# Patient Record
Sex: FEMALE | Race: BLACK OR AFRICAN AMERICAN | NOT HISPANIC OR LATINO | Employment: UNEMPLOYED | URBAN - METROPOLITAN AREA
[De-identification: names, ages, dates, MRNs, and addresses within clinical notes are randomized per-mention and may not be internally consistent; named-entity substitution may affect disease eponyms.]

---

## 2017-01-27 ENCOUNTER — HISTORICAL (OUTPATIENT)
Dept: ADMINISTRATIVE | Facility: HOSPITAL | Age: 57
End: 2017-01-27

## 2017-05-15 ENCOUNTER — OFFICE VISIT (OUTPATIENT)
Dept: ORTHOPEDICS | Facility: CLINIC | Age: 57
End: 2017-05-15
Payer: MEDICAID

## 2017-05-15 VITALS
BODY MASS INDEX: 37.39 KG/M2 | DIASTOLIC BLOOD PRESSURE: 85 MMHG | WEIGHT: 219 LBS | SYSTOLIC BLOOD PRESSURE: 130 MMHG | HEIGHT: 64 IN | HEART RATE: 84 BPM

## 2017-05-15 DIAGNOSIS — M25.561 CHRONIC PAIN OF BOTH KNEES: ICD-10-CM

## 2017-05-15 DIAGNOSIS — M79.641 PAIN IN BOTH HANDS: Primary | ICD-10-CM

## 2017-05-15 DIAGNOSIS — G89.29 CHRONIC PAIN OF BOTH KNEES: ICD-10-CM

## 2017-05-15 DIAGNOSIS — M25.562 CHRONIC PAIN OF BOTH KNEES: ICD-10-CM

## 2017-05-15 DIAGNOSIS — M79.642 PAIN IN BOTH HANDS: Primary | ICD-10-CM

## 2017-05-15 PROCEDURE — 99203 OFFICE O/P NEW LOW 30 MIN: CPT | Mod: ,,, | Performed by: ORTHOPAEDIC SURGERY

## 2017-05-15 PROCEDURE — 73560 X-RAY EXAM OF KNEE 1 OR 2: CPT | Mod: 50,,, | Performed by: ORTHOPAEDIC SURGERY

## 2017-05-15 PROCEDURE — 73120 X-RAY EXAM OF HAND: CPT | Mod: 50,,, | Performed by: ORTHOPAEDIC SURGERY

## 2017-05-15 RX ORDER — AMLODIPINE BESYLATE 5 MG/1
5 TABLET ORAL DAILY
COMMUNITY
End: 2017-07-17

## 2017-05-15 RX ORDER — HYDROCHLOROTHIAZIDE 25 MG/1
25 TABLET ORAL DAILY
COMMUNITY
End: 2018-12-03 | Stop reason: SDUPTHER

## 2017-05-15 RX ORDER — CARVEDILOL 6.25 MG/1
6.25 TABLET ORAL 2 TIMES DAILY WITH MEALS
COMMUNITY
End: 2018-12-03 | Stop reason: SDUPTHER

## 2017-05-15 NOTE — PROGRESS NOTES
Subjective:     Chief Complaint  Chief Complaint   Patient presents with    Left Hand - Pain     Left hand is worse than her right hand and has been occurring for about a month no known injury.    Right Hand - Pain       HPI  Chelsea Grossman is a 57 y.o.  female who complains of numbness in both hands. On her physical examination she has numbness in the median nerve distribution of both hands.examination of her knees she has full range of motion there is no effusion and no crepitance on range of motion x-ray examination of the left and right hand show some mild degenerative changes. X-ray examination of the left and right knee are negative      Review of Systems     Constitutional: Negative    HENT: Negative  Eyes: Negative  Respiratory: Negative  Cardiovascular: Negative  Musculoskeletal: HPI  Skin: Negative  Neurological: Negative  Hematological: Negative  Endocrine: Negative      Physical Exam    Vitals:    05/15/17 1438   BP: 130/85   Pulse: 84     Physical Examination:     General appearance -  well appearing, and in no distress  Mental status - awake  Neck - supple  Chest -  symmetric air entry  Heart - normal rate   Abdomen - soft    Past Medical History  Past Medical History:   Diagnosis Date    Hypertension        Past Surgical History  Past Surgical History:   Procedure Laterality Date    HYSTEROTOMY      kidney stones      Left ear surgery      Right pinky surgery         Medications  Current Outpatient Prescriptions   Medication Sig    amlodipine (NORVASC) 5 MG tablet Take 5 mg by mouth once daily.    carvedilol (COREG) 6.25 MG tablet Take 6.25 mg by mouth 2 (two) times daily with meals.    hydrochlorothiazide (HYDRODIURIL) 25 MG tablet Take 25 mg by mouth once daily.    loratadine (CLARITIN) 5 mg chewable tablet Take 5 mg by mouth once daily.     No current facility-administered medications for this visit.        Allergies  Review of patient's allergies indicates:  No Known  Allergies        Assessment/Plan   Pain in both hands  -     X-Ray Hand 3 View Bilateral  -     Nerve conduction test; Future    Chronic pain of both knees  -     X-Ray Knee 1 or 2 View Left  -     X-Ray Knee 1 or 2 View Right

## 2017-05-15 NOTE — MR AVS SNAPSHOT
[facility]  [address]  [address]  [place] [address]  Phone: [phone]  Fax: [phone]                  Chelsea Grossman   5/15/2017 2:20 PM   Office Visit    Description:  Female : 1960   Provider:  Harpreet Rothman MD   Department:  [facility]           Reason for Visit     Left Hand - Pain     Right Hand - Pain           Diagnoses this Visit        Comments    Pain in both hands    -  Primary     Chronic pain of both knees                To Do List           Future Appointments        Provider Department Dept Phone    2017 8:40 AM Harpreet Rothman MD [facility] [phone]      Goals (5 Years of Data)     None      Follow-Up and Disposition     Return in about 1 week (around 2017) for after nerve conduction test.    Follow-up and Disposition History           Medications           Message regarding Medications     Verify the changes and/or additions to your medication regime listed below are the same as discussed with your clinician today.  If any of these changes or additions are incorrect, please notify your healthcare provider.             Verify that the below list of medications is an accurate representation of the medications you are currently taking.  If none reported, the list may be blank. If incorrect, please contact your healthcare provider. Carry this list with you in case of emergency.           Current Medications     amlodipine (NORVASC) 5 MG tablet Take 5 mg by mouth once daily.    carvedilol (COREG) 6.25 MG tablet Take 6.25 mg by mouth 2 (two) times daily with meals.    hydrochlorothiazide (HYDRODIURIL) 25 MG tablet Take 25 mg by mouth once daily.    loratadine (CLARITIN) 5 mg chewable tablet Take 5 mg by mouth once daily.           Clinical Reference Information           Your Vitals Were     BP Pulse Height Weight BMI    130/85 (BP Location: Left arm, Patient Position:  "Sitting) 84 5' 4" (1.626 m) 99.3 kg (219 lb) 37.59 kg/m2      Blood Pressure          Most Recent Value    BP  130/85      Allergies as of 5/15/2017     No Known Allergies      Immunizations Administered on Date of Encounter - 5/15/2017     None      Orders Placed During Today's Visit      Normal Orders This Visit    X-Ray Hand 3 View Bilateral     X-Ray Knee 1 or 2 View Left     X-Ray Knee 1 or 2 View Right     Future Labs/Procedures Expected by Expires    Nerve conduction test  As directed 5/15/2018      gripNote Sign UP     Activating your gripNote account is as easy as 1-2-3!     1) Visit [email or web address], select Sign Up Now, enter this activation code and your date of birth, then select Next.  [email or web address]  Expires: 6/29/2017  3:15 PM      2) Create a username and password to use when you visit [employer]Note in the future and select a security question in case you lose your password and select Next.    3) Enter your e-mail address and click Sign Up!    Additional Information  If you have questions, please  call [phone] to talk to our gripNote staff. Remember, gripNote is NOT to be used for urgent needs. For medical emergencies, dial 911.         "

## 2017-05-22 ENCOUNTER — OFFICE VISIT (OUTPATIENT)
Dept: ORTHOPEDICS | Facility: CLINIC | Age: 57
End: 2017-05-22
Payer: MEDICAID

## 2017-05-22 VITALS
HEIGHT: 64 IN | HEART RATE: 84 BPM | WEIGHT: 213 LBS | DIASTOLIC BLOOD PRESSURE: 83 MMHG | SYSTOLIC BLOOD PRESSURE: 126 MMHG | BODY MASS INDEX: 36.37 KG/M2

## 2017-05-22 DIAGNOSIS — G56.03 CARPAL TUNNEL SYNDROME, BILATERAL: Primary | ICD-10-CM

## 2017-05-22 PROCEDURE — 99213 OFFICE O/P EST LOW 20 MIN: CPT | Mod: ,,, | Performed by: ORTHOPAEDIC SURGERY

## 2017-05-22 NOTE — PROGRESS NOTES
Has complaints of numbness in both hands for several months she denies any traumaSubjective:     Chief Complaint  Chief Complaint   Patient presents with    Right Knee - Pain     Patient is here today for both knees and both hands.  The problem has been occurring for about a month.  She has X-rays on her left hand.    Left Knee - Pain    Left Hand - Pain    Right Hand - Pain       HPI  Chelsea Grossman is a 57 y.o.  female who numbness in both hands for several months denies any trauma patient has also been seen in the past for some bilateral knee pain her x-rays essentially negative we will schedule a nerve conduction tests of both upper extremities rule out carpal tunnel syndrome      Review of Systems     Constitutional: Negative    HENT: Negative  Eyes: Negative  Respiratory: Negative  Cardiovascular: Negative  Musculoskeletal: HPI  Skin: Negative  Neurological: Negative  Hematological: Negative  Endocrine: Negative      Physical Exam    Vitals:    05/22/17 0853   BP: 126/83   Pulse: 84     Physical Examination:     General appearance -  well appearing, and in no distress  Mental status - awake  Neck - supple  Chest -  symmetric air entry  Heart - normal rate   Abdomen - soft    Past Medical History  Past Medical History:   Diagnosis Date    Hypertension        Past Surgical History  Past Surgical History:   Procedure Laterality Date    HYSTEROTOMY      kidney stones      Left ear surgery      Right pinky surgery         Medications  Current Outpatient Prescriptions   Medication Sig    amlodipine (NORVASC) 5 MG tablet Take 5 mg by mouth once daily.    carvedilol (COREG) 6.25 MG tablet Take 6.25 mg by mouth 2 (two) times daily with meals.    hydrochlorothiazide (HYDRODIURIL) 25 MG tablet Take 25 mg by mouth once daily.    loratadine (CLARITIN) 5 mg chewable tablet Take 5 mg by mouth once daily.     No current facility-administered medications for this visit.        Allergies  Review of patient's  allergies indicates:   Allergen Reactions    Bactrim [sulfamethoxazole-trimethoprim] Itching    Sulfa (sulfonamide antibiotics) Itching           Assessment/Plan   Carpal tunnel syndrome, bilateral  -     Nerve conduction test; Future

## 2017-05-30 ENCOUNTER — DOCUMENTATION ONLY (OUTPATIENT)
Dept: ORTHOPEDICS | Facility: CLINIC | Age: 57
End: 2017-05-30

## 2017-06-19 ENCOUNTER — OFFICE VISIT (OUTPATIENT)
Dept: ORTHOPEDICS | Facility: CLINIC | Age: 57
End: 2017-06-19
Payer: MEDICAID

## 2017-06-19 VITALS
HEIGHT: 64 IN | DIASTOLIC BLOOD PRESSURE: 90 MMHG | SYSTOLIC BLOOD PRESSURE: 140 MMHG | HEART RATE: 93 BPM | BODY MASS INDEX: 36.37 KG/M2 | WEIGHT: 213 LBS

## 2017-06-19 DIAGNOSIS — G56.03 CARPAL TUNNEL SYNDROME, BILATERAL: Primary | ICD-10-CM

## 2017-06-19 DIAGNOSIS — M65.352 TRIGGER FINGER, LEFT LITTLE FINGER: ICD-10-CM

## 2017-06-19 PROCEDURE — 99213 OFFICE O/P EST LOW 20 MIN: CPT | Mod: 57,,, | Performed by: ORTHOPAEDIC SURGERY

## 2017-06-19 RX ORDER — TIZANIDINE 4 MG/1
1 TABLET ORAL DAILY
COMMUNITY
Start: 2017-06-05 | End: 2017-08-21 | Stop reason: ALTCHOICE

## 2017-06-19 RX ORDER — TRAMADOL HYDROCHLORIDE 50 MG/1
1 TABLET ORAL DAILY
COMMUNITY
Start: 2017-05-27 | End: 2017-08-21 | Stop reason: ALTCHOICE

## 2017-06-19 RX ORDER — HYDROCODONE BITARTRATE AND ACETAMINOPHEN 10; 325 MG/1; MG/1
1 TABLET ORAL DAILY
COMMUNITY
Start: 2017-05-27 | End: 2017-08-21 | Stop reason: ALTCHOICE

## 2017-06-19 RX ORDER — GABAPENTIN 300 MG/1
1 CAPSULE ORAL 3 TIMES DAILY
COMMUNITY
Start: 2017-06-05 | End: 2018-12-03

## 2017-06-19 NOTE — PROGRESS NOTES
Past Medical History:   Diagnosis Date    Hypertension        Past Surgical History:   Procedure Laterality Date    HYSTEROTOMY      kidney stones      Left ear surgery      Right pinky surgery         Current Outpatient Prescriptions   Medication Sig    amlodipine (NORVASC) 5 MG tablet Take 5 mg by mouth once daily.    carvedilol (COREG) 6.25 MG tablet Take 6.25 mg by mouth 2 (two) times daily with meals.    gabapentin (NEURONTIN) 300 MG capsule Take 1 capsule by mouth Daily.    hydrochlorothiazide (HYDRODIURIL) 25 MG tablet Take 25 mg by mouth once daily.    hydrocodone-acetaminophen 10-325mg (NORCO)  mg Tab Take 1 tablet by mouth Daily.    loratadine (CLARITIN) 5 mg chewable tablet Take 5 mg by mouth once daily.    tizanidine (ZANAFLEX) 4 MG tablet Take 1 tablet by mouth Daily.    tramadol (ULTRAM) 50 mg tablet Take 1 tablet by mouth Daily.     No current facility-administered medications for this visit.        Review of patient's allergies indicates:   Allergen Reactions    Bactrim [sulfamethoxazole-trimethoprim] Itching    Sulfa (sulfonamide antibiotics) Itching       History reviewed. No pertinent family history.    Social History     Social History    Marital status: Single     Spouse name: N/A    Number of children: N/A    Years of education: N/A     Occupational History    Not on file.     Social History Main Topics    Smoking status: Current Some Day Smoker     Types: Cigarettes    Smokeless tobacco: Not on file    Alcohol use Yes    Drug use: No    Sexual activity: Not on file     Other Topics Concern    Not on file     Social History Narrative    No narrative on file       Chief Complaint:   Chief Complaint   Patient presents with    Left Wrist - Follow-up, Pain, Numbness     Here for review of NCS. Performed on 06/08/2017    Right Wrist - Follow-up, Pain, Numbness     Here for review of NCS. Performed on 06/08/2017       Consulting Physician: No ref. provider  "found    History of Present Illness:    This is a 57 y.o. year old female who complains of Patient's nerve conduction test are positive for bilateral carpal tunnel syndrome and she is also having chronic left finger trigger finger of the left little finger      ROS    Examination:    Vital Signs:    Vitals:    06/19/17 1510   BP: (!) 140/90   Pulse: 93   Weight: 96.6 kg (213 lb)   Height: 5' 4" (1.626 m)   PainSc:   7   PainLoc: Wrist       This a well-developed, well nourished patient in no acute distress.    Alert and oriented and cooperative to examination.       Physical Exam: Left Hand Exam    Skin  Scars:   None  Rash:   None    Inspection  Erythema:  None  Bruising:  None  Swelling:  None  Masses:  None  Lymphadenopathy: None    Coordination:  Normal  Instability:  None    Range of Motion  Finger ROM:  Full    Strength:  Normal   Tenderness:  None    Pulse:   2+ radial  Capillary Refill: Normal    Sensation:  Decreased sensation of the left hand median distribution  Patient also has triggering of the left fifth finger        Imaging: X-rays ordered and reviewed today no x-ray done today     Assessment: bilateral carpal tunnel syndrome and left little finger trigger finger    Plan:  patient wishes to have a left carpal tunnel release and release of the left fifth finger trigger finger      DISCLAIMER: This note may have been dictated using voice recognition software and may contain grammatical errors.     NOTE: Consult report sent to referring provider via EPIC EMR.  "

## 2017-07-07 ENCOUNTER — OFFICE VISIT (OUTPATIENT)
Dept: ORTHOPEDICS | Facility: CLINIC | Age: 57
End: 2017-07-07
Payer: MEDICAID

## 2017-07-07 LAB
ALBUMIN SERPL-MCNC: 3.7 G/DL (ref 3.1–4.7)
ALP SERPL-CCNC: 140 IU/L (ref 40–104)
ALT (SGPT): 96 IU/L (ref 3–33)
AST SERPL-CCNC: 81 IU/L (ref 10–40)
BASOPHILS NFR BLD: 0 K/UL (ref 0–0.2)
BASOPHILS NFR BLD: 0.6 %
BILIRUB SERPL-MCNC: 0.8 MG/DL (ref 0.3–1)
BUN SERPL-MCNC: 14 MG/DL (ref 8–20)
CALCIUM SERPL-MCNC: 9.8 MG/DL (ref 7.7–10.4)
CHLORIDE: 107 MMOL/L (ref 98–110)
CO2 SERPL-SCNC: 26.8 MMOL/L (ref 22.8–31.6)
CREATININE: 0.97 MG/DL (ref 0.6–1.4)
EOSINOPHIL NFR BLD: 0.1 K/UL (ref 0–0.7)
EOSINOPHIL NFR BLD: 1.6 %
ERYTHROCYTE [DISTWIDTH] IN BLOOD BY AUTOMATED COUNT: 12.3 % (ref 11.7–14.9)
GLUCOSE: 100 MG/DL (ref 70–99)
GRAN #: 2.5 K/UL (ref 1.4–6.5)
GRAN%: 34.7 %
HCT VFR BLD AUTO: 40 % (ref 36–48)
HGB BLD-MCNC: 14 G/DL (ref 12–15)
IMMATURE GRANS (ABS): 0 K/UL (ref 0–1)
IMMATURE GRANULOCYTES: 0.3 %
LYMPH #: 3.9 K/UL (ref 1.2–3.4)
LYMPH%: 56 %
MCH RBC QN AUTO: 33.2 PG (ref 25–35)
MCHC RBC AUTO-ENTMCNC: 35 G/DL (ref 31–36)
MCV RBC AUTO: 94.8 FL (ref 79–98)
MONO #: 0.5 K/UL (ref 0.1–0.6)
MONO%: 6.8 %
NUCLEATED RBCS: 0 %
PLATELET # BLD AUTO: 214 K/UL (ref 140–440)
PMV BLD AUTO: 10.3 FL (ref 8.8–12.7)
POTASSIUM SERPL-SCNC: 3.3 MMOL/L (ref 3.5–5)
PROT SERPL-MCNC: 7.5 G/DL (ref 6–8.2)
RBC # BLD AUTO: 4.22 M/UL (ref 3.5–5.5)
SODIUM: 141 MMOL/L (ref 134–144)
WBC # BLD AUTO: 7 K/UL (ref 5–10)

## 2017-07-11 LAB — POTASSIUM SERPL-SCNC: 3.4 MMOL/L (ref 3.5–5)

## 2017-07-14 DIAGNOSIS — L29.8 ITCHING DUE TO DRUG: Primary | ICD-10-CM

## 2017-07-14 DIAGNOSIS — T50.905A ITCHING DUE TO DRUG: Primary | ICD-10-CM

## 2017-07-14 RX ORDER — DIPHENHYDRAMINE HCL 50 MG
50 CAPSULE ORAL EVERY 6 HOURS PRN
Refills: 0 | COMMUNITY
Start: 2017-07-14 | End: 2018-11-16

## 2017-07-17 ENCOUNTER — OFFICE VISIT (OUTPATIENT)
Dept: ORTHOPEDICS | Facility: CLINIC | Age: 57
End: 2017-07-17
Payer: MEDICAID

## 2017-07-17 VITALS
DIASTOLIC BLOOD PRESSURE: 78 MMHG | WEIGHT: 213 LBS | HEART RATE: 82 BPM | BODY MASS INDEX: 36.37 KG/M2 | SYSTOLIC BLOOD PRESSURE: 133 MMHG | HEIGHT: 64 IN

## 2017-07-17 DIAGNOSIS — G56.03 CARPAL TUNNEL SYNDROME, BILATERAL: Primary | ICD-10-CM

## 2017-07-17 DIAGNOSIS — M65.352 TRIGGER FINGER, LEFT LITTLE FINGER: ICD-10-CM

## 2017-07-17 PROCEDURE — 99024 POSTOP FOLLOW-UP VISIT: CPT | Mod: ,,, | Performed by: ORTHOPAEDIC SURGERY

## 2017-07-17 RX ORDER — OXYCODONE HYDROCHLORIDE 10 MG/1
TABLET ORAL
Refills: 0 | COMMUNITY
Start: 2017-07-11 | End: 2017-08-21 | Stop reason: ALTCHOICE

## 2017-07-17 RX ORDER — OXYCODONE AND ACETAMINOPHEN 5; 325 MG/1; MG/1
1 TABLET ORAL EVERY 6 HOURS PRN
Qty: 30 TABLET | Refills: 0 | Status: SHIPPED | OUTPATIENT
Start: 2017-07-17 | End: 2017-08-21 | Stop reason: ALTCHOICE

## 2017-07-17 RX ORDER — AMLODIPINE BESYLATE 10 MG/1
10 TABLET ORAL DAILY
COMMUNITY
Start: 2017-06-22 | End: 2018-12-03 | Stop reason: SDUPTHER

## 2017-07-17 NOTE — PROGRESS NOTES
Past Medical History:   Diagnosis Date    Hypertension        Past Surgical History:   Procedure Laterality Date    HYSTEROTOMY      kidney stones      Left ear surgery      Right pinky surgery         Current Outpatient Prescriptions   Medication Sig    amlodipine (NORVASC) 10 MG tablet     carvedilol (COREG) 6.25 MG tablet Take 6.25 mg by mouth 2 (two) times daily with meals.    diphenhydrAMINE (BENADRYL) 50 MG capsule Take 1 capsule (50 mg total) by mouth every 6 (six) hours as needed for Itching.    gabapentin (NEURONTIN) 300 MG capsule Take 1 capsule by mouth Daily.    hydrochlorothiazide (HYDRODIURIL) 25 MG tablet Take 25 mg by mouth once daily.    hydrocodone-acetaminophen 10-325mg (NORCO)  mg Tab Take 1 tablet by mouth Daily.    loratadine (CLARITIN) 5 mg chewable tablet Take 5 mg by mouth once daily.    oxycodone (ROXICODONE) 10 mg Tab immediate release tablet TK 1 T PO Q 4 TO 6 H PRN    tizanidine (ZANAFLEX) 4 MG tablet Take 1 tablet by mouth Daily.    tramadol (ULTRAM) 50 mg tablet Take 1 tablet by mouth Daily.     No current facility-administered medications for this visit.        Review of patient's allergies indicates:   Allergen Reactions    Bactrim [sulfamethoxazole-trimethoprim] Itching    Sulfa (sulfonamide antibiotics) Itching       History reviewed. No pertinent family history.    Social History     Social History    Marital status: Single     Spouse name: N/A    Number of children: N/A    Years of education: N/A     Occupational History    Not on file.     Social History Main Topics    Smoking status: Current Some Day Smoker     Types: Cigarettes    Smokeless tobacco: Never Used    Alcohol use Yes    Drug use: No    Sexual activity: Not on file     Other Topics Concern    Not on file     Social History Narrative    No narrative on file       Chief Complaint:   Chief Complaint   Patient presents with    Left Wrist - Pain, Post-op Evaluation     DOS: 07/11/2017.  "6 days post op Left wrist CTR and trigger finger release of 5th digit Left hand. Patient states oxycodone is making her itch. She hasn't went to the pharmacy to  benadryl that was approved on 07/14/2017.        Consulting Physician: No ref. provider found    History of Present Illness:    This is a 57 y.o. year old female who complains of patient returns today 6 days post left carpal tunnel release and fifth finger trigger finger release      ROS    Examination:    Vital Signs:    Vitals:    07/17/17 1043   BP: 133/78   Pulse: 82   Weight: 96.6 kg (213 lb)   Height: 5' 4" (1.626 m)   PainSc:   6   PainLoc: Wrist       This a well-developed, well nourished patient in no acute distress.    Alert and oriented and cooperative to examination.       Physical Exam: on physical examination she has increased sensation to left hand her incisions are healing well with no drainage    Imaging: X-rays ordered and reviewed today no x-rays done     Assessment: status post left arpal tunnel release and left fifth finger trigger finger release    Plan:  Patient instructed to start range of motion of the finger and wrist and return in 9 days for suture removal      DISCLAIMER: This note may have been dictated using voice recognition software and may contain grammatical errors.     NOTE: Consult report sent to referring provider via EPIC EMR.  "

## 2017-07-26 ENCOUNTER — OFFICE VISIT (OUTPATIENT)
Dept: ORTHOPEDICS | Facility: CLINIC | Age: 57
End: 2017-07-26
Payer: MEDICAID

## 2017-07-26 VITALS
DIASTOLIC BLOOD PRESSURE: 80 MMHG | HEIGHT: 64 IN | SYSTOLIC BLOOD PRESSURE: 134 MMHG | HEART RATE: 78 BPM | RESPIRATION RATE: 98 BRPM | BODY MASS INDEX: 36.37 KG/M2 | WEIGHT: 213 LBS

## 2017-07-26 DIAGNOSIS — G56.03 CARPAL TUNNEL SYNDROME, BILATERAL: ICD-10-CM

## 2017-07-26 DIAGNOSIS — M65.352 TRIGGER FINGER, LEFT LITTLE FINGER: Primary | ICD-10-CM

## 2017-07-26 PROCEDURE — 99024 POSTOP FOLLOW-UP VISIT: CPT | Mod: ,,, | Performed by: ORTHOPAEDIC SURGERY

## 2017-07-26 NOTE — PROGRESS NOTES
Past Medical History:   Diagnosis Date    Hypertension        Past Surgical History:   Procedure Laterality Date    HYSTEROTOMY      kidney stones      Left ear surgery      Right pinky surgery         Current Outpatient Prescriptions   Medication Sig    amlodipine (NORVASC) 10 MG tablet     carvedilol (COREG) 6.25 MG tablet Take 6.25 mg by mouth 2 (two) times daily with meals.    diphenhydrAMINE (BENADRYL) 50 MG capsule Take 1 capsule (50 mg total) by mouth every 6 (six) hours as needed for Itching.    gabapentin (NEURONTIN) 300 MG capsule Take 1 capsule by mouth Daily.    hydrochlorothiazide (HYDRODIURIL) 25 MG tablet Take 25 mg by mouth once daily.    hydrocodone-acetaminophen 10-325mg (NORCO)  mg Tab Take 1 tablet by mouth Daily.    loratadine (CLARITIN) 5 mg chewable tablet Take 5 mg by mouth once daily.    oxycodone (ROXICODONE) 10 mg Tab immediate release tablet TK 1 T PO Q 4 TO 6 H PRN    oxycodone-acetaminophen (PERCOCET) 5-325 mg per tablet Take 1 tablet by mouth every 6 (six) hours as needed for Pain.    tizanidine (ZANAFLEX) 4 MG tablet Take 1 tablet by mouth Daily.    tramadol (ULTRAM) 50 mg tablet Take 1 tablet by mouth Daily.     No current facility-administered medications for this visit.        Review of patient's allergies indicates:   Allergen Reactions    Bactrim [sulfamethoxazole-trimethoprim] Itching    Sulfa (sulfonamide antibiotics) Itching       History reviewed. No pertinent family history.    Social History     Social History    Marital status: Single     Spouse name: N/A    Number of children: N/A    Years of education: N/A     Occupational History    Not on file.     Social History Main Topics    Smoking status: Current Some Day Smoker     Types: Cigarettes    Smokeless tobacco: Never Used    Alcohol use Yes    Drug use: No    Sexual activity: Not on file     Other Topics Concern    Not on file     Social History Narrative    No narrative on file  "      Chief Complaint:   Chief Complaint   Patient presents with    Left Wrist - Post-op Evaluation     DOS: 07/11/2017. 15 days post op Left wrist CTR and trigger finger release of 5th digit Left hand. Sutures Removed Today. Patient had pains in her wrist by incision site right before going to sleep last night.       Consulting Physician: No ref. provider found    History of Present Illness:    This is a 57 y.o. year old female who complains of Patient is 2 weeks status postleft carpal tunnel release and fifth finger trigger finger release she has no complaints smileexcept for some mild discomfort around the incision she has good sensation to the fingers      ROS    Examination:    Vital Signs:    Vitals:    07/26/17 0853   BP: 134/80   Pulse: 78   Resp: (!) 98   Weight: 96.6 kg (213 lb)   Height: 5' 4" (1.626 m)   PainSc:   4   PainLoc: Wrist       This a well-developed, well nourished patient in no acute distress.    Alert and oriented and cooperative to examination.       Physical Exam: on her physical examination her incision is healed she has good sensation to the fingers and has no triggering of the fifth finger    Imaging: X-rays ordered and reviewed today no x-rays done today     Assessment: tatus post left carpal tunnel release and left fifth finger trigger finger releasedoing well    Plan:  Patient instructed to continue range of motion exercises to her wrist and finger and to protect the incision she is to return as needed      DISCLAIMER: This note may have been dictated using voice recognition software and may contain grammatical errors.     NOTE: Consult report sent to referring provider via EPIC EMR.  "

## 2017-08-21 ENCOUNTER — OFFICE VISIT (OUTPATIENT)
Dept: ORTHOPEDICS | Facility: CLINIC | Age: 57
End: 2017-08-21
Payer: MEDICAID

## 2017-08-21 VITALS
WEIGHT: 213 LBS | HEIGHT: 64 IN | DIASTOLIC BLOOD PRESSURE: 88 MMHG | SYSTOLIC BLOOD PRESSURE: 138 MMHG | HEART RATE: 83 BPM | BODY MASS INDEX: 36.37 KG/M2

## 2017-08-21 DIAGNOSIS — G56.03 CARPAL TUNNEL SYNDROME, BILATERAL: Primary | ICD-10-CM

## 2017-08-21 PROCEDURE — 3008F BODY MASS INDEX DOCD: CPT | Mod: ,,, | Performed by: ORTHOPAEDIC SURGERY

## 2017-08-21 PROCEDURE — 99024 POSTOP FOLLOW-UP VISIT: CPT | Mod: ,,, | Performed by: ORTHOPAEDIC SURGERY

## 2017-08-21 RX ORDER — OXYCODONE AND ACETAMINOPHEN 7.5; 325 MG/1; MG/1
1 TABLET ORAL EVERY 6 HOURS PRN
Qty: 30 TABLET | Refills: 0 | Status: SHIPPED | OUTPATIENT
Start: 2017-08-21 | End: 2017-09-06 | Stop reason: SDUPTHER

## 2017-08-21 NOTE — PROGRESS NOTES
Past Medical History:   Diagnosis Date    Hypertension        Past Surgical History:   Procedure Laterality Date    HYSTEROTOMY      kidney stones      Left ear surgery      Right pinky surgery         Current Outpatient Prescriptions   Medication Sig    amlodipine (NORVASC) 10 MG tablet     carvedilol (COREG) 6.25 MG tablet Take 6.25 mg by mouth 2 (two) times daily with meals.    diphenhydrAMINE (BENADRYL) 50 MG capsule Take 1 capsule (50 mg total) by mouth every 6 (six) hours as needed for Itching.    gabapentin (NEURONTIN) 300 MG capsule Take 1 capsule by mouth Daily.    hydrochlorothiazide (HYDRODIURIL) 25 MG tablet Take 25 mg by mouth once daily.    loratadine (CLARITIN) 5 mg chewable tablet Take 5 mg by mouth once daily.     No current facility-administered medications for this visit.        Review of patient's allergies indicates:   Allergen Reactions    Bactrim [sulfamethoxazole-trimethoprim] Itching    Sulfa (sulfonamide antibiotics) Itching       History reviewed. No pertinent family history.    Social History     Social History    Marital status: Single     Spouse name: N/A    Number of children: N/A    Years of education: N/A     Occupational History    Not on file.     Social History Main Topics    Smoking status: Current Some Day Smoker     Types: Cigarettes    Smokeless tobacco: Never Used    Alcohol use Yes    Drug use: No    Sexual activity: Not on file     Other Topics Concern    Not on file     Social History Narrative    No narrative on file       Chief Complaint:   Chief Complaint   Patient presents with    Right Hand - Pain, Numbness, Tingling     Patient having Numbness, tingling, and pain for about 2 weeks. She states it is getting worse not better. Affects the first 2 fingers worse and going into the 3rd finger now.       Consulting Physician: No ref. provider found    History of Present Illness:    This is a 57 y.o. year old female who complains of patient returns  "today with numbness in the right hand she has a positive history of bilateral carpal tunnel syndrome a positive nerve conduction test      ROS    Examination:    Vital Signs:    Vitals:    08/21/17 0839   BP: 138/88   Pulse: 83   Weight: 96.6 kg (213 lb)   Height: 5' 4" (1.626 m)   PainSc:   7   PainLoc: Hand       This a well-developed, well nourished patient in no acute distress.    Alert and oriented and cooperative to examination.       Physical Exam: rightHand Exam    Skin  Scars:   None  Rash:   None    Inspection  Erythema:  None  Bruising:  None  Swelling:  None  Masses:  None  Lymphadenopathy: None    Coordination:  Normal  Instability:  None    Range of Motion  Finger ROM:  Full    Strength:  Normal   Tenderness:  None    Pulse:   2+ radial  Capillary Refill: Normal    Sensation:  Decreased sensation in the thumb index and ring finger and long finger          Imaging: X-rays ordered and reviewed today no x-rays done today patient has a previous nerve conduction test which is positive for right carpal tunnel syndrome     Assessment: right carpal tunnel syndrome    Plan:  Patient wishes to have right carpal tunnel release and we will schedule      DISCLAIMER: This note may have been dictated using voice recognition software and may contain grammatical errors.     NOTE: Consult report sent to referring provider via EPIC EMR.  "

## 2017-08-25 ENCOUNTER — OFFICE VISIT (OUTPATIENT)
Dept: ORTHOPEDICS | Facility: CLINIC | Age: 57
End: 2017-08-25
Payer: MEDICAID

## 2017-08-25 LAB
ALBUMIN SERPL-MCNC: 4 G/DL (ref 3.1–4.7)
ALP SERPL-CCNC: 125 IU/L (ref 40–104)
ALT (SGPT): 90 IU/L (ref 3–33)
AST SERPL-CCNC: 76 IU/L (ref 10–40)
BASOPHILS NFR BLD: 0 K/UL (ref 0–0.2)
BASOPHILS NFR BLD: 0.5 %
BILIRUB SERPL-MCNC: 1 MG/DL (ref 0.3–1)
BUN SERPL-MCNC: 11 MG/DL (ref 8–20)
CALCIUM SERPL-MCNC: 9.4 MG/DL (ref 7.7–10.4)
CHLORIDE: 105 MMOL/L (ref 98–110)
CO2 SERPL-SCNC: 25.2 MMOL/L (ref 22.8–31.6)
CREATININE: 0.85 MG/DL (ref 0.6–1.4)
EOSINOPHIL NFR BLD: 0.1 K/UL (ref 0–0.7)
EOSINOPHIL NFR BLD: 0.9 %
ERYTHROCYTE [DISTWIDTH] IN BLOOD BY AUTOMATED COUNT: 12.4 % (ref 11.7–14.9)
GLUCOSE: 124 MG/DL (ref 70–99)
GRAN #: 2.3 K/UL (ref 1.4–6.5)
GRAN%: 35.1 %
HCT VFR BLD AUTO: 41.9 % (ref 36–48)
HGB BLD-MCNC: 14.2 G/DL (ref 12–15)
IMMATURE GRANS (ABS): 0 K/UL (ref 0–1)
IMMATURE GRANULOCYTES: 0 %
LYMPH #: 3.7 K/UL (ref 1.2–3.4)
LYMPH%: 57.5 %
MCH RBC QN AUTO: 32.5 PG (ref 25–35)
MCHC RBC AUTO-ENTMCNC: 33.9 G/DL (ref 31–36)
MCV RBC AUTO: 95.9 FL (ref 79–98)
MONO #: 0.4 K/UL (ref 0.1–0.6)
MONO%: 6 %
NUCLEATED RBCS: 0 %
PLATELET # BLD AUTO: 200 K/UL (ref 140–440)
PMV BLD AUTO: 10.2 FL (ref 8.8–12.7)
POTASSIUM SERPL-SCNC: 3.8 MMOL/L (ref 3.5–5)
PROT SERPL-MCNC: 8 G/DL (ref 6–8.2)
RBC # BLD AUTO: 4.37 M/UL (ref 3.5–5.5)
SODIUM: 139 MMOL/L (ref 134–144)
WBC # BLD AUTO: 6.5 K/UL (ref 5–10)

## 2017-09-06 ENCOUNTER — OFFICE VISIT (OUTPATIENT)
Dept: ORTHOPEDICS | Facility: CLINIC | Age: 57
End: 2017-09-06
Payer: MEDICAID

## 2017-09-06 VITALS
DIASTOLIC BLOOD PRESSURE: 107 MMHG | HEART RATE: 86 BPM | HEIGHT: 64 IN | BODY MASS INDEX: 36.37 KG/M2 | WEIGHT: 213 LBS | SYSTOLIC BLOOD PRESSURE: 162 MMHG

## 2017-09-06 DIAGNOSIS — G56.03 CARPAL TUNNEL SYNDROME, BILATERAL: Primary | ICD-10-CM

## 2017-09-06 PROCEDURE — 99024 POSTOP FOLLOW-UP VISIT: CPT | Mod: ,,, | Performed by: ORTHOPAEDIC SURGERY

## 2017-09-06 RX ORDER — OXYCODONE AND ACETAMINOPHEN 7.5; 325 MG/1; MG/1
1 TABLET ORAL EVERY 6 HOURS PRN
Qty: 30 TABLET | Refills: 0 | Status: SHIPPED | OUTPATIENT
Start: 2017-09-06 | End: 2018-11-16

## 2017-09-06 NOTE — PROGRESS NOTES
Past Medical History:   Diagnosis Date    Hypertension        Past Surgical History:   Procedure Laterality Date    HYSTEROTOMY      kidney stones      Left ear surgery      Right pinky surgery         Current Outpatient Prescriptions   Medication Sig    amlodipine (NORVASC) 10 MG tablet     carvedilol (COREG) 6.25 MG tablet Take 6.25 mg by mouth 2 (two) times daily with meals.    diphenhydrAMINE (BENADRYL) 50 MG capsule Take 1 capsule (50 mg total) by mouth every 6 (six) hours as needed for Itching.    gabapentin (NEURONTIN) 300 MG capsule Take 1 capsule by mouth Daily.    hydrochlorothiazide (HYDRODIURIL) 25 MG tablet Take 25 mg by mouth once daily.    loratadine (CLARITIN) 5 mg chewable tablet Take 5 mg by mouth once daily.    oxycodone-acetaminophen (PERCOCET) 7.5-325 mg per tablet Take 1 tablet by mouth every 6 (six) hours as needed for Pain.     No current facility-administered medications for this visit.        Review of patient's allergies indicates:   Allergen Reactions    Bactrim [sulfamethoxazole-trimethoprim] Itching    Sulfa (sulfonamide antibiotics) Itching       History reviewed. No pertinent family history.    Social History     Social History    Marital status: Single     Spouse name: N/A    Number of children: N/A    Years of education: N/A     Occupational History    Not on file.     Social History Main Topics    Smoking status: Current Some Day Smoker     Types: Cigarettes    Smokeless tobacco: Never Used    Alcohol use Yes    Drug use: No    Sexual activity: Not on file     Other Topics Concern    Not on file     Social History Narrative    No narrative on file       Chief Complaint:   Chief Complaint   Patient presents with    Right Wrist - Post-op Evaluation, Pain     DOS: 08/29/2017, Right CTR 8 days post op       Consulting Physician: No ref. provider found    History of Present Illness:    This is a 57 y.o. year old female who complains of patient is one-week  status post right carpal tunnel release she has no complaints      ROS    Examination:    Vital Signs:  There were no vitals filed for this visit.    This a well-developed, well nourished patient in no acute distress.    Alert and oriented and cooperative to examination.       Physical Exam: Right Wrist Exam    Skin  Scars:   Healing well  Rash:   None    Inspection  Erythema:  None  Bruising:  None  Swelling:  None  Masses  None  Lymphadenopathy: None    Coordination:  Normal  Instability:  None    Range of Motion  Volar Flexion:  Normal  Dorsal Extension: Normal  Radial Deviation: Normal  Ulnar Deviation: Normal  Finger ROM:  Full    Strength:  Normal     Tenderness  Radial:   None  Ulnar:   None  Snuffbox:  None    Pulse:   2+ radial  Sensation:  Intact    Tinel's:   Negative  Finkelstein's:  Negative          Imaging: X-rays ordered and reviewed today no x-rays done today     Assessment: 1 week status post right carpal tunnel release doing well    Plan:  Patient's been instructed on range of motion exercises she is to keep the wound dry and return in 1 week for suture removal      DISCLAIMER: This note may have been dictated using voice recognition software and may contain grammatical errors.     NOTE: Consult report sent to referring provider via [employer].

## 2017-09-13 ENCOUNTER — OFFICE VISIT (OUTPATIENT)
Dept: ORTHOPEDICS | Facility: CLINIC | Age: 57
End: 2017-09-13
Payer: MEDICAID

## 2017-09-13 VITALS
HEART RATE: 83 BPM | DIASTOLIC BLOOD PRESSURE: 85 MMHG | WEIGHT: 213 LBS | BODY MASS INDEX: 36.37 KG/M2 | HEIGHT: 64 IN | SYSTOLIC BLOOD PRESSURE: 139 MMHG

## 2017-09-13 DIAGNOSIS — G56.03 CARPAL TUNNEL SYNDROME, BILATERAL: Primary | ICD-10-CM

## 2017-09-13 PROCEDURE — 99024 POSTOP FOLLOW-UP VISIT: CPT | Mod: ,,, | Performed by: ORTHOPAEDIC SURGERY

## 2017-09-13 RX ORDER — TIZANIDINE 4 MG/1
TABLET ORAL
COMMUNITY
Start: 2017-08-28 | End: 2018-11-16

## 2017-09-13 NOTE — PROGRESS NOTES
Past Medical History:   Diagnosis Date    Hypertension        Past Surgical History:   Procedure Laterality Date    HYSTEROTOMY      kidney stones      Left ear surgery      Right pinky surgery         Current Outpatient Prescriptions   Medication Sig    amlodipine (NORVASC) 10 MG tablet     carvedilol (COREG) 6.25 MG tablet Take 6.25 mg by mouth 2 (two) times daily with meals.    diphenhydrAMINE (BENADRYL) 50 MG capsule Take 1 capsule (50 mg total) by mouth every 6 (six) hours as needed for Itching.    gabapentin (NEURONTIN) 300 MG capsule Take 1 capsule by mouth Daily.    hydrochlorothiazide (HYDRODIURIL) 25 MG tablet Take 25 mg by mouth once daily.    loratadine (CLARITIN) 5 mg chewable tablet Take 5 mg by mouth once daily.    oxycodone-acetaminophen (PERCOCET) 7.5-325 mg per tablet Take 1 tablet by mouth every 6 (six) hours as needed for Pain.    tizanidine (ZANAFLEX) 4 MG tablet      No current facility-administered medications for this visit.        Review of patient's allergies indicates:   Allergen Reactions    Bactrim [sulfamethoxazole-trimethoprim] Itching    Sulfa (sulfonamide antibiotics) Itching       History reviewed. No pertinent family history.    Social History     Social History    Marital status: Single     Spouse name: N/A    Number of children: N/A    Years of education: N/A     Occupational History    Not on file.     Social History Main Topics    Smoking status: Current Some Day Smoker     Types: Cigarettes    Smokeless tobacco: Never Used    Alcohol use Yes    Drug use: No    Sexual activity: Not on file     Other Topics Concern    Not on file     Social History Narrative    No narrative on file       Chief Complaint:   Chief Complaint   Patient presents with    Right Wrist - Post-op Evaluation     Post-op 2 weeks and 1 day.  DOS: 08/29/2017, Right CTR.  She states her wrist is doing okay.       Consulting Physician: No ref. provider found    History of Present  "Illness:    This is a 57 y.o. year old female who complains of patient is two-week status post      ROS    Examination:    Vital Signs:    Vitals:    09/13/17 0930   BP: 139/85   Pulse: 83   Weight: 96.6 kg (213 lb)   Height: 5' 4" (1.626 m)   PainSc:   7       This a well-developed, well nourished patient in no acute distress.    Alert and oriented and cooperative to examination.       Physical Exam: right hand-he incision on her right hand is healing well she has no drainage she has good sensation of the fingers    Imaging: X-rays ordered and reviewed today no x-rays done today     Assessment:  Weeks status post right carpal tunnel release doing well    Plan:  Patient isinstructed to do no heavy lifting and protect the incision      DISCLAIMER: This note may have been dictated using voice recognition software and may contain grammatical errors.     NOTE: Consult report sent to referring provider via [employer] EMR.  "

## 2018-07-26 ENCOUNTER — OFFICE VISIT (OUTPATIENT)
Dept: ORTHOPEDICS | Facility: CLINIC | Age: 58
End: 2018-07-26
Payer: MEDICAID

## 2018-07-26 VITALS
HEART RATE: 64 BPM | DIASTOLIC BLOOD PRESSURE: 74 MMHG | SYSTOLIC BLOOD PRESSURE: 118 MMHG | WEIGHT: 213 LBS | HEIGHT: 64 IN | BODY MASS INDEX: 36.37 KG/M2

## 2018-07-26 DIAGNOSIS — S93.492A SPRAIN OF ANTERIOR TALOFIBULAR LIGAMENT OF LEFT ANKLE, INITIAL ENCOUNTER: Primary | ICD-10-CM

## 2018-07-26 PROCEDURE — 99213 OFFICE O/P EST LOW 20 MIN: CPT | Mod: ,,, | Performed by: ORTHOPAEDIC SURGERY

## 2018-07-26 RX ORDER — MELOXICAM 7.5 MG/1
7.5 TABLET ORAL DAILY
Qty: 30 TABLET | Refills: 2 | Status: SHIPPED | OUTPATIENT
Start: 2018-07-26 | End: 2018-08-25

## 2018-07-26 RX ORDER — TRAMADOL HYDROCHLORIDE 50 MG/1
TABLET ORAL
COMMUNITY
Start: 2018-07-12 | End: 2018-11-16

## 2018-07-26 NOTE — PROGRESS NOTES
[facility]    Subjective:     Chief Complaint:   Chief Complaint   Patient presents with    Left Ankle - Pain     Left ankle/foot pain for about a week. States she fell stepping off a curb and twisted foot. States she does not feel stable. Went to [facility] and got xrays.        Past Medical History:   Diagnosis Date    Hypertension        Past Surgical History:   Procedure Laterality Date    CARPAL TUNNEL RELEASE      HYSTEROTOMY      kidney stones      Left ear surgery      Right pinky surgery         Current Outpatient Prescriptions   Medication Sig    amlodipine (NORVASC) 10 MG tablet     carvedilol (COREG) 6.25 MG tablet Take 6.25 mg by mouth 2 (two) times daily with meals.    diphenhydrAMINE (BENADRYL) 50 MG capsule Take 1 capsule (50 mg total) by mouth every 6 (six) hours as needed for Itching.    hydrochlorothiazide (HYDRODIURIL) 25 MG tablet Take 25 mg by mouth once daily.    loratadine (CLARITIN) 5 mg chewable tablet Take 5 mg by mouth once daily.    tizanidine (ZANAFLEX) 4 MG tablet     traMADol (ULTRAM) 50 mg tablet     gabapentin (NEURONTIN) 300 MG capsule Take 1 capsule by mouth Daily.    oxycodone-acetaminophen (PERCOCET) 7.5-325 mg per tablet Take 1 tablet by mouth every 6 (six) hours as needed for Pain.     No current facility-administered medications for this visit.        Review of patient's allergies indicates:   Allergen Reactions    Bactrim [sulfamethoxazole-trimethoprim] Itching    Sulfa (sulfonamide antibiotics) Itching       History reviewed. No pertinent family history.    Social History     Social History    Marital status: Single     Spouse name: N/A    Number of children: N/A    Years of education: N/A     Occupational History    Not on file.     Social History Main Topics    Smoking status: Current Some Day Smoker     Types: Cigarettes    Smokeless tobacco: Never Used    Alcohol use Yes    Drug use: No    Sexual activity: Not on file     Other Topics  Concern    Not on file     Social History Narrative    No narrative on file       History of present illness: Patient comes in today for her left ankle. She fell. She tripped. She twisted her left ankle. She was seen in the emergency room and placed into a splint. She was referred on for further care.      Review of Systems:    Constitution: Negative for chills, fever, and sweats.  Negative for unexplained weight loss.    HENT:  Negative for headaches and blurry vision.    Cardiovascular:Negative for chest pain or irregular heart beat. Negative for hypertension.    Respiratory:  Negative for cough and shortness of breath.    Gastrointestinal: Negative for abdominal pain, heartburn, melena, nausea, and vomitting.    Genitourinary:  Negative bladder incontinence and dysuria.    Musculoskeletal:  See HPI for details.     Neurological: Negative for numbness.    Psychiatric/Behavioral: Negative for depression.  The patient is not nervous/anxious.      Endocrine: Negative for polyuria    Hematologic/Lymphatic: Negative for bleeding problem.  Does not bruise/bleed easily.    Skin: Negative for poor would healing and rash    Objective:      Physical Examination:    Vital Signs:    Vitals:    07/26/18 1448   BP: 118/74   Pulse: 64       Body mass index is 36.56 kg/m².    This a well-developed, well nourished patient in no acute distress.  They are alert and oriented and cooperative to examination.        Patient is very tender of the lateral ligament complex. She has a negative anterior drawer. She has no medial sided tenderness.  Pertinent New Results:  X-rays done in the emergency room are reviewed. They demonstrate no fractures or subluxations. The x-rays are of the left ankle  XRAY Report / Interpretation:   No new XRAYS Today.    Assessment/Plan:      Ankle sprain. Injury to the anterior talofibular ligament. Placed her into an ankle corset. Follow-up in 6 weeks. I did start her on physical therapy      This note was  created using Dragon voice recognition software that occasionally misinterpreted phrases or words.

## 2018-10-01 ENCOUNTER — OFFICE VISIT (OUTPATIENT)
Dept: ORTHOPEDICS | Facility: CLINIC | Age: 58
End: 2018-10-01
Payer: MEDICAID

## 2018-10-01 VITALS — HEIGHT: 64 IN | BODY MASS INDEX: 36.37 KG/M2 | WEIGHT: 213 LBS

## 2018-10-01 DIAGNOSIS — M79.672 CHRONIC PAIN IN LEFT FOOT: ICD-10-CM

## 2018-10-01 DIAGNOSIS — G89.29 CHRONIC PAIN IN LEFT FOOT: ICD-10-CM

## 2018-10-01 DIAGNOSIS — S93.492A SPRAIN OF ANTERIOR TALOFIBULAR LIGAMENT OF LEFT ANKLE, INITIAL ENCOUNTER: Primary | ICD-10-CM

## 2018-10-01 PROCEDURE — 73620 X-RAY EXAM OF FOOT: CPT | Mod: FY,LT,, | Performed by: ORTHOPAEDIC SURGERY

## 2018-10-01 PROCEDURE — 99213 OFFICE O/P EST LOW 20 MIN: CPT | Mod: ,,, | Performed by: ORTHOPAEDIC SURGERY

## 2018-10-01 RX ORDER — IBUPROFEN 600 MG/1
600 TABLET ORAL 3 TIMES DAILY
Qty: 60 TABLET | Refills: 1 | Status: SHIPPED | OUTPATIENT
Start: 2018-10-01 | End: 2019-03-18

## 2018-10-01 NOTE — PROGRESS NOTES
Past Medical History:   Diagnosis Date    Hypertension        Past Surgical History:   Procedure Laterality Date    CARPAL TUNNEL RELEASE      HYSTEROTOMY      kidney stones      Left ear surgery      Right pinky surgery         Current Outpatient Medications   Medication Sig    amlodipine (NORVASC) 10 MG tablet     carvedilol (COREG) 6.25 MG tablet Take 6.25 mg by mouth 2 (two) times daily with meals.    diphenhydrAMINE (BENADRYL) 50 MG capsule Take 1 capsule (50 mg total) by mouth every 6 (six) hours as needed for Itching.    gabapentin (NEURONTIN) 300 MG capsule Take 1 capsule by mouth Daily.    hydrochlorothiazide (HYDRODIURIL) 25 MG tablet Take 25 mg by mouth once daily.    loratadine (CLARITIN) 5 mg chewable tablet Take 5 mg by mouth once daily.    tizanidine (ZANAFLEX) 4 MG tablet     traMADol (ULTRAM) 50 mg tablet     oxycodone-acetaminophen (PERCOCET) 7.5-325 mg per tablet Take 1 tablet by mouth every 6 (six) hours as needed for Pain.     No current facility-administered medications for this visit.        Review of patient's allergies indicates:   Allergen Reactions    Bactrim [sulfamethoxazole-trimethoprim] Itching    Sulfa (sulfonamide antibiotics) Itching       History reviewed. No pertinent family history.    Social History     Socioeconomic History    Marital status: Single     Spouse name: Not on file    Number of children: Not on file    Years of education: Not on file    Highest education level: Not on file   Social Needs    Financial resource strain: Not on file    Food insecurity - worry: Not on file    Food insecurity - inability: Not on file    Transportation needs - medical: Not on file    Transportation needs - non-medical: Not on file   Occupational History    Not on file   Tobacco Use    Smoking status: Current Some Day Smoker     Types: Cigarettes    Smokeless tobacco: Never Used   Substance and Sexual Activity    Alcohol use: Yes    Drug use: No    Sexual  "activity: Not on file   Other Topics Concern    Not on file   Social History Narrative    Not on file       Chief Complaint:   Chief Complaint   Patient presents with    Left Ankle - Pain     DOI: May 2018, twisted ankle in late July when she stepped off of a curb and twisted her Left ankle. Pt states that her fall affected her left side. She has pain on her left side. She was seen at [facility]. She takes Tylenol 3 and it helps with her pain.       Consulting Physician: No ref. provider found    History of Present Illness:    This is a 58 y.o. year old female who complains of patient is seen today with a body full month history of a leftankle sprain she twisted her ankle when she stepped off a curb patient continues to complain of some intermittent left ankle pain      ROS    Examination:    Vital Signs:    Vitals:    10/01/18 1254   Weight: 96.6 kg (213 lb)   Height: 5' 4" (1.626 m)   PainSc:   9   PainLoc: Ankle       This a well-developed, well nourished patient in no acute distress.    Alert and oriented and cooperative to examination.       Physical Exam: left ankle-patient has good range of motion of the ankle most of her pain appears to be over the medial aspect of the ankle over the medial malleolus and she also has some pain into the first metatarsal area and to the great toe there is some diffuse swelling present in the ankle and the left foot compared to the right    Imaging:  AP and lateral x-ray of the left foot was negative for any fractures or dislocations the most recent x-ray of her left ankle shows mortise to be well centered is no evidence of any fractures or dislocations     Assessment: chronic left foot pain no improvement after about 4-5 months of conservative therapy    Plan:  We'll get an MRI of the left foot      DISCLAIMER: This note may have been dictated using voice recognition software and may contain grammatical errors.     NOTE: Consult report sent to referring provider via [employer] " EMR.

## 2018-10-08 DIAGNOSIS — S92.302G: Primary | ICD-10-CM

## 2018-10-24 ENCOUNTER — OFFICE VISIT (OUTPATIENT)
Dept: ORTHOPEDICS | Facility: CLINIC | Age: 58
End: 2018-10-24
Payer: MEDICAID

## 2018-10-24 VITALS — BODY MASS INDEX: 36.37 KG/M2 | WEIGHT: 213 LBS | HEIGHT: 64 IN

## 2018-10-24 DIAGNOSIS — M84.375G STRESS FRACTURE OF METATARSAL BONE OF LEFT FOOT WITH DELAYED HEALING, SUBSEQUENT ENCOUNTER: Primary | ICD-10-CM

## 2018-10-24 DIAGNOSIS — S92.302G: ICD-10-CM

## 2018-10-24 PROCEDURE — 99213 OFFICE O/P EST LOW 20 MIN: CPT | Mod: ,,, | Performed by: ORTHOPAEDIC SURGERY

## 2018-10-24 RX ORDER — ESTRADIOL 0.1 MG/G
CREAM VAGINAL DAILY
COMMUNITY
Start: 2018-08-06 | End: 2018-12-03

## 2018-10-24 NOTE — PROGRESS NOTES
Past Medical History:   Diagnosis Date    Hypertension        Past Surgical History:   Procedure Laterality Date    CARPAL TUNNEL RELEASE      HYSTEROTOMY      kidney stones      Left ear surgery      Right pinky surgery         Current Outpatient Medications   Medication Sig    amlodipine (NORVASC) 10 MG tablet     carvedilol (COREG) 6.25 MG tablet Take 6.25 mg by mouth 2 (two) times daily with meals.    diphenhydrAMINE (BENADRYL) 50 MG capsule Take 1 capsule (50 mg total) by mouth every 6 (six) hours as needed for Itching.    estradiol (ESTRACE) 0.01 % (0.1 mg/gram) vaginal cream once daily.    gabapentin (NEURONTIN) 300 MG capsule Take 1 capsule by mouth Daily.    hydrochlorothiazide (HYDRODIURIL) 25 MG tablet Take 25 mg by mouth once daily.    ibuprofen (ADVIL,MOTRIN) 600 MG tablet Take 1 tablet (600 mg total) by mouth 3 (three) times daily.    loratadine (CLARITIN) 5 mg chewable tablet Take 5 mg by mouth once daily.    oxycodone-acetaminophen (PERCOCET) 7.5-325 mg per tablet Take 1 tablet by mouth every 6 (six) hours as needed for Pain.    tizanidine (ZANAFLEX) 4 MG tablet     traMADol (ULTRAM) 50 mg tablet      No current facility-administered medications for this visit.        Review of patient's allergies indicates:   Allergen Reactions    Bactrim [sulfamethoxazole-trimethoprim] Itching    Sulfa (sulfonamide antibiotics) Itching       History reviewed. No pertinent family history.    Social History     Socioeconomic History    Marital status: Single     Spouse name: Not on file    Number of children: Not on file    Years of education: Not on file    Highest education level: Not on file   Social Needs    Financial resource strain: Not on file    Food insecurity - worry: Not on file    Food insecurity - inability: Not on file    Transportation needs - medical: Not on file    Transportation needs - non-medical: Not on file   Occupational History    Not on file   Tobacco Use     "Smoking status: Current Some Day Smoker     Types: Cigarettes    Smokeless tobacco: Never Used   Substance and Sexual Activity    Alcohol use: Yes    Drug use: No    Sexual activity: Not on file   Other Topics Concern    Not on file   Social History Narrative    Not on file       Chief Complaint:   Chief Complaint   Patient presents with    Results     Patient is here to review her MRI of the left foot performed on 10/8/18 @ [facility].        Consulting Physician: No ref. provider found    History of Present Illness:    This is a 58 y.o. year old female who complains of this patient returns today for follow-up of her left foot she  Complains of significant pain in the left foot her initial injury occurred patient states she injured her foot in May 2018 and then had a reinjury of her foot in July 2018 patient was seen in July with Dr. Gonzales who placed her in a walking boot for about 6 weeks      ROS    Examination:    Vital Signs:    Vitals:    10/24/18 1523   Weight: 96.6 kg (213 lb)   Height: 5' 4" (1.626 m)       This a well-developed, well nourished patient in no acute distress.    Alert and oriented and cooperative to examination.       Physical Exam: left foot- patient has swelling and pain across the midfoot area    Imaging:  Patient is a MRI which was done on October in 2018 which showed a ununited fractures of the second third and fourth metatarsals with complete rupture of the Lisfranc ligament and lateral subluxation compatible with a Lisfranc fracture     Assessment: Lisfranc fracture of the left foot    Plan:  I'm getting ahead and get an CT scan of the left foot      DISCLAIMER: This note may have been dictated using voice recognition software and may contain grammatical errors.     NOTE: Consult report sent to referring provider via [employer] EMR.  "

## 2018-11-16 ENCOUNTER — OFFICE VISIT (OUTPATIENT)
Dept: ORTHOPEDICS | Facility: CLINIC | Age: 58
End: 2018-11-16
Payer: MEDICAID

## 2018-11-16 ENCOUNTER — TELEPHONE (OUTPATIENT)
Dept: ORTHOPEDICS | Facility: CLINIC | Age: 58
End: 2018-11-16

## 2018-11-16 VITALS — WEIGHT: 213 LBS | HEIGHT: 64 IN | BODY MASS INDEX: 36.37 KG/M2

## 2018-11-16 DIAGNOSIS — S93.325D LISFRANC DISLOCATION, LEFT, SUBSEQUENT ENCOUNTER: Primary | ICD-10-CM

## 2018-11-16 DIAGNOSIS — S92.322P: ICD-10-CM

## 2018-11-16 PROCEDURE — 99213 OFFICE O/P EST LOW 20 MIN: CPT | Mod: ,,, | Performed by: ORTHOPAEDIC SURGERY

## 2018-11-16 RX ORDER — OMEPRAZOLE 20 MG/1
CAPSULE, DELAYED RELEASE ORAL
COMMUNITY
Start: 2018-11-01 | End: 2019-06-10 | Stop reason: SDUPTHER

## 2018-11-16 NOTE — PROGRESS NOTES
Past Medical History:   Diagnosis Date    Hypertension        Past Surgical History:   Procedure Laterality Date    CARPAL TUNNEL RELEASE      HYSTEROTOMY      kidney stones      Left ear surgery      Right pinky surgery         Current Outpatient Medications   Medication Sig    amlodipine (NORVASC) 10 MG tablet     carvedilol (COREG) 6.25 MG tablet Take 6.25 mg by mouth 2 (two) times daily with meals.    estradiol (ESTRACE) 0.01 % (0.1 mg/gram) vaginal cream once daily.    gabapentin (NEURONTIN) 300 MG capsule Take 1 capsule by mouth Daily.    hydrochlorothiazide (HYDRODIURIL) 25 MG tablet Take 25 mg by mouth once daily.    ibuprofen (ADVIL,MOTRIN) 600 MG tablet Take 1 tablet (600 mg total) by mouth 3 (three) times daily.    omeprazole (PRILOSEC) 20 MG capsule      No current facility-administered medications for this visit.        Review of patient's allergies indicates:   Allergen Reactions    Bactrim [sulfamethoxazole-trimethoprim] Itching    Sulfa (sulfonamide antibiotics) Itching       History reviewed. No pertinent family history.    Social History     Socioeconomic History    Marital status: Single     Spouse name: Not on file    Number of children: Not on file    Years of education: Not on file    Highest education level: Not on file   Social Needs    Financial resource strain: Not on file    Food insecurity - worry: Not on file    Food insecurity - inability: Not on file    Transportation needs - medical: Not on file    Transportation needs - non-medical: Not on file   Occupational History    Not on file   Tobacco Use    Smoking status: Current Some Day Smoker     Types: Cigarettes    Smokeless tobacco: Never Used   Substance and Sexual Activity    Alcohol use: Yes    Drug use: No    Sexual activity: Not on file   Other Topics Concern    Not on file   Social History Narrative    Not on file       Chief Complaint:   Chief Complaint   Patient presents with    Left Foot -  "Pain, Follow-up     Here for Review of  CT Left Foot       Consulting Physician: No ref. provider found    History of Present Illness:    This is a 58 y.o. year old female who complains of patient returns today for follow-up of her left foot she had a recent CT scan patient continues to complain of severe pain left foot and inability to bear weight      ROS    Examination:    Vital Signs:    Vitals:    11/16/18 1037   Weight: 96.6 kg (213 lb)   Height: 5' 4" (1.626 m)   PainSc:   8   PainLoc: Foot       This a well-developed, well nourished patient in no acute distress.    Alert and oriented and cooperative to examination.       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     NOTE: Consult report sent to referring provider via [employer] EMR.  "

## 2018-12-03 ENCOUNTER — OFFICE VISIT (OUTPATIENT)
Dept: FAMILY MEDICINE | Facility: CLINIC | Age: 58
End: 2018-12-03
Payer: MEDICAID

## 2018-12-03 VITALS
SYSTOLIC BLOOD PRESSURE: 160 MMHG | WEIGHT: 230 LBS | OXYGEN SATURATION: 97 % | HEIGHT: 64 IN | HEART RATE: 97 BPM | BODY MASS INDEX: 39.27 KG/M2 | DIASTOLIC BLOOD PRESSURE: 92 MMHG | TEMPERATURE: 98 F

## 2018-12-03 DIAGNOSIS — R30.0 DYSURIA: ICD-10-CM

## 2018-12-03 DIAGNOSIS — M15.9 OSTEOARTHRITIS OF MULTIPLE JOINTS, UNSPECIFIED OSTEOARTHRITIS TYPE: ICD-10-CM

## 2018-12-03 DIAGNOSIS — I10 ESSENTIAL HYPERTENSION: Primary | ICD-10-CM

## 2018-12-03 PROBLEM — M85.80 OSTEOPENIA: Status: ACTIVE | Noted: 2018-04-20

## 2018-12-03 LAB
BILIRUB SERPL-MCNC: NORMAL MG/DL
BLOOD URINE, POC: NORMAL
COLOR, POC UA: YELLOW
GLUCOSE UR QL STRIP: NORMAL
KETONES UR QL STRIP: NORMAL
LEUKOCYTE ESTERASE URINE, POC: NORMAL
NITRITE, POC UA: NORMAL
PH, POC UA: 5.5
PROTEIN, POC: NORMAL
SPECIFIC GRAVITY, POC UA: 1.02
UROBILINOGEN, POC UA: 1

## 2018-12-03 PROCEDURE — 99214 OFFICE O/P EST MOD 30 MIN: CPT | Mod: 25,,, | Performed by: INTERNAL MEDICINE

## 2018-12-03 PROCEDURE — 81002 URINALYSIS NONAUTO W/O SCOPE: CPT | Mod: ,,, | Performed by: INTERNAL MEDICINE

## 2018-12-03 RX ORDER — MELOXICAM 15 MG/1
15 TABLET ORAL DAILY
COMMUNITY
End: 2018-12-03

## 2018-12-03 RX ORDER — TRAMADOL HYDROCHLORIDE 50 MG/1
50 TABLET ORAL EVERY 12 HOURS PRN
COMMUNITY
End: 2018-12-03 | Stop reason: SDUPTHER

## 2018-12-03 RX ORDER — HYDROCODONE BITARTRATE AND ACETAMINOPHEN 10; 325 MG/1; MG/1
1 TABLET ORAL EVERY 12 HOURS PRN
COMMUNITY
End: 2018-12-03

## 2018-12-03 RX ORDER — CARVEDILOL 6.25 MG/1
6.25 TABLET ORAL 2 TIMES DAILY WITH MEALS
Qty: 180 TABLET | Refills: 1 | Status: SHIPPED | OUTPATIENT
Start: 2018-12-03 | End: 2019-06-10 | Stop reason: SDUPTHER

## 2018-12-03 RX ORDER — TRAMADOL HYDROCHLORIDE 50 MG/1
50 TABLET ORAL EVERY 12 HOURS PRN
Qty: 60 TABLET | Refills: 1 | Status: SHIPPED | OUTPATIENT
Start: 2018-12-03 | End: 2019-03-18 | Stop reason: SDUPTHER

## 2018-12-03 RX ORDER — LORATADINE 10 MG/1
10 TABLET ORAL DAILY
COMMUNITY
End: 2019-06-10 | Stop reason: SDUPTHER

## 2018-12-03 RX ORDER — TIZANIDINE 4 MG/1
4 TABLET ORAL 2 TIMES DAILY PRN
COMMUNITY
End: 2018-12-03

## 2018-12-03 RX ORDER — HYDROCHLOROTHIAZIDE 25 MG/1
25 TABLET ORAL DAILY
Qty: 90 TABLET | Refills: 1 | Status: SHIPPED | OUTPATIENT
Start: 2018-12-03 | End: 2019-06-10 | Stop reason: SDUPTHER

## 2018-12-03 RX ORDER — AMLODIPINE BESYLATE 10 MG/1
10 TABLET ORAL DAILY
Qty: 90 TABLET | Refills: 1 | Status: SHIPPED | OUTPATIENT
Start: 2018-12-03 | End: 2019-06-10 | Stop reason: SDUPTHER

## 2018-12-03 NOTE — PROGRESS NOTES
Subjective:       Patient ID: Chelsea Grossman is a 58 y.o. female.    Chief Complaint: No chief complaint on file.    Here to establish care with me; previously seeing Dr. Adams who has left the area.   She is scheduled for surgery on her foot with Dr. Rothman later this month; going for pre op labs on 12/13.  She has been out of her meds.  Taking ibuprofen for pain but it is upsetting her stomach.  Previously on meloxicam which also upset her stomach.  Seeing GI for Hep C; sounds like she is waiting on insurance approval for treatment.        Hypertension   This is a chronic problem. The problem is uncontrolled. Associated symptoms include peripheral edema. Pertinent negatives include no chest pain, headaches, neck pain, palpitations or shortness of breath. Past treatments include calcium channel blockers, beta blockers and diuretics. Compliance problems: has been out of meds about a week.    Urinary Tract Infection    The current episode started more than 1 month ago. The problem occurs every urination. The quality of the pain is described as burning. Associated symptoms include flank pain, frequency and urgency. Pertinent negatives include no chills, discharge, hematuria, nausea, vomiting, constipation or rash. Her past medical history is significant for hypertension.     Review of Systems   Constitutional: Positive for unexpected weight change (weight gain). Negative for chills, fatigue and fever.   HENT: Positive for congestion and sinus pressure. Negative for hearing loss, postnasal drip, rhinorrhea, trouble swallowing and voice change.    Eyes: Negative for photophobia and visual disturbance.   Respiratory: Negative for apnea, cough, choking, chest tightness, shortness of breath and wheezing.    Cardiovascular: Negative for chest pain, palpitations and leg swelling.   Gastrointestinal: Negative for abdominal pain, blood in stool, constipation, diarrhea, nausea, rectal pain and vomiting.   Endocrine:  Negative for cold intolerance, heat intolerance, polydipsia and polyuria.   Genitourinary: Positive for flank pain, frequency and urgency. Negative for decreased urine volume, difficulty urinating, dysuria, genital sores, hematuria, menstrual problem, pelvic pain, vaginal bleeding and vaginal discharge.   Musculoskeletal: Positive for arthralgias (left foot), back pain and neck stiffness. Negative for gait problem, joint swelling, myalgias and neck pain.   Skin: Negative for color change, rash and wound.   Allergic/Immunologic: Negative for environmental allergies and food allergies.   Neurological: Negative for dizziness, tremors, seizures, syncope, facial asymmetry, speech difficulty, weakness, light-headedness, numbness and headaches.   Hematological: Negative for adenopathy. Does not bruise/bleed easily.   Psychiatric/Behavioral: Negative for confusion, hallucinations, sleep disturbance and suicidal ideas. The patient is not nervous/anxious.        Past Medical History:   Diagnosis Date    Hepatitis C     Hypertension     Osteoarthritis     Osteopenia       Past Surgical History:   Procedure Laterality Date    CARPAL TUNNEL RELEASE      HYSTEROTOMY      KIDNEY STONE SURGERY      Left ear surgery      TOTAL ABDOMINAL HYSTERECTOMY W/ BILATERAL SALPINGOOPHORECTOMY      TRIGGER FINGER RELEASE Left     2nd and 3rd fingers       Family History   Problem Relation Age of Onset    Heart disease Mother     Cancer Father         stomach    Breast cancer Sister     Stomach cancer Brother        Social History     Socioeconomic History    Marital status: Single     Spouse name: None    Number of children: None    Years of education: None    Highest education level: None   Social Needs    Financial resource strain: None    Food insecurity - worry: None    Food insecurity - inability: None    Transportation needs - medical: None    Transportation needs - non-medical: None   Occupational History     "Occupation: disabled   Tobacco Use    Smoking status: Former Smoker     Types: Cigarettes     Last attempt to quit: 10/2018     Years since quittin.1    Smokeless tobacco: Never Used   Substance and Sexual Activity    Alcohol use: Yes     Frequency: 2-4 times a month     Drinks per session: 1 or 2    Drug use: No    Sexual activity: No     Partners: Female   Other Topics Concern    None   Social History Narrative    Live with sister       Current Outpatient Medications   Medication Sig Dispense Refill    amLODIPine (NORVASC) 10 MG tablet Take 1 tablet (10 mg total) by mouth once daily. 90 tablet 1    carvedilol (COREG) 6.25 MG tablet Take 1 tablet (6.25 mg total) by mouth 2 (two) times daily with meals. 180 tablet 1    hydroCHLOROthiazide (HYDRODIURIL) 25 MG tablet Take 1 tablet (25 mg total) by mouth once daily. 90 tablet 1    ibuprofen (ADVIL,MOTRIN) 600 MG tablet Take 1 tablet (600 mg total) by mouth 3 (three) times daily. 60 tablet 1    loratadine (CLARITIN) 10 mg tablet Take 10 mg by mouth once daily.      omeprazole (PRILOSEC) 20 MG capsule       traMADol (ULTRAM) 50 mg tablet Take 1 tablet (50 mg total) by mouth every 12 (twelve) hours as needed for Pain. 60 tablet 1     No current facility-administered medications for this visit.        Review of patient's allergies indicates:   Allergen Reactions    Bactrim [sulfamethoxazole-trimethoprim] Itching    Sulfa (sulfonamide antibiotics) Itching     Objective:      Blood pressure (!) 160/92, pulse 97, temperature 97.6 °F (36.4 °C), temperature source Temporal, height 5' 4" (1.626 m), weight 104.3 kg (230 lb), SpO2 97 %. Body mass index is 39.48 kg/m².   Physical Exam   Constitutional: She appears well-developed. No distress.   Obese     HENT:   Nose: Nose normal.   Mouth/Throat: Oropharynx is clear and moist.   Eyes: Conjunctivae are normal. Right eye exhibits no discharge. Left eye exhibits no discharge. No scleral icterus.   Neck: Carotid " bruit is not present.   Cardiovascular: Normal rate, regular rhythm and normal heart sounds.   No murmur heard.  Pulmonary/Chest: Effort normal and breath sounds normal. No respiratory distress. She has no decreased breath sounds. She has no wheezes. She has no rhonchi. She has no rales.   Abdominal: Soft. She exhibits no distension. There is no tenderness. There is no rebound and no guarding.   Musculoskeletal: She exhibits no edema.   Neurological: She is alert. She displays no tremor.   Skin: Skin is warm and dry.   Psychiatric: She has a normal mood and affect. Her speech is normal.   Nursing note and vitals reviewed.          Assessment:       1. Essential hypertension    2. Osteoarthritis of multiple joints, unspecified osteoarthritis type    3. Dysuria        Plan:       Diagnoses and all orders for this visit:    Essential hypertension  Comments:  Need to get her back on her meds and recheck BP before surgery.  Will see her after her labs so I can review them also.   Orders:  -     amLODIPine (NORVASC) 10 MG tablet; Take 1 tablet (10 mg total) by mouth once daily.  -     hydroCHLOROthiazide (HYDRODIURIL) 25 MG tablet; Take 1 tablet (25 mg total) by mouth once daily.    Osteoarthritis of multiple joints, unspecified osteoarthritis type  -     traMADol (ULTRAM) 50 mg tablet; Take 1 tablet (50 mg total) by mouth every 12 (twelve) hours as needed for Pain.    Dysuria  Comments:  Looks like she saw GYN for similar back in April.  If culture negative, should f/u with her.  Orders:  -     POCT urine dipstick without microscope  -     Urine Culture, Routine    Other orders  -     carvedilol (COREG) 6.25 MG tablet; Take 1 tablet (6.25 mg total) by mouth 2 (two) times daily with meals.

## 2018-12-06 ENCOUNTER — TELEPHONE (OUTPATIENT)
Dept: FAMILY MEDICINE | Facility: CLINIC | Age: 58
End: 2018-12-06

## 2018-12-06 LAB
BACTERIA UR CULT: NO GROWTH
BACTERIA UR CULT: NORMAL

## 2018-12-06 NOTE — TELEPHONE ENCOUNTER
----- Message from Spenser Curry Jr., MD sent at 12/6/2018 12:52 PM CST -----  I have reviewed your results.  They demonstrate no abnormal findings.  If you have any additional concerns regarding these tests, please contact me at your convenience.    Culture was negative.  She should f/u with GYN to see if symptoms are r/t changes d/t menopause

## 2018-12-13 LAB
ALBUMIN SERPL-MCNC: 3.8 G/DL (ref 3.1–4.7)
ALP SERPL-CCNC: 141 IU/L (ref 40–104)
ALT (SGPT): 102 IU/L (ref 3–33)
AST SERPL-CCNC: 100 IU/L (ref 10–40)
BILIRUB SERPL-MCNC: 1.2 MG/DL (ref 0.3–1)
BUN SERPL-MCNC: 13 MG/DL (ref 8–20)
CALCIUM SERPL-MCNC: 10 MG/DL (ref 7.7–10.4)
CHLORIDE: 101 MMOL/L (ref 98–110)
CO2 SERPL-SCNC: 26.2 MMOL/L (ref 22.8–31.6)
CREATININE: 0.83 MG/DL (ref 0.6–1.4)
GLUCOSE: 118 MG/DL (ref 70–99)
HCT VFR BLD AUTO: 39.6 % (ref 36–48)
HGB BLD-MCNC: 13.4 G/DL (ref 12–15)
MCH RBC QN AUTO: 32.4 PG (ref 25–35)
MCHC RBC AUTO-ENTMCNC: 33.8 G/DL (ref 31–36)
MCV RBC AUTO: 95.9 FL (ref 79–98)
NUCLEATED RBCS: 0 %
PLATELET # BLD AUTO: 213 K/UL (ref 140–440)
POTASSIUM SERPL-SCNC: 3.6 MMOL/L (ref 3.5–5)
PROT SERPL-MCNC: 8 G/DL (ref 6–8.2)
RBC # BLD AUTO: 4.13 M/UL (ref 3.5–5.5)
SODIUM: 138 MMOL/L (ref 134–144)
WBC # BLD AUTO: 6.4 K/UL (ref 5–10)

## 2018-12-14 ENCOUNTER — OFFICE VISIT (OUTPATIENT)
Dept: FAMILY MEDICINE | Facility: CLINIC | Age: 58
End: 2018-12-14
Payer: MEDICAID

## 2018-12-14 VITALS
SYSTOLIC BLOOD PRESSURE: 126 MMHG | HEART RATE: 84 BPM | HEIGHT: 64 IN | OXYGEN SATURATION: 96 % | BODY MASS INDEX: 40.12 KG/M2 | DIASTOLIC BLOOD PRESSURE: 78 MMHG | TEMPERATURE: 98 F | WEIGHT: 235 LBS

## 2018-12-14 DIAGNOSIS — Z01.818 PRE-OPERATIVE EXAMINATION: ICD-10-CM

## 2018-12-14 DIAGNOSIS — I10 ESSENTIAL HYPERTENSION: Primary | ICD-10-CM

## 2018-12-14 PROCEDURE — 99213 OFFICE O/P EST LOW 20 MIN: CPT | Mod: ,,, | Performed by: INTERNAL MEDICINE

## 2018-12-14 NOTE — PROGRESS NOTES
Subjective:       Patient ID: Chelsea Grossman is a 58 y.o. female.    Chief Complaint: Hypertension (continuity of care)    Here for follow up on blood pressure and preoperative evaluation.  She hadn't been on her meds last visit and her blood pressure was not well controlled.  Back on meds now and BP has been better;  120s/70s on home monitoring.  No chest pain or SOB.  Prior to hurting her foot in May, she had  > 4 METS activity without symptoms.  She also reports that she has had a treadmill stress test within the last 2 years which was fine.        Review of Systems   Constitutional: Negative for chills, fatigue, fever and unexpected weight change.   HENT: Negative for congestion, hearing loss, postnasal drip, rhinorrhea, trouble swallowing and voice change.    Eyes: Negative for photophobia and visual disturbance.   Respiratory: Negative for apnea, cough, choking, chest tightness, shortness of breath and wheezing.    Cardiovascular: Negative for chest pain, palpitations and leg swelling.   Gastrointestinal: Negative for abdominal pain, blood in stool, constipation, diarrhea, nausea, rectal pain and vomiting.   Endocrine: Negative for cold intolerance, heat intolerance, polydipsia and polyuria.   Genitourinary: Negative for decreased urine volume, difficulty urinating, dysuria, frequency, genital sores, hematuria, menstrual problem, pelvic pain, urgency, vaginal bleeding and vaginal discharge.   Musculoskeletal: Positive for arthralgias (foot pain). Negative for back pain, gait problem, joint swelling, myalgias, neck pain and neck stiffness.   Skin: Negative for color change, rash and wound.   Allergic/Immunologic: Negative for environmental allergies and food allergies.   Neurological: Negative for dizziness, tremors, seizures, syncope, facial asymmetry, speech difficulty, weakness, light-headedness, numbness and headaches.   Hematological: Negative for adenopathy. Does not bruise/bleed easily.    Psychiatric/Behavioral: Negative for confusion, hallucinations, sleep disturbance and suicidal ideas. The patient is not nervous/anxious.        Past Medical History:   Diagnosis Date    Hepatitis C     Hypertension     Osteoarthritis     Osteopenia       Past Surgical History:   Procedure Laterality Date    CARPAL TUNNEL RELEASE      HYSTEROTOMY      KIDNEY STONE SURGERY      Left ear surgery      TOTAL ABDOMINAL HYSTERECTOMY W/ BILATERAL SALPINGOOPHORECTOMY      TRIGGER FINGER RELEASE Left     2nd and 3rd fingers       Family History   Problem Relation Age of Onset    Heart disease Mother     Cancer Father         stomach    Breast cancer Sister     Stomach cancer Brother        Social History     Socioeconomic History    Marital status: Single     Spouse name: None    Number of children: None    Years of education: None    Highest education level: None   Social Needs    Financial resource strain: None    Food insecurity - worry: None    Food insecurity - inability: None    Transportation needs - medical: None    Transportation needs - non-medical: None   Occupational History    Occupation: disabled   Tobacco Use    Smoking status: Former Smoker     Types: Cigarettes     Last attempt to quit: 10/2018     Years since quittin.2    Smokeless tobacco: Never Used   Substance and Sexual Activity    Alcohol use: Yes     Frequency: 2-4 times a month     Drinks per session: 1 or 2    Drug use: No    Sexual activity: No     Partners: Female   Other Topics Concern    None   Social History Narrative    Live with sister       Current Outpatient Medications   Medication Sig Dispense Refill    amLODIPine (NORVASC) 10 MG tablet Take 1 tablet (10 mg total) by mouth once daily. 90 tablet 1    carvedilol (COREG) 6.25 MG tablet Take 1 tablet (6.25 mg total) by mouth 2 (two) times daily with meals. 180 tablet 1    hydroCHLOROthiazide (HYDRODIURIL) 25 MG tablet Take 1 tablet (25 mg total) by mouth  "once daily. 90 tablet 1    ibuprofen (ADVIL,MOTRIN) 600 MG tablet Take 1 tablet (600 mg total) by mouth 3 (three) times daily. 60 tablet 1    loratadine (CLARITIN) 10 mg tablet Take 10 mg by mouth once daily.      omeprazole (PRILOSEC) 20 MG capsule       traMADol (ULTRAM) 50 mg tablet Take 1 tablet (50 mg total) by mouth every 12 (twelve) hours as needed for Pain. 60 tablet 1     No current facility-administered medications for this visit.        Review of patient's allergies indicates:   Allergen Reactions    Bactrim [sulfamethoxazole-trimethoprim] Itching    Sulfa (sulfonamide antibiotics) Itching     Objective:    HPI     Hypertension      Additional comments: continuity of care          Last edited by Hugh Rausch MA on 12/14/2018 10:44 AM. (History)      Blood pressure 126/78, pulse 84, temperature 97.6 °F (36.4 °C), temperature source Temporal, height 5' 4" (1.626 m), weight 106.6 kg (235 lb), SpO2 96 %. Body mass index is 40.34 kg/m².   Physical Exam   Constitutional: She appears well-developed. No distress.   Obese     HENT:   Nose: Nose normal.   Mouth/Throat: Oropharynx is clear and moist.   Eyes: Conjunctivae are normal. Right eye exhibits no discharge. Left eye exhibits no discharge. No scleral icterus.   Neck: Carotid bruit is not present.   Cardiovascular: Normal rate, regular rhythm and normal heart sounds.   No murmur heard.  Pulmonary/Chest: Effort normal and breath sounds normal. No respiratory distress. She has no decreased breath sounds. She has no wheezes. She has no rhonchi. She has no rales.   Abdominal: Soft. She exhibits no distension. There is no tenderness. There is no rebound and no guarding.   Musculoskeletal: She exhibits no edema.   Neurological: She is alert. She displays no tremor.   Skin: Skin is warm and dry.   Psychiatric: She has a normal mood and affect. Her speech is normal.   Nursing note and vitals reviewed.      CMP normal other than LFTs  Assessment:       1. " Essential hypertension    2. Pre-operative examination        Plan:       Chelsea was seen today for hypertension.    Diagnoses and all orders for this visit:    Essential hypertension  Comments:  Controlled back on meds;  continue current meds.     Pre-operative examination  Comments:  No further workup required.

## 2019-01-04 ENCOUNTER — OFFICE VISIT (OUTPATIENT)
Dept: ORTHOPEDICS | Facility: CLINIC | Age: 59
End: 2019-01-04
Payer: MEDICAID

## 2019-01-04 VITALS — BODY MASS INDEX: 40.19 KG/M2 | HEIGHT: 64 IN | WEIGHT: 235.38 LBS

## 2019-01-04 DIAGNOSIS — Z98.890 POST-OPERATIVE STATE: ICD-10-CM

## 2019-01-04 DIAGNOSIS — S93.325D LISFRANC DISLOCATION, LEFT, SUBSEQUENT ENCOUNTER: Primary | ICD-10-CM

## 2019-01-04 PROCEDURE — 99024 SUTURE REMOVAL: ICD-10-PCS | Mod: ,,, | Performed by: ORTHOPAEDIC SURGERY

## 2019-01-04 PROCEDURE — 73630 X-RAY EXAM OF FOOT: CPT | Mod: FY,LT,, | Performed by: ORTHOPAEDIC SURGERY

## 2019-01-04 PROCEDURE — 29405 PR APPLY SHORT LEG CAST: ICD-10-PCS | Mod: 58,LT,, | Performed by: ORTHOPAEDIC SURGERY

## 2019-01-04 PROCEDURE — 99024 POSTOP FOLLOW-UP VISIT: CPT | Mod: ,,, | Performed by: ORTHOPAEDIC SURGERY

## 2019-01-04 PROCEDURE — 29405 APPL SHORT LEG CAST: CPT | Mod: 58,LT,, | Performed by: ORTHOPAEDIC SURGERY

## 2019-01-04 PROCEDURE — 73630 PR  X-RAY FOOT 3+ VW: ICD-10-PCS | Mod: FY,LT,, | Performed by: ORTHOPAEDIC SURGERY

## 2019-01-04 RX ORDER — OXYCODONE AND ACETAMINOPHEN 5; 325 MG/1; MG/1
1 TABLET ORAL EVERY 6 HOURS PRN
Qty: 30 TABLET | Refills: 0 | Status: SHIPPED | OUTPATIENT
Start: 2019-01-04 | End: 2019-03-18

## 2019-01-04 RX ORDER — OXYCODONE AND ACETAMINOPHEN 10; 325 MG/1; MG/1
TABLET ORAL
Refills: 0 | COMMUNITY
Start: 2018-12-20 | End: 2019-03-18

## 2019-01-04 NOTE — PROGRESS NOTES
Past Medical History:   Diagnosis Date    Hepatitis C     Hypertension     Osteoarthritis     Osteopenia        Past Surgical History:   Procedure Laterality Date    CARPAL TUNNEL RELEASE      HYSTEROTOMY      KIDNEY STONE SURGERY      Left ear surgery      TOTAL ABDOMINAL HYSTERECTOMY W/ BILATERAL SALPINGOOPHORECTOMY      TRIGGER FINGER RELEASE Left     2nd and 3rd fingers       Current Outpatient Medications   Medication Sig    amLODIPine (NORVASC) 10 MG tablet Take 1 tablet (10 mg total) by mouth once daily.    carvedilol (COREG) 6.25 MG tablet Take 1 tablet (6.25 mg total) by mouth 2 (two) times daily with meals.    hydroCHLOROthiazide (HYDRODIURIL) 25 MG tablet Take 1 tablet (25 mg total) by mouth once daily.    ibuprofen (ADVIL,MOTRIN) 600 MG tablet Take 1 tablet (600 mg total) by mouth 3 (three) times daily.    loratadine (CLARITIN) 10 mg tablet Take 10 mg by mouth once daily.    omeprazole (PRILOSEC) 20 MG capsule     oxyCODONE-acetaminophen (PERCOCET)  mg per tablet TK ONE T PO Q 4 TO 6 H PRN P    traMADol (ULTRAM) 50 mg tablet Take 1 tablet (50 mg total) by mouth every 12 (twelve) hours as needed for Pain.     No current facility-administered medications for this visit.        Review of patient's allergies indicates:   Allergen Reactions    Bactrim [sulfamethoxazole-trimethoprim] Itching    Sulfa (sulfonamide antibiotics) Itching       Family History   Problem Relation Age of Onset    Heart disease Mother     Cancer Father         stomach    Breast cancer Sister     Stomach cancer Brother        Social History     Socioeconomic History    Marital status: Single     Spouse name: Not on file    Number of children: Not on file    Years of education: Not on file    Highest education level: Not on file   Social Needs    Financial resource strain: Not on file    Food insecurity - worry: Not on file    Food insecurity - inability: Not on file    Transportation needs -  "medical: Not on file    Transportation needs - non-medical: Not on file   Occupational History    Occupation: disabled   Tobacco Use    Smoking status: Former Smoker     Types: Cigarettes     Last attempt to quit: 10/2018     Years since quittin.2    Smokeless tobacco: Never Used   Substance and Sexual Activity    Alcohol use: Yes     Frequency: 2-4 times a month     Drinks per session: 1 or 2    Drug use: No    Sexual activity: No     Partners: Female   Other Topics Concern    Not on file   Social History Narrative    Live with sister       Chief Complaint:   Chief Complaint   Patient presents with    Left Foot - Pain, Post-op Evaluation     DOS: 2018, 17 days S/P Fusion of the left first metatarsal medial cuneiform fusion of second metatarsal and intermediate cuneiform infusion of third metatarsal lateral cuneiform bone left foot       Consulting Physician: No ref. provider found    History of Present Illness:    This is a 58 y.o. 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    Alert and oriented and cooperative to examination.       Physical Exam: left foot-the incisions are healing well there is some swelling there is no drainage    Imaging:  AP lateral oblique x-ray of the left foot shows the fusions to bein good position with staples and hardware in good position     Assessment: status post fusions of the second third and fourth metatarsal cuneiformjoints of the left foot    Plan:  We'll place her in a nonweightbearing short leg cast      DISCLAIMER: This note may have been dictated using voice recognition " software and may contain grammatical errors.     NOTE: Consult report sent to referring provider via [employer] EMR.

## 2019-02-06 ENCOUNTER — OFFICE VISIT (OUTPATIENT)
Dept: ORTHOPEDICS | Facility: CLINIC | Age: 59
End: 2019-02-06
Payer: MEDICAID

## 2019-02-06 VITALS — WEIGHT: 235 LBS | BODY MASS INDEX: 40.12 KG/M2 | HEIGHT: 64 IN

## 2019-02-06 DIAGNOSIS — Z98.890 POST-OPERATIVE STATE: ICD-10-CM

## 2019-02-06 DIAGNOSIS — S93.325D LISFRANC DISLOCATION, LEFT, SUBSEQUENT ENCOUNTER: Primary | ICD-10-CM

## 2019-02-06 PROCEDURE — 29405 APPL SHORT LEG CAST: CPT | Mod: 58,LT,, | Performed by: ORTHOPAEDIC SURGERY

## 2019-02-06 PROCEDURE — 29405 PR APPLY SHORT LEG CAST: ICD-10-PCS | Mod: 58,LT,, | Performed by: ORTHOPAEDIC SURGERY

## 2019-02-06 PROCEDURE — 99024 POSTOP FOLLOW-UP VISIT: CPT | Mod: ,,, | Performed by: ORTHOPAEDIC SURGERY

## 2019-02-06 PROCEDURE — 99024 PR POST-OP FOLLOW-UP VISIT: ICD-10-PCS | Mod: ,,, | Performed by: ORTHOPAEDIC SURGERY

## 2019-02-06 RX ORDER — TRAMADOL HYDROCHLORIDE 50 MG/1
50 TABLET ORAL EVERY 6 HOURS PRN
Qty: 30 TABLET | Refills: 0 | Status: SHIPPED | OUTPATIENT
Start: 2019-02-06 | End: 2019-02-16

## 2019-02-06 NOTE — PROGRESS NOTES
Past Medical History:   Diagnosis Date    Hepatitis C     Hypertension     Osteoarthritis     Osteopenia        Past Surgical History:   Procedure Laterality Date    CARPAL TUNNEL RELEASE      HYSTEROTOMY      KIDNEY STONE SURGERY      Left ear surgery      TOTAL ABDOMINAL HYSTERECTOMY W/ BILATERAL SALPINGOOPHORECTOMY      TRIGGER FINGER RELEASE Left     2nd and 3rd fingers       Current Outpatient Medications   Medication Sig    amLODIPine (NORVASC) 10 MG tablet Take 1 tablet (10 mg total) by mouth once daily.    carvedilol (COREG) 6.25 MG tablet Take 1 tablet (6.25 mg total) by mouth 2 (two) times daily with meals.    hydroCHLOROthiazide (HYDRODIURIL) 25 MG tablet Take 1 tablet (25 mg total) by mouth once daily.    ibuprofen (ADVIL,MOTRIN) 600 MG tablet Take 1 tablet (600 mg total) by mouth 3 (three) times daily.    loratadine (CLARITIN) 10 mg tablet Take 10 mg by mouth once daily.    omeprazole (PRILOSEC) 20 MG capsule     oxyCODONE-acetaminophen (PERCOCET)  mg per tablet TK ONE T PO Q 4 TO 6 H PRN P    oxyCODONE-acetaminophen (PERCOCET) 5-325 mg per tablet Take 1 tablet by mouth every 6 (six) hours as needed for Pain.    traMADol (ULTRAM) 50 mg tablet Take 1 tablet (50 mg total) by mouth every 12 (twelve) hours as needed for Pain.     No current facility-administered medications for this visit.        Review of patient's allergies indicates:   Allergen Reactions    Bactrim [sulfamethoxazole-trimethoprim] Itching    Sulfa (sulfonamide antibiotics) Itching       Family History   Problem Relation Age of Onset    Heart disease Mother     Cancer Father         stomach    Breast cancer Sister     Stomach cancer Brother        Social History     Socioeconomic History    Marital status: Single     Spouse name: Not on file    Number of children: Not on file    Years of education: Not on file    Highest education level: Not on file   Social Needs    Financial resource strain: Not on  "file    Food insecurity - worry: Not on file    Food insecurity - inability: Not on file    Transportation needs - medical: Not on file    Transportation needs - non-medical: Not on file   Occupational History    Occupation: disabled   Tobacco Use    Smoking status: Former Smoker     Types: Cigarettes     Last attempt to quit: 10/2018     Years since quittin.3    Smokeless tobacco: Never Used   Substance and Sexual Activity    Alcohol use: Yes     Frequency: 2-4 times a month     Drinks per session: 1 or 2    Drug use: No    Sexual activity: No     Partners: Female   Other Topics Concern    Not on file   Social History Narrative    Live with sister       Chief Complaint:   Chief Complaint   Patient presents with    Left Foot - Post-op Evaluation     DOS: 2018, 7 weeks and 1 day S/P Fusion of the left first metatarsal medial cuneiform fusion of second metatarsal and intermediate cuneiform infusion of third metatarsal lateral cuneiform bone left foot. Patient NWB and doing well. PL is 6/10       Consulting Physician: No ref. provider found    History of Present Illness:    This is a 58 y.o. year old female who complains of patient is now about 7 weeks status post fusion of her first metatarsal second and third metatarsalbones for a chronic Lisfranc fracture patient continues to have some pain she is presently nonweightbearingf the left foot      ROS    Examination:    Vital Signs:    Vitals:    19 1333   Weight: 106.6 kg (235 lb)   Height: 5' 4" (1.626 m)   PainSc:   6   PainLoc: Foot       This a well-developed, well nourished patient in no acute distress.    Alert and oriented and cooperative to examination.       Physical Exam: left foot-patient's incisions are healing well she has no drainage    Imaging:  No x-rays done today     Assessment: status post first second and third metatarsal base effusions the left foot for Lisfranc fracture    Plan:  We'll continue her nonweightbearing for " the next 4 weeks placed her in a short leg cast she can start weightbearing partiallyfull at 5 weeks from      DISCLAIMER: This note may have been dictated using voice recognition software and may contain grammatical errors.     NOTE: Consult report sent to referring provider via [employer] EMR.

## 2019-03-13 ENCOUNTER — OFFICE VISIT (OUTPATIENT)
Dept: ORTHOPEDICS | Facility: CLINIC | Age: 59
End: 2019-03-13
Payer: MEDICAID

## 2019-03-13 VITALS
HEART RATE: 80 BPM | DIASTOLIC BLOOD PRESSURE: 79 MMHG | SYSTOLIC BLOOD PRESSURE: 127 MMHG | HEIGHT: 64 IN | BODY MASS INDEX: 40.12 KG/M2 | WEIGHT: 235 LBS

## 2019-03-13 DIAGNOSIS — Z98.890 POST-OPERATIVE STATE: ICD-10-CM

## 2019-03-13 DIAGNOSIS — S93.325D LISFRANC DISLOCATION, LEFT, SUBSEQUENT ENCOUNTER: Primary | ICD-10-CM

## 2019-03-13 PROCEDURE — 73630 X-RAY EXAM OF FOOT: CPT | Mod: FY,LT,, | Performed by: ORTHOPAEDIC SURGERY

## 2019-03-13 PROCEDURE — 73630 PR  X-RAY FOOT 3+ VW: ICD-10-PCS | Mod: FY,LT,, | Performed by: ORTHOPAEDIC SURGERY

## 2019-03-13 PROCEDURE — 99024 POSTOP FOLLOW-UP VISIT: CPT | Mod: ,,, | Performed by: ORTHOPAEDIC SURGERY

## 2019-03-13 PROCEDURE — 99024 PR POST-OP FOLLOW-UP VISIT: ICD-10-PCS | Mod: ,,, | Performed by: ORTHOPAEDIC SURGERY

## 2019-03-13 NOTE — PROGRESS NOTES
Past Medical History:   Diagnosis Date    Hepatitis C     Hypertension     Osteoarthritis     Osteopenia        Past Surgical History:   Procedure Laterality Date    CARPAL TUNNEL RELEASE      HYSTEROTOMY      KIDNEY STONE SURGERY      Left ear surgery      TOTAL ABDOMINAL HYSTERECTOMY W/ BILATERAL SALPINGOOPHORECTOMY      TRIGGER FINGER RELEASE Left     2nd and 3rd fingers       Current Outpatient Medications   Medication Sig    amLODIPine (NORVASC) 10 MG tablet Take 1 tablet (10 mg total) by mouth once daily.    carvedilol (COREG) 6.25 MG tablet Take 1 tablet (6.25 mg total) by mouth 2 (two) times daily with meals.    hydroCHLOROthiazide (HYDRODIURIL) 25 MG tablet Take 1 tablet (25 mg total) by mouth once daily.    ibuprofen (ADVIL,MOTRIN) 600 MG tablet Take 1 tablet (600 mg total) by mouth 3 (three) times daily.    loratadine (CLARITIN) 10 mg tablet Take 10 mg by mouth once daily.    omeprazole (PRILOSEC) 20 MG capsule     oxyCODONE-acetaminophen (PERCOCET)  mg per tablet TK ONE T PO Q 4 TO 6 H PRN P    oxyCODONE-acetaminophen (PERCOCET) 5-325 mg per tablet Take 1 tablet by mouth every 6 (six) hours as needed for Pain.    traMADol (ULTRAM) 50 mg tablet Take 1 tablet (50 mg total) by mouth every 12 (twelve) hours as needed for Pain.     No current facility-administered medications for this visit.        Review of patient's allergies indicates:   Allergen Reactions    Bactrim [sulfamethoxazole-trimethoprim] Itching    Sulfa (sulfonamide antibiotics) Itching       Family History   Problem Relation Age of Onset    Heart disease Mother     Cancer Father         stomach    Breast cancer Sister     Stomach cancer Brother        Social History     Socioeconomic History    Marital status: Single     Spouse name: Not on file    Number of children: Not on file    Years of education: Not on file    Highest education level: Not on file   Social Needs    Financial resource strain: Not on  "file    Food insecurity - worry: Not on file    Food insecurity - inability: Not on file    Transportation needs - medical: Not on file    Transportation needs - non-medical: Not on file   Occupational History    Occupation: disabled   Tobacco Use    Smoking status: Former Smoker     Types: Cigarettes     Last attempt to quit: 10/2018     Years since quittin.4    Smokeless tobacco: Never Used   Substance and Sexual Activity    Alcohol use: Yes     Frequency: 2-4 times a month     Drinks per session: 1 or 2    Drug use: No    Sexual activity: No     Partners: Female   Other Topics Concern    Not on file   Social History Narrative    Live with sister       Chief Complaint:   Chief Complaint   Patient presents with    Left Foot - Post-op Evaluation     DOS: 2018,  12 weeks and 1 day S/P Fusion of the left first metatarsal medial cuneiform fusion of second metatarsal and intermediate cuneiform infusion of third metatarsal lateral cuneiform bone left foot. Patient started walking on it 1 week ago       Consulting Physician: No ref. provider found    History of Present Illness:    This is a 58 y.o. year old female who complains of the patient is now about 3 months status post first second third metatarsal fusions for a is [place] fracture dislocationof the  left footthe patient has been casted and she is only partial weightbearing at this time her cast was removed      ROS    Examination:    Vital Signs:    Vitals:    19 1406   BP: 127/79   Pulse: 80   Weight: 106.6 kg (235 lb)   Height: 5' 4" (1.626 m)   PainSc:   4       This a well-developed, well nourished patient in no acute distress.    Alert and oriented and cooperative to examination.       Physical Exam: left foot-her incisions are healed is no drainage or any swelling    Imaging:  AP lateral oblique x-ray of the left foot shows hardware to be in good position the the fusions appear to be taking there is no lytic lines between the " fusionfused bones     Assessment: status post first second third metatarsal base fusions of the left foot    Plan:  Patient is a start partial weightbearing with 50 pounds for the next 2 weeks and increase to full weightbearing as tolerated we'll see her back in 4 weeks      DISCLAIMER: This note may have been dictated using voice recognition software and may contain grammatical errors.     NOTE: Consult report sent to referring provider via [employer] EMR.

## 2019-03-18 ENCOUNTER — OFFICE VISIT (OUTPATIENT)
Dept: FAMILY MEDICINE | Facility: CLINIC | Age: 59
End: 2019-03-18
Payer: MEDICAID

## 2019-03-18 VITALS
SYSTOLIC BLOOD PRESSURE: 134 MMHG | HEART RATE: 83 BPM | OXYGEN SATURATION: 98 % | TEMPERATURE: 98 F | WEIGHT: 210 LBS | BODY MASS INDEX: 35.85 KG/M2 | HEIGHT: 64 IN | DIASTOLIC BLOOD PRESSURE: 78 MMHG

## 2019-03-18 DIAGNOSIS — I10 BENIGN ESSENTIAL HYPERTENSION: Primary | ICD-10-CM

## 2019-03-18 DIAGNOSIS — M25.512 ACUTE PAIN OF LEFT SHOULDER: ICD-10-CM

## 2019-03-18 DIAGNOSIS — R30.0 DYSURIA: ICD-10-CM

## 2019-03-18 DIAGNOSIS — M15.9 OSTEOARTHRITIS OF MULTIPLE JOINTS, UNSPECIFIED OSTEOARTHRITIS TYPE: ICD-10-CM

## 2019-03-18 LAB
BILIRUB SERPL-MCNC: ABNORMAL MG/DL
BLOOD, POC UA: ABNORMAL
GLUCOSE UR QL STRIP: ABNORMAL
KETONES UR QL STRIP: ABNORMAL
LEUKOCYTE ESTERASE URINE, POC: ABNORMAL
NITRITE, POC UA: ABNORMAL
PH, POC UA: ABNORMAL
PROTEIN, POC: ABNORMAL
SPECIFIC GRAVITY, POC UA: 1.02
UROBILINOGEN, POC UA: 4

## 2019-03-18 PROCEDURE — 99214 PR OFFICE/OUTPT VISIT, EST, LEVL IV, 30-39 MIN: ICD-10-PCS | Mod: 25,,, | Performed by: INTERNAL MEDICINE

## 2019-03-18 PROCEDURE — 81003 POCT URINALYSIS: ICD-10-PCS | Mod: QW,,, | Performed by: INTERNAL MEDICINE

## 2019-03-18 PROCEDURE — 81003 URINALYSIS AUTO W/O SCOPE: CPT | Mod: QW,,, | Performed by: INTERNAL MEDICINE

## 2019-03-18 PROCEDURE — 99214 OFFICE O/P EST MOD 30 MIN: CPT | Mod: 25,,, | Performed by: INTERNAL MEDICINE

## 2019-03-18 RX ORDER — TRAMADOL HYDROCHLORIDE 50 MG/1
50 TABLET ORAL EVERY 6 HOURS PRN
Qty: 60 TABLET | Refills: 1 | Status: SHIPPED | OUTPATIENT
Start: 2019-03-18 | End: 2019-06-10 | Stop reason: SDUPTHER

## 2019-03-18 RX ORDER — CELECOXIB 200 MG/1
200 CAPSULE ORAL DAILY
Qty: 30 CAPSULE | Refills: 3 | Status: SHIPPED | OUTPATIENT
Start: 2019-03-18 | End: 2019-06-10 | Stop reason: SDUPTHER

## 2019-03-18 NOTE — PROGRESS NOTES
Subjective:       Patient ID: Chelsea Grossman is a 58 y.o. female.    Chief Complaint: Hypertension (continuity of care and med refills) and Neck Pain (neck pain and swellin in shoulders)    Here for routine follow up.  She had surgery on her foot in December and remained in a cast until last week.  She is currently limited weight bearing, progressing to full weight bearign as tolerated.  She has increased pain if she puts too much weight on it.  Left shoulder has been bothering her about a week.  No known trauma.  She has been sleeping on her left side a lot and also having to lean on the walker to ambulate.  Has had issues with NSAIDs, including meloxicam, bothering her stomach.        Hypertension   This is a chronic problem. Associated symptoms include neck pain and peripheral edema (left foot). Pertinent negatives include no chest pain, headaches, palpitations or shortness of breath.   Dysuria    This is a new problem. The current episode started in the past 7 days. The problem occurs every urination. The quality of the pain is described as burning. There has been no fever. Associated symptoms include frequency and urgency. Pertinent negatives include no chills, hematuria, nausea, vomiting, constipation or rash. Her past medical history is significant for hypertension.     Review of Systems   Constitutional: Negative for chills, fatigue, fever and unexpected weight change.   HENT: Negative for congestion, hearing loss, postnasal drip, rhinorrhea, trouble swallowing and voice change.    Eyes: Negative for photophobia and visual disturbance.   Respiratory: Negative for apnea, cough, choking, chest tightness, shortness of breath and wheezing.    Cardiovascular: Negative for chest pain, palpitations and leg swelling.   Gastrointestinal: Negative for abdominal pain, blood in stool, constipation, diarrhea, nausea, rectal pain and vomiting.   Endocrine: Negative for cold intolerance, heat intolerance, polydipsia  and polyuria.   Genitourinary: Positive for difficulty urinating, dysuria, frequency, pelvic pain (pressure in the last week) and urgency. Negative for decreased urine volume, genital sores, hematuria, menstrual problem and vaginal bleeding.   Musculoskeletal: Positive for arthralgias, back pain, gait problem, neck pain and neck stiffness. Negative for myalgias. Joint swelling: bilateral shoulder swelling.   Skin: Negative for color change, rash and wound.   Allergic/Immunologic: Negative for environmental allergies and food allergies.   Neurological: Positive for light-headedness. Negative for dizziness, tremors, seizures, syncope, facial asymmetry, speech difficulty, weakness, numbness and headaches.   Hematological: Negative for adenopathy. Does not bruise/bleed easily.   Psychiatric/Behavioral: Negative for confusion, hallucinations, sleep disturbance and suicidal ideas. The patient is not nervous/anxious.        Past Medical History:   Diagnosis Date    Hepatitis C     Hypertension     Osteoarthritis     Osteopenia       Past Surgical History:   Procedure Laterality Date    CARPAL TUNNEL RELEASE      FOOT SURGERY Left 12/18/2018    HYSTEROTOMY      KIDNEY STONE SURGERY      Left ear surgery      TOTAL ABDOMINAL HYSTERECTOMY W/ BILATERAL SALPINGOOPHORECTOMY      TRIGGER FINGER RELEASE Left     2nd and 3rd fingers       Family History   Problem Relation Age of Onset    Heart disease Mother     Cancer Father         stomach    Breast cancer Sister     Stomach cancer Brother        Social History     Socioeconomic History    Marital status: Single     Spouse name: None    Number of children: None    Years of education: None    Highest education level: None   Social Needs    Financial resource strain: None    Food insecurity - worry: None    Food insecurity - inability: None    Transportation needs - medical: None    Transportation needs - non-medical: None   Occupational History     "Occupation: disabled   Tobacco Use    Smoking status: Former Smoker     Types: Cigarettes     Last attempt to quit: 10/2018     Years since quittin.4    Smokeless tobacco: Never Used   Substance and Sexual Activity    Alcohol use: Yes     Frequency: 2-4 times a month     Drinks per session: 1 or 2    Drug use: No    Sexual activity: No     Partners: Female   Other Topics Concern    None   Social History Narrative    Live with sister       Current Outpatient Medications   Medication Sig Dispense Refill    amLODIPine (NORVASC) 10 MG tablet Take 1 tablet (10 mg total) by mouth once daily. 90 tablet 1    carvedilol (COREG) 6.25 MG tablet Take 1 tablet (6.25 mg total) by mouth 2 (two) times daily with meals. 180 tablet 1    hydroCHLOROthiazide (HYDRODIURIL) 25 MG tablet Take 1 tablet (25 mg total) by mouth once daily. 90 tablet 1    loratadine (CLARITIN) 10 mg tablet Take 10 mg by mouth once daily.      omeprazole (PRILOSEC) 20 MG capsule       traMADol (ULTRAM) 50 mg tablet Take 1 tablet (50 mg total) by mouth every 6 (six) hours as needed for Pain. 60 tablet 1    celecoxib (CELEBREX) 200 MG capsule Take 1 capsule (200 mg total) by mouth once daily. 30 capsule 3     No current facility-administered medications for this visit.        Review of patient's allergies indicates:   Allergen Reactions    Bactrim [sulfamethoxazole-trimethoprim] Itching    Sulfa (sulfonamide antibiotics) Itching     Objective:    HPI     Hypertension      Additional comments: continuity of care and med refills              Neck Pain      Additional comments: neck pain and swellin in shoulders          Last edited by Hugh Rausch MA on 3/18/2019  2:00 PM. (History)      Blood pressure 134/78, pulse 83, temperature 97.6 °F (36.4 °C), temperature source Temporal, height 5' 4" (1.626 m), weight 95.3 kg (210 lb), SpO2 98 %. Body mass index is 36.05 kg/m².   Physical Exam   Constitutional: She appears well-developed. No distress. "   Obese     HENT:   Nose: Nose normal.   Mouth/Throat: Oropharynx is clear and moist.   Eyes: Conjunctivae are normal. Right eye exhibits no discharge. Left eye exhibits no discharge. No scleral icterus.   Neck: Carotid bruit is not present.   Cardiovascular: Normal rate, regular rhythm and normal heart sounds.   No murmur heard.  Pulmonary/Chest: Effort normal and breath sounds normal. No respiratory distress. She has no decreased breath sounds. She has no wheezes. She has no rhonchi. She has no rales.   Abdominal: Soft. She exhibits no distension. There is tenderness in the suprapubic area. There is no rebound, no guarding and no CVA tenderness.   Musculoskeletal: She exhibits no edema.        Left shoulder: She exhibits decreased range of motion and tenderness.   Neurological: She is alert. She displays no tremor. Gait (using rolling walker) abnormal.   Skin: Skin is warm and dry.   Psychiatric: She has a normal mood and affect. Her speech is normal.   Nursing note and vitals reviewed.        Office Visit on 03/18/2019   Component Date Value Ref Range Status    WBC, UA 03/18/2019 -   Final    Nitrite, UA 03/18/2019 -   Final    Urobilinogen, UA 03/18/2019 4.0   Final    Protein 03/18/2019 -   Final    pH, UA 03/18/2019 -   Final    Blood, UA 03/18/2019 -   Final    Spec Grav UA 03/18/2019 1.020   Final    Ketones, UA 03/18/2019 -   Final    Bilirubin 03/18/2019 -   Final    Glucose, UA 03/18/2019 -   Final   ]  Assessment:       1. Benign essential hypertension    2. Dysuria    3. Acute pain of left shoulder    4. Osteoarthritis of multiple joints, unspecified osteoarthritis type        Plan:       Chelsea was seen today for hypertension and neck pain.    Diagnoses and all orders for this visit:    Benign essential hypertension  -     Comprehensive metabolic panel; Future  -     Lipid panel; Future  -     Comprehensive metabolic panel  -     Lipid panel    Dysuria  Comments:  Will check culture.  Make  sure to drink plenty ofl fluids as mild dehydration can cause some dysuria. AZO as needed    Orders:  -     POCT Urinalysis  -     Urine Culture, Routine    Acute pain of left shoulder  Comments:  Discuss in f/u with Ortho  Orders:  -     celecoxib (CELEBREX) 200 MG capsule; Take 1 capsule (200 mg total) by mouth once daily.    Osteoarthritis of multiple joints, unspecified osteoarthritis type  -     traMADol (ULTRAM) 50 mg tablet; Take 1 tablet (50 mg total) by mouth every 6 (six) hours as needed for Pain.  -     celecoxib (CELEBREX) 200 MG capsule; Take 1 capsule (200 mg total) by mouth once daily.

## 2019-03-20 ENCOUNTER — TELEPHONE (OUTPATIENT)
Dept: FAMILY MEDICINE | Facility: CLINIC | Age: 59
End: 2019-03-20

## 2019-03-20 DIAGNOSIS — B18.2 CHRONIC HEPATITIS C WITHOUT HEPATIC COMA: Primary | ICD-10-CM

## 2019-03-20 LAB
ALBUMIN SERPL-MCNC: 4.5 G/DL (ref 3.5–5.5)
ALBUMIN/GLOB SERPL: 1 {RATIO} (ref 1.2–2.2)
ALP SERPL-CCNC: 182 IU/L (ref 39–117)
ALT SERPL-CCNC: 80 IU/L (ref 0–32)
AST SERPL-CCNC: 81 IU/L (ref 0–40)
BILIRUB SERPL-MCNC: 1.1 MG/DL (ref 0–1.2)
BUN SERPL-MCNC: 11 MG/DL (ref 6–24)
BUN/CREAT SERPL: 11 (ref 9–23)
CALCIUM SERPL-MCNC: 10.3 MG/DL (ref 8.7–10.2)
CHLORIDE SERPL-SCNC: 99 MMOL/L (ref 96–106)
CHOLEST SERPL-MCNC: 139 MG/DL (ref 100–199)
CO2 SERPL-SCNC: 25 MMOL/L (ref 20–29)
CREAT SERPL-MCNC: 0.98 MG/DL (ref 0.57–1)
GLOBULIN SER CALC-MCNC: 4.3 G/DL (ref 1.5–4.5)
GLUCOSE SERPL-MCNC: 126 MG/DL (ref 65–99)
HDLC SERPL-MCNC: 42 MG/DL
LDLC SERPL CALC-MCNC: 78 MG/DL (ref 0–99)
POTASSIUM SERPL-SCNC: 3.7 MMOL/L (ref 3.5–5.2)
PROT SERPL-MCNC: 8.8 G/DL (ref 6–8.5)
SODIUM SERPL-SCNC: 143 MMOL/L (ref 134–144)
TRIGL SERPL-MCNC: 96 MG/DL (ref 0–149)
VLDLC SERPL CALC-MCNC: 19 MG/DL (ref 5–40)

## 2019-03-20 NOTE — TELEPHONE ENCOUNTER
----- Message from Spenser Curry Jr., MD sent at 3/20/2019 10:40 AM CDT -----  Let her know that I found out about a Hep C clinic at [facility] so I am going to refer her to them to see if they can get treatment approved.

## 2019-03-20 NOTE — TELEPHONE ENCOUNTER
Notified patient that Dr. Curry was sending a referral to clinic at [facility] to see if the can get treatment for hep c approved.

## 2019-03-20 NOTE — TELEPHONE ENCOUNTER
----- Message from Spenser Curry Jr., MD sent at 3/20/2019 10:40 AM CDT -----  Your recent labs revealed an elevation in your fasting blood sugar.  This indicates that you may be at risk for developing diabetes.  You can reduce this risk by reducing or eliminating sweets from your diet, using wheat bread/pasta, limiting potato intake, and walking at least 30 minutes per day.  We will do follow bloodwork at your next appointment.

## 2019-03-21 ENCOUNTER — DOCUMENTATION ONLY (OUTPATIENT)
Dept: TRANSPLANT | Facility: CLINIC | Age: 59
End: 2019-03-21

## 2019-03-21 NOTE — NURSING
Pt records reviewed.   Pt will be referred to Hepatology.    Initial referral received  from the [facility].   Referring Provider/diagnosis    ANEL ROMANO JR, Jr., MD   Chronic hepatitis C without hepatic coma      Referral letter sent to patient.

## 2019-03-21 NOTE — LETTER
March 21, 2019    Chelsea Grossman  [address] [address]      Dear Chelsea Grossman:    Your doctor has referred you to the [facility]. We are sending this letter to remind you to make an appointment with us to complete the referral process.     Please call us at [phone] to schedule an appointment. We look forward to seeing you soon.     If you received a call and have been scheduled, please disregard this letter.       Sincerely,        [facility]   [address] [place] [id number]  [phone]

## 2019-04-11 ENCOUNTER — OFFICE VISIT (OUTPATIENT)
Dept: ORTHOPEDICS | Facility: CLINIC | Age: 59
End: 2019-04-11
Payer: MEDICAID

## 2019-04-11 VITALS — WEIGHT: 210 LBS | HEART RATE: 76 BPM | BODY MASS INDEX: 35.85 KG/M2 | HEIGHT: 64 IN

## 2019-04-11 DIAGNOSIS — S93.325D LISFRANC DISLOCATION, LEFT, SUBSEQUENT ENCOUNTER: Primary | ICD-10-CM

## 2019-04-11 DIAGNOSIS — Z98.890 POST-OPERATIVE STATE: ICD-10-CM

## 2019-04-11 PROCEDURE — 99214 OFFICE O/P EST MOD 30 MIN: CPT | Mod: ,,, | Performed by: ORTHOPAEDIC SURGERY

## 2019-04-11 PROCEDURE — 73630 X-RAY EXAM OF FOOT: CPT | Mod: FY,LT,, | Performed by: ORTHOPAEDIC SURGERY

## 2019-04-11 PROCEDURE — 73630 PR  X-RAY FOOT 3+ VW: ICD-10-PCS | Mod: FY,LT,, | Performed by: ORTHOPAEDIC SURGERY

## 2019-04-11 PROCEDURE — 99214 PR OFFICE/OUTPT VISIT, EST, LEVL IV, 30-39 MIN: ICD-10-PCS | Mod: ,,, | Performed by: ORTHOPAEDIC SURGERY

## 2019-04-11 RX ORDER — TIZANIDINE 4 MG/1
4 TABLET ORAL
COMMUNITY
Start: 2016-09-08 | End: 2019-06-10 | Stop reason: SDUPTHER

## 2019-04-11 NOTE — PROGRESS NOTES
Past Medical History:   Diagnosis Date    Hepatitis C     Hypertension     Osteoarthritis     Osteopenia        Past Surgical History:   Procedure Laterality Date    CARPAL TUNNEL RELEASE      FOOT SURGERY Left 12/18/2018    HYSTEROTOMY      KIDNEY STONE SURGERY      Left ear surgery      TOTAL ABDOMINAL HYSTERECTOMY W/ BILATERAL SALPINGOOPHORECTOMY      TRIGGER FINGER RELEASE Left     2nd and 3rd fingers       Current Outpatient Medications   Medication Sig    amLODIPine (NORVASC) 10 MG tablet Take 1 tablet (10 mg total) by mouth once daily.    carvedilol (COREG) 6.25 MG tablet Take 1 tablet (6.25 mg total) by mouth 2 (two) times daily with meals.    celecoxib (CELEBREX) 200 MG capsule Take 1 capsule (200 mg total) by mouth once daily.    hydroCHLOROthiazide (HYDRODIURIL) 25 MG tablet Take 1 tablet (25 mg total) by mouth once daily.    loratadine (CLARITIN) 10 mg tablet Take 10 mg by mouth once daily.    traMADol (ULTRAM) 50 mg tablet Take 1 tablet (50 mg total) by mouth every 6 (six) hours as needed for Pain.    omeprazole (PRILOSEC) 20 MG capsule     tiZANidine (ZANAFLEX) 4 MG tablet Take 4 mg by mouth.     No current facility-administered medications for this visit.        Review of patient's allergies indicates:   Allergen Reactions    Sulfa (sulfonamide antibiotics) Itching    Sulfamethoxazole-trimethoprim Itching and Rash     Itchy Rash       Family History   Problem Relation Age of Onset    Heart disease Mother     Cancer Father         stomach    Breast cancer Sister     Stomach cancer Brother        Social History     Socioeconomic History    Marital status: Single     Spouse name: Not on file    Number of children: Not on file    Years of education: Not on file    Highest education level: Not on file   Occupational History    Occupation: disabled   Social Needs    Financial resource strain: Not on file    Food insecurity:     Worry: Not on file     Inability: Not on file  "   Transportation needs:     Medical: Not on file     Non-medical: Not on file   Tobacco Use    Smoking status: Former Smoker     Types: Cigarettes     Last attempt to quit: 10/2018     Years since quittin.5    Smokeless tobacco: Never Used   Substance and Sexual Activity    Alcohol use: Yes     Frequency: 2-4 times a month     Drinks per session: 1 or 2    Drug use: No    Sexual activity: Never     Partners: Female   Lifestyle    Physical activity:     Days per week: Not on file     Minutes per session: Not on file    Stress: Not on file   Relationships    Social connections:     Talks on phone: Not on file     Gets together: Not on file     Attends Congregational service: Not on file     Active member of club or organization: Not on file     Attends meetings of clubs or organizations: Not on file     Relationship status: Not on file   Other Topics Concern    Not on file   Social History Narrative    Live with sister       Chief Complaint:   Chief Complaint   Patient presents with    Left Foot - Follow-up     DOS: 2018,  4 months S/P Fusion of the left first metatarsal medial cuneiform fusion of second metatarsal and intermediate cuneiform infusion of third metatarsal lateral cuneiform bone left foot.  Patient is walking with cane.        Consulting Physician: No ref. provider found    History of Present Illness:    This is a 58 y.o. year old female who complains of atient is now 4 months status post fusion of the metatarsal second third and fourth metatarsals for a Lisfranc fracturepatient is a malleolus K      ROS    Examination:    Vital Signs:    Vitals:    19 1306   Pulse: 76   Weight: 95.3 kg (210 lb)   Height: 5' 4" (1.626 m)   PainSc:   4   PainLoc: Foot       This a well-developed, well nourished patient in no acute distress.    Alert and oriented and cooperative to examination.       Physical Exam: left foot-refers not on her incisions are healing wellshe has some discomfort over the " anterior part of her metatarsal base area she alsoedially and laterally in the foot    Imaging:  -ray examination of the left foot shows definite fractures between thefirst metatarsal second and third metatarsal basesbut the hardware in good position     Assessment: tatus post multiple fusions of the first second third metatarsal bases Be healing well    Plan:  atient continue full weightbearing as tolerated      DISCLAIMER: This note may have been dictated using voice recognition software and may contain grammatical errors.     NOTE: Consult report sent to referring provider via [employer] EMR.

## 2019-05-07 ENCOUNTER — TELEPHONE (OUTPATIENT)
Dept: FAMILY MEDICINE | Facility: CLINIC | Age: 59
End: 2019-05-07

## 2019-05-07 NOTE — TELEPHONE ENCOUNTER
----- Message from Spenser Curry Jr., MD sent at 5/6/2019  3:49 PM CDT -----  I have reviewed your results.  They demonstrate no abnormal findings.  If you have any additional concerns regarding these tests, please contact me at your convenience.

## 2019-06-10 ENCOUNTER — OFFICE VISIT (OUTPATIENT)
Dept: FAMILY MEDICINE | Facility: CLINIC | Age: 59
End: 2019-06-10
Payer: MEDICAID

## 2019-06-10 VITALS
TEMPERATURE: 97 F | SYSTOLIC BLOOD PRESSURE: 120 MMHG | HEART RATE: 81 BPM | DIASTOLIC BLOOD PRESSURE: 70 MMHG | HEIGHT: 64 IN | BODY MASS INDEX: 35.68 KG/M2 | WEIGHT: 209 LBS | OXYGEN SATURATION: 98 %

## 2019-06-10 DIAGNOSIS — M15.9 OSTEOARTHRITIS OF MULTIPLE JOINTS, UNSPECIFIED OSTEOARTHRITIS TYPE: ICD-10-CM

## 2019-06-10 DIAGNOSIS — I10 ESSENTIAL HYPERTENSION: Primary | ICD-10-CM

## 2019-06-10 DIAGNOSIS — B18.2 CHRONIC HEPATITIS C WITHOUT HEPATIC COMA: ICD-10-CM

## 2019-06-10 DIAGNOSIS — R73.01 IMPAIRED FASTING GLUCOSE: ICD-10-CM

## 2019-06-10 DIAGNOSIS — R77.9 ELEVATED SERUM PROTEIN LEVEL: ICD-10-CM

## 2019-06-10 DIAGNOSIS — H93.12 TINNITUS OF LEFT EAR: ICD-10-CM

## 2019-06-10 PROBLEM — E55.9 VITAMIN D DEFICIENCY: Status: ACTIVE | Noted: 2017-10-31

## 2019-06-10 PROBLEM — M54.12 CERVICAL RADICULAR PAIN: Status: ACTIVE | Noted: 2017-02-03

## 2019-06-10 PROBLEM — M15.0 PRIMARY OSTEOARTHRITIS INVOLVING MULTIPLE JOINTS: Status: ACTIVE | Noted: 2018-04-24

## 2019-06-10 PROBLEM — G56.03 CARPAL TUNNEL SYNDROME ON BOTH SIDES: Status: ACTIVE | Noted: 2017-02-03

## 2019-06-10 PROCEDURE — 99214 OFFICE O/P EST MOD 30 MIN: CPT | Mod: ,,, | Performed by: INTERNAL MEDICINE

## 2019-06-10 PROCEDURE — 99214 PR OFFICE/OUTPT VISIT, EST, LEVL IV, 30-39 MIN: ICD-10-PCS | Mod: ,,, | Performed by: INTERNAL MEDICINE

## 2019-06-10 RX ORDER — HYDROCHLOROTHIAZIDE 25 MG/1
25 TABLET ORAL DAILY
Qty: 90 TABLET | Refills: 1 | Status: SHIPPED | OUTPATIENT
Start: 2019-06-10 | End: 2019-12-10 | Stop reason: SDUPTHER

## 2019-06-10 RX ORDER — LORATADINE 10 MG/1
10 TABLET ORAL DAILY
Qty: 90 TABLET | Refills: 1 | Status: SHIPPED | OUTPATIENT
Start: 2019-06-10 | End: 2019-11-13

## 2019-06-10 RX ORDER — CELECOXIB 200 MG/1
200 CAPSULE ORAL DAILY
Qty: 90 CAPSULE | Refills: 1 | Status: SHIPPED | OUTPATIENT
Start: 2019-06-10 | End: 2019-12-10 | Stop reason: SDUPTHER

## 2019-06-10 RX ORDER — TIZANIDINE 4 MG/1
4 TABLET ORAL 3 TIMES DAILY PRN
Qty: 60 TABLET | Refills: 3 | Status: SHIPPED | OUTPATIENT
Start: 2019-06-10 | End: 2019-06-11

## 2019-06-10 RX ORDER — CARVEDILOL 6.25 MG/1
6.25 TABLET ORAL 2 TIMES DAILY WITH MEALS
Qty: 180 TABLET | Refills: 1 | Status: SHIPPED | OUTPATIENT
Start: 2019-06-10 | End: 2019-12-10 | Stop reason: SDUPTHER

## 2019-06-10 RX ORDER — FLUTICASONE PROPIONATE 50 MCG
1 SPRAY, SUSPENSION (ML) NASAL DAILY
Qty: 3 BOTTLE | Refills: 1 | Status: SHIPPED | OUTPATIENT
Start: 2019-06-10 | End: 2020-05-18 | Stop reason: SDUPTHER

## 2019-06-10 RX ORDER — OMEPRAZOLE 20 MG/1
20 CAPSULE, DELAYED RELEASE ORAL DAILY
Qty: 90 CAPSULE | Refills: 1 | Status: SHIPPED | OUTPATIENT
Start: 2019-06-10 | End: 2019-12-10 | Stop reason: SDUPTHER

## 2019-06-10 RX ORDER — AMLODIPINE BESYLATE 10 MG/1
10 TABLET ORAL DAILY
Qty: 90 TABLET | Refills: 1 | Status: SHIPPED | OUTPATIENT
Start: 2019-06-10 | End: 2019-12-10 | Stop reason: SDUPTHER

## 2019-06-10 RX ORDER — TRAMADOL HYDROCHLORIDE 50 MG/1
50 TABLET ORAL EVERY 6 HOURS PRN
Qty: 60 TABLET | Refills: 1 | Status: SHIPPED | OUTPATIENT
Start: 2019-06-10 | End: 2019-09-09 | Stop reason: SDUPTHER

## 2019-06-10 NOTE — PROGRESS NOTES
Subjective:       Patient ID: Chelsea Grossman is a 59 y.o. female.    Chief Complaint: Hypertension (continuity of care); Foot Pain (left foot pain); and Tinnitus (left ear noise)    Here for routine follow up.  She has [facility] to try and arrange treatment for her Hep C.      Hypertension   Associated symptoms include neck pain. Pertinent negatives include no chest pain, headaches, palpitations or shortness of breath.   Ringing in Ears:   Chronicity:  Chronic  Onset:  More than 1 year ago  Progression since onset:  Gradually worsening  Frequency:  Constantly  Hearing loss characteristics: crickets.   Associated symptoms: ear pain (left ear) and tinnitus (left ear).  No dizziness, no fever, no headaches and no rhinorrhea.   Left side of nose always feels congested  Exacerbated by: Worse if gets water in her ear during shower.  Risk factors for lung disease:  Ear surgeryNo dizziness and no environmental allergies.    Review of Systems   Constitutional: Negative for chills, fatigue, fever and unexpected weight change.   HENT: Positive for ear pain (left ear) and tinnitus (left ear). Negative for congestion, hearing loss, postnasal drip, rhinorrhea, trouble swallowing and voice change.    Eyes: Negative for photophobia and visual disturbance.   Respiratory: Negative for apnea, cough, choking, chest tightness, shortness of breath and wheezing.    Cardiovascular: Negative for chest pain, palpitations and leg swelling.   Gastrointestinal: Negative for abdominal pain, blood in stool, constipation, diarrhea, nausea, rectal pain and vomiting.   Endocrine: Negative for cold intolerance, heat intolerance, polydipsia and polyuria.   Genitourinary: Negative for decreased urine volume, difficulty urinating, dysuria, frequency, genital sores, hematuria, menstrual problem, pelvic pain, urgency, vaginal bleeding and vaginal discharge.   Musculoskeletal: Positive for arthralgias, back pain,  neck pain and neck stiffness. Negative for gait problem, joint swelling and myalgias.   Skin: Negative for color change, rash and wound.   Allergic/Immunologic: Negative for environmental allergies and food allergies.   Neurological: Positive for numbness (left hand ). Negative for dizziness, tremors, seizures, syncope, facial asymmetry, speech difficulty, weakness, light-headedness and headaches.   Hematological: Negative for adenopathy. Does not bruise/bleed easily.   Psychiatric/Behavioral: Negative for confusion, hallucinations, sleep disturbance and suicidal ideas. The patient is not nervous/anxious.        Past Medical History:   Diagnosis Date    Hepatitis C     Hypertension     Osteoarthritis     Osteopenia       Past Surgical History:   Procedure Laterality Date    CARPAL TUNNEL RELEASE      FOOT SURGERY Left 2018    HYSTEROTOMY      KIDNEY STONE SURGERY      Left ear surgery      TOTAL ABDOMINAL HYSTERECTOMY W/ BILATERAL SALPINGOOPHORECTOMY      TRIGGER FINGER RELEASE Left     2nd and 3rd fingers       Family History   Problem Relation Age of Onset    Heart disease Mother     Cancer Father         stomach    Breast cancer Sister     Stomach cancer Brother        Social History     Socioeconomic History    Marital status: Single     Spouse name: Not on file    Number of children: Not on file    Years of education: Not on file    Highest education level: Not on file   Occupational History    Occupation: disabled   Social Needs    Financial resource strain: Not on file    Food insecurity:     Worry: Not on file     Inability: Not on file    Transportation needs:     Medical: Not on file     Non-medical: Not on file   Tobacco Use    Smoking status: Former Smoker     Types: Cigarettes     Last attempt to quit: 10/2018     Years since quittin.6    Smokeless tobacco: Never Used   Substance and Sexual Activity    Alcohol use: Yes     Frequency: 2-4 times a month     Drinks per  session: 1 or 2    Drug use: No    Sexual activity: Never     Partners: Female   Lifestyle    Physical activity:     Days per week: Not on file     Minutes per session: Not on file    Stress: Rather much   Relationships    Social connections:     Talks on phone: Not on file     Gets together: Not on file     Attends Jew service: Not on file     Active member of club or organization: Not on file     Attends meetings of clubs or organizations: Not on file     Relationship status: Not on file   Other Topics Concern    Not on file   Social History Narrative    Live with sister       Current Outpatient Medications   Medication Sig Dispense Refill    amLODIPine (NORVASC) 10 MG tablet Take 1 tablet (10 mg total) by mouth once daily. 90 tablet 1    carvedilol (COREG) 6.25 MG tablet Take 1 tablet (6.25 mg total) by mouth 2 (two) times daily with meals. 180 tablet 1    celecoxib (CELEBREX) 200 MG capsule Take 1 capsule (200 mg total) by mouth once daily. 90 capsule 1    hydroCHLOROthiazide (HYDRODIURIL) 25 MG tablet Take 1 tablet (25 mg total) by mouth once daily. 90 tablet 1    loratadine (CLARITIN) 10 mg tablet Take 1 tablet (10 mg total) by mouth once daily. 90 tablet 1    omeprazole (PRILOSEC) 20 MG capsule Take 1 capsule (20 mg total) by mouth once daily. 90 capsule 1    tiZANidine (ZANAFLEX) 4 MG tablet Take 1 tablet (4 mg total) by mouth 3 (three) times daily as needed. 60 tablet 3    traMADol (ULTRAM) 50 mg tablet Take 1 tablet (50 mg total) by mouth every 6 (six) hours as needed for Pain. 60 tablet 1    fluticasone propionate (FLONASE) 50 mcg/actuation nasal spray 1 spray (50 mcg total) by Each Nare route once daily. 3 Bottle 1     No current facility-administered medications for this visit.        Review of patient's allergies indicates:   Allergen Reactions    Sulfa (sulfonamide antibiotics) Itching    Sulfamethoxazole-trimethoprim Itching and Rash     Itchy Rash     Objective:    HPI      "Hypertension      Additional comments: continuity of care              Foot Pain      Additional comments: left foot pain              Tinnitus      Additional comments: left ear noise          Last edited by Hugh Rausch MA on 6/10/2019  2:07 PM. (History)      Blood pressure 120/70, pulse 81, temperature 97.2 °F (36.2 °C), temperature source Temporal, height 5' 4" (1.626 m), weight 94.8 kg (209 lb), SpO2 98 %. Body mass index is 35.87 kg/m².   Physical Exam   Constitutional: She appears well-developed. No distress.   Obese     HENT:   Right Ear: Tympanic membrane, external ear and ear canal normal.   Left Ear: External ear and ear canal normal. Tympanic membrane is scarred and retracted.   Nose: Nose normal.   Mouth/Throat: Oropharynx is clear and moist.   Eyes: Conjunctivae are normal. Right eye exhibits no discharge. Left eye exhibits no discharge. No scleral icterus.   Arcus senilus     Neck: Carotid bruit is not present.   Cardiovascular: Normal rate, regular rhythm and normal heart sounds.   No murmur heard.  Pulmonary/Chest: Effort normal and breath sounds normal. No respiratory distress. She has no decreased breath sounds. She has no wheezes. She has no rhonchi. She has no rales.   Abdominal: Soft. She exhibits no distension. There is no tenderness. There is no rebound and no guarding.   Musculoskeletal: She exhibits no edema.   Neurological: She is alert. She displays no tremor.   Skin: Skin is warm and dry.   Psychiatric: She has a normal mood and affect. Her speech is normal.   Nursing note and vitals reviewed.        Orders Only on 03/19/2019   Component Date Value Ref Range Status    Glucose 03/19/2019 126* 65 - 99 mg/dL Final    BUN, Bld 03/19/2019 11  6 - 24 mg/dL Final    Creatinine 03/19/2019 0.98  0.57 - 1.00 mg/dL Final    eGFR if non African American 03/19/2019 63  >59 mL/min/1.73 Final    eGFR if African American 03/19/2019 73  >59 mL/min/1.73 Final    BUN/Creatinine Ratio 03/19/2019 " 11  9 - 23 Final    Sodium 03/19/2019 143  134 - 144 mmol/L Final    Potassium 03/19/2019 3.7  3.5 - 5.2 mmol/L Final    Chloride 03/19/2019 99  96 - 106 mmol/L Final    CO2 03/19/2019 25  20 - 29 mmol/L Final    Calcium 03/19/2019 10.3* 8.7 - 10.2 mg/dL Final    Total Protein 03/19/2019 8.8* 6.0 - 8.5 g/dL Final    Albumin 03/19/2019 4.5  3.5 - 5.5 g/dL Final    Globulin, Total 03/19/2019 4.3  1.5 - 4.5 g/dL Final    Albumin/Globulin Ratio 03/19/2019 1.0* 1.2 - 2.2 Final    Total Bilirubin 03/19/2019 1.1  0.0 - 1.2 mg/dL Final    Alkaline Phosphatase 03/19/2019 182* 39 - 117 IU/L Final    AST 03/19/2019 81* 0 - 40 IU/L Final    ALT 03/19/2019 80* 0 - 32 IU/L Final    Cholesterol 03/19/2019 139  100 - 199 mg/dL Final    Triglycerides 03/19/2019 96  0 - 149 mg/dL Final    HDL 03/19/2019 42  >39 mg/dL Final    VLDL Cholesterol Nicolas 03/19/2019 19  5 - 40 mg/dL Final    LDL Calculated 03/19/2019 78  0 - 99 mg/dL Final   Office Visit on 03/18/2019   Component Date Value Ref Range Status    WBC, UA 03/18/2019 -   Final    Nitrite, UA 03/18/2019 -   Final    Urobilinogen, UA 03/18/2019 4.0   Final    Protein 03/18/2019 -   Final    pH, UA 03/18/2019 -   Final    Blood, UA 03/18/2019 -   Final    Spec Grav UA 03/18/2019 1.020   Final    Ketones, UA 03/18/2019 -   Final    Bilirubin 03/18/2019 -   Final    Glucose, UA 03/18/2019 -   Final   ]  Assessment:       1. Essential hypertension    2. Osteoarthritis of multiple joints, unspecified osteoarthritis type    3. Chronic hepatitis C without hepatic coma    4. Tinnitus of left ear    5. Elevated serum protein level    6. Impaired fasting glucose        Plan:       Chelsea was seen today for hypertension, foot pain and tinnitus.    Diagnoses and all orders for this visit:    Essential hypertension  Comments:  Well controlled  Orders:  -     amLODIPine (NORVASC) 10 MG tablet; Take 1 tablet (10 mg total) by mouth once daily.  -     carvedilol (COREG)  6.25 MG tablet; Take 1 tablet (6.25 mg total) by mouth 2 (two) times daily with meals.  -     hydroCHLOROthiazide (HYDRODIURIL) 25 MG tablet; Take 1 tablet (25 mg total) by mouth once daily.  -     Comprehensive metabolic panel; Future    Osteoarthritis of multiple joints, unspecified osteoarthritis type  -     celecoxib (CELEBREX) 200 MG capsule; Take 1 capsule (200 mg total) by mouth once daily.  -     traMADol (ULTRAM) 50 mg tablet; Take 1 tablet (50 mg total) by mouth every 6 (six) hours as needed for Pain.    Chronic hepatitis C without hepatic coma  Comments:  Keep hepatology appointment      Tinnitus of left ear  Comments:  Likely r/t prior surgery.  May want to use ear plugs in the shower.  Orders:  -     loratadine (CLARITIN) 10 mg tablet; Take 1 tablet (10 mg total) by mouth once daily.  -     fluticasone propionate (FLONASE) 50 mcg/actuation nasal spray; 1 spray (50 mcg total) by Each Nare route once daily.    Elevated serum protein level  Comments:  Elevated on last labs; repeat labs.   Orders:  -     Comprehensive metabolic panel; Future    Impaired fasting glucose  -     Comprehensive metabolic panel; Future  -     Hemoglobin A1c; Future    Other orders  -     omeprazole (PRILOSEC) 20 MG capsule; Take 1 capsule (20 mg total) by mouth once daily.  -     tiZANidine (ZANAFLEX) 4 MG tablet; Take 1 tablet (4 mg total) by mouth 3 (three) times daily as needed.

## 2019-06-11 ENCOUNTER — LAB VISIT (OUTPATIENT)
Dept: LAB | Facility: HOSPITAL | Age: 59
End: 2019-06-11
Attending: PHYSICIAN ASSISTANT
Payer: MEDICAID

## 2019-06-11 ENCOUNTER — INITIAL CONSULT (OUTPATIENT)
Dept: HEPATOLOGY | Facility: CLINIC | Age: 59
End: 2019-06-11
Payer: MEDICAID

## 2019-06-11 VITALS
SYSTOLIC BLOOD PRESSURE: 144 MMHG | WEIGHT: 209.69 LBS | HEART RATE: 71 BPM | DIASTOLIC BLOOD PRESSURE: 96 MMHG | BODY MASS INDEX: 35.8 KG/M2 | HEIGHT: 64 IN

## 2019-06-11 DIAGNOSIS — R74.8 ABNORMAL TRANSAMINASES: ICD-10-CM

## 2019-06-11 DIAGNOSIS — R73.01 IMPAIRED FASTING GLUCOSE: ICD-10-CM

## 2019-06-11 DIAGNOSIS — R76.8 HEPATITIS C ANTIBODY TEST POSITIVE: ICD-10-CM

## 2019-06-11 DIAGNOSIS — R74.8 ABNORMAL TRANSAMINASES: Primary | ICD-10-CM

## 2019-06-11 LAB
ALBUMIN SERPL BCP-MCNC: 3.7 G/DL (ref 3.5–5.2)
ALP SERPL-CCNC: 215 U/L (ref 55–135)
ALT SERPL W/O P-5'-P-CCNC: 88 U/L (ref 10–44)
ANION GAP SERPL CALC-SCNC: 11 MMOL/L (ref 8–16)
AST SERPL-CCNC: 68 U/L (ref 10–40)
BASOPHILS # BLD AUTO: 0.05 K/UL (ref 0–0.2)
BASOPHILS NFR BLD: 0.6 % (ref 0–1.9)
BILIRUB SERPL-MCNC: 0.6 MG/DL (ref 0.1–1)
BUN SERPL-MCNC: 16 MG/DL (ref 6–20)
CALCIUM SERPL-MCNC: 10.4 MG/DL (ref 8.7–10.5)
CHLORIDE SERPL-SCNC: 104 MMOL/L (ref 95–110)
CO2 SERPL-SCNC: 25 MMOL/L (ref 23–29)
CREAT SERPL-MCNC: 0.8 MG/DL (ref 0.5–1.4)
DIFFERENTIAL METHOD: ABNORMAL
EOSINOPHIL # BLD AUTO: 0.1 K/UL (ref 0–0.5)
EOSINOPHIL NFR BLD: 1.2 % (ref 0–8)
ERYTHROCYTE [DISTWIDTH] IN BLOOD BY AUTOMATED COUNT: 13.4 % (ref 11.5–14.5)
EST. GFR  (AFRICAN AMERICAN): >60 ML/MIN/1.73 M^2
EST. GFR  (NON AFRICAN AMERICAN): >60 ML/MIN/1.73 M^2
GLUCOSE SERPL-MCNC: 112 MG/DL (ref 70–110)
HCT VFR BLD AUTO: 40.5 % (ref 37–48.5)
HGB BLD-MCNC: 13.5 G/DL (ref 12–16)
IMM GRANULOCYTES # BLD AUTO: 0.03 K/UL (ref 0–0.04)
IMM GRANULOCYTES NFR BLD AUTO: 0.4 % (ref 0–0.5)
INR PPP: 1.1 (ref 0.8–1.2)
LYMPHOCYTES # BLD AUTO: 3.9 K/UL (ref 1–4.8)
LYMPHOCYTES NFR BLD: 47.4 % (ref 18–48)
MCH RBC QN AUTO: 32.5 PG (ref 27–31)
MCHC RBC AUTO-ENTMCNC: 33.3 G/DL (ref 32–36)
MCV RBC AUTO: 98 FL (ref 82–98)
MONOCYTES # BLD AUTO: 0.6 K/UL (ref 0.3–1)
MONOCYTES NFR BLD: 6.8 % (ref 4–15)
NEUTROPHILS # BLD AUTO: 3.6 K/UL (ref 1.8–7.7)
NEUTROPHILS NFR BLD: 43.6 % (ref 38–73)
NRBC BLD-RTO: 0 /100 WBC
PLATELET # BLD AUTO: 242 K/UL (ref 150–350)
PMV BLD AUTO: 11 FL (ref 9.2–12.9)
POTASSIUM SERPL-SCNC: 3.9 MMOL/L (ref 3.5–5.1)
PROT SERPL-MCNC: 8.2 G/DL (ref 6–8.4)
PROTHROMBIN TIME: 11.1 SEC (ref 9–12.5)
RBC # BLD AUTO: 4.15 M/UL (ref 4–5.4)
SODIUM SERPL-SCNC: 140 MMOL/L (ref 136–145)
WBC # BLD AUTO: 8.23 K/UL (ref 3.9–12.7)

## 2019-06-11 PROCEDURE — 99213 OFFICE O/P EST LOW 20 MIN: CPT | Mod: PBBFAC,PO | Performed by: PHYSICIAN ASSISTANT

## 2019-06-11 PROCEDURE — 86706 HEP B SURFACE ANTIBODY: CPT

## 2019-06-11 PROCEDURE — 87340 HEPATITIS B SURFACE AG IA: CPT

## 2019-06-11 PROCEDURE — 87902 NFCT AGT GNTYP ALYS HEP C: CPT

## 2019-06-11 PROCEDURE — 99203 PR OFFICE/OUTPT VISIT, NEW, LEVL III, 30-44 MIN: ICD-10-PCS | Mod: S$PBB,,, | Performed by: PHYSICIAN ASSISTANT

## 2019-06-11 PROCEDURE — 80053 COMPREHEN METABOLIC PANEL: CPT

## 2019-06-11 PROCEDURE — 87522 HEPATITIS C REVRS TRNSCRPJ: CPT

## 2019-06-11 PROCEDURE — 85610 PROTHROMBIN TIME: CPT | Mod: PO

## 2019-06-11 PROCEDURE — 86704 HEP B CORE ANTIBODY TOTAL: CPT

## 2019-06-11 PROCEDURE — 99999 PR PBB SHADOW E&M-EST. PATIENT-LVL III: CPT | Mod: PBBFAC,,, | Performed by: PHYSICIAN ASSISTANT

## 2019-06-11 PROCEDURE — 36415 COLL VENOUS BLD VENIPUNCTURE: CPT | Mod: PO

## 2019-06-11 PROCEDURE — 85025 COMPLETE CBC W/AUTO DIFF WBC: CPT

## 2019-06-11 PROCEDURE — 83036 HEMOGLOBIN GLYCOSYLATED A1C: CPT

## 2019-06-11 PROCEDURE — 99203 OFFICE O/P NEW LOW 30 MIN: CPT | Mod: S$PBB,,, | Performed by: PHYSICIAN ASSISTANT

## 2019-06-11 PROCEDURE — 99999 PR PBB SHADOW E&M-EST. PATIENT-LVL III: ICD-10-PCS | Mod: PBBFAC,,, | Performed by: PHYSICIAN ASSISTANT

## 2019-06-11 PROCEDURE — 86790 VIRUS ANTIBODY NOS: CPT

## 2019-06-11 RX ORDER — MULTIVITAMIN
1 TABLET ORAL DAILY
COMMUNITY

## 2019-06-11 RX ORDER — AMOXICILLIN 500 MG
1 CAPSULE ORAL DAILY
COMMUNITY
End: 2020-02-10

## 2019-06-11 NOTE — LETTER
June 11, 2019      Spenser Curry Jr., MD  [address]  [facility] [address]           [facility]  [address] [facility] [address] [place] [address]  Phone: [phone]          Patient: Chelsea Grossman   MR Number: [id number]   YOB: 1960   Date of Visit: 6/11/2019       Dear Dr. Spenser Curry Jr.:    Thank you for referring Chelsea Grossman to me for evaluation. Attached you will find relevant portions of my assessment and plan of care.    If you have questions, please do not hesitate to call me. I look forward to following Chelsea Grossman along with you.    Sincerely,    Jennifer B. Scheuermann, PA    Enclosure  CC:  No Recipients    If you would like to receive this communication electronically, please contact [email or web address] or [phone] to request more information on [facility] access.    For providers and/or their staff who would like to refer a patient to [facility], please contact us through our one-stop-shop provider referral line, [facility], at [phone].    If you feel you have received this communication in error or would no longer like to receive these types of communications, please e-mail [email or web address]

## 2019-06-11 NOTE — PROGRESS NOTES
HEPATOLOGY CLINIC VISIT NOTE - HCV clinic    REFERRING PROVIDER: Spenser Curry MD    CHIEF COMPLAINT: Hepatitis C     HISTORY: This is a 59 y.o. Black or  female here for HCV    HCV history:  · Originally told of HCV in 2018; was referred to Dr De's office but appt never scheduled.  · 14 HCVAB positive (Care Everywhere). No records of HCV RNA.    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   No abnormal skin rashes.                     Past Medical History:   Diagnosis Date    Hepatitis C     History of kidney stones     Hypertension     Osteoarthritis     Osteopenia        Past Surgical History:   Procedure Laterality Date    CARPAL TUNNEL RELEASE      FOOT SURGERY Left 2018    HYSTEROTOMY      KIDNEY STONE SURGERY      Left ear surgery      TOTAL ABDOMINAL HYSTERECTOMY W/ BILATERAL SALPINGOOPHORECTOMY      TRIGGER FINGER RELEASE Left     2nd and 3rd fingers       FAMILY HISTORY: Negative for liver disease    SOCIAL HISTORY:   Social History     Tobacco Use   Smoking Status Former Smoker    Types: Cigarettes    Last attempt to quit: 10/2018    Years since quittin.6   Smokeless Tobacco Never Used     Alcohol - (+) alcohol use in past, none regular now.  Drugs - remote history       ROS:   No fever, chills, weight loss, fatigue  No chest pain, palpitations, dyspnea, cough  No abdominal pain, change in bowel pattern, nausea, vomiting, GERD  No dysuria, hematuria   No skin rashes   No headaches  (+) Back pain, joint pains  No lower extremity edema  No depression or anxiety      PHYSICAL EXAM:  Friendly Black or   American female, in no acute distress; alert and oriented to person, place and time  VITALS: reviewed  HEENT: Sclerae anicteric.   NECK: Supple  CVS: Regular rate and rhythm. No murmurs  LUNGS: Normal respiratory effort. Clear bilaterally  ABDOMEN: Flat, soft, nontender. No organomegaly or masses. No ascites or hernias. Good bowel sounds.    SKIN: Warm and dry. No jaundice, No obvious rashes.   EXTREMITIES: No lower extremity edema  NEURO/PSYCH: Normal gate. Memory intact. Thought and speech pattern appropriate. Behavior normal. No depression or anxiety noted.    RECENT LABS:  Lab Results   Component Value Date    WBC 6.4 12/13/2018    HGB 13.4 12/13/2018     12/13/2018     No results found for: INR  Lab Results   Component Value Date    AST 81 (H) 03/19/2019    ALT 80 (H) 03/19/2019    BILITOT 1.1 03/19/2019    ALBUMIN 4.5 03/19/2019    ALKPHOS 182 (H) 03/19/2019    CREATININE 0.98 03/19/2019    BUN 11 03/19/2019     03/19/2019    K 3.7 03/19/2019         RECENT IMAGING:  none    ASSESSMENT  59 y.o. Black or  female with:  1. POSITIVE HEPATITIS C ANTIBODY  2. ABNORMAL LIVER PANEL      EDUCATION:  Discussed significance of HCV antibody, indicating prior exposure to HCV. Discussed need for additional labs to see if pt has cleared virus on own or if still viremic, in which case further evaluation and antiviral therapy would be needed.    Transmission of Hepatitis C was reviewed, including possible sexual transmission. Sexual contacts should be screened. Risk of vertical transmission of Hepatitis C from mother to baby was reviewed. Children should be screened. Patient should avoid sharing personal products such as razors, toothbrushes, etc.         PLAN:  1. Labs   Orders Placed This Encounter   Procedures    Hepatitis C genotype    CBC auto differential    Comprehensive metabolic panel    Protime-INR    Hepatitis B surface antigen    Hepatitis B surface antibody    Hepatitis B core  antibody, total    Hepatitis A antibody, IgG     Further recs to follow based on results.  If HCV RNA pos, will need f/u in HCV clinic  If HCV RNA neg but LFT still abnormal, will need general hepatology f/u for abnormal liver panel

## 2019-06-12 LAB
ESTIMATED AVG GLUCOSE: 108 MG/DL (ref 68–131)
HBA1C MFR BLD HPLC: 5.4 % (ref 4–5.6)
HBV CORE AB SERPL QL IA: NEGATIVE
HBV SURFACE AB SER-ACNC: NEGATIVE M[IU]/ML
HBV SURFACE AG SERPL QL IA: NEGATIVE
HEPATITIS A ANTIBODY, IGG: POSITIVE

## 2019-06-14 ENCOUNTER — TELEPHONE (OUTPATIENT)
Dept: HEPATOLOGY | Facility: CLINIC | Age: 59
End: 2019-06-14

## 2019-06-14 DIAGNOSIS — B18.2 CHRONIC HEPATITIS C WITHOUT HEPATIC COMA: Primary | ICD-10-CM

## 2019-06-14 DIAGNOSIS — R74.8 ABNORMAL TRANSAMINASES: ICD-10-CM

## 2019-06-14 LAB
HCV GENTYP SERPL NAA+PROBE: ABNORMAL
HCV RNA SERPL NAA+PROBE-LOG IU: 6.92 LOG (10) IU/ML
HCV RNA SERPL QL NAA+PROBE: DETECTED
HCV RNA SPEC NAA+PROBE-ACNC: ABNORMAL IU/ML

## 2019-06-14 NOTE — TELEPHONE ENCOUNTER
----- Message from Toyin Fuentes sent at 6/14/2019  2:48 PM CDT -----  Contact: PT  Needs Advice    Reason for call: TO DISCUSS LAB RESULTS         Communication Preference: [phone]    Additional Information:

## 2019-06-14 NOTE — TELEPHONE ENCOUNTER
Attempt made to reach patient.  I spoke with family member briefly.  Msg from PA Scheuermann mailed to her.  I stressed that she contact us for scheduling.

## 2019-06-14 NOTE — TELEPHONE ENCOUNTER
6/11/19 labs reviewed:    pls tell pt labs confirm she does have Hep C infection    pls schedule:  · (If she is able to get to main campus)  FibroScan  U/S abdomen  Visit  (all on same day)    · (If unable to get to main campus)  HCV FibroSURE, HIV - to be done ~ 1 week before visit  U/S abdomen  Visit    **Pt says we can call sister's number if her number is not working, but do NOT give medical info to her sister

## 2019-06-17 ENCOUNTER — OFFICE VISIT (OUTPATIENT)
Dept: ORTHOPEDICS | Facility: CLINIC | Age: 59
End: 2019-06-17
Payer: MEDICAID

## 2019-06-17 VITALS — HEIGHT: 64 IN | HEART RATE: 92 BPM | WEIGHT: 209 LBS | BODY MASS INDEX: 35.68 KG/M2

## 2019-06-17 DIAGNOSIS — S93.325D LISFRANC DISLOCATION, LEFT, SUBSEQUENT ENCOUNTER: Primary | ICD-10-CM

## 2019-06-17 PROCEDURE — 99214 PR OFFICE/OUTPT VISIT, EST, LEVL IV, 30-39 MIN: ICD-10-PCS | Mod: 25,,, | Performed by: ORTHOPAEDIC SURGERY

## 2019-06-17 PROCEDURE — 99214 OFFICE O/P EST MOD 30 MIN: CPT | Mod: 25,,, | Performed by: ORTHOPAEDIC SURGERY

## 2019-06-17 PROCEDURE — 73630 PR  X-RAY FOOT 3+ VW: ICD-10-PCS | Mod: FY,LT,, | Performed by: ORTHOPAEDIC SURGERY

## 2019-06-17 PROCEDURE — 73630 X-RAY EXAM OF FOOT: CPT | Mod: FY,LT,, | Performed by: ORTHOPAEDIC SURGERY

## 2019-06-17 NOTE — PROGRESS NOTES
Past Medical History:   Diagnosis Date    Hepatitis C     History of kidney stones     Hypertension     Osteoarthritis     Osteopenia        Past Surgical History:   Procedure Laterality Date    CARPAL TUNNEL RELEASE      FOOT SURGERY Left 12/18/2018    HYSTEROTOMY      KIDNEY STONE SURGERY      Left ear surgery      TOTAL ABDOMINAL HYSTERECTOMY W/ BILATERAL SALPINGOOPHORECTOMY      TRIGGER FINGER RELEASE Left     2nd and 3rd fingers       Current Outpatient Medications   Medication Sig    amLODIPine (NORVASC) 10 MG tablet Take 1 tablet (10 mg total) by mouth once daily.    carvedilol (COREG) 6.25 MG tablet Take 1 tablet (6.25 mg total) by mouth 2 (two) times daily with meals.    celecoxib (CELEBREX) 200 MG capsule Take 1 capsule (200 mg total) by mouth once daily.    fish oil-omega-3 fatty acids 300-1,000 mg capsule Take 1 capsule by mouth once daily.    fluticasone propionate (FLONASE) 50 mcg/actuation nasal spray 1 spray (50 mcg total) by Each Nare route once daily.    hydroCHLOROthiazide (HYDRODIURIL) 25 MG tablet Take 1 tablet (25 mg total) by mouth once daily.    loratadine (CLARITIN) 10 mg tablet Take 1 tablet (10 mg total) by mouth once daily.    multivitamin (MULTIPLE VITAMINS) per tablet Take 1 tablet by mouth once daily.    omeprazole (PRILOSEC) 20 MG capsule Take 1 capsule (20 mg total) by mouth once daily.    traMADol (ULTRAM) 50 mg tablet Take 1 tablet (50 mg total) by mouth every 6 (six) hours as needed for Pain.     No current facility-administered medications for this visit.        Review of patient's allergies indicates:   Allergen Reactions    Sulfa (sulfonamide antibiotics) Itching    Sulfamethoxazole-trimethoprim Itching and Rash     Itchy Rash       Family History   Problem Relation Age of Onset    Heart disease Mother     Cancer Father         stomach    Breast cancer Sister     Stomach cancer Brother        Social History     Socioeconomic History    Marital  status: Single     Spouse name: Not on file    Number of children: Not on file    Years of education: Not on file    Highest education level: Not on file   Occupational History    Occupation: disabled   Social Needs    Financial resource strain: Not on file    Food insecurity:     Worry: Not on file     Inability: Not on file    Transportation needs:     Medical: Not on file     Non-medical: Not on file   Tobacco Use    Smoking status: Former Smoker     Types: Cigarettes     Last attempt to quit: 10/2018     Years since quittin.7    Smokeless tobacco: Never Used   Substance and Sexual Activity    Alcohol use: Yes     Frequency: 2-4 times a month     Drinks per session: 1 or 2    Drug use: No    Sexual activity: Never     Partners: Female   Lifestyle    Physical activity:     Days per week: Not on file     Minutes per session: Not on file    Stress: Rather much   Relationships    Social connections:     Talks on phone: Not on file     Gets together: Not on file     Attends Sikh service: Not on file     Active member of club or organization: Not on file     Attends meetings of clubs or organizations: Not on file     Relationship status: Not on file   Other Topics Concern    Not on file   Social History Narrative    Live with sister       Chief Complaint:   Chief Complaint   Patient presents with    Left Foot - Pain, Follow-up     DOS: 2018,  6 months S/P Fusion of the left first metatarsal medial cuneiform fusion of second metatarsal and intermediate cuneiform infusion of third metatarsal lateral cuneiform bone left foot. She still having pain in the foot when she walks       Consulting Physician: No ref. provider found    History of Present Illness:    This is a 59 y.o. year old female who complains of patient is now 6 months status post fusion of the left first metatarsal second metatarsal and third metatarsal she had a Lisfranc fracture of the left foot she continues complain of  "pain      ROS    Examination:    Vital Signs:    Vitals:    06/17/19 1349   Pulse: 92   Weight: 94.8 kg (209 lb)   Height: 5' 4" (1.626 m)   PainSc:   6   PainLoc: Foot       This a well-developed, well nourished patient in no acute distress.    Alert and oriented and cooperative to examination.       Physical Exam: left foot-patient incision is healed well she does not have any prominent hardware her pain appears be over the first metatarsal shaft area and along the lateralhindfoot area she has good ankle motion and subtalar joint motion    Imaging:  AP lateral oblique x-ray of the left foot shows fusion of the first second third metatarsals with hardware in place diffusions appear to be intact     Assessment: status post left first metatarsal second metatarsal and third metatarsal base effusions    Plan:  We'll get her a Rx support molded to her left shoe      DISCLAIMER: This note may have been dictated using voice recognition software and may contain grammatical errors.     NOTE: Consult report sent to referring provider via EP[employer] EMR.  "

## 2019-07-26 RX ORDER — TRAMADOL HYDROCHLORIDE 50 MG/1
50 TABLET ORAL
COMMUNITY
Start: 2017-04-28 | End: 2019-09-09

## 2019-07-26 RX ORDER — TIZANIDINE 4 MG/1
4 TABLET ORAL 3 TIMES DAILY PRN
Refills: 3 | COMMUNITY
Start: 2019-07-10 | End: 2020-05-18

## 2019-07-26 RX ORDER — ESTRADIOL 0.1 MG/G
CREAM VAGINAL
Refills: 6 | COMMUNITY
Start: 2019-06-24 | End: 2019-12-10 | Stop reason: SDUPTHER

## 2019-08-23 ENCOUNTER — HOSPITAL ENCOUNTER (OUTPATIENT)
Dept: RADIOLOGY | Facility: HOSPITAL | Age: 59
Discharge: HOME | End: 2019-08-23
Attending: PHYSICIAN ASSISTANT
Payer: MEDICAID

## 2019-08-23 ENCOUNTER — PROCEDURE VISIT (OUTPATIENT)
Dept: HEPATOLOGY | Facility: CLINIC | Age: 59
End: 2019-08-23
Attending: PHYSICIAN ASSISTANT
Payer: MEDICAID

## 2019-08-23 DIAGNOSIS — B18.2 CHRONIC HEPATITIS C WITHOUT HEPATIC COMA: ICD-10-CM

## 2019-08-23 DIAGNOSIS — R74.8 ABNORMAL TRANSAMINASES: ICD-10-CM

## 2019-08-23 PROCEDURE — 76700 US EXAM ABDOM COMPLETE: CPT | Mod: 26,,, | Performed by: RADIOLOGY

## 2019-08-23 PROCEDURE — 76700 US ABDOMEN COMPLETE: ICD-10-PCS | Mod: 26,,, | Performed by: RADIOLOGY

## 2019-08-23 PROCEDURE — 91200 PR LIVER ELASTOGRAPHY W/OUT IMAG W/INTERP & REPORT: ICD-10-PCS | Mod: 26,S$PBB,, | Performed by: PHYSICIAN ASSISTANT

## 2019-08-23 PROCEDURE — 91200 LIVER ELASTOGRAPHY: CPT | Mod: 26,S$PBB,, | Performed by: PHYSICIAN ASSISTANT

## 2019-08-23 PROCEDURE — 76700 US EXAM ABDOM COMPLETE: CPT | Mod: TC

## 2019-08-23 PROCEDURE — 91200 LIVER ELASTOGRAPHY: CPT | Mod: PBBFAC | Performed by: PHYSICIAN ASSISTANT

## 2019-08-27 NOTE — PROCEDURES
Procedures   Fibroscan Procedure     Name: Chelsea Grossman  Date of Procedure : 2019   :: Jennifer B Scheuermann, PA  Diagnosis: HCV    Probe: XL    Fibroscan readin.4 KPa    Fibrosis:F4     CAP readin dB/m    Steatosis: :<S1

## 2019-09-09 ENCOUNTER — OFFICE VISIT (OUTPATIENT)
Dept: FAMILY MEDICINE | Facility: CLINIC | Age: 59
End: 2019-09-09
Payer: MEDICAID

## 2019-09-09 ENCOUNTER — TELEPHONE (OUTPATIENT)
Dept: HEPATOLOGY | Facility: CLINIC | Age: 59
End: 2019-09-09

## 2019-09-09 VITALS
TEMPERATURE: 98 F | HEART RATE: 81 BPM | SYSTOLIC BLOOD PRESSURE: 120 MMHG | HEIGHT: 64 IN | OXYGEN SATURATION: 98 % | BODY MASS INDEX: 36.19 KG/M2 | WEIGHT: 212 LBS | DIASTOLIC BLOOD PRESSURE: 60 MMHG

## 2019-09-09 DIAGNOSIS — M15.9 OSTEOARTHRITIS OF MULTIPLE JOINTS, UNSPECIFIED OSTEOARTHRITIS TYPE: ICD-10-CM

## 2019-09-09 DIAGNOSIS — I10 BENIGN ESSENTIAL HYPERTENSION: Primary | ICD-10-CM

## 2019-09-09 PROBLEM — K21.9 GASTROESOPHAGEAL REFLUX DISEASE: Status: ACTIVE | Noted: 2019-06-24

## 2019-09-09 PROCEDURE — 99213 OFFICE O/P EST LOW 20 MIN: CPT | Mod: S$GLB,,, | Performed by: INTERNAL MEDICINE

## 2019-09-09 PROCEDURE — 99213 PR OFFICE/OUTPT VISIT, EST, LEVL III, 20-29 MIN: ICD-10-PCS | Mod: S$GLB,,, | Performed by: INTERNAL MEDICINE

## 2019-09-09 RX ORDER — TRAMADOL HYDROCHLORIDE 50 MG/1
50 TABLET ORAL EVERY 6 HOURS PRN
Qty: 60 TABLET | Refills: 1 | Status: SHIPPED | OUTPATIENT
Start: 2019-09-09 | End: 2019-11-13 | Stop reason: SDUPTHER

## 2019-09-09 NOTE — PROGRESS NOTES
Subjective:       Patient ID: Chelsea Grossman is a 59 y.o. female.    Chief Complaint: Hypertension (continuity of care)    Here for routine follow up.  She has been to Hepatology and in process of arranging treatment for Hep C.    Hypertension   This is a chronic problem. The problem is controlled. Pertinent negatives include no chest pain, headaches, neck pain, palpitations, peripheral edema or shortness of breath. Past treatments include beta blockers, calcium channel blockers and diuretics. There are no compliance problems.      Review of Systems   Constitutional: Negative for chills, fatigue, fever and unexpected weight change.   HENT: Positive for ear pain (left ear) and tinnitus (left ear). Negative for congestion, hearing loss, postnasal drip, rhinorrhea, trouble swallowing and voice change.    Eyes: Negative for photophobia and visual disturbance.   Respiratory: Negative for apnea, cough, choking, chest tightness, shortness of breath and wheezing.    Cardiovascular: Negative for chest pain, palpitations and leg swelling.   Gastrointestinal: Negative for abdominal pain, blood in stool, constipation, diarrhea, nausea, rectal pain and vomiting.   Endocrine: Negative for cold intolerance, heat intolerance, polydipsia and polyuria.   Genitourinary: Negative for decreased urine volume, difficulty urinating, dysuria, frequency, genital sores, hematuria, menstrual problem, pelvic pain, urgency, vaginal bleeding and vaginal discharge.   Musculoskeletal: Positive for arthralgias (still has persistent pain r/t lisfranc fracture). Negative for back pain, gait problem, joint swelling, myalgias, neck pain and neck stiffness.   Skin: Negative for color change, rash and wound.   Allergic/Immunologic: Negative for environmental allergies and food allergies.   Neurological: Negative for dizziness, tremors, seizures, syncope, facial asymmetry, speech difficulty, weakness, light-headedness, numbness and headaches.    Hematological: Negative for adenopathy. Does not bruise/bleed easily.   Psychiatric/Behavioral: Negative for confusion, hallucinations, sleep disturbance and suicidal ideas. The patient is not nervous/anxious.        Past Medical History:   Diagnosis Date    Hepatitis C     History of kidney stones     Hypertension     Osteoarthritis     Osteopenia       Past Surgical History:   Procedure Laterality Date    CARPAL TUNNEL RELEASE      FOOT SURGERY Left 2018    HYSTEROTOMY      KIDNEY STONE SURGERY      Left ear surgery      TOTAL ABDOMINAL HYSTERECTOMY W/ BILATERAL SALPINGOOPHORECTOMY      TRIGGER FINGER RELEASE Left     2nd and 3rd fingers       Family History   Problem Relation Age of Onset    Heart disease Mother     Cancer Father         stomach    Breast cancer Sister     Stomach cancer Brother        Social History     Socioeconomic History    Marital status: Single     Spouse name: Not on file    Number of children: Not on file    Years of education: Not on file    Highest education level: Not on file   Occupational History    Occupation: disabled   Social Needs    Financial resource strain: Not on file    Food insecurity:     Worry: Not on file     Inability: Not on file    Transportation needs:     Medical: Not on file     Non-medical: Not on file   Tobacco Use    Smoking status: Former Smoker     Types: Cigarettes     Last attempt to quit: 10/2018     Years since quittin.9    Smokeless tobacco: Never Used   Substance and Sexual Activity    Alcohol use: Yes     Frequency: 2-4 times a month     Drinks per session: 1 or 2    Drug use: No    Sexual activity: Never     Partners: Female   Lifestyle    Physical activity:     Days per week: Not on file     Minutes per session: Not on file    Stress: Not at all   Relationships    Social connections:     Talks on phone: Not on file     Gets together: Not on file     Attends Mandaen service: Not on file     Active member  of club or organization: Not on file     Attends meetings of clubs or organizations: Not on file     Relationship status: Not on file   Other Topics Concern    Not on file   Social History Narrative    Live with sister       Current Outpatient Medications   Medication Sig Dispense Refill    amLODIPine (NORVASC) 10 MG tablet Take 1 tablet (10 mg total) by mouth once daily. 90 tablet 1    carvedilol (COREG) 6.25 MG tablet Take 1 tablet (6.25 mg total) by mouth 2 (two) times daily with meals. 180 tablet 1    celecoxib (CELEBREX) 200 MG capsule Take 1 capsule (200 mg total) by mouth once daily. 90 capsule 1    estradiol (ESTRACE) 0.01 % (0.1 mg/gram) vaginal cream INSERT 1 2 GRAM INTRAVAGINALLY EVERY NIGHT AT BEDTIME 2 TO 3 TIMES PER WEEK  6    fish oil-omega-3 fatty acids 300-1,000 mg capsule Take 1 capsule by mouth once daily.      fluticasone propionate (FLONASE) 50 mcg/actuation nasal spray 1 spray (50 mcg total) by Each Nare route once daily. 3 Bottle 1    hydroCHLOROthiazide (HYDRODIURIL) 25 MG tablet Take 1 tablet (25 mg total) by mouth once daily. 90 tablet 1    loratadine (CLARITIN) 10 mg tablet Take 1 tablet (10 mg total) by mouth once daily. 90 tablet 1    multivitamin (MULTIPLE VITAMINS) per tablet Take 1 tablet by mouth once daily.      omeprazole (PRILOSEC) 20 MG capsule Take 1 capsule (20 mg total) by mouth once daily. 90 capsule 1    tiZANidine (ZANAFLEX) 4 MG tablet Take 4 mg by mouth 3 (three) times daily as needed.  3    traMADol (ULTRAM) 50 mg tablet Take 1 tablet (50 mg total) by mouth every 6 (six) hours as needed for Pain. 60 tablet 1     No current facility-administered medications for this visit.        Review of patient's allergies indicates:   Allergen Reactions    Sulfa (sulfonamide antibiotics) Itching    Sulfamethoxazole-trimethoprim Itching and Rash     Itchy Rash     Objective:    HPI     Hypertension      Additional comments: continuity of care          Last edited by  "Hugh Oliverjose [email or web address] cox on 9/9/2019  8:58 AM. (History)      Blood pressure 120/60, pulse 81, temperature 97.5 °F (36.4 °C), temperature source Temporal, height 5' 4" (1.626 m), weight 96.2 kg (212 lb), SpO2 98 %. Body mass index is 36.39 kg/m².   Physical Exam   Constitutional: She appears well-developed. No distress.   Obese     HENT:   Nose: Nose normal.   Mouth/Throat: Oropharynx is clear and moist.   Eyes: Conjunctivae are normal. Right eye exhibits no discharge. Left eye exhibits no discharge. No scleral icterus.   Arcus senilus     Neck: Carotid bruit is not present.   Cardiovascular: Normal rate, regular rhythm and normal heart sounds.   No murmur heard.  Pulmonary/Chest: Effort normal and breath sounds normal. No respiratory distress. She has no decreased breath sounds. She has no wheezes. She has no rhonchi. She has no rales.   Abdominal: Soft. She exhibits no distension. There is no tenderness. There is no rebound and no guarding.   Musculoskeletal: She exhibits no edema.   Neurological: She is alert. She displays no tremor.   Skin: Skin is warm and dry.   Psychiatric: She has a normal mood and affect. Her speech is normal.   Nursing note and vitals reviewed.          Assessment:       1. Benign essential hypertension    2. Osteoarthritis of multiple joints, unspecified osteoarthritis type        Plan:       Chelsea was seen today for hypertension.    Diagnoses and all orders for this visit:    Benign essential hypertension  Comments:  BP well controlled    Osteoarthritis of multiple joints, unspecified osteoarthritis type  -     traMADol (ULTRAM) 50 mg tablet; Take 1 tablet (50 mg total) by mouth every 6 (six) hours as needed for Pain.         "

## 2019-09-10 NOTE — TELEPHONE ENCOUNTER
Attempt made to reach patient.  Unable to LVM.  Msg from provider mailed to her.  I stressed that she contact us for scheduling.

## 2019-09-10 NOTE — TELEPHONE ENCOUNTER
----- Message from Vaishnavi Greer MA sent at 9/9/2019  1:20 PM CDT -----  Contact: Pt   Pt inquiring about results from lab, u/s, and fibroscan completed on 8/22. Pt states she will not be able to come in for a physical visit due to having a death in her family.   ----- Message -----  From: Ginger Rutherford MA  Sent: 9/9/2019   8:30 AM  To: [place] Jennifer B Staff    Patient had an appointment with Gely last week, she missed due to a death in her family.Pt calling to get results.        Pt# [phone]

## 2019-09-10 NOTE — TELEPHONE ENCOUNTER
FibroScan - kPa 13.4  U/S - no liver lesions  HIV neg    pls call pt   - HIV test was negative  - FibroScan shows she has cirrhosis, which means there is scarring all throughout her liver from the virus  - ultrasound looked okay    It is VERY important she r/s the visit so we can review these results in more detail and discuss HCV treatment    Let me know if she doesn't r/s    thanks

## 2019-09-17 ENCOUNTER — TELEPHONE (OUTPATIENT)
Dept: HEPATOLOGY | Facility: CLINIC | Age: 59
End: 2019-09-17

## 2019-10-02 ENCOUNTER — TELEPHONE (OUTPATIENT)
Dept: HEPATOLOGY | Facility: CLINIC | Age: 59
End: 2019-10-02

## 2019-10-02 NOTE — TELEPHONE ENCOUNTER
----- Message from Cynthia Trotter RN sent at 10/2/2019  8:04 AM CDT -----  Patient phoned stating that transportation did not arrive.  She moved appt to 10/9.

## 2019-10-25 ENCOUNTER — TELEPHONE (OUTPATIENT)
Dept: PHARMACY | Facility: CLINIC | Age: 59
End: 2019-10-25

## 2019-10-25 ENCOUNTER — OFFICE VISIT (OUTPATIENT)
Dept: HEPATOLOGY | Facility: CLINIC | Age: 59
End: 2019-10-25
Payer: MEDICAID

## 2019-10-25 ENCOUNTER — CLINICAL SUPPORT (OUTPATIENT)
Dept: INFECTIOUS DISEASES | Facility: CLINIC | Age: 59
End: 2019-10-25
Payer: MEDICAID

## 2019-10-25 ENCOUNTER — LAB VISIT (OUTPATIENT)
Dept: LAB | Facility: HOSPITAL | Age: 59
End: 2019-10-25
Payer: MEDICAID

## 2019-10-25 VITALS
RESPIRATION RATE: 16 BRPM | WEIGHT: 217.13 LBS | HEART RATE: 80 BPM | TEMPERATURE: 99 F | HEIGHT: 64 IN | BODY MASS INDEX: 37.07 KG/M2 | DIASTOLIC BLOOD PRESSURE: 77 MMHG | SYSTOLIC BLOOD PRESSURE: 138 MMHG

## 2019-10-25 DIAGNOSIS — K74.60 HEPATIC CIRRHOSIS, UNSPECIFIED HEPATIC CIRRHOSIS TYPE, UNSPECIFIED WHETHER ASCITES PRESENT: ICD-10-CM

## 2019-10-25 DIAGNOSIS — B18.2 CHRONIC HEPATITIS C WITHOUT HEPATIC COMA: ICD-10-CM

## 2019-10-25 DIAGNOSIS — K74.60 HEPATIC CIRRHOSIS, UNSPECIFIED HEPATIC CIRRHOSIS TYPE, UNSPECIFIED WHETHER ASCITES PRESENT: Primary | ICD-10-CM

## 2019-10-25 LAB — AFP SERPL-MCNC: 19 NG/ML (ref 0–8.4)

## 2019-10-25 PROCEDURE — 99215 OFFICE O/P EST HI 40 MIN: CPT | Mod: PBBFAC,25 | Performed by: PHYSICIAN ASSISTANT

## 2019-10-25 PROCEDURE — 99999 PR PBB SHADOW E&M-EST. PATIENT-LVL V: ICD-10-PCS | Mod: PBBFAC,,, | Performed by: PHYSICIAN ASSISTANT

## 2019-10-25 PROCEDURE — 82105 ALPHA-FETOPROTEIN SERUM: CPT

## 2019-10-25 PROCEDURE — 99215 PR OFFICE/OUTPT VISIT, EST, LEVL V, 40-54 MIN: ICD-10-PCS | Mod: S$PBB,,, | Performed by: PHYSICIAN ASSISTANT

## 2019-10-25 PROCEDURE — 99999 PR PBB SHADOW E&M-EST. PATIENT-LVL V: CPT | Mod: PBBFAC,,, | Performed by: PHYSICIAN ASSISTANT

## 2019-10-25 PROCEDURE — 81596 NFCT DS CHRNC HCV 6 ASSAYS: CPT

## 2019-10-25 PROCEDURE — 99215 OFFICE O/P EST HI 40 MIN: CPT | Mod: S$PBB,,, | Performed by: PHYSICIAN ASSISTANT

## 2019-10-25 PROCEDURE — 90471 IMMUNIZATION ADMIN: CPT | Mod: PBBFAC

## 2019-10-25 RX ORDER — VELPATASVIR AND SOFOSBUVIR 100; 400 MG/1; MG/1
1 TABLET, FILM COATED ORAL DAILY
Qty: 28 TABLET | Refills: 2 | Status: SHIPPED | OUTPATIENT
Start: 2019-10-25 | End: 2020-02-10 | Stop reason: ALTCHOICE

## 2019-10-25 RX ORDER — CONJUGATED ESTROGENS 0.62 MG/G
CREAM VAGINAL
COMMUNITY
Start: 2019-10-11 | End: 2020-05-18

## 2019-10-25 NOTE — PROGRESS NOTES
HEPATOLOGY CLINIC VISIT NOTE - HCV clinic    CHIEF COMPLAINT: Hepatitis C     HISTORY: This is a 59 y.o. Black or  female here for f/u. Initially seen w/ (+) HCVAB, subsequent labs confirmed Chronic HCV.    Here for f/u w/ additional labs, imaging, liver staging.   (+) immunity to HAV    Lacking immunity to HBV   HIV screen neg   Evidence of cirrhosis, noted below.    Feels well  Motivated to have HCV treated  Denies jaundice, dark urine, hematemesis, melena, slowed mentation, abdominal distention.       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    No transfusions or tattoos  - Genotype 1a  - Treatment naive  - NS5A resistance unknown  - HCV RNA > 8 million     Liver staging:  FibroScan 8/23/19 - kPa 13.4, F4  Labs seem to support this w/ intermittent TBili elevation to 1.2 and isolated AST>ALT    Cirrhosis history:  Well compensated, no evidence of portal HTN  HE - none  Ascites - none  Jaundice / TBili elevation - intermittent TBili up to 1.2  Albumin - normal 3.7  Platelets - normal > 200    MELD-Na score: 7 at 6/11/2019 11:27 AM  MELD score: 7 at 6/11/2019 11:27 AM  Calculated from:  Serum Creatinine: 0.8 mg/dL (Rounded to 1 mg/dL) at 6/11/2019 11:27 AM  Serum Sodium: 140 mmol/L (Rounded to 137 mmol/L) at 6/11/2019 11:27 AM  Total Bilirubin: 0.6 mg/dL (Rounded to 1 mg/dL) at 6/11/2019 11:27 AM  INR(ratio): 1.1 at 6/11/2019 11:27 AM  Age: 59 years    Cirrhosis maintenance:  - HCC screening - U/S 8/2019 - no liver lesions, no AFP  - Varices screening - not indicated  - HAV immunity - (+) immunity documented 6/11/19  - HBV immunity - Lacking immunity                      Past Medical History:   Diagnosis Date    Hepatitis C     History of kidney stones     Hypertension     Osteoarthritis     Osteopenia        Past Surgical History:   Procedure Laterality Date    CARPAL  TUNNEL RELEASE      FOOT SURGERY Left 2018    HYSTEROTOMY      KIDNEY STONE SURGERY      Left ear surgery      TOTAL ABDOMINAL HYSTERECTOMY W/ BILATERAL SALPINGOOPHORECTOMY      TRIGGER FINGER RELEASE Left     2nd and 3rd fingers       FAMILY HISTORY: Negative for liver disease    SOCIAL HISTORY:   Social History     Tobacco Use   Smoking Status Former Smoker    Types: Cigarettes    Last attempt to quit: 10/2018    Years since quittin.0   Smokeless Tobacco Never Used     Alcohol - (+) alcohol use in past, none regular now.  Drugs - remote history       ROS:   No fever, chills, weight loss, fatigue  No chest pain, palpitations, dyspnea, cough  No abdominal pain, nausea, vomiting, (+) GERD  No skin rashes   No headaches  (+) Back pain, joint pains  No lower extremity edema  No depression or anxiety      PHYSICAL EXAM:  Friendly Black or  female, in no acute distress; alert and oriented to person, place and time  VITALS: reviewed  HEENT: Sclerae anicteric.   NECK: Supple  LUNGS: Normal respiratory effort.  ABDOMEN: Flat, soft, nontender.   SKIN: Warm and dry. No jaundice, No obvious rashes.   EXTREMITIES: No lower extremity edema  NEURO/PSYCH: Normal gate. Memory intact. Thought and speech pattern appropriate. Behavior normal. No depression or anxiety noted.    RECENT LABS:  Lab Results   Component Value Date    WBC 8.23 2019    HGB 13.5 2019     2019     Lab Results   Component Value Date    INR 1.1 2019     Lab Results   Component Value Date    AST 68 (H) 2019    ALT 88 (H) 2019    BILITOT 0.6 2019    ALBUMIN 3.7 2019    ALKPHOS 215 (H) 2019    CREATININE 0.8 2019    BUN 16 2019     2019    K 3.9 2019         RECENT IMAGING:  Results for orders placed during the hospital encounter of 19   US Abdomen Complete    Narrative EXAMINATION:  US ABDOMEN COMPLETE    CLINICAL HISTORY:  HCV,  eval for cirrhosis and portal HTN; Chronic viral hepatitis C    TECHNIQUE:  Complete abdominal ultrasound (including pancreas, liver, gallbladder, common bile duct, spleen, aorta, IVC, and kidneys) was performed.    COMPARISON:  No prior abdominal imaging available for comparison    FINDINGS:  Pancreas: The visualized portions of pancreas appear normal.    Aorta: No aneurysm.    Liver: 17.9 cm, enlarged in size homogeneous parenchymal echotexture. No focal lesions.    Gallbladder: 3-4 mobile gallstones largest measuring 0.3 cm, no wall thickening or pericholecystic fluid, no hyperemia.  Negative sonographic Black's sign.    Biliary system: 3 mm common bile duct.  No intrahepatic ductal dilatation.    Inferior vena cava: Normal in appearance.    Right kidney: 8.6 cm. No hydronephrosis.  Reduced in size, postsurgical changes evident, increased cortical echogenicity.    Left kidney: 11.8 cm. No hydronephrosis.    Spleen: 9.9 x 3.6 cm.  Normal in size with homogeneous echotexture.    Miscellaneous: No ascites.      Impression 1. Hepatomegaly  2. Cholelithiasis without evidence of acute cholecystitis.    Electronically signed by resident: Antoni Hills  Date:    08/23/2019  Time:    09:45    Electronically signed by: Matt Riley MD  Date:    08/23/2019  Time:    10:19       ASSESSMENT  59 y.o. Black or  female with:  1. CHRONIC HEPATITIS C, GENOTYPE 1A - treatment naive  -- elevated transaminases  -- (+) immunity to HAV  -- lacking immunity to HBV    2. NEWLY DIAGNOSED CIRRHOSIS   -- well preserved liver function  -- MELD 6/2019 - 7  -- HCC screening - u/s okay, needs AFP    3. GERD  -- well controlled w/ omeprazole      EDUCATION:  CIRRHOSIS  Discussed evidence that indicates that pt has cirrhosis.   -- Discussed the basics about liver fibrosis /scarring and how that relates to liver function. Reviewed the significance of the MELD scoring system as it relates to indication for transplant.  -- Signs and  symptoms of hepatic decompensation were reviewed, including jaundice, ascites, and slowed mentation due to hepatic encephalopathy. The patient should seek medical attention if any of these things occur.   --  We discussed the potential for bleeding from esophageal varices with symptoms of hematemesis and melena. The patient should report to the Emergency Department for these symptoms.   -- We discussed the increased risk of hepatocellular carcinoma due to cirrhosis. Lifelong screening every six months with ultrasound and AFP is recommended.   -- Discussed portal hypertension, including potential development of esophageal varices.    -- Tylenol (acetaminophen) is okay up to 2,000mg daily  -- Avoid NSAIDs     Dietary recommendations:  -- Avoid alcohol  -- Avoid raw seafood  -- Limit Na to 2,000mg   -- High protein diet    HCV  Discussed goal of HCV eradication to prevent progression of liver disease.  Discussed use of Epclusa daily x 12 weeks w/ potential side effects of fatigue and headache.     Reviewed limitations on acid suppressant medications due to DDI w/ Epclusa:  -- Antacids, H2 Receptor Antagonist - not taking  -- PPI - taking omeprazole 20mg. Will take 4 hours AFTER epclusa is dosed w/ food  Patient instructed to contact me if experiencing acid related symptoms so further recommendations can be made regarding acid suppression therapy.      Herbal / alternative therapies must be discontinued  Discussed importance of medication adherence and risk of treatment failure / viral resistance if not adherent. Pt has verbalized understanding.          PLAN:  1. Labs today: AFP, HCV FibroSURE  2. Obtain authorization to treat HCV with Epclusa x 12 weeks  -- Rx will be routed to [facility]  -- Patient will notify me of exact treatment start date so appropriate lab f/u can be scheduled.  3. HBV vaccine series - start today in [place]  4. CBC, CMP, INR, AFP, U/S - 2/2020    Duration of visit: 50 minutes  >50%  of visit spent counseling patient on HCV diagnosis and treatment, cirrhosis diagnosis and need for longterm monitoring

## 2019-10-25 NOTE — PATIENT INSTRUCTIONS
CIRRHOSIS COUNSELING   AVOID ALL alcohol (includes beer, wine and liquor)   AVOID non-steroidal anti-inflammatory drugs (NSAIDs) such as ibuprofen (Motrin, Advil), naproxen (Naprosyn, Aleve)    Tylenol/acetaminophen is OKAY for pain, no more than 2000 mg per day   NO RAW SEAFOOD due to the risk of infection   LOW SODIUM / SALT diet, less than 2000 mg per day (avoid canned foods, avoid adding salt to food, read food labels)   HIGH PROTEIN DIET to prevent muscle mass loss (meat, chicken, fish, eggs, dairy, whey protein powder, protein shakes)    Liver cancer screening (blood tests and ultrasound) is needed every 6 months forever. We will get tumor marker now. Assuming it is normal, this is due again in 2/2020.      Hepatitis B vaccine series - start today  Blood work today

## 2019-10-25 NOTE — TELEPHONE ENCOUNTER
No answer/VM to inform patient that [facility] received prescription for EPCLUSA and benefits investigation is required.  [facility] will be back in touch once insurance determination is received.

## 2019-10-28 ENCOUNTER — TELEPHONE (OUTPATIENT)
Dept: HEPATOLOGY | Facility: CLINIC | Age: 59
End: 2019-10-28

## 2019-10-28 LAB
A2 MACROGLOB SERPL-MCNC: 373 MG/DL (ref 100–280)
ALT SERPL W P-5'-P-CCNC: 72 U/L (ref 7–45)
APO A-I SERPL-MCNC: 170 MG/DL
BILIRUB SERPL-MCNC: 0.7 MG/DL
BIOPREDICTIVE SERIAL NUMBER: ABNORMAL
FIBROSIS STAGE SERPL QL: ABNORMAL
FIBROTEST INTERPRETATION: ABNORMAL
FIBROTEST-ACTITEST COMMENT: ABNORMAL
GGT SERPL-CCNC: 463 U/L (ref 5–36)
HAPTOGLOB SERPL-MCNC: 130 MG/DL (ref 30–200)
LIVER FIBR SCORE SERPL CALC.FIBROSURE: 0.76
NECROINFLAMMAT INTERP: ABNORMAL
NECROINFLAMMATORY ACT GRADE SERPL QL: ABNORMAL
NECROINFLAMMATORY ACT SCORE SERPL: 0.6

## 2019-10-28 NOTE — TELEPHONE ENCOUNTER
DOCUMENTATION ONLY:     Epclusa does not require prior authorization with the patients insurance company. Co-pay: $0 Patient Assistance IS NOT required. Forward to clinical pharmacist for consult & shipment.

## 2019-10-28 NOTE — TELEPHONE ENCOUNTER
Labs 10/25/19 reviewed  AFP 19  HCV FibroSURE - A2-3, F4    pls call pt:  1. Lab to check for liver scarring also indicates early cirrhosis present in liver. I do not think she needs any additional testing to determine the scarring level    2. Liver cancer tumor marker is slightly elevated. This is probably from the Hep C virus. We will monitor it while she is on treatment. As of now, next liver cancer screening will be due 2/2020.    3. We will proceed w/ HCV treatment w/ Epclusa as we discussed at her visit.    thanks

## 2019-11-04 NOTE — TELEPHONE ENCOUNTER
In the following note references to Epclusa indicate the authorized generic for Epclusa - aka AG Epclusa - sofosbuvir-velpatasvir 400-100mg tab.     Initial Epclusa consult completed on . Epclusa will be shipped on  to arrive at patient's home on  via [employer]. $0 copay. Patient will start Epclusa on . Address confirmed, CC on file. Confirmed 2 patient identifiers - name and . Therapy Appropriate.     Epclusa 400/100mg- Take one tablet by mouth daily x 12 weeks  Counseling was reviewed:   1. Patient MUST take Epclusa at the SAME time every day.   2. Patient MUST avoid acid reducers without consulting with myself or provider first. Antacids are to be spaced out at least 4 hours apart from Epclusa.  Omeprazole is to be taken 4 hours after Epclusa which is to be taken each morning with food.     3. Potential Side effects include: nausea, headaches, insomnia and fatigue.   Headache: Patient may treat with OTC remedies. If Tylenol is used, dose should not exceed 2000mg per day.    4. Medication list reviewed. No DDIs or allergies noted. Patient MUST contact myself or provider prior to starting any new OTC, herbal, or prescription drugs to avoid potential DDIs.    DDI: Medication list reviewed and potential DDIs addressed.    Discussed the importance of staying well hydrated while on therapy. Compliance stressed - patient to take missed doses as soon as remembered, but NOT to take 2 doses in one day. Patient will report questions or concerns to myself or practitioner. Patient verbalizes understanding. Patient plans to start Epclusa on .  Consultation included: indication; goals of treatment; administration; storage and handling; side effects; how to handle side effects; the importance of compliance; how to handle missed doses; the importance of laboratory monitoring; the importance of keeping all follow up appointments.  Patient understands to report any medication changes to [facility] and provider. All  questions answered and addressed to patients satisfaction. I will f/u with her in 1 week from start, [facility] to contact patient in 3 weeks for refills.     Hemal Avalos R.Ph., AAHIVP  Clinical Pharmacist, HIV/HCV  [facility]  Phone: [phone]

## 2019-11-06 ENCOUNTER — EPISODE CHANGES (OUTPATIENT)
Dept: HEPATOLOGY | Facility: CLINIC | Age: 59
End: 2019-11-06

## 2019-11-06 ENCOUNTER — TELEPHONE (OUTPATIENT)
Dept: HEPATOLOGY | Facility: CLINIC | Age: 59
End: 2019-11-06

## 2019-11-06 DIAGNOSIS — B18.2 CHRONIC HEPATITIS C WITHOUT HEPATIC COMA: Primary | ICD-10-CM

## 2019-11-06 NOTE — TELEPHONE ENCOUNTER
----- Message from Cynthia Trotter RN sent at 11/6/2019 11:36 AM CST -----  Patient phoned stating that she will start hep c tx on 11/7/19 and will have labs drawn at [facility].

## 2019-11-06 NOTE — TELEPHONE ENCOUNTER
Pt beginning 12 weeks of Epclusa on 11/7/19  Keerthi 1a, tx naive  F4    Pls schedule:  - CMP, AFP, HCV RNA at week 6 - 12/18/19  - CMP, HCV RNA - SVR12 - 4/22/20

## 2019-11-13 ENCOUNTER — TELEPHONE (OUTPATIENT)
Dept: FAMILY MEDICINE | Facility: CLINIC | Age: 59
End: 2019-11-13

## 2019-11-13 DIAGNOSIS — M15.9 OSTEOARTHRITIS OF MULTIPLE JOINTS, UNSPECIFIED OSTEOARTHRITIS TYPE: ICD-10-CM

## 2019-11-13 RX ORDER — TRAMADOL HYDROCHLORIDE 50 MG/1
50 TABLET ORAL EVERY 6 HOURS PRN
Qty: 60 TABLET | Refills: 1 | Status: SHIPPED | OUTPATIENT
Start: 2019-11-13 | End: 2019-12-10 | Stop reason: SDUPTHER

## 2019-11-14 ENCOUNTER — TELEPHONE (OUTPATIENT)
Dept: PHARMACY | Facility: CLINIC | Age: 59
End: 2019-11-14

## 2019-11-14 NOTE — TELEPHONE ENCOUNTER
Initial touch base conducted for Epclusa - Name/ confirmed.  Confirmed patient started medication as instructed on .  Patient confirms that they are taking Epclusa every day WITH FOOD. Patient confirmed she takes her Omeprazole 4 hours after the Epclusa.  Patient denies skipping or missing doses.  Patient reports experiencing no side effects since beginning therapy. Patient reports no new medications, otc remedies, or allergies. Patient reminded of lab appointments.  Patient counseled on importance of maintaining adherence and keeping lab appointments which were scheduled.  Advised to call [facility] and provider if any issues arise.  No questions or concerns. Aware [facility] will call for refills when patient has 7 days of medication on hand.

## 2019-11-16 ENCOUNTER — TELEPHONE (OUTPATIENT)
Dept: HEPATOLOGY | Facility: CLINIC | Age: 59
End: 2019-11-16

## 2019-11-27 ENCOUNTER — TELEPHONE (OUTPATIENT)
Dept: PHARMACY | Facility: CLINIC | Age: 59
End: 2019-11-27

## 2019-12-06 ENCOUNTER — EPISODE CHANGES (OUTPATIENT)
Dept: HEPATOLOGY | Facility: CLINIC | Age: 59
End: 2019-12-06

## 2019-12-09 ENCOUNTER — CLINICAL SUPPORT (OUTPATIENT)
Dept: INFECTIOUS DISEASES | Facility: CLINIC | Age: 59
End: 2019-12-09
Payer: MEDICAID

## 2019-12-09 PROCEDURE — 90471 IMMUNIZATION ADMIN: CPT | Mod: PBBFAC

## 2019-12-10 ENCOUNTER — OFFICE VISIT (OUTPATIENT)
Dept: FAMILY MEDICINE | Facility: CLINIC | Age: 59
End: 2019-12-10
Payer: MEDICAID

## 2019-12-10 VITALS
SYSTOLIC BLOOD PRESSURE: 150 MMHG | DIASTOLIC BLOOD PRESSURE: 90 MMHG | OXYGEN SATURATION: 98 % | HEIGHT: 64 IN | WEIGHT: 217 LBS | TEMPERATURE: 97 F | BODY MASS INDEX: 37.05 KG/M2 | HEART RATE: 73 BPM

## 2019-12-10 DIAGNOSIS — S93.325D LISFRANC DISLOCATION, LEFT, SUBSEQUENT ENCOUNTER: ICD-10-CM

## 2019-12-10 DIAGNOSIS — I10 ESSENTIAL HYPERTENSION: Primary | ICD-10-CM

## 2019-12-10 DIAGNOSIS — M15.9 OSTEOARTHRITIS OF MULTIPLE JOINTS, UNSPECIFIED OSTEOARTHRITIS TYPE: ICD-10-CM

## 2019-12-10 PROCEDURE — 99214 PR OFFICE/OUTPT VISIT, EST, LEVL IV, 30-39 MIN: ICD-10-PCS | Mod: S$GLB,,, | Performed by: INTERNAL MEDICINE

## 2019-12-10 PROCEDURE — 99214 OFFICE O/P EST MOD 30 MIN: CPT | Mod: S$GLB,,, | Performed by: INTERNAL MEDICINE

## 2019-12-10 RX ORDER — CARVEDILOL 6.25 MG/1
6.25 TABLET ORAL 2 TIMES DAILY WITH MEALS
Qty: 180 TABLET | Refills: 1 | Status: SHIPPED | OUTPATIENT
Start: 2019-12-10 | End: 2020-05-18 | Stop reason: SDUPTHER

## 2019-12-10 RX ORDER — DULOXETIN HYDROCHLORIDE 30 MG/1
30 CAPSULE, DELAYED RELEASE ORAL DAILY
Qty: 30 CAPSULE | Refills: 1 | Status: SHIPPED | OUTPATIENT
Start: 2019-12-10 | End: 2020-05-18 | Stop reason: SDUPTHER

## 2019-12-10 RX ORDER — TRAMADOL HYDROCHLORIDE 50 MG/1
50 TABLET ORAL EVERY 8 HOURS PRN
Qty: 60 TABLET | Refills: 1 | Status: SHIPPED | OUTPATIENT
Start: 2019-12-10 | End: 2020-05-18 | Stop reason: SDUPTHER

## 2019-12-10 RX ORDER — AMLODIPINE BESYLATE 10 MG/1
10 TABLET ORAL DAILY
Qty: 90 TABLET | Refills: 1 | Status: SHIPPED | OUTPATIENT
Start: 2019-12-10 | End: 2020-05-18 | Stop reason: SDUPTHER

## 2019-12-10 RX ORDER — LISINOPRIL 20 MG/1
20 TABLET ORAL DAILY
Qty: 30 TABLET | Refills: 1 | Status: SHIPPED | OUTPATIENT
Start: 2019-12-10 | End: 2020-02-13

## 2019-12-10 RX ORDER — CONJUGATED ESTROGENS 0.62 MG/G
CREAM VAGINAL
Status: CANCELLED | OUTPATIENT
Start: 2019-12-10

## 2019-12-10 RX ORDER — HYDROCHLOROTHIAZIDE 25 MG/1
25 TABLET ORAL DAILY
Qty: 90 TABLET | Refills: 1 | Status: SHIPPED | OUTPATIENT
Start: 2019-12-10 | End: 2020-05-18 | Stop reason: SDUPTHER

## 2019-12-10 RX ORDER — CELECOXIB 200 MG/1
200 CAPSULE ORAL DAILY
Qty: 90 CAPSULE | Refills: 1 | Status: SHIPPED | OUTPATIENT
Start: 2019-12-10 | End: 2020-05-18 | Stop reason: SDUPTHER

## 2019-12-10 RX ORDER — ESTRADIOL 0.1 MG/G
CREAM VAGINAL
Qty: 42.5 G | Refills: 3 | Status: SHIPPED | OUTPATIENT
Start: 2019-12-10 | End: 2020-05-18 | Stop reason: SDUPTHER

## 2019-12-10 RX ORDER — OMEPRAZOLE 20 MG/1
20 CAPSULE, DELAYED RELEASE ORAL DAILY
Qty: 90 CAPSULE | Refills: 1 | Status: SHIPPED | OUTPATIENT
Start: 2019-12-10 | End: 2020-02-10 | Stop reason: CLARIF

## 2019-12-10 NOTE — PROGRESS NOTES
Subjective:       Patient ID: Chelsea Grossman is a 59 y.o. female.    Chief Complaint: Hypertension (continuity of care) and sleep disturbance (difficulty sleeping)    Here for routine follow up.  She is being treated for her Hep C.  She complains today of worsening left foot pain.  She has a h/o Lis Franc injury requiring surgery about a year ago.  She has pain ever since but it is gradually worsening.  She describes the pain as a burning sensation that radiates up her leg.  Tramadol does help but she is hesitant to take it more than once per day.   Rates pain 8-9/10 intensity.    Hypertension   This is a chronic problem. Pertinent negatives include no chest pain, headaches, neck pain, palpitations, peripheral edema or shortness of breath. Past treatments include beta blockers, calcium channel blockers and diuretics. There are no compliance problems.      Review of Systems   Constitutional: Negative for chills, fatigue, fever and unexpected weight change.   HENT: Negative for congestion, hearing loss, postnasal drip, rhinorrhea, trouble swallowing and voice change.    Eyes: Negative for photophobia and visual disturbance.   Respiratory: Negative for apnea, cough, choking, chest tightness, shortness of breath and wheezing.    Cardiovascular: Negative for chest pain, palpitations and leg swelling.   Gastrointestinal: Negative for abdominal pain, blood in stool, constipation, diarrhea, nausea, rectal pain and vomiting.   Endocrine: Negative for cold intolerance, heat intolerance, polydipsia and polyuria.   Genitourinary: Negative for decreased urine volume, difficulty urinating, dysuria, frequency, genital sores, hematuria, menstrual problem, pelvic pain, urgency, vaginal bleeding and vaginal discharge.   Musculoskeletal: Positive for arthralgias (foot (left)). Negative for back pain, gait problem, joint swelling, myalgias, neck pain and neck stiffness.   Skin: Negative for color change, rash and wound.    Allergic/Immunologic: Negative for environmental allergies and food allergies.   Neurological: Negative for dizziness, tremors, seizures, syncope, facial asymmetry, speech difficulty, weakness, light-headedness, numbness and headaches.   Hematological: Negative for adenopathy. Does not bruise/bleed easily.   Psychiatric/Behavioral: Positive for sleep disturbance. Negative for confusion, hallucinations and suicidal ideas. The patient is not nervous/anxious.        Past Medical History:   Diagnosis Date    Cirrhosis     Hepatitis C     History of kidney stones     Hypertension     Osteoarthritis     Osteopenia       Past Surgical History:   Procedure Laterality Date    CARPAL TUNNEL RELEASE      FOOT SURGERY Left 2018    HYSTEROTOMY      KIDNEY STONE SURGERY      Left ear surgery      TOTAL ABDOMINAL HYSTERECTOMY W/ BILATERAL SALPINGOOPHORECTOMY      TRIGGER FINGER RELEASE Left     2nd and 3rd fingers       Family History   Problem Relation Age of Onset    Heart disease Mother     Cancer Father         stomach    Breast cancer Sister     Stomach cancer Brother        Social History     Socioeconomic History    Marital status: Single     Spouse name: Not on file    Number of children: Not on file    Years of education: Not on file    Highest education level: Not on file   Occupational History    Occupation: disabled   Social Needs    Financial resource strain: Not on file    Food insecurity:     Worry: Not on file     Inability: Not on file    Transportation needs:     Medical: Not on file     Non-medical: Not on file   Tobacco Use    Smoking status: Former Smoker     Types: Cigarettes     Last attempt to quit: 10/2018     Years since quittin.1    Smokeless tobacco: Never Used   Substance and Sexual Activity    Alcohol use: Yes     Frequency: 2-4 times a month     Drinks per session: 1 or 2    Drug use: No    Sexual activity: Never     Partners: Female   Lifestyle    Physical  activity:     Days per week: Not on file     Minutes per session: Not on file    Stress: To some extent   Relationships    Social connections:     Talks on phone: Not on file     Gets together: Not on file     Attends Caodaism service: Not on file     Active member of club or organization: Not on file     Attends meetings of clubs or organizations: Not on file     Relationship status: Not on file   Other Topics Concern    Not on file   Social History Narrative    Live with sister       Current Outpatient Medications   Medication Sig Dispense Refill    amLODIPine (NORVASC) 10 MG tablet Take 1 tablet (10 mg total) by mouth once daily. 90 tablet 1    carvedilol (COREG) 6.25 MG tablet Take 1 tablet (6.25 mg total) by mouth 2 (two) times daily with meals. 180 tablet 1    celecoxib (CELEBREX) 200 MG capsule Take 1 capsule (200 mg total) by mouth once daily. 90 capsule 1    estradiol (ESTRACE) 0.01 % (0.1 mg/gram) vaginal cream INSERT 1 2 GRAM INTRAVAGINALLY EVERY NIGHT AT BEDTIME 2 TO 3 TIMES PER WEEK 42.5 g 3    fish oil-omega-3 fatty acids 300-1,000 mg capsule Take 1 capsule by mouth once daily.      fluticasone propionate (FLONASE) 50 mcg/actuation nasal spray 1 spray (50 mcg total) by Each Nare route once daily. 3 Bottle 1    hydroCHLOROthiazide (HYDRODIURIL) 25 MG tablet Take 1 tablet (25 mg total) by mouth once daily. 90 tablet 1    multivitamin (MULTIPLE VITAMINS) per tablet Take 1 tablet by mouth once daily.      omeprazole (PRILOSEC) 20 MG capsule Take 1 capsule (20 mg total) by mouth once daily. 90 capsule 1    PREMARIN vaginal cream       sofosbuvir-velpatasvir 400-100 mg Tab Take 1 tablet by mouth once daily. 28 tablet 2    tiZANidine (ZANAFLEX) 4 MG tablet Take 4 mg by mouth 3 (three) times daily as needed.  3    traMADol (ULTRAM) 50 mg tablet Take 1 tablet (50 mg total) by mouth every 8 (eight) hours as needed for Pain. 60 tablet 1    DULoxetine (CYMBALTA) 30 MG capsule Take 1 capsule (30  "mg total) by mouth once daily. 30 capsule 1    lisinopril (PRINIVIL,ZESTRIL) 20 MG tablet Take 1 tablet (20 mg total) by mouth once daily. 30 tablet 1     No current facility-administered medications for this visit.        Review of patient's allergies indicates:   Allergen Reactions    Sulfa (sulfonamide antibiotics) Itching    Sulfamethoxazole-trimethoprim Itching and Rash     Itchy Rash     Objective:    HPI     Hypertension      Additional comments: continuity of care              sleep disturbance      Additional comments: difficulty sleeping          Last edited by Hugh Rausch MA on 12/10/2019  1:16 PM. (History)      Blood pressure (!) 150/90, pulse 73, temperature 97 °F (36.1 °C), temperature source Temporal, height 5' 4" (1.626 m), weight 98.4 kg (217 lb), SpO2 98 %. Body mass index is 37.25 kg/m².   Physical Exam   Constitutional: She appears well-developed. No distress.   Obese     HENT:   Nose: Nose normal.   Mouth/Throat: Oropharynx is clear and moist.   Eyes: Conjunctivae are normal. Right eye exhibits no discharge. Left eye exhibits no discharge. No scleral icterus.   Arcus senilus     Neck: Carotid bruit is not present.   Cardiovascular: Normal rate, regular rhythm and normal heart sounds.   No murmur heard.  Pulmonary/Chest: Effort normal and breath sounds normal. No respiratory distress. She has no decreased breath sounds. She has no wheezes. She has no rhonchi. She has no rales.   Abdominal: Soft. She exhibits no distension. There is no tenderness. There is no rebound and no guarding.   Musculoskeletal: She exhibits no edema.   Neurological: She is alert. She displays no tremor.   Skin: Skin is warm and dry.   Psychiatric: She has a normal mood and affect. Her speech is normal.   Nursing note and vitals reviewed.          Assessment:       1. Essential hypertension    2. Lisfranc dislocation, left, subsequent encounter    3. Osteoarthritis of multiple joints, unspecified osteoarthritis type "        Plan:       Chelsea was seen today for hypertension and sleep disturbance.    Diagnoses and all orders for this visit:    Essential hypertension  -     amLODIPine (NORVASC) 10 MG tablet; Take 1 tablet (10 mg total) by mouth once daily.  -     carvedilol (COREG) 6.25 MG tablet; Take 1 tablet (6.25 mg total) by mouth 2 (two) times daily with meals.  -     hydroCHLOROthiazide (HYDRODIURIL) 25 MG tablet; Take 1 tablet (25 mg total) by mouth once daily.    Lisfranc dislocation, left, subsequent encounter  Comments:  May have a neuropathic component given description of pain.  Gabapentin bothers stomach.  Make f/u with Ortho initially but may benefit from Podiatry eval  Orders:  -     celecoxib (CELEBREX) 200 MG capsule; Take 1 capsule (200 mg total) by mouth once daily.  -     traMADol (ULTRAM) 50 mg tablet; Take 1 tablet (50 mg total) by mouth every 8 (eight) hours as needed for Pain.  -     Ambulatory referral/consult to Orthopedics; Future  -     DULoxetine (CYMBALTA) 30 MG capsule; Take 1 capsule (30 mg total) by mouth once daily.    Osteoarthritis of multiple joints, unspecified osteoarthritis type  -     celecoxib (CELEBREX) 200 MG capsule; Take 1 capsule (200 mg total) by mouth once daily.  -     traMADol (ULTRAM) 50 mg tablet; Take 1 tablet (50 mg total) by mouth every 8 (eight) hours as needed for Pain.    Other orders  -     estradiol (ESTRACE) 0.01 % (0.1 mg/gram) vaginal cream; INSERT 1 2 GRAM INTRAVAGINALLY EVERY NIGHT AT BEDTIME 2 TO 3 TIMES PER WEEK  -     omeprazole (PRILOSEC) 20 MG capsule; Take 1 capsule (20 mg total) by mouth once daily.  -     Cancel: PREMARIN vaginal cream  -     lisinopril (PRINIVIL,ZESTRIL) 20 MG tablet; Take 1 tablet (20 mg total) by mouth once daily.

## 2019-12-18 ENCOUNTER — LAB VISIT (OUTPATIENT)
Dept: LAB | Facility: HOSPITAL | Age: 59
End: 2019-12-18
Attending: INTERNAL MEDICINE
Payer: MEDICAID

## 2019-12-18 DIAGNOSIS — R77.9 ELEVATED SERUM PROTEIN LEVEL: ICD-10-CM

## 2019-12-18 DIAGNOSIS — R73.01 IMPAIRED FASTING GLUCOSE: ICD-10-CM

## 2019-12-18 DIAGNOSIS — I10 ESSENTIAL HYPERTENSION: ICD-10-CM

## 2019-12-18 DIAGNOSIS — B18.2 CHRONIC HEPATITIS C WITHOUT HEPATIC COMA: ICD-10-CM

## 2019-12-18 LAB
AFP SERPL-MCNC: 10 NG/ML (ref 0–8.4)
ALBUMIN SERPL BCP-MCNC: 3.8 G/DL (ref 3.5–5.2)
ALP SERPL-CCNC: 115 U/L (ref 55–135)
ALT SERPL W/O P-5'-P-CCNC: 29 U/L (ref 10–44)
ANION GAP SERPL CALC-SCNC: 8 MMOL/L (ref 8–16)
AST SERPL-CCNC: 32 U/L (ref 10–40)
BILIRUB SERPL-MCNC: 0.5 MG/DL (ref 0.1–1)
BUN SERPL-MCNC: 16 MG/DL (ref 6–20)
CALCIUM SERPL-MCNC: 9.6 MG/DL (ref 8.7–10.5)
CHLORIDE SERPL-SCNC: 105 MMOL/L (ref 95–110)
CO2 SERPL-SCNC: 25 MMOL/L (ref 23–29)
CREAT SERPL-MCNC: 1.1 MG/DL (ref 0.5–1.4)
EST. GFR  (AFRICAN AMERICAN): >60 ML/MIN/1.73 M^2
EST. GFR  (NON AFRICAN AMERICAN): 54.7 ML/MIN/1.73 M^2
GLUCOSE SERPL-MCNC: 124 MG/DL (ref 70–110)
POTASSIUM SERPL-SCNC: 4 MMOL/L (ref 3.5–5.1)
PROT SERPL-MCNC: 8 G/DL (ref 6–8.4)
SODIUM SERPL-SCNC: 138 MMOL/L (ref 136–145)

## 2019-12-18 PROCEDURE — 80053 COMPREHEN METABOLIC PANEL: CPT

## 2019-12-18 PROCEDURE — 87522 HEPATITIS C REVRS TRNSCRPJ: CPT

## 2019-12-18 PROCEDURE — 82105 ALPHA-FETOPROTEIN SERUM: CPT

## 2019-12-20 LAB
HCV RNA SERPL NAA+PROBE-LOG IU: <1.08 LOG (10) IU/ML
HCV RNA SERPL QL NAA+PROBE: NOT DETECTED IU/ML
HCV RNA SPEC NAA+PROBE-ACNC: <12 IU/ML

## 2019-12-23 ENCOUNTER — TELEPHONE (OUTPATIENT)
Dept: HEPATOLOGY | Facility: CLINIC | Age: 59
End: 2019-12-23

## 2019-12-23 NOTE — TELEPHONE ENCOUNTER
HCV LAB REVIEW  Week 6 of Epclusa, planning on 12 weeks treatment  Keerthi 1a, tx naive  F4    Pertinent labs:  12/18/19  CMP stable  AFP 10 (down from 19)  HCV neg    pls call pt:  - Labs look good:  Liver enzymes now normal.   HCV is now negative in blood. Very important to continue treatment since it is likely still in the liver.  - Continue Epclusa - 1 pill daily - don't miss any doses.  - Should be half way through treatment.  - There is a small chance the virus can return during the 3 months after treatment ends. We will monitor blood for this.     Next labs to see if Hep C is cured are due:  - CMP, HCV RNA - SVR12 - 4/22/20  (keep appts for liver cancer screening in 2/2020)

## 2019-12-24 ENCOUNTER — TELEPHONE (OUTPATIENT)
Dept: PHARMACY | Facility: CLINIC | Age: 59
End: 2019-12-24

## 2019-12-24 NOTE — TELEPHONE ENCOUNTER
Epclusa (3 of 3) refill and reassessment attempted. (Attempt 1). N/A. Unable to leave VM for call back. Called both numbers on file. Will f/u with patient if no return call.

## 2019-12-26 NOTE — TELEPHONE ENCOUNTER
Epclusa (3 of 3) refill confirmed and reassessment complete. We will ship Epclusa refill on  via fedex to arrive on . $0.00 copay- 004. Confirmed 2 patient identifiers - name and . Therapy appropriate.     Patient has 6 doses of Epclusa remaining and takes it around 7:00 am daily.  Pt reports they are not having any side effects so far. No missed doses, no new medications, no new allergies or health conditions reported at this time. Allergies reviewed and medication reconciliation complete (reviewed and [facility] in [facility]).  Disease education reviewed (including transmission and prevention). Patient counseled on importance of maintaining adherence and keeping lab appointments which were scheduled. All questions answered and addressed to patients satisfaction. Advised to call [facility] and provider if any issues arise. Pt verbalized understanding.   - Patient continues to take Epclusa WITH food and Prilosec 4 hours later.

## 2019-12-30 ENCOUNTER — TELEPHONE (OUTPATIENT)
Dept: HEPATOLOGY | Facility: CLINIC | Age: 59
End: 2019-12-30

## 2019-12-30 NOTE — TELEPHONE ENCOUNTER
----- Message from Skylar Mcclendon sent at 12/30/2019  2:59 PM CST -----  Contact: pt  Patient calling to see when her next appt     Call Back :[phone]

## 2019-12-31 ENCOUNTER — TELEPHONE (OUTPATIENT)
Dept: FAMILY MEDICINE | Facility: CLINIC | Age: 59
End: 2019-12-31

## 2019-12-31 NOTE — TELEPHONE ENCOUNTER
The following medication needs a prior authorization:     Medication Name: omeprazole    Dosage: 20mg    Frequency: daily    Directions for use: take 1 capsule by mouth once daily.    Diagnosis: GERD K21.9    Is the request for a reauthorization? no    Is the patient currently stable on therapy? yes    Please list all therapeutic alternatives previously used with start/end dates and outcome:

## 2020-01-02 DIAGNOSIS — M84.375G STRESS FRACTURE OF METATARSAL BONE OF LEFT FOOT WITH DELAYED HEALING, SUBSEQUENT ENCOUNTER: Primary | ICD-10-CM

## 2020-01-15 ENCOUNTER — OFFICE VISIT (OUTPATIENT)
Dept: ORTHOPEDICS | Facility: CLINIC | Age: 60
End: 2020-01-15
Payer: MEDICAID

## 2020-01-15 ENCOUNTER — HOSPITAL ENCOUNTER (OUTPATIENT)
Dept: RADIOLOGY | Facility: HOSPITAL | Age: 60
Discharge: HOME | End: 2020-01-15
Attending: ORTHOPAEDIC SURGERY
Payer: MEDICAID

## 2020-01-15 VITALS — HEIGHT: 64 IN | WEIGHT: 217 LBS | BODY MASS INDEX: 37.05 KG/M2

## 2020-01-15 DIAGNOSIS — M84.375G STRESS FRACTURE OF METATARSAL BONE OF LEFT FOOT WITH DELAYED HEALING, SUBSEQUENT ENCOUNTER: ICD-10-CM

## 2020-01-15 DIAGNOSIS — S93.325D LISFRANC DISLOCATION, LEFT, SUBSEQUENT ENCOUNTER: ICD-10-CM

## 2020-01-15 PROCEDURE — 73630 X-RAY EXAM OF FOOT: CPT | Mod: TC,PN,LT

## 2020-01-15 PROCEDURE — 99213 PR OFFICE/OUTPT VISIT, EST, LEVL III, 20-29 MIN: ICD-10-PCS | Mod: S$PBB,,, | Performed by: ORTHOPAEDIC SURGERY

## 2020-01-15 PROCEDURE — 99999 PR PBB SHADOW E&M-EST. PATIENT-LVL III: CPT | Mod: PBBFAC,,, | Performed by: ORTHOPAEDIC SURGERY

## 2020-01-15 PROCEDURE — 99213 OFFICE O/P EST LOW 20 MIN: CPT | Mod: PBBFAC,25,PN | Performed by: ORTHOPAEDIC SURGERY

## 2020-01-15 PROCEDURE — 99213 OFFICE O/P EST LOW 20 MIN: CPT | Mod: S$PBB,,, | Performed by: ORTHOPAEDIC SURGERY

## 2020-01-15 PROCEDURE — 73630 XR FOOT COMPLETE 3 VIEW LEFT: ICD-10-PCS | Mod: 26,LT,, | Performed by: RADIOLOGY

## 2020-01-15 PROCEDURE — 73630 X-RAY EXAM OF FOOT: CPT | Mod: 26,LT,, | Performed by: RADIOLOGY

## 2020-01-15 PROCEDURE — 99999 PR PBB SHADOW E&M-EST. PATIENT-LVL III: ICD-10-PCS | Mod: PBBFAC,,, | Performed by: ORTHOPAEDIC SURGERY

## 2020-01-15 RX ORDER — TRAMADOL HYDROCHLORIDE 50 MG/1
50 TABLET ORAL EVERY 12 HOURS PRN
Qty: 30 TABLET | Refills: 0 | Status: SHIPPED | OUTPATIENT
Start: 2020-01-15 | End: 2020-01-25

## 2020-01-15 NOTE — LETTER
January 15, 2020      Spenser Curry Jr., MD  [address]  [place] [address]           [facility]  [place] [address]  Phone: [phone]          Patient: Chelsea Grossman   MR Number: [id number]   YOB: 1960   Date of Visit: 1/15/2020       Dear Dr. Spenser Curry Jr.:    Thank you for referring Chelsea Grossman to me for evaluation. Attached you will find relevant portions of my assessment and plan of care.    If you have questions, please do not hesitate to call me. I look forward to following Chelsea Grossman along with you.    Sincerely,    Harpreet Rothman MD    Enclosure  CC:  No Recipients    If you would like to receive this communication electronically, please contact [email or web address] or [phone] to request more information on [employer] access.    For providers and/or their staff who would like to refer a patient to [facility], please contact us through our one-stop-shop provider referral line, [facility], at [phone].    If you feel you have received this communication in error or would no longer like to receive these types of communications, please e-mail [email or web address]

## 2020-01-15 NOTE — PROGRESS NOTES
1/15/2020      Past Medical History:   Diagnosis Date    Cirrhosis     Hepatitis C     History of kidney stones     Hypertension     Osteoarthritis     Osteopenia        Past Surgical History:   Procedure Laterality Date    CARPAL TUNNEL RELEASE      FOOT SURGERY Left 12/18/2018    HYSTEROTOMY      KIDNEY STONE SURGERY      Left ear surgery      TOTAL ABDOMINAL HYSTERECTOMY W/ BILATERAL SALPINGOOPHORECTOMY      TRIGGER FINGER RELEASE Left     2nd and 3rd fingers         Current Outpatient Medications:     amLODIPine (NORVASC) 10 MG tablet, Take 1 tablet (10 mg total) by mouth once daily., Disp: 90 tablet, Rfl: 1    carvedilol (COREG) 6.25 MG tablet, Take 1 tablet (6.25 mg total) by mouth 2 (two) times daily with meals., Disp: 180 tablet, Rfl: 1    celecoxib (CELEBREX) 200 MG capsule, Take 1 capsule (200 mg total) by mouth once daily., Disp: 90 capsule, Rfl: 1    DULoxetine (CYMBALTA) 30 MG capsule, Take 1 capsule (30 mg total) by mouth once daily., Disp: 30 capsule, Rfl: 1    estradiol (ESTRACE) 0.01 % (0.1 mg/gram) vaginal cream, INSERT 1 2 GRAM INTRAVAGINALLY EVERY NIGHT AT BEDTIME 2 TO 3 TIMES PER WEEK, Disp: 42.5 g, Rfl: 3    fish oil-omega-3 fatty acids 300-1,000 mg capsule, Take 1 capsule by mouth once daily., Disp: , Rfl:     hydroCHLOROthiazide (HYDRODIURIL) 25 MG tablet, Take 1 tablet (25 mg total) by mouth once daily., Disp: 90 tablet, Rfl: 1    lisinopril (PRINIVIL,ZESTRIL) 20 MG tablet, Take 1 tablet (20 mg total) by mouth once daily., Disp: 30 tablet, Rfl: 1    omeprazole (PRILOSEC) 20 MG capsule, Take 1 capsule (20 mg total) by mouth once daily., Disp: 90 capsule, Rfl: 1    PREMARIN vaginal cream, , Disp: , Rfl:     sofosbuvir-velpatasvir 400-100 mg Tab, Take 1 tablet by mouth once daily., Disp: 28 tablet, Rfl: 2    traMADol (ULTRAM) 50 mg tablet, Take 1 tablet (50 mg total) by mouth every 8 (eight) hours as needed for Pain., Disp: 60 tablet, Rfl: 1    fluticasone propionate  (FLONASE) 50 mcg/actuation nasal spray, 1 spray (50 mcg total) by Each Nare route once daily. (Patient not taking: Reported on 1/15/2020), Disp: 3 Bottle, Rfl: 1    multivitamin (MULTIPLE VITAMINS) per tablet, Take 1 tablet by mouth once daily., Disp: , Rfl:     tiZANidine (ZANAFLEX) 4 MG tablet, Take 4 mg by mouth 3 (three) times daily as needed., Disp: , Rfl: 3    Allergies as of 01/15/2020 - Reviewed 01/15/2020   Allergen Reaction Noted    Sulfa (sulfonamide antibiotics) Itching 2017    Sulfamethoxazole-trimethoprim Itching and Rash 2017       Family History   Problem Relation Age of Onset    Heart disease Mother     Cancer Father         stomach    Breast cancer Sister     Stomach cancer Brother        Social History     Socioeconomic History    Marital status: Single     Spouse name: Not on file    Number of children: Not on file    Years of education: Not on file    Highest education level: Not on file   Occupational History    Occupation: disabled   Social Needs    Financial resource strain: Not on file    Food insecurity:     Worry: Not on file     Inability: Not on file    Transportation needs:     Medical: Not on file     Non-medical: Not on file   Tobacco Use    Smoking status: Former Smoker     Types: Cigarettes     Last attempt to quit: 10/2018     Years since quittin.2    Smokeless tobacco: Never Used   Substance and Sexual Activity    Alcohol use: Yes     Frequency: 2-4 times a month     Drinks per session: 1 or 2    Drug use: No    Sexual activity: Never     Partners: Female   Lifestyle    Physical activity:     Days per week: Not on file     Minutes per session: Not on file    Stress: To some extent   Relationships    Social connections:     Talks on phone: Not on file     Gets together: Not on file     Attends Hoahaoism service: Not on file     Active member of club or organization: Not on file     Attends meetings of clubs or organizations: Not on file      Relationship status: Not on file   Other Topics Concern    Not on file   Social History Narrative    Live with sister             HPI:  Patient is now about a year status post 1st 2nd and 3rd metatarsal base fusions for a Lisfranc fracture of the left foot she states about a week ago she started having increasing pain in the left foot  Patient states she has had about 5 episodes in the last week these episodes last for a few minutes mostly when she gets up    Review of Systems   Constitution: Negative for chills and fever.   HENT: Negative for headaches and blurry vision.   Cardiovascular: Negative for chest pain, irregular heartbeat, leg swelling and palpitations.   Respiratory: Negative for cough and shortness of breath.   Endocrine: Negative for polyuria.   Hematologic/Lymphatic: Negative for bleeding problem. Does not bruise/bleed easily.   Skin: Negative for poor wound healing and rash.   Musculoskeletal: Negative for joint pain. Negative for arthritis, joint swelling, muscle weakness and myalgias.   Gastrointestinal: Negative for abdominal pain, heartburn, melena, nausea and vomiting.   Genitourinary: Negative for bladder incontinence and dysuria.   Neurological: Negative for numbness.   Psychiatric/Behavioral: Negative for depression. The patient is not nervous/anxious.     PE:  [unfilled]    A&Ox3, NAD  Neck-supple without pain  RUE no swelling or edema  LUE no swelling or edema  Pelvis no hip or pelvic pain  Back no back pain  RLE no swelling or edema  LLE patient has no excessive swelling most her pain does not appear to be over the fusion sites appears to be over the medial arch and the medial metatarsal 1st metatarsal she ambulates well    Xrays:  X-rays of the left foot show the fusion of her 2nd and 3rd metatarsals the be intact and the hardware in good position no evidence of any other stress fractures        Labs:    No results found for this or any previous visit (from the past 24  hour(s)).    Assessment/Plan:   Status post 1st 2nd and 3rd metatarsal base excess fusions appear to be doing well will reorder her Ultram and watch this for a few weeks re-evaluate her in about 3 weeks she should avoid a lot of excessive walking at this time warm soaks to the foot

## 2020-01-27 ENCOUNTER — TELEPHONE (OUTPATIENT)
Dept: ORTHOPEDICS | Facility: CLINIC | Age: 60
End: 2020-01-27

## 2020-01-27 NOTE — TELEPHONE ENCOUNTER
----- Message from Hemal Donahue sent at 1/27/2020 11:22 AM CST -----  Contact: pt  Wants to zach 2/18, I can't, [phone]

## 2020-02-10 ENCOUNTER — OFFICE VISIT (OUTPATIENT)
Dept: FAMILY MEDICINE | Facility: CLINIC | Age: 60
End: 2020-02-10
Payer: MEDICAID

## 2020-02-10 VITALS
WEIGHT: 225 LBS | BODY MASS INDEX: 38.41 KG/M2 | HEIGHT: 64 IN | HEART RATE: 69 BPM | DIASTOLIC BLOOD PRESSURE: 82 MMHG | SYSTOLIC BLOOD PRESSURE: 138 MMHG | TEMPERATURE: 97 F | OXYGEN SATURATION: 98 %

## 2020-02-10 DIAGNOSIS — I10 BENIGN ESSENTIAL HYPERTENSION: Primary | ICD-10-CM

## 2020-02-10 DIAGNOSIS — K21.9 GASTROESOPHAGEAL REFLUX DISEASE, ESOPHAGITIS PRESENCE NOT SPECIFIED: ICD-10-CM

## 2020-02-10 DIAGNOSIS — E55.9 VITAMIN D DEFICIENCY: ICD-10-CM

## 2020-02-10 DIAGNOSIS — M15.9 PRIMARY OSTEOARTHRITIS INVOLVING MULTIPLE JOINTS: ICD-10-CM

## 2020-02-10 PROCEDURE — 99214 PR OFFICE/OUTPT VISIT, EST, LEVL IV, 30-39 MIN: ICD-10-PCS | Mod: S$GLB,,, | Performed by: INTERNAL MEDICINE

## 2020-02-10 PROCEDURE — 99214 OFFICE O/P EST MOD 30 MIN: CPT | Mod: S$GLB,,, | Performed by: INTERNAL MEDICINE

## 2020-02-10 RX ORDER — PANTOPRAZOLE SODIUM 40 MG/1
40 TABLET, DELAYED RELEASE ORAL DAILY
Qty: 90 TABLET | Refills: 1 | Status: SHIPPED | OUTPATIENT
Start: 2020-02-10 | End: 2020-05-18 | Stop reason: SDUPTHER

## 2020-02-10 NOTE — PROGRESS NOTES
Subjective:       Patient ID: Chelsea Grossman is a 59 y.o. female.    Chief Complaint: Leg Pain (left leg pain); Gastroesophageal Reflux; and Hypertension (continuity of care)    Here for routine follow up.  She is being treated for her Hep C.  Continues to have foot pain.  Tramadol does help and allows her to complete her ADLs.  Takes as it up to TID, sometimes only once or twice.  However, her insurance is not covering it.      Hypertension   This is a chronic problem. The problem is controlled. Associated symptoms include headaches (sinus pressure). Pertinent negatives include no chest pain, neck pain, palpitations, peripheral edema or shortness of breath. Past treatments include beta blockers, calcium channel blockers and diuretics. There are no compliance problems.    Gastroesophageal Reflux   She complains of heartburn. She reports no abdominal pain, no chest pain, no choking, no coughing, no nausea or no wheezing. This is a chronic problem. The heartburn is of moderate intensity. The heartburn wakes her from sleep. The symptoms are aggravated by certain foods and lying down (red sauce). Pertinent negatives include no fatigue. Risk factors include NSAIDs and obesity. She has tried a PPI for the symptoms. The treatment provided mild (has been out of meds because her insurance isn't covering them; incomplete relief when she was on them) relief.     Review of Systems   Constitutional: Positive for unexpected weight change (wt. gain). Negative for chills, fatigue and fever.   HENT: Positive for congestion, postnasal drip and sinus pain. Negative for hearing loss, rhinorrhea, trouble swallowing and voice change.    Eyes: Negative for photophobia and visual disturbance.   Respiratory: Negative for apnea, cough, choking, chest tightness, shortness of breath and wheezing.    Cardiovascular: Negative for chest pain, palpitations and leg swelling.   Gastrointestinal: Positive for heartburn. Negative for abdominal  pain, blood in stool, constipation, diarrhea, nausea, rectal pain and vomiting.        Reflux   Endocrine: Negative for cold intolerance, heat intolerance, polydipsia and polyuria.   Genitourinary: Negative for decreased urine volume, difficulty urinating, dysuria, frequency, genital sores, hematuria, menstrual problem, pelvic pain, urgency, vaginal bleeding and vaginal discharge.   Musculoskeletal: Positive for arthralgias, back pain and myalgias. Negative for gait problem, joint swelling, neck pain and neck stiffness.        Left leg pain   Skin: Negative for color change, rash and wound.   Allergic/Immunologic: Negative for environmental allergies and food allergies.   Neurological: Positive for headaches (sinus pressure). Negative for dizziness, tremors, seizures, syncope, facial asymmetry, speech difficulty, weakness, light-headedness and numbness.   Hematological: Negative for adenopathy. Does not bruise/bleed easily.   Psychiatric/Behavioral: Negative for confusion, hallucinations, sleep disturbance and suicidal ideas. The patient is not nervous/anxious.        Past Medical History:   Diagnosis Date    Cirrhosis     Hepatitis C     History of kidney stones     Hypertension     Osteoarthritis     Osteopenia       Past Surgical History:   Procedure Laterality Date    CARPAL TUNNEL RELEASE      FOOT SURGERY Left 12/18/2018    HYSTEROTOMY      KIDNEY STONE SURGERY      Left ear surgery      TOTAL ABDOMINAL HYSTERECTOMY W/ BILATERAL SALPINGOOPHORECTOMY      TRIGGER FINGER RELEASE Left     2nd and 3rd fingers       Family History   Problem Relation Age of Onset    Heart disease Mother     Cancer Father         stomach    Breast cancer Sister     Stomach cancer Brother        Social History     Socioeconomic History    Marital status: Single     Spouse name: Not on file    Number of children: Not on file    Years of education: Not on file    Highest education level: Not on file   Occupational  History    Occupation: disabled   Social Needs    Financial resource strain: Not on file    Food insecurity:     Worry: Not on file     Inability: Not on file    Transportation needs:     Medical: Not on file     Non-medical: Not on file   Tobacco Use    Smoking status: Former Smoker     Types: Cigarettes     Last attempt to quit: 10/2018     Years since quittin.3    Smokeless tobacco: Never Used   Substance and Sexual Activity    Alcohol use: Yes     Frequency: 2-4 times a month     Drinks per session: 1 or 2    Drug use: No    Sexual activity: Never     Partners: Female   Lifestyle    Physical activity:     Days per week: Not on file     Minutes per session: Not on file    Stress: To some extent   Relationships    Social connections:     Talks on phone: Not on file     Gets together: Not on file     Attends Hindu service: Not on file     Active member of club or organization: Not on file     Attends meetings of clubs or organizations: Not on file     Relationship status: Not on file   Other Topics Concern    Not on file   Social History Narrative    Live with sister       Current Outpatient Medications   Medication Sig Dispense Refill    amLODIPine (NORVASC) 10 MG tablet Take 1 tablet (10 mg total) by mouth once daily. 90 tablet 1    carvedilol (COREG) 6.25 MG tablet Take 1 tablet (6.25 mg total) by mouth 2 (two) times daily with meals. 180 tablet 1    celecoxib (CELEBREX) 200 MG capsule Take 1 capsule (200 mg total) by mouth once daily. 90 capsule 1    DULoxetine (CYMBALTA) 30 MG capsule Take 1 capsule (30 mg total) by mouth once daily. 30 capsule 1    estradiol (ESTRACE) 0.01 % (0.1 mg/gram) vaginal cream INSERT 1 2 GRAM INTRAVAGINALLY EVERY NIGHT AT BEDTIME 2 TO 3 TIMES PER WEEK 42.5 g 3    fluticasone propionate (FLONASE) 50 mcg/actuation nasal spray 1 spray (50 mcg total) by Each Nare route once daily. 3 Bottle 1    hydroCHLOROthiazide (HYDRODIURIL) 25 MG tablet Take 1 tablet (25  "mg total) by mouth once daily. 90 tablet 1    lisinopril (PRINIVIL,ZESTRIL) 20 MG tablet Take 1 tablet (20 mg total) by mouth once daily. 30 tablet 1    multivitamin (MULTIPLE VITAMINS) per tablet Take 1 tablet by mouth once daily.      PREMARIN vaginal cream       tiZANidine (ZANAFLEX) 4 MG tablet Take 4 mg by mouth 3 (three) times daily as needed.  3    traMADol (ULTRAM) 50 mg tablet Take 1 tablet (50 mg total) by mouth every 8 (eight) hours as needed for Pain. 60 tablet 1    pantoprazole (PROTONIX) 40 MG tablet Take 1 tablet (40 mg total) by mouth once daily. 90 tablet 1     No current facility-administered medications for this visit.        Review of patient's allergies indicates:   Allergen Reactions    Sulfa (sulfonamide antibiotics) Itching    Sulfamethoxazole-trimethoprim Itching and Rash     Itchy Rash     Objective:    HPI     Leg Pain      Additional comments: left leg pain              Hypertension      Additional comments: continuity of care          Last edited by Hugh Rausch MA on 2/10/2020  9:15 AM. (History)      Blood pressure 138/82, pulse 69, temperature 97.3 °F (36.3 °C), temperature source Temporal, height 5' 4" (1.626 m), weight 102.1 kg (225 lb), SpO2 98 %. Body mass index is 38.62 kg/m².   Physical Exam   Constitutional: She appears well-developed. No distress.   Obese     HENT:   Nose: Nose normal.   Mouth/Throat: Oropharynx is clear and moist.   Eyes: Conjunctivae are normal. Right eye exhibits no discharge. Left eye exhibits no discharge. No scleral icterus.   Arcus senilus     Neck: Carotid bruit is not present.   Cardiovascular: Normal rate, regular rhythm and normal heart sounds.   No murmur heard.  Pulmonary/Chest: Effort normal and breath sounds normal. No respiratory distress. She has no decreased breath sounds. She has no wheezes. She has no rhonchi. She has no rales.   Abdominal: Soft. She exhibits no distension. There is no tenderness. There is no rebound and no " guarding.   Musculoskeletal: She exhibits no edema.   Neurological: She is alert. She displays no tremor.   Skin: Skin is warm and dry.   Psychiatric: She has a normal mood and affect. Her speech is normal.   Nursing note and vitals reviewed.          Assessment:       1. Benign essential hypertension    2. Gastroesophageal reflux disease, esophagitis presence not specified    3. Vitamin D deficiency    4. Primary osteoarthritis involving multiple joints        Plan:       Chelsea was seen today for leg pain, gastroesophageal reflux and hypertension.    Diagnoses and all orders for this visit:    Benign essential hypertension  -     Lipid panel; Future  -     Urinalysis; Future    Gastroesophageal reflux disease, esophagitis presence not specified  -     pantoprazole (PROTONIX) 40 MG tablet; Take 1 tablet (40 mg total) by mouth once daily.    Vitamin D deficiency  -     Vitamin D; Future    Primary osteoarthritis involving multiple joints

## 2020-02-13 RX ORDER — LISINOPRIL 20 MG/1
TABLET ORAL
Qty: 90 TABLET | Refills: 1 | Status: SHIPPED | OUTPATIENT
Start: 2020-02-13 | End: 2020-05-18 | Stop reason: SDUPTHER

## 2020-02-19 ENCOUNTER — OFFICE VISIT (OUTPATIENT)
Dept: ORTHOPEDICS | Facility: CLINIC | Age: 60
End: 2020-02-19
Payer: MEDICAID

## 2020-02-19 ENCOUNTER — HOSPITAL ENCOUNTER (OUTPATIENT)
Dept: RADIOLOGY | Facility: HOSPITAL | Age: 60
Discharge: HOME | End: 2020-02-19
Attending: ORTHOPAEDIC SURGERY
Payer: MEDICAID

## 2020-02-19 VITALS — HEIGHT: 64 IN | BODY MASS INDEX: 38.41 KG/M2 | WEIGHT: 225 LBS

## 2020-02-19 DIAGNOSIS — S93.325D LISFRANC DISLOCATION, LEFT, SUBSEQUENT ENCOUNTER: Primary | ICD-10-CM

## 2020-02-19 DIAGNOSIS — M84.375G STRESS FRACTURE OF METATARSAL BONE OF LEFT FOOT WITH DELAYED HEALING, SUBSEQUENT ENCOUNTER: Primary | ICD-10-CM

## 2020-02-19 DIAGNOSIS — M84.375G STRESS FRACTURE OF METATARSAL BONE OF LEFT FOOT WITH DELAYED HEALING, SUBSEQUENT ENCOUNTER: ICD-10-CM

## 2020-02-19 PROCEDURE — 99213 OFFICE O/P EST LOW 20 MIN: CPT | Mod: S$PBB,,, | Performed by: ORTHOPAEDIC SURGERY

## 2020-02-19 PROCEDURE — 73630 X-RAY EXAM OF FOOT: CPT | Mod: TC,PN,LT

## 2020-02-19 PROCEDURE — 73630 XR FOOT COMPLETE 3 VIEW LEFT: ICD-10-PCS | Mod: 26,LT,, | Performed by: RADIOLOGY

## 2020-02-19 PROCEDURE — 99212 OFFICE O/P EST SF 10 MIN: CPT | Mod: PBBFAC,25,PN | Performed by: ORTHOPAEDIC SURGERY

## 2020-02-19 PROCEDURE — 99999 PR PBB SHADOW E&M-EST. PATIENT-LVL II: CPT | Mod: PBBFAC,,, | Performed by: ORTHOPAEDIC SURGERY

## 2020-02-19 PROCEDURE — 99999 PR PBB SHADOW E&M-EST. PATIENT-LVL II: ICD-10-PCS | Mod: PBBFAC,,, | Performed by: ORTHOPAEDIC SURGERY

## 2020-02-19 PROCEDURE — 99213 PR OFFICE/OUTPT VISIT, EST, LEVL III, 20-29 MIN: ICD-10-PCS | Mod: S$PBB,,, | Performed by: ORTHOPAEDIC SURGERY

## 2020-02-19 PROCEDURE — 73630 X-RAY EXAM OF FOOT: CPT | Mod: 26,LT,, | Performed by: RADIOLOGY

## 2020-02-19 RX ORDER — IBUPROFEN 600 MG/1
600 TABLET ORAL 3 TIMES DAILY
Qty: 60 TABLET | Refills: 1 | Status: SHIPPED | OUTPATIENT
Start: 2020-02-19 | End: 2020-05-18 | Stop reason: ALTCHOICE

## 2020-02-19 NOTE — LETTER
February 19, 2020      Spenser Curry Jr., MD  [address]  [place] [address]           [facility]  [place] [address]  Phone: [phone]          Patient: Chelsea Grossman   MR Number: [id number]   YOB: 1960   Date of Visit: 2/19/2020       Dear Dr. Spenser Curry Jr.:    Thank you for referring Chelsea Grossman to me for evaluation. Attached you will find relevant portions of my assessment and plan of care.    If you have questions, please do not hesitate to call me. I look forward to following Chelsea Grossman along with you.    Sincerely,    Harpreet Rothman MD    Enclosure  CC:  No Recipients    If you would like to receive this communication electronically, please contact [email or web address] or [phone] to request more information on [employer] access.    For providers and/or their staff who would like to refer a patient to [facility], please contact us through our one-stop-shop provider referral line, [facility], at [phone].    If you feel you have received this communication in error or would no longer like to receive these types of communications, please e-mail [email or web address]

## 2020-02-19 NOTE — PROGRESS NOTES
2/19/2020    Past Medical History:   Diagnosis Date    Cirrhosis     Hepatitis C     History of kidney stones     Hypertension     Osteoarthritis     Osteopenia        Past Surgical History:   Procedure Laterality Date    CARPAL TUNNEL RELEASE      FOOT SURGERY Left 12/18/2018    HYSTEROTOMY      KIDNEY STONE SURGERY      Left ear surgery      TOTAL ABDOMINAL HYSTERECTOMY W/ BILATERAL SALPINGOOPHORECTOMY      TRIGGER FINGER RELEASE Left     2nd and 3rd fingers       Current Outpatient Medications   Medication Sig    amLODIPine (NORVASC) 10 MG tablet Take 1 tablet (10 mg total) by mouth once daily.    carvedilol (COREG) 6.25 MG tablet Take 1 tablet (6.25 mg total) by mouth 2 (two) times daily with meals.    celecoxib (CELEBREX) 200 MG capsule Take 1 capsule (200 mg total) by mouth once daily.    DULoxetine (CYMBALTA) 30 MG capsule Take 1 capsule (30 mg total) by mouth once daily.    estradiol (ESTRACE) 0.01 % (0.1 mg/gram) vaginal cream INSERT 1 2 GRAM INTRAVAGINALLY EVERY NIGHT AT BEDTIME 2 TO 3 TIMES PER WEEK    fluticasone propionate (FLONASE) 50 mcg/actuation nasal spray 1 spray (50 mcg total) by Each Nare route once daily.    hydroCHLOROthiazide (HYDRODIURIL) 25 MG tablet Take 1 tablet (25 mg total) by mouth once daily.    lisinopril (PRINIVIL,ZESTRIL) 20 MG tablet TAKE 1 TABLET BY MOUTH ONCE DAILY    multivitamin (MULTIPLE VITAMINS) per tablet Take 1 tablet by mouth once daily.    pantoprazole (PROTONIX) 40 MG tablet Take 1 tablet (40 mg total) by mouth once daily.    PREMARIN vaginal cream     tiZANidine (ZANAFLEX) 4 MG tablet Take 4 mg by mouth 3 (three) times daily as needed.    traMADol (ULTRAM) 50 mg tablet Take 1 tablet (50 mg total) by mouth every 8 (eight) hours as needed for Pain.     No current facility-administered medications for this visit.        Review of patient's allergies indicates:   Allergen Reactions    Sulfa (sulfonamide antibiotics) Itching     Sulfamethoxazole-trimethoprim Itching and Rash     Itchy Rash       Family History   Problem Relation Age of Onset    Heart disease Mother     Cancer Father         stomach    Breast cancer Sister     Stomach cancer Brother        Social History     Socioeconomic History    Marital status: Single     Spouse name: Not on file    Number of children: Not on file    Years of education: Not on file    Highest education level: Not on file   Occupational History    Occupation: disabled   Social Needs    Financial resource strain: Not on file    Food insecurity:     Worry: Not on file     Inability: Not on file    Transportation needs:     Medical: Not on file     Non-medical: Not on file   Tobacco Use    Smoking status: Former Smoker     Types: Cigarettes     Last attempt to quit: 10/2018     Years since quittin.3    Smokeless tobacco: Never Used   Substance and Sexual Activity    Alcohol use: Yes     Frequency: 2-4 times a month     Drinks per session: 1 or 2    Drug use: No    Sexual activity: Never     Partners: Female   Lifestyle    Physical activity:     Days per week: Not on file     Minutes per session: Not on file    Stress: To some extent   Relationships    Social connections:     Talks on phone: Not on file     Gets together: Not on file     Attends Adventism service: Not on file     Active member of club or organization: Not on file     Attends meetings of clubs or organizations: Not on file     Relationship status: Not on file   Other Topics Concern    Not on file   Social History Narrative    Live with sister       Chief Complaint:   Chief Complaint   Patient presents with    Left Foot - Pain, Follow-up     DOS: 2018.-- 1 yr S/P left first metatarsal, second metatarsal, & third metatarsal base fusions. Avoid a lot of excessive walking at this time. Advised warm soaks to the foot. She has pain in her big toe and medial side of foot         History of present illness:    This is a  "59 y.o. year old female who complains of patient is now about a year status post fusion of her 1st 2nd 3rd metatarsals bases for Lisfranc fracture patient been ambulating but complains of pain in her foot mostly in the great toe area medial arch area    Review of Systems:    Constitution: Denies chills, fever, and sweats.  HENT: Denies headaches or blurry vision.  Cardiovascular: Denies chest pain or irregular heart beat.  Respiratory: Denies cough or shortness of breath.  Gastrointestinal: Denies abdominal pain, nausea, or vomiting.  Musculoskeletal:  Denies muscle cramps.  Neurological: Denies dizziness or focal weakness.  Psychiatric/Behavioral: Normal mental status.  Hematologic/Lymphatic: Denies bleeding problem or easy bruising/bleeding.  Skin: Denies rash or suspicious lesions.    Examination:    Vital Signs:    Vitals:    02/19/20 1303   Weight: 102.1 kg (225 lb)   Height: 5' 4" (1.626 m)   PainSc:   7   PainLoc: Foot       Body mass index is 38.62 kg/m².    This a well-developed, well nourished patient in no acute distress.    Alert and oriented x 3 and cooperative to examination.       Physical Exam:  Left foot-her incisions are healed most of the patient's pain appears to be over the medial arch area she has some pain near the MCL TP joint and some near the interphalangeal joint there is no swelling or edema she has bilateral flatfoot deformities    Imaging:  X-rays show appear to be a good fusion at the base of the 1st 2nd and 3rd metatarsals she has moderate arthritic changes of the MTP joint and some more mild arthritic changes in the IP joint       Assessment: Lisfranc dislocation, left, subsequent encounter        Plan:  Overall I think she is doing very well she is using inserts in her left foot she states that her pain occurs perhaps 3 times a week on further questions she states she does not wish to have any kind of surgery. May give her some Motrin 600 mg to take as needed and have her return as " needed      DISCLAIMER: This note may have been dictated using voice recognition software and may contain grammatical errors.     NOTE: Consult report sent to referring provider via [employer] EMR.

## 2020-02-20 ENCOUNTER — TELEPHONE (OUTPATIENT)
Dept: HEPATOLOGY | Facility: CLINIC | Age: 60
End: 2020-02-20

## 2020-02-20 NOTE — TELEPHONE ENCOUNTER
----- Message from Cynthia Trotter RN sent at 2/20/2020  3:23 PM CST -----  Contact: pt  Please advise.  ----- Message -----  From: Skylar Mcclendon  Sent: 2/20/2020  10:29 AM CST  To: [facility] Jennifer B Staff    Patient calling about medication ,,,,,,,Pantoprazole ,,,,sodium delay release 40mg    Pt trying to see if ok to take medication       Call Back: [phone]

## 2020-02-20 NOTE — TELEPHONE ENCOUNTER
Once pt is done with the epclusa (HCV rx) she can take whatever stomach acid medication that she needs, including pantoprazole      thanks

## 2020-02-21 ENCOUNTER — HOSPITAL ENCOUNTER (OUTPATIENT)
Dept: RADIOLOGY | Facility: CLINIC | Age: 60
Discharge: HOME | End: 2020-02-21
Attending: PHYSICIAN ASSISTANT
Payer: MEDICAID

## 2020-02-21 ENCOUNTER — TELEPHONE (OUTPATIENT)
Dept: HEPATOLOGY | Facility: CLINIC | Age: 60
End: 2020-02-21

## 2020-02-21 DIAGNOSIS — K74.60 HEPATIC CIRRHOSIS, UNSPECIFIED HEPATIC CIRRHOSIS TYPE, UNSPECIFIED WHETHER ASCITES PRESENT: Primary | ICD-10-CM

## 2020-02-21 DIAGNOSIS — K74.60 HEPATIC CIRRHOSIS, UNSPECIFIED HEPATIC CIRRHOSIS TYPE, UNSPECIFIED WHETHER ASCITES PRESENT: ICD-10-CM

## 2020-02-21 DIAGNOSIS — B18.2 CHRONIC HEPATITIS C WITHOUT HEPATIC COMA: ICD-10-CM

## 2020-02-21 PROCEDURE — 76705 ECHO EXAM OF ABDOMEN: CPT | Mod: 26,,, | Performed by: RADIOLOGY

## 2020-02-21 PROCEDURE — 76705 US ABDOMEN LIMITED: ICD-10-PCS | Mod: 26,,, | Performed by: RADIOLOGY

## 2020-02-21 PROCEDURE — 76705 ECHO EXAM OF ABDOMEN: CPT | Mod: TC,PO

## 2020-02-21 NOTE — TELEPHONE ENCOUNTER
2/21/20 labs and u/s reviewed - stable    Please notify patient:  I have reviewed the recent labs and ultrasound.  Liver is working well.  Liver cancer screening looked fine. There are no tumors in the liver.  Next liver monitoring is due in 6 months.    Please schedule: CBC, CMP, INR, AFP, U/S, VISIT in 8/2020    Thanks

## 2020-03-03 ENCOUNTER — TELEPHONE (OUTPATIENT)
Dept: ORTHOPEDICS | Facility: CLINIC | Age: 60
End: 2020-03-03

## 2020-03-03 NOTE — TELEPHONE ENCOUNTER
----- Message from Hemal Donahue sent at 3/3/2020 11:32 AM CST -----  Contact: [facility]   Needs to know it condition is Acute or Cronic

## 2020-04-20 ENCOUNTER — LAB VISIT (OUTPATIENT)
Dept: LAB | Facility: HOSPITAL | Age: 60
End: 2020-04-20
Attending: PHYSICIAN ASSISTANT
Payer: MEDICAID

## 2020-04-20 ENCOUNTER — CLINICAL SUPPORT (OUTPATIENT)
Dept: INTERNAL MEDICINE | Facility: CLINIC | Age: 60
End: 2020-04-20
Payer: MEDICAID

## 2020-04-20 DIAGNOSIS — B18.2 CHRONIC HEPATITIS C WITHOUT HEPATIC COMA: ICD-10-CM

## 2020-04-20 DIAGNOSIS — Z23 IMMUNIZATION DUE: ICD-10-CM

## 2020-04-20 LAB
ALBUMIN SERPL BCP-MCNC: 3.7 G/DL (ref 3.5–5.2)
ALP SERPL-CCNC: 131 U/L (ref 55–135)
ALT SERPL W/O P-5'-P-CCNC: 44 U/L (ref 10–44)
ANION GAP SERPL CALC-SCNC: 11 MMOL/L (ref 8–16)
AST SERPL-CCNC: 35 U/L (ref 10–40)
BILIRUB SERPL-MCNC: 0.3 MG/DL (ref 0.1–1)
BUN SERPL-MCNC: 17 MG/DL (ref 6–20)
CALCIUM SERPL-MCNC: 9.7 MG/DL (ref 8.7–10.5)
CHLORIDE SERPL-SCNC: 107 MMOL/L (ref 95–110)
CO2 SERPL-SCNC: 23 MMOL/L (ref 23–29)
CREAT SERPL-MCNC: 1 MG/DL (ref 0.5–1.4)
EST. GFR  (AFRICAN AMERICAN): >60 ML/MIN/1.73 M^2
EST. GFR  (NON AFRICAN AMERICAN): >60 ML/MIN/1.73 M^2
GLUCOSE SERPL-MCNC: 109 MG/DL (ref 70–110)
POTASSIUM SERPL-SCNC: 4.3 MMOL/L (ref 3.5–5.1)
PROT SERPL-MCNC: 7.8 G/DL (ref 6–8.4)
SODIUM SERPL-SCNC: 141 MMOL/L (ref 136–145)

## 2020-04-20 PROCEDURE — 90471 IMMUNIZATION ADMIN: CPT | Mod: PBBFAC,PO

## 2020-04-20 PROCEDURE — 87522 HEPATITIS C REVRS TRNSCRPJ: CPT

## 2020-04-20 PROCEDURE — 80053 COMPREHEN METABOLIC PANEL: CPT

## 2020-04-20 PROCEDURE — 36415 COLL VENOUS BLD VENIPUNCTURE: CPT | Mod: PO

## 2020-04-20 NOTE — PROGRESS NOTES
Two person identification name, d.o.b with verbal feedback.  Aseptic technique used.  Administration Hep B vaccine to the  R Deltoid IM given.  Tolerated well.  waited 15 min.  VIS given./mp

## 2020-04-21 ENCOUNTER — TELEPHONE (OUTPATIENT)
Dept: FAMILY MEDICINE | Facility: CLINIC | Age: 60
End: 2020-04-21

## 2020-04-21 NOTE — TELEPHONE ENCOUNTER
----- Message from Spenser Curry Jr., MD sent at 4/21/2020  1:10 PM CDT -----  I have reviewed your results.  They demonstrate no significant or concerning findings.  If you have any additional concerns regarding these tests, please contact me at your convenience.

## 2020-04-27 ENCOUNTER — TELEPHONE (OUTPATIENT)
Dept: HEPATOLOGY | Facility: CLINIC | Age: 60
End: 2020-04-27

## 2020-04-27 DIAGNOSIS — Z86.19 HISTORY OF HEPATITIS C: ICD-10-CM

## 2020-04-27 DIAGNOSIS — K74.60 HEPATIC CIRRHOSIS, UNSPECIFIED HEPATIC CIRRHOSIS TYPE, UNSPECIFIED WHETHER ASCITES PRESENT: Primary | ICD-10-CM

## 2020-04-27 NOTE — TELEPHONE ENCOUNTER
HCV LAB REVIEW  Completed 12 weeks of Epclusa, 2/2020 treatment  Keerthi 1a, tx naive  F4    Pertinent labs:  4/20/20  HCV neg  CMP normal    These labs document SVR12 following successful HCV treatment with Epclusa  These labs document SVR12 following successful HCV treatment with Harvoni  These labs document SVR12 following successful HCV treatment with Mavyret    please tell patient:  1.) Lab test shows there is NO Hepatitis C in the blood. This means the Hepatitis C is cured!!  We do not expect the virus to return.  This does not give protection from Hepatitis C and patient could be infected again if ever exposed to the virus again.    2.) Cirrhosis is still present and patient needs monitoring of liver and liver cancer screening every 6 months for the rest of their life. This is due again in 8/2020        Please schedule   - HCV RNA, CBC, CMP, INR, AFP, U/S, VISIT in 8/2020

## 2020-04-28 NOTE — TELEPHONE ENCOUNTER
Msg from provider mailed to patient.  HCC testing scheduled 8/18 and visit with provider scheduled 9/15 in [place].

## 2020-05-18 ENCOUNTER — OFFICE VISIT (OUTPATIENT)
Dept: FAMILY MEDICINE | Facility: CLINIC | Age: 60
End: 2020-05-18
Payer: MEDICAID

## 2020-05-18 ENCOUNTER — TELEPHONE (OUTPATIENT)
Dept: FAMILY MEDICINE | Facility: CLINIC | Age: 60
End: 2020-05-18

## 2020-05-18 VITALS
WEIGHT: 224 LBS | DIASTOLIC BLOOD PRESSURE: 86 MMHG | TEMPERATURE: 99 F | SYSTOLIC BLOOD PRESSURE: 130 MMHG | HEART RATE: 68 BPM | HEIGHT: 64 IN | BODY MASS INDEX: 38.24 KG/M2 | OXYGEN SATURATION: 98 %

## 2020-05-18 DIAGNOSIS — I10 ESSENTIAL HYPERTENSION: Primary | ICD-10-CM

## 2020-05-18 DIAGNOSIS — K21.9 GASTROESOPHAGEAL REFLUX DISEASE, ESOPHAGITIS PRESENCE NOT SPECIFIED: ICD-10-CM

## 2020-05-18 DIAGNOSIS — H93.12 TINNITUS OF LEFT EAR: ICD-10-CM

## 2020-05-18 DIAGNOSIS — Z12.31 BREAST CANCER SCREENING BY MAMMOGRAM: ICD-10-CM

## 2020-05-18 DIAGNOSIS — S93.325D LISFRANC DISLOCATION, LEFT, SUBSEQUENT ENCOUNTER: ICD-10-CM

## 2020-05-18 DIAGNOSIS — M15.9 OSTEOARTHRITIS OF MULTIPLE JOINTS, UNSPECIFIED OSTEOARTHRITIS TYPE: ICD-10-CM

## 2020-05-18 PROCEDURE — 99214 PR OFFICE/OUTPT VISIT, EST, LEVL IV, 30-39 MIN: ICD-10-PCS | Mod: S$GLB,,, | Performed by: INTERNAL MEDICINE

## 2020-05-18 PROCEDURE — 99214 OFFICE O/P EST MOD 30 MIN: CPT | Mod: S$GLB,,, | Performed by: INTERNAL MEDICINE

## 2020-05-18 RX ORDER — AMLODIPINE BESYLATE 10 MG/1
10 TABLET ORAL DAILY
Qty: 90 TABLET | Refills: 1 | Status: SHIPPED | OUTPATIENT
Start: 2020-05-18 | End: 2020-07-21 | Stop reason: SDUPTHER

## 2020-05-18 RX ORDER — TRAMADOL HYDROCHLORIDE 50 MG/1
50 TABLET ORAL EVERY 8 HOURS PRN
Qty: 90 TABLET | Refills: 2 | Status: SHIPPED | OUTPATIENT
Start: 2020-05-18 | End: 2020-07-21 | Stop reason: SDUPTHER

## 2020-05-18 RX ORDER — DULOXETIN HYDROCHLORIDE 30 MG/1
30 CAPSULE, DELAYED RELEASE ORAL DAILY
Qty: 90 CAPSULE | Refills: 1 | Status: SHIPPED | OUTPATIENT
Start: 2020-05-18 | End: 2020-09-23

## 2020-05-18 RX ORDER — ESTRADIOL 0.1 MG/G
CREAM VAGINAL
Qty: 42.5 G | Refills: 3 | Status: SHIPPED | OUTPATIENT
Start: 2020-05-18 | End: 2021-01-29 | Stop reason: SDUPTHER

## 2020-05-18 RX ORDER — FLUTICASONE PROPIONATE 50 MCG
1 SPRAY, SUSPENSION (ML) NASAL DAILY
Qty: 48 G | Refills: 1 | Status: SHIPPED | OUTPATIENT
Start: 2020-05-18 | End: 2021-04-29 | Stop reason: SDUPTHER

## 2020-05-18 RX ORDER — CARVEDILOL 6.25 MG/1
6.25 TABLET ORAL 2 TIMES DAILY WITH MEALS
Qty: 180 TABLET | Refills: 1 | Status: SHIPPED | OUTPATIENT
Start: 2020-05-18 | End: 2020-07-21 | Stop reason: SDUPTHER

## 2020-05-18 RX ORDER — LISINOPRIL 20 MG/1
20 TABLET ORAL DAILY
Qty: 90 TABLET | Refills: 1 | Status: SHIPPED | OUTPATIENT
Start: 2020-05-18 | End: 2020-07-21 | Stop reason: SDUPTHER

## 2020-05-18 RX ORDER — CELECOXIB 200 MG/1
200 CAPSULE ORAL DAILY
Qty: 90 CAPSULE | Refills: 1 | Status: SHIPPED | OUTPATIENT
Start: 2020-05-18 | End: 2021-01-29 | Stop reason: SDUPTHER

## 2020-05-18 RX ORDER — IBUPROFEN 600 MG/1
600 TABLET ORAL 3 TIMES DAILY
Qty: 60 TABLET | Refills: 1 | Status: CANCELLED | OUTPATIENT
Start: 2020-05-18

## 2020-05-18 RX ORDER — HYDROCHLOROTHIAZIDE 25 MG/1
25 TABLET ORAL DAILY
Qty: 90 TABLET | Refills: 1 | Status: SHIPPED | OUTPATIENT
Start: 2020-05-18 | End: 2020-07-21 | Stop reason: SDUPTHER

## 2020-05-18 RX ORDER — PANTOPRAZOLE SODIUM 40 MG/1
40 TABLET, DELAYED RELEASE ORAL DAILY
Qty: 90 TABLET | Refills: 1 | Status: SHIPPED | OUTPATIENT
Start: 2020-05-18 | End: 2021-01-29 | Stop reason: SDUPTHER

## 2020-05-18 NOTE — TELEPHONE ENCOUNTER
Pharmacy req clarification on estrace directions.  They are also asking if the tramadol is for a chronic or acute px

## 2020-05-18 NOTE — PROGRESS NOTES
Subjective:       Patient ID: Chelsea Grossman is a 60 y.o. female.    Chief Complaint: Hypertension (3 moth followup)    Here for routine follow up.  She is being [facility] for her Hep C.  Continues to have foot pain which radiates up her leg into her calf.  Tramadol does help and allows her to complete her ADLs.  Takes as it up to TID, sometimes only once or twice.  Ibuprofen also seems to help but doesn't take it often d/t it bothering her stomach, even taking with food and being on PPI.     Hypertension   This is a chronic problem. The problem is controlled. Pertinent negatives include no chest pain, headaches, neck pain, palpitations, peripheral edema or shortness of breath. Past treatments include beta blockers, calcium channel blockers and diuretics. There are no compliance problems.    Gastroesophageal Reflux   She complains of heartburn. She reports no abdominal pain, no chest pain, no choking, no coughing, no nausea or no wheezing. This is a chronic problem. The heartburn is of moderate intensity. The heartburn wakes her from sleep. The symptoms are aggravated by certain foods, lying down and medications (red sauce, NSAIDs). Pertinent negatives include no fatigue. Risk factors include NSAIDs and obesity. She has tried a PPI for the symptoms. The treatment provided moderate relief.     Review of Systems   Constitutional: Negative for chills, fatigue, fever and unexpected weight change.   HENT: Negative for congestion, hearing loss, postnasal drip, rhinorrhea, trouble swallowing and voice change.    Eyes: Negative for photophobia and visual disturbance.   Respiratory: Negative for apnea, cough, choking, chest tightness, shortness of breath and wheezing.    Cardiovascular: Negative for chest pain, palpitations and leg swelling.   Gastrointestinal: Positive for heartburn. Negative for abdominal pain, blood in stool, constipation, diarrhea, nausea, rectal pain and vomiting.   Endocrine:  Negative for cold intolerance, heat intolerance, polydipsia and polyuria.   Genitourinary: Negative for decreased urine volume, difficulty urinating, dysuria, frequency, genital sores, hematuria, menstrual problem, pelvic pain, urgency, vaginal bleeding and vaginal discharge.   Musculoskeletal: Positive for arthralgias (left foot). Negative for back pain, gait problem, joint swelling, myalgias, neck pain and neck stiffness.   Skin: Negative for color change, rash and wound.   Allergic/Immunologic: Negative for environmental allergies and food allergies.   Neurological: Negative for dizziness, tremors, seizures, syncope, facial asymmetry, speech difficulty, weakness, light-headedness, numbness and headaches.   Hematological: Negative for adenopathy. Does not bruise/bleed easily.   Psychiatric/Behavioral: Negative for confusion, hallucinations, sleep disturbance and suicidal ideas. The patient is not nervous/anxious.        Past Medical History:   Diagnosis Date    Cirrhosis     History of hepatitis C, s/p successful Rx w/ cure (SVR12 - 4/2020)     History of kidney stones     Hypertension     Osteoarthritis     Osteopenia       Past Surgical History:   Procedure Laterality Date    CARPAL TUNNEL RELEASE      FOOT SURGERY Left 12/18/2018    HYSTEROTOMY      KIDNEY STONE SURGERY      Left ear surgery      TOTAL ABDOMINAL HYSTERECTOMY W/ BILATERAL SALPINGOOPHORECTOMY      TRIGGER FINGER RELEASE Left     2nd and 3rd fingers       Family History   Problem Relation Age of Onset    Heart disease Mother     Cancer Father         stomach    Breast cancer Sister     Stomach cancer Brother        Social History     Socioeconomic History    Marital status: Single     Spouse name: Not on file    Number of children: Not on file    Years of education: Not on file    Highest education level: Not on file   Occupational History    Occupation: disabled   Social Needs    Financial resource strain: Not on file     Food insecurity:     Worry: Not on file     Inability: Not on file    Transportation needs:     Medical: Not on file     Non-medical: Not on file   Tobacco Use    Smoking status: Former Smoker     Types: Cigarettes     Last attempt to quit: 10/2018     Years since quittin.6    Smokeless tobacco: Never Used   Substance and Sexual Activity    Alcohol use: Yes     Frequency: 2-4 times a month     Drinks per session: 1 or 2    Drug use: No    Sexual activity: Never     Partners: Female   Lifestyle    Physical activity:     Days per week: Not on file     Minutes per session: Not on file    Stress: To some extent   Relationships    Social connections:     Talks on phone: Not on file     Gets together: Not on file     Attends Sabianism service: Not on file     Active member of club or organization: Not on file     Attends meetings of clubs or organizations: Not on file     Relationship status: Not on file   Other Topics Concern    Not on file   Social History Narrative    Live with sister       Current Outpatient Medications   Medication Sig Dispense Refill    amLODIPine (NORVASC) 10 MG tablet Take 1 tablet (10 mg total) by mouth once daily. 90 tablet 1    carvediloL (COREG) 6.25 MG tablet Take 1 tablet (6.25 mg total) by mouth 2 (two) times daily with meals. 180 tablet 1    celecoxib (CELEBREX) 200 MG capsule Take 1 capsule (200 mg total) by mouth once daily. 90 capsule 1    DULoxetine (CYMBALTA) 30 MG capsule Take 1 capsule (30 mg total) by mouth once daily. 90 capsule 1    estradioL (ESTRACE) 0.01 % (0.1 mg/gram) vaginal cream INSERT 1 2 GRAM INTRAVAGINALLY EVERY NIGHT AT BEDTIME 2 TO 3 TIMES PER WEEK 42.5 g 3    fluticasone propionate (FLONASE) 50 mcg/actuation nasal spray 1 spray (50 mcg total) by Each Nostril route once daily. 48 g 1    hydroCHLOROthiazide (HYDRODIURIL) 25 MG tablet Take 1 tablet (25 mg total) by mouth once daily. 90 tablet 1    lisinopriL (PRINIVIL,ZESTRIL) 20 MG tablet Take 1  "tablet (20 mg total) by mouth once daily. 90 tablet 1    multivitamin (MULTIPLE VITAMINS) per tablet Take 1 tablet by mouth once daily.      pantoprazole (PROTONIX) 40 MG tablet Take 1 tablet (40 mg total) by mouth once daily. 90 tablet 1    traMADoL (ULTRAM) 50 mg tablet Take 1 tablet (50 mg total) by mouth every 8 (eight) hours as needed for Pain. 90 tablet 2     No current facility-administered medications for this visit.        Review of patient's allergies indicates:   Allergen Reactions    Sulfa (sulfonamide antibiotics) Itching    Sulfamethoxazole-trimethoprim Itching and Rash     Itchy Rash     Objective:      Blood pressure 130/86, pulse 68, temperature 98.6 °F (37 °C), temperature source Oral, height 5' 4" (1.626 m), weight 101.6 kg (224 lb), SpO2 98 %. Body mass index is 38.45 kg/m².   Physical Exam   Constitutional: She appears well-developed. No distress.   Obese     Eyes: Conjunctivae are normal. Right eye exhibits no discharge. Left eye exhibits no discharge. No scleral icterus.   Arcus senilus     Neck: Carotid bruit is not present.   Cardiovascular: Normal rate, regular rhythm and normal heart sounds.   No murmur heard.  Pulmonary/Chest: Effort normal and breath sounds normal. No respiratory distress. She has no decreased breath sounds. She has no wheezes. She has no rhonchi. She has no rales.   Abdominal: Soft. She exhibits no distension. There is no tenderness. There is no rebound and no guarding.   Musculoskeletal: She exhibits no edema.   Neurological: She is alert. She displays no tremor.   Skin: Skin is warm and dry.   Psychiatric: She has a normal mood and affect. Her speech is normal.   Nursing note and vitals reviewed.          Assessment:       1. Essential hypertension    2. Osteoarthritis of multiple joints, unspecified osteoarthritis type    3. Lisfranc dislocation, left, subsequent encounter    4. Tinnitus of left ear    5. Gastroesophageal reflux disease, esophagitis presence not " specified    6. Breast cancer screening by mammogram        Plan:       Chelsea was seen today for hypertension.    Diagnoses and all orders for this visit:    Essential hypertension  -     amLODIPine (NORVASC) 10 MG tablet; Take 1 tablet (10 mg total) by mouth once daily.  -     carvediloL (COREG) 6.25 MG tablet; Take 1 tablet (6.25 mg total) by mouth 2 (two) times daily with meals.  -     hydroCHLOROthiazide (HYDRODIURIL) 25 MG tablet; Take 1 tablet (25 mg total) by mouth once daily.    Osteoarthritis of multiple joints, unspecified osteoarthritis type  -     traMADoL (ULTRAM) 50 mg tablet; Take 1 tablet (50 mg total) by mouth every 8 (eight) hours as needed for Pain.  -     celecoxib (CELEBREX) 200 MG capsule; Take 1 capsule (200 mg total) by mouth once daily.    Lisfranc dislocation, left, subsequent encounter  Comments:  May have a neuropathic component given description of pain.   Orders:  -     traMADoL (ULTRAM) 50 mg tablet; Take 1 tablet (50 mg total) by mouth every 8 (eight) hours as needed for Pain.  -     celecoxib (CELEBREX) 200 MG capsule; Take 1 capsule (200 mg total) by mouth once daily.  -     DULoxetine (CYMBALTA) 30 MG capsule; Take 1 capsule (30 mg total) by mouth once daily.    Tinnitus of left ear  -     fluticasone propionate (FLONASE) 50 mcg/actuation nasal spray; 1 spray (50 mcg total) by Each Nostril route once daily.    Gastroesophageal reflux disease, esophagitis presence not specified  -     pantoprazole (PROTONIX) 40 MG tablet; Take 1 tablet (40 mg total) by mouth once daily.    Breast cancer screening by mammogram  -     Mammo Digital Screening Bilat; Future    Other orders  -     estradioL (ESTRACE) 0.01 % (0.1 mg/gram) vaginal cream; INSERT 1 2 GRAM INTRAVAGINALLY EVERY NIGHT AT BEDTIME 2 TO 3 TIMES PER WEEK  -     Cancel: ibuprofen (ADVIL,MOTRIN) 600 MG tablet; Take 1 tablet (600 mg total) by mouth 3 (three) times daily.  -     lisinopriL (PRINIVIL,ZESTRIL) 20 MG tablet; Take 1  tablet (20 mg total) by mouth once daily.

## 2020-05-29 ENCOUNTER — TELEPHONE (OUTPATIENT)
Dept: FAMILY MEDICINE | Facility: CLINIC | Age: 60
End: 2020-05-29

## 2020-05-29 ENCOUNTER — HOSPITAL ENCOUNTER (OUTPATIENT)
Dept: RADIOLOGY | Facility: HOSPITAL | Age: 60
Discharge: HOME | End: 2020-05-29
Attending: INTERNAL MEDICINE
Payer: MEDICAID

## 2020-05-29 VITALS — HEIGHT: 64 IN | BODY MASS INDEX: 38.24 KG/M2 | WEIGHT: 224 LBS

## 2020-05-29 DIAGNOSIS — Z12.31 BREAST CANCER SCREENING BY MAMMOGRAM: ICD-10-CM

## 2020-05-29 PROCEDURE — 77067 SCR MAMMO BI INCL CAD: CPT | Mod: TC,PO

## 2020-05-29 NOTE — TELEPHONE ENCOUNTER
----- Message from Spenser Curry Jr., MD sent at 5/29/2020 11:14 AM CDT -----  I have reviewed your results.  They demonstrate no significant or concerning findings.  If you have any additional concerns regarding these tests, please contact me at your convenience.

## 2020-07-21 DIAGNOSIS — S93.325D LISFRANC DISLOCATION, LEFT, SUBSEQUENT ENCOUNTER: ICD-10-CM

## 2020-07-21 DIAGNOSIS — I10 ESSENTIAL HYPERTENSION: ICD-10-CM

## 2020-07-21 DIAGNOSIS — M15.9 OSTEOARTHRITIS OF MULTIPLE JOINTS, UNSPECIFIED OSTEOARTHRITIS TYPE: ICD-10-CM

## 2020-07-21 RX ORDER — HYDROCHLOROTHIAZIDE 25 MG/1
25 TABLET ORAL DAILY
Qty: 90 TABLET | Refills: 1 | Status: SHIPPED | OUTPATIENT
Start: 2020-07-21 | End: 2021-01-29 | Stop reason: SDUPTHER

## 2020-07-21 RX ORDER — AMLODIPINE BESYLATE 10 MG/1
10 TABLET ORAL DAILY
Qty: 90 TABLET | Refills: 1 | Status: SHIPPED | OUTPATIENT
Start: 2020-07-21 | End: 2021-01-29 | Stop reason: SDUPTHER

## 2020-07-21 RX ORDER — CARVEDILOL 6.25 MG/1
6.25 TABLET ORAL 2 TIMES DAILY WITH MEALS
Qty: 180 TABLET | Refills: 1 | Status: SHIPPED | OUTPATIENT
Start: 2020-07-21 | End: 2021-01-29 | Stop reason: SDUPTHER

## 2020-07-21 RX ORDER — TRAMADOL HYDROCHLORIDE 50 MG/1
50 TABLET ORAL EVERY 8 HOURS PRN
Qty: 90 TABLET | Refills: 2 | Status: SHIPPED | OUTPATIENT
Start: 2020-07-21 | End: 2020-09-23 | Stop reason: SDUPTHER

## 2020-07-21 RX ORDER — LISINOPRIL 20 MG/1
20 TABLET ORAL DAILY
Qty: 90 TABLET | Refills: 1 | Status: SHIPPED | OUTPATIENT
Start: 2020-07-21 | End: 2020-09-23 | Stop reason: SDUPTHER

## 2020-07-21 NOTE — TELEPHONE ENCOUNTER
Pt is requesting enough medication to get her through September due to she is leaving to visit family and will not be back until then. Thank you!

## 2020-09-14 ENCOUNTER — TELEPHONE (OUTPATIENT)
Dept: HEPATOLOGY | Facility: CLINIC | Age: 60
End: 2020-09-14

## 2020-09-14 NOTE — TELEPHONE ENCOUNTER
Patient scheduled to see PA Scheuermann on 9/15/20 but due to severe weather appt had to be changed to virtual visit or moved to another day.  I spoke with patient who asked that appt be moved to 9/28/20 in [place]; done and appt notice mailed.

## 2020-09-18 ENCOUNTER — HOSPITAL ENCOUNTER (OUTPATIENT)
Dept: RADIOLOGY | Facility: HOSPITAL | Age: 60
Discharge: HOME | End: 2020-09-18
Attending: PHYSICIAN ASSISTANT
Payer: MEDICAID

## 2020-09-18 DIAGNOSIS — K74.60 HEPATIC CIRRHOSIS, UNSPECIFIED HEPATIC CIRRHOSIS TYPE, UNSPECIFIED WHETHER ASCITES PRESENT: ICD-10-CM

## 2020-09-18 PROCEDURE — 76705 US ABDOMEN LIMITED: ICD-10-PCS | Mod: 26,,, | Performed by: RADIOLOGY

## 2020-09-18 PROCEDURE — 76705 ECHO EXAM OF ABDOMEN: CPT | Mod: 26,,, | Performed by: RADIOLOGY

## 2020-09-18 PROCEDURE — 76705 ECHO EXAM OF ABDOMEN: CPT | Mod: TC

## 2020-09-23 ENCOUNTER — OFFICE VISIT (OUTPATIENT)
Dept: FAMILY MEDICINE | Facility: CLINIC | Age: 60
End: 2020-09-23
Payer: MEDICAID

## 2020-09-23 VITALS
WEIGHT: 218 LBS | HEIGHT: 64 IN | TEMPERATURE: 98 F | OXYGEN SATURATION: 97 % | DIASTOLIC BLOOD PRESSURE: 80 MMHG | HEART RATE: 80 BPM | BODY MASS INDEX: 37.22 KG/M2 | SYSTOLIC BLOOD PRESSURE: 150 MMHG

## 2020-09-23 DIAGNOSIS — K21.9 GASTROESOPHAGEAL REFLUX DISEASE, ESOPHAGITIS PRESENCE NOT SPECIFIED: ICD-10-CM

## 2020-09-23 DIAGNOSIS — S91.332A PUNCTURE WOUND OF PLANTAR ASPECT OF LEFT FOOT, INITIAL ENCOUNTER: ICD-10-CM

## 2020-09-23 DIAGNOSIS — S93.325D LISFRANC DISLOCATION, LEFT, SUBSEQUENT ENCOUNTER: ICD-10-CM

## 2020-09-23 DIAGNOSIS — M15.9 OSTEOARTHRITIS OF MULTIPLE JOINTS, UNSPECIFIED OSTEOARTHRITIS TYPE: ICD-10-CM

## 2020-09-23 DIAGNOSIS — Z23 NEED FOR PROPHYLACTIC VACCINATION WITH COMBINED DIPHTHERIA-TETANUS-PERTUSSIS (DTP) VACCINE: ICD-10-CM

## 2020-09-23 DIAGNOSIS — I10 BENIGN ESSENTIAL HYPERTENSION: Primary | ICD-10-CM

## 2020-09-23 PROCEDURE — 90715 TDAP VACCINE GREATER THAN OR EQUAL TO 7YO IM: ICD-10-PCS | Mod: S$GLB,,, | Performed by: INTERNAL MEDICINE

## 2020-09-23 PROCEDURE — 90715 TDAP VACCINE 7 YRS/> IM: CPT | Mod: S$GLB,,, | Performed by: INTERNAL MEDICINE

## 2020-09-23 PROCEDURE — 99214 PR OFFICE/OUTPT VISIT, EST, LEVL IV, 30-39 MIN: ICD-10-PCS | Mod: 25,S$GLB,, | Performed by: INTERNAL MEDICINE

## 2020-09-23 PROCEDURE — 99214 OFFICE O/P EST MOD 30 MIN: CPT | Mod: 25,S$GLB,, | Performed by: INTERNAL MEDICINE

## 2020-09-23 PROCEDURE — 90471 IMMUNIZATION ADMIN: CPT | Mod: S$GLB,,, | Performed by: INTERNAL MEDICINE

## 2020-09-23 PROCEDURE — 90471 TDAP VACCINE GREATER THAN OR EQUAL TO 7YO IM: ICD-10-PCS | Mod: S$GLB,,, | Performed by: INTERNAL MEDICINE

## 2020-09-23 RX ORDER — LISINOPRIL 20 MG/1
20 TABLET ORAL 2 TIMES DAILY
Qty: 180 TABLET | Refills: 1 | Status: SHIPPED | OUTPATIENT
Start: 2020-09-23 | End: 2021-01-29 | Stop reason: SDUPTHER

## 2020-09-23 RX ORDER — DULOXETIN HYDROCHLORIDE 60 MG/1
60 CAPSULE, DELAYED RELEASE ORAL DAILY
Qty: 90 CAPSULE | Refills: 1 | Status: SHIPPED | OUTPATIENT
Start: 2020-09-23 | End: 2021-04-29 | Stop reason: SDUPTHER

## 2020-09-23 RX ORDER — TRAMADOL HYDROCHLORIDE 50 MG/1
50 TABLET ORAL EVERY 8 HOURS PRN
Qty: 90 TABLET | Refills: 2 | Status: SHIPPED | OUTPATIENT
Start: 2020-09-23 | End: 2021-01-29 | Stop reason: SDUPTHER

## 2020-09-23 NOTE — PROGRESS NOTES
Subjective:       Patient ID: Chelsea Grossman is a 60 y.o. female.    Chief Complaint: Hypertension (3  months followup) and Foot Pain (left foot pain (stepped on a nail) 9 days ago)    Here for routine follow up.  She is being followed in [facility] for her Hep C.  Continues to have foot pain which radiates up her leg into her calf.  Tramadol does help and allows her to complete her ADLs.  Takes as it up to TID, sometimes only once or twice.  Celebrex does help some without GI side effects she had with ibuprofen.  Her pain has been compounded by the fact that she stepped on a nail about 1.5 weeks ago.  Nail went through bottom of her slipper and into her foot.  Tetanus is not UTD.  She has not had any fever, redness, drainage.      Hypertension  This is a chronic problem. Condition status: usually runs 130s/90s when she checks at home. Pertinent negatives include no chest pain, headaches, neck pain, palpitations, peripheral edema or shortness of breath. Past treatments include beta blockers, calcium channel blockers, diuretics and ACE inhibitors. There are no compliance problems.    Gastroesophageal Reflux  She complains of heartburn. She reports no abdominal pain, no chest pain, no choking, no coughing, no nausea or no wheezing. This is a chronic problem. The heartburn is of moderate intensity. The heartburn wakes her from sleep. The symptoms are aggravated by certain foods, lying down and medications (red sauce, NSAIDs). Pertinent negatives include no fatigue. Risk factors include NSAIDs and obesity. She has tried a PPI for the symptoms. The treatment provided moderate relief.     Review of Systems   Constitutional: Negative for chills, fatigue, fever and unexpected weight change.   HENT: Negative for congestion, hearing loss, postnasal drip, rhinorrhea, trouble swallowing and voice change.    Eyes: Negative for photophobia and visual disturbance.   Respiratory: Negative for apnea, cough, choking,  chest tightness, shortness of breath and wheezing.    Cardiovascular: Negative for chest pain, palpitations and leg swelling.   Gastrointestinal: Positive for heartburn. Negative for abdominal pain, blood in stool, constipation, diarrhea, nausea, rectal pain and vomiting.   Endocrine: Negative for cold intolerance, heat intolerance, polydipsia and polyuria.   Genitourinary: Negative for decreased urine volume, difficulty urinating, dysuria, frequency, genital sores, hematuria, menstrual problem, pelvic pain, urgency, vaginal bleeding and vaginal discharge.   Musculoskeletal: Negative for arthralgias, back pain, gait problem, joint swelling, myalgias, neck pain and neck stiffness.   Skin: Positive for wound (left foot). Negative for color change and rash.   Allergic/Immunologic: Negative for environmental allergies and food allergies.   Neurological: Negative for dizziness, tremors, seizures, syncope, facial asymmetry, speech difficulty, weakness, light-headedness, numbness and headaches.   Hematological: Negative for adenopathy. Does not bruise/bleed easily.   Psychiatric/Behavioral: Negative for confusion, hallucinations, sleep disturbance and suicidal ideas. The patient is not nervous/anxious.        Past Medical History:   Diagnosis Date    Cirrhosis     History of hepatitis C, s/p successful Rx w/ cure (SVR12 - 4/2020)     History of kidney stones     Hypertension     Osteoarthritis     Osteopenia       Past Surgical History:   Procedure Laterality Date    CARPAL TUNNEL RELEASE      FOOT SURGERY Left 12/18/2018    HYSTEROTOMY      KIDNEY STONE SURGERY      Left ear surgery      TOTAL ABDOMINAL HYSTERECTOMY W/ BILATERAL SALPINGOOPHORECTOMY      TRIGGER FINGER RELEASE Left     2nd and 3rd fingers       Family History   Problem Relation Age of Onset    Heart disease Mother     Cancer Father         stomach    Breast cancer Sister     Stomach cancer Brother        Social History     Socioeconomic  History    Marital status: Single     Spouse name: Not on file    Number of children: Not on file    Years of education: Not on file    Highest education level: Not on file   Occupational History    Occupation: disabled   Social Needs    Financial resource strain: Not on file    Food insecurity     Worry: Not on file     Inability: Not on file    Transportation needs     Medical: Not on file     Non-medical: Not on file   Tobacco Use    Smoking status: Former Smoker     Types: Cigarettes     Quit date: 10/2018     Years since quittin.9    Smokeless tobacco: Never Used   Substance and Sexual Activity    Alcohol use: Yes     Frequency: 2-4 times a month     Drinks per session: 1 or 2    Drug use: No    Sexual activity: Never     Partners: Female   Lifestyle    Physical activity     Days per week: Not on file     Minutes per session: Not on file    Stress: Not at all   Relationships    Social connections     Talks on phone: Not on file     Gets together: Not on file     Attends Druze service: Not on file     Active member of club or organization: Not on file     Attends meetings of clubs or organizations: Not on file     Relationship status: Not on file   Other Topics Concern    Not on file   Social History Narrative    Live with sister       Current Outpatient Medications   Medication Sig Dispense Refill    amLODIPine (NORVASC) 10 MG tablet Take 1 tablet (10 mg total) by mouth once daily. 90 tablet 1    carvediloL (COREG) 6.25 MG tablet Take 1 tablet (6.25 mg total) by mouth 2 (two) times daily with meals. 180 tablet 1    celecoxib (CELEBREX) 200 MG capsule Take 1 capsule (200 mg total) by mouth once daily. 90 capsule 1    estradioL (ESTRACE) 0.01 % (0.1 mg/gram) vaginal cream INSERT 1 2 GRAM INTRAVAGINALLY EVERY NIGHT AT BEDTIME 2 TO 3 TIMES PER WEEK 42.5 g 3    fluticasone propionate (FLONASE) 50 mcg/actuation nasal spray 1 spray (50 mcg total) by Each Nostril route once daily. 48 g 1  "   hydroCHLOROthiazide (HYDRODIURIL) 25 MG tablet Take 1 tablet (25 mg total) by mouth once daily. 90 tablet 1    lisinopriL (PRINIVIL,ZESTRIL) 20 MG tablet Take 1 tablet (20 mg total) by mouth 2 (two) times a day. 180 tablet 1    multivitamin (MULTIPLE VITAMINS) per tablet Take 1 tablet by mouth once daily.      pantoprazole (PROTONIX) 40 MG tablet Take 1 tablet (40 mg total) by mouth once daily. 90 tablet 1    traMADoL (ULTRAM) 50 mg tablet Take 1 tablet (50 mg total) by mouth every 8 (eight) hours as needed for Pain. 90 tablet 2    DULoxetine (CYMBALTA) 60 MG capsule Take 1 capsule (60 mg total) by mouth once daily. 90 capsule 1     No current facility-administered medications for this visit.        Review of patient's allergies indicates:   Allergen Reactions    Sulfa (sulfonamide antibiotics) Itching    Sulfamethoxazole-trimethoprim Itching and Rash     Itchy Rash     Objective:    HPI     Hypertension      Additional comments: 3  months followup              Foot Pain      Additional comments: left foot pain (stepped on a nail) 9 days ago          Last edited by Hugh Rausch MA on 9/23/2020 12:59 PM. (History)      Blood pressure (!) 150/80, pulse 80, temperature 97.5 °F (36.4 °C), temperature source Temporal, height 5' 4" (1.626 m), weight 98.9 kg (218 lb), SpO2 97 %. Body mass index is 37.42 kg/m².   Physical Exam  Vitals signs and nursing note reviewed.   Constitutional:       General: She is not in acute distress.     Appearance: She is well-developed.      Comments: Obese     Eyes:      General: No scleral icterus.        Right eye: No discharge.         Left eye: No discharge.      Conjunctiva/sclera: Conjunctivae normal.      Comments: Arcus senilus     Neck:      Vascular: No carotid bruit.   Cardiovascular:      Rate and Rhythm: Normal rate and regular rhythm.      Heart sounds: Normal heart sounds. No murmur.   Pulmonary:      Effort: Pulmonary effort is normal. No respiratory distress.    "   Breath sounds: Normal breath sounds. No decreased breath sounds, wheezing, rhonchi or rales.   Abdominal:      General: There is no distension.      Palpations: Abdomen is soft.      Tenderness: There is no abdominal tenderness. There is no guarding or rebound.   Musculoskeletal:        Feet:    Feet:      Left foot:      Skin integrity: Ulcer and skin breakdown present.   Skin:     General: Skin is warm and dry.   Neurological:      Mental Status: She is alert.      Motor: No tremor.   Psychiatric:         Speech: Speech normal.           Lab Visit on 09/18/2020   Component Date Value Ref Range Status    WBC 09/18/2020 7.48  3.90 - 12.70 K/uL Final    RBC 09/18/2020 4.20  4.00 - 5.40 M/uL Final    Hemoglobin 09/18/2020 13.1  12.0 - 16.0 g/dL Final    Hematocrit 09/18/2020 41.6  37.0 - 48.5 % Final    Mean Corpuscular Volume 09/18/2020 99* 82 - 98 fL Final    Mean Corpuscular Hemoglobin 09/18/2020 31.2* 27.0 - 31.0 pg Final    Mean Corpuscular Hemoglobin Conc 09/18/2020 31.5* 32.0 - 36.0 g/dL Final    RDW 09/18/2020 12.7  11.5 - 14.5 % Final    Platelets 09/18/2020 195  150 - 350 K/uL Final    MPV 09/18/2020 9.8  9.2 - 12.9 fL Final    Immature Granulocytes 09/18/2020 0.3  0.0 - 0.5 % Final    Gran # (ANC) 09/18/2020 2.5  1.8 - 7.7 K/uL Final    Immature Grans (Abs) 09/18/2020 0.02  0.00 - 0.04 K/uL Final    Comment: Mild elevation in immature granulocytes is non specific and   can be seen in a variety of conditions including stress response,   acute inflammation, trauma and pregnancy. Correlation with other   laboratory and clinical findings is essential.      Lymph # 09/18/2020 4.3  1.0 - 4.8 K/uL Final    Mono # 09/18/2020 0.5  0.3 - 1.0 K/uL Final    Eos # 09/18/2020 0.1  0.0 - 0.5 K/uL Final    Baso # 09/18/2020 0.04  0.00 - 0.20 K/uL Final    nRBC 09/18/2020 0  0 /100 WBC Final    Gran% 09/18/2020 33.7* 38.0 - 73.0 % Final    Lymph% 09/18/2020 57.8* 18.0 - 48.0 % Final    Mono%  09/18/2020 6.0  4.0 - 15.0 % Final    Eosinophil% 09/18/2020 1.7  0.0 - 8.0 % Final    Basophil% 09/18/2020 0.5  0.0 - 1.9 % Final    Differential Method 09/18/2020 Automated   Final    Sodium 09/18/2020 141  136 - 145 mmol/L Final    Potassium 09/18/2020 4.0  3.5 - 5.1 mmol/L Final    Chloride 09/18/2020 107  95 - 110 mmol/L Final    CO2 09/18/2020 23  23 - 29 mmol/L Final    Glucose 09/18/2020 99  70 - 110 mg/dL Final    BUN, Bld 09/18/2020 18  8 - 23 mg/dL Final    Creatinine 09/18/2020 1.0  0.5 - 1.4 mg/dL Final    Calcium 09/18/2020 9.6  8.7 - 10.5 mg/dL Final    Total Protein 09/18/2020 8.0  6.0 - 8.4 g/dL Final    Albumin 09/18/2020 3.8  3.5 - 5.2 g/dL Final    Total Bilirubin 09/18/2020 0.3  0.1 - 1.0 mg/dL Final    Comment: For infants and newborns, interpretation of results should be based  on gestational age, weight and in agreement with clinical  observations.  Premature Infant recommended reference ranges:  Up to 24 hours.............<8.0 mg/dL  Up to 48 hours............<12.0 mg/dL  3-5 days..................<15.0 mg/dL  6-29 days.................<15.0 mg/dL      Alkaline Phosphatase 09/18/2020 117  55 - 135 U/L Final    AST 09/18/2020 27  10 - 40 U/L Final    ALT 09/18/2020 37  10 - 44 U/L Final    Anion Gap 09/18/2020 11  8 - 16 mmol/L Final    eGFR if African American 09/18/2020 >60  >60 mL/min/1.73 m^2 Final    eGFR if non African American 09/18/2020 >60  >60 mL/min/1.73 m^2 Final    Comment: Calculation used to obtain the estimated glomerular filtration  rate (eGFR) is the CKD-EPI equation.       Prothrombin Time 09/18/2020 10.5  9.0 - 12.5 sec Final    INR 09/18/2020 0.9  0.8 - 1.2 Final    Comment: Coumadin Therapy:  2.0 - 3.0 for INR for all indicators except mechanical heart valves  and antiphospholipid syndromes which should use 2.5 - 3.5.      AFP 09/18/2020 8.7* 0.0 - 8.4 ng/mL Final   ]  Assessment:       1. Benign essential hypertension    2. Osteoarthritis of  multiple joints, unspecified osteoarthritis type    3. Lisfranc dislocation, left, subsequent encounter    4. Puncture wound of plantar aspect of left foot, initial encounter    5. Need for prophylactic vaccination with combined diphtheria-tetanus-pertussis (DTP) vaccine    6. Gastroesophageal reflux disease, esophagitis presence not specified        Plan:       Chelsea was seen today for hypertension and foot pain.    Diagnoses and all orders for this visit:    Benign essential hypertension  Comments:  Increase lisinopril to BID  Orders:  -     lisinopriL (PRINIVIL,ZESTRIL) 20 MG tablet; Take 1 tablet (20 mg total) by mouth 2 (two) times a day.    Osteoarthritis of multiple joints, unspecified osteoarthritis type  -     traMADoL (ULTRAM) 50 mg tablet; Take 1 tablet (50 mg total) by mouth every 8 (eight) hours as needed for Pain.    Lisfranc dislocation, left, subsequent encounter  Comments:  May have a neuropathic component  Orders:  -     traMADoL (ULTRAM) 50 mg tablet; Take 1 tablet (50 mg total) by mouth every 8 (eight) hours as needed for Pain.  -     DULoxetine (CYMBALTA) 60 MG capsule; Take 1 capsule (60 mg total) by mouth once daily.    Puncture wound of plantar aspect of left foot, initial encounter  Comments:  Will get Xray to r/o FB.  Wound care referral if doesn't improve.  Doesn't look cellulitic at this time  Orders:  -     Tdap Vaccine  -     X-Ray Foot 2 View Left; Future    Need for prophylactic vaccination with combined diphtheria-tetanus-pertussis (DTP) vaccine  -     Tdap Vaccine    Gastroesophageal reflux disease, esophagitis presence not specified  Comments:  Controlled

## 2020-09-28 ENCOUNTER — OFFICE VISIT (OUTPATIENT)
Dept: HEPATOLOGY | Facility: CLINIC | Age: 60
End: 2020-09-28
Payer: MEDICAID

## 2020-09-28 VITALS
DIASTOLIC BLOOD PRESSURE: 87 MMHG | OXYGEN SATURATION: 98 % | HEIGHT: 64 IN | BODY MASS INDEX: 37.26 KG/M2 | WEIGHT: 218.25 LBS | SYSTOLIC BLOOD PRESSURE: 141 MMHG | HEART RATE: 68 BPM

## 2020-09-28 DIAGNOSIS — Z86.19 HISTORY OF HEPATITIS C: ICD-10-CM

## 2020-09-28 DIAGNOSIS — K74.60 HEPATIC CIRRHOSIS, UNSPECIFIED HEPATIC CIRRHOSIS TYPE, UNSPECIFIED WHETHER ASCITES PRESENT: Primary | ICD-10-CM

## 2020-09-28 PROCEDURE — 99213 PR OFFICE/OUTPT VISIT, EST, LEVL III, 20-29 MIN: ICD-10-PCS | Mod: S$PBB,,, | Performed by: PHYSICIAN ASSISTANT

## 2020-09-28 PROCEDURE — 99999 PR PBB SHADOW E&M-EST. PATIENT-LVL IV: CPT | Mod: PBBFAC,,, | Performed by: PHYSICIAN ASSISTANT

## 2020-09-28 PROCEDURE — 99214 OFFICE O/P EST MOD 30 MIN: CPT | Mod: PBBFAC | Performed by: PHYSICIAN ASSISTANT

## 2020-09-28 PROCEDURE — 99213 OFFICE O/P EST LOW 20 MIN: CPT | Mod: S$PBB,,, | Performed by: PHYSICIAN ASSISTANT

## 2020-09-28 PROCEDURE — 99999 PR PBB SHADOW E&M-EST. PATIENT-LVL IV: ICD-10-PCS | Mod: PBBFAC,,, | Performed by: PHYSICIAN ASSISTANT

## 2020-09-28 NOTE — PATIENT INSTRUCTIONS
CIRRHOSIS   Your liver scarring tests before HCV treatment showed you have cirrhosis - which means the scarring is all through out your liver.  Your current tests show your liver is working well, but you still need monitoring of your liver every 6 months forever.  Our goal is to get rid of everything that has damaged your liver to prevent further scarring. We want to protect the liver!    MELD score (liver sickness score)  6 = normal (better chance of survival with your own liver)  15 = better chance of survival with a liver transplant  Your MELD score is: 6 (9/2020)    General recommendations:   AVOID ALL alcohol (includes beer, wine and liquor)   AVOID non-steroidal anti-inflammatory drugs (NSAIDs) such as ibuprofen (Motrin, Advil), naproxen (Naprosyn, Aleve)    Tylenol/acetaminophen is OKAY for pain, no more than 2000 mg per day   NO RAW SEAFOOD due to the risk of infection   LOW SODIUM / SALT diet, less than 2000 mg per day (Avoid canned foods, avoid adding salt to food, use salt-free seasonings when cooking. Read food labels)   HIGH PROTEIN DIET to prevent muscle mass loss (meat, chicken, fish, eggs, dairy, whey protein powder, protein shakes). Avoid missing & skipping missing meals. Smaller more frequent meals throughout the day is better than fewer large meals.    Liver cancer screening (blood test and ultrasound) is needed every 6 months forever. Current tests look fine.    Due again 3/2021.      Plan:  1. Labs, ultrasound, visit - 3/2021  (we will schedule video visit for now. If you have trouble with the [facility] account we can change to an in person visit.     2. Install [employer] gerardo on your phone. [facility].  Username: Haley  Password: [email or web address]

## 2020-09-28 NOTE — PROGRESS NOTES
HEPATOLOGY CLINIC VISIT NOTE - HCV clinic    CHIEF COMPLAINT: Cirrhosis    HISTORY: This is a 60 y.o. Black or  female w/ well compensated cirrhosis due to HCV (now treated and cured) here for f/u.     Feels well  Denies jaundice, dark urine, hematemesis, melena, slowed mentation, abdominal distention.   Recent labs and u/s unremarkable    Cirrhosis history:  FibroScan 8/23/19 - kPa 13.4, F4 // HCV FibroSURE 10/2019 - A2-3, F4  (Labs pre HCV Rx supported this w/ intermittent TBili elevation to 1.2 and isolated AST>ALT)    Well compensated, no evidence of portal HTN  · HE - none  · Ascites - none  · Jaundice / TBili elevation - intermittent elevations prior to HCV cure,now normal  · Platelets - normal > 200    MELD-Na score: 6 at 9/18/2020  8:35 AM  MELD score: 6 at 9/18/2020  8:35 AM  Calculated from:  Serum Creatinine: 1.0 mg/dL at 9/18/2020  8:35 AM  Serum Sodium: 141 mmol/L (Rounded to 137 mmol/L) at 9/18/2020  8:35 AM  Total Bilirubin: 0.3 mg/dL (Rounded to 1 mg/dL) at 9/18/2020  8:35 AM  INR(ratio): 0.9 (Rounded to 1) at 9/18/2020  8:35 AM  Age: 60 years 4 months    Cirrhosis maintenance:  - HCC screening - U/S 8/2019 - no liver lesions, no AFP  - Varices screening - not indicated  - HAV immunity - (+) immunity documented 6/11/19  - HBV immunity - s/p vaccine [id number]                    HCV history:  S/p 12 weeks epclusa w/ SVR12 (cure) - 4/2020  - Genotype 1a  - Treatment naive prior to epclusa    PMH, PSH, FAMILY HX, SOCIAL HX: reviewed in Epic  MEDS/ALLERGIES: reviewed in [facility]    SOCIAL HISTORY:   Alcohol - (+) alcohol use in past, none regular now.  Drugs - remote history 1970s      ROS:   No fever, chills, weight loss, fatigue  No chest pain, palpitations, dyspnea, cough  No abdominal pain, nausea, vomiting  No skin rashes   (+) Back pain, joint pains  No lower extremity edema      PHYSICAL EXAM:  Friendly Black or  female, in no acute distress; alert and oriented to  person, place and time  VITALS: reviewed  HEENT: Sclerae anicteric.   LUNGS: Normal respiratory effort. Clear bilat  CVS: RRR, no murmurs  ABDOMEN: Flat, soft, nontender.   SKIN: Warm and dry. No jaundice, No obvious rashes.   EXTREMITIES: No lower extremity edema  NEURO/PSYCH: Normal gate. Memory intact. Thought and speech pattern appropriate. Behavior normal. No depression or anxiety noted.    RECENT LABS:  Lab Results   Component Value Date    WBC 7.48 09/18/2020    HGB 13.1 09/18/2020     09/18/2020     Lab Results   Component Value Date    INR 0.9 09/18/2020     Lab Results   Component Value Date    AST 27 09/18/2020    ALT 37 09/18/2020    BILITOT 0.3 09/18/2020    ALBUMIN 3.8 09/18/2020    ALKPHOS 117 09/18/2020    CREATININE 1.0 09/18/2020    BUN 18 09/18/2020     09/18/2020    K 4.0 09/18/2020    AFP 8.7 (H) 09/18/2020         RECENT IMAGING:  Results for orders placed during the hospital encounter of 09/18/20   US Abdomen Limited    Narrative EXAMINATION:  US ABDOMEN LIMITED    CLINICAL HISTORY:  Unspecified cirrhosis of liver    TECHNIQUE:  Limited ultrasound of the right upper quadrant of the abdomen (including pancreas, liver, gallbladder, common bile duct, and spleen) was performed.    COMPARISON:  02/21/2020    FINDINGS:  Liver: Normal in size, measuring 15.6 cm. No focal hepatic lesion is identified.  No focal hepatic lesions.    Gallbladder: There is cholelithiasis.  The gallbladder wall is within normal limits 1.3 mm.  The common duct is within normal limits at 5 mm.    Spleen: Normal in size and echotexture, measuring 10 cm.    Miscellaneous: No upper abdominal ascites.  Incidental note of a small right renal cyst is made.      Impression There is cholelithiasis.  No focal hepatic lesions are identified.      Electronically signed by: Lilly Bravo MD  Date:    09/18/2020  Time:    09:04           ASSESSMENT  60 y.o. Black or  female with:  1.HISTORY OF CHRONIC  HEPATITIS C, GENOTYPE 1A - treated / cured  -- s/p 12 weeks epclusa w/ SVR12 (cure) - 4/2020  -- (+) immunity to HAV  -- s/p vaccine [id number]    2. WELL COMPENSATED CIRRHOSIS   -- based on FibroScan and FibroSURE  -- MELD 9/2020 - 6  -- HCC screening - up to date 9/2020         PLAN:  1. CBC, CMP, INR, AFP, U/S, VISIT - 3/2021  Pt will set up [facility] account w/ her brother's help on her phone tomorrow. Would like to try video visit for future. But can return to main campus if difficulty w/ video visit.

## 2020-10-08 ENCOUNTER — HOSPITAL ENCOUNTER (OUTPATIENT)
Dept: RADIOLOGY | Facility: HOSPITAL | Age: 60
Discharge: HOME | End: 2020-10-08
Attending: INTERNAL MEDICINE
Payer: MEDICAID

## 2020-10-08 DIAGNOSIS — S91.332A PUNCTURE WOUND OF PLANTAR ASPECT OF LEFT FOOT, INITIAL ENCOUNTER: ICD-10-CM

## 2020-10-08 PROCEDURE — 73630 X-RAY EXAM OF FOOT: CPT | Mod: TC,PO,LT

## 2020-10-22 ENCOUNTER — TELEPHONE (OUTPATIENT)
Dept: FAMILY MEDICINE | Facility: CLINIC | Age: 60
End: 2020-10-22

## 2020-10-22 DIAGNOSIS — S91.332A PUNCTURE WOUND OF PLANTAR ASPECT OF LEFT FOOT, INITIAL ENCOUNTER: Primary | ICD-10-CM

## 2020-10-22 NOTE — TELEPHONE ENCOUNTER
Pt still c/o left foot pain from stepping on nail in September. Pt states she still has a hole in the arch of her foot with some drainage on a band aid.  Please advise, thank you!

## 2020-10-23 NOTE — TELEPHONE ENCOUNTER
Pt notified of referral to wound care. Spoke to wound care clinic, will have nursing staff look at referral and call the pt when they are done with clinic. Pt notified.

## 2020-12-31 ENCOUNTER — OFFICE VISIT (OUTPATIENT)
Dept: WOUND CARE | Facility: HOSPITAL | Age: 60
End: 2020-12-31
Attending: INTERNAL MEDICINE
Payer: MEDICAID

## 2020-12-31 VITALS
RESPIRATION RATE: 18 BRPM | HEIGHT: 64 IN | TEMPERATURE: 98 F | SYSTOLIC BLOOD PRESSURE: 146 MMHG | HEART RATE: 81 BPM | WEIGHT: 165 LBS | BODY MASS INDEX: 28.17 KG/M2 | DIASTOLIC BLOOD PRESSURE: 87 MMHG

## 2020-12-31 DIAGNOSIS — T14.8XXA ABRASION: Primary | ICD-10-CM

## 2020-12-31 PROCEDURE — 99213 OFFICE O/P EST LOW 20 MIN: CPT | Performed by: INTERNAL MEDICINE

## 2021-01-13 DIAGNOSIS — M85.88 MASS OF HARD PALATE: ICD-10-CM

## 2021-01-13 DIAGNOSIS — Z12.31 SCREENING MAMMOGRAM FOR HIGH-RISK PATIENT: Primary | ICD-10-CM

## 2021-01-29 ENCOUNTER — OFFICE VISIT (OUTPATIENT)
Dept: FAMILY MEDICINE | Facility: CLINIC | Age: 61
End: 2021-01-29
Payer: MEDICAID

## 2021-01-29 VITALS
HEIGHT: 64 IN | TEMPERATURE: 98 F | OXYGEN SATURATION: 99 % | BODY MASS INDEX: 37.73 KG/M2 | HEART RATE: 89 BPM | SYSTOLIC BLOOD PRESSURE: 128 MMHG | WEIGHT: 221 LBS | DIASTOLIC BLOOD PRESSURE: 88 MMHG

## 2021-01-29 DIAGNOSIS — M15.9 OSTEOARTHRITIS OF MULTIPLE JOINTS, UNSPECIFIED OSTEOARTHRITIS TYPE: ICD-10-CM

## 2021-01-29 DIAGNOSIS — S93.325D LISFRANC DISLOCATION, LEFT, SUBSEQUENT ENCOUNTER: ICD-10-CM

## 2021-01-29 DIAGNOSIS — I10 ESSENTIAL HYPERTENSION: Primary | ICD-10-CM

## 2021-01-29 DIAGNOSIS — R21 RASH AND NONSPECIFIC SKIN ERUPTION: ICD-10-CM

## 2021-01-29 DIAGNOSIS — K21.9 GASTROESOPHAGEAL REFLUX DISEASE, UNSPECIFIED WHETHER ESOPHAGITIS PRESENT: ICD-10-CM

## 2021-01-29 PROCEDURE — 99214 OFFICE O/P EST MOD 30 MIN: CPT | Mod: S$GLB,,, | Performed by: INTERNAL MEDICINE

## 2021-01-29 PROCEDURE — 99214 PR OFFICE/OUTPT VISIT, EST, LEVL IV, 30-39 MIN: ICD-10-PCS | Mod: S$GLB,,, | Performed by: INTERNAL MEDICINE

## 2021-01-29 RX ORDER — HYDROCHLOROTHIAZIDE 25 MG/1
25 TABLET ORAL DAILY
Qty: 90 TABLET | Refills: 1 | Status: SHIPPED | OUTPATIENT
Start: 2021-01-29 | End: 2021-04-29 | Stop reason: SDUPTHER

## 2021-01-29 RX ORDER — CELECOXIB 200 MG/1
200 CAPSULE ORAL DAILY
Qty: 90 CAPSULE | Refills: 1 | Status: SHIPPED | OUTPATIENT
Start: 2021-01-29 | End: 2021-04-29 | Stop reason: SDUPTHER

## 2021-01-29 RX ORDER — CARVEDILOL 6.25 MG/1
6.25 TABLET ORAL 2 TIMES DAILY WITH MEALS
Qty: 180 TABLET | Refills: 1 | Status: SHIPPED | OUTPATIENT
Start: 2021-01-29 | End: 2021-04-29 | Stop reason: SDUPTHER

## 2021-01-29 RX ORDER — PANTOPRAZOLE SODIUM 40 MG/1
40 TABLET, DELAYED RELEASE ORAL DAILY
Qty: 90 TABLET | Refills: 1 | Status: SHIPPED | OUTPATIENT
Start: 2021-01-29 | End: 2021-04-29 | Stop reason: SDUPTHER

## 2021-01-29 RX ORDER — ESTRADIOL 0.1 MG/G
CREAM VAGINAL
Qty: 42.5 G | Refills: 3 | Status: SHIPPED | OUTPATIENT
Start: 2021-01-29 | End: 2021-04-29 | Stop reason: SDUPTHER

## 2021-01-29 RX ORDER — TRAMADOL HYDROCHLORIDE 50 MG/1
50 TABLET ORAL EVERY 8 HOURS PRN
Qty: 90 TABLET | Refills: 2 | Status: SHIPPED | OUTPATIENT
Start: 2021-01-29 | End: 2021-04-29 | Stop reason: SDUPTHER

## 2021-01-29 RX ORDER — LISINOPRIL 20 MG/1
20 TABLET ORAL 2 TIMES DAILY
Qty: 180 TABLET | Refills: 1 | Status: SHIPPED | OUTPATIENT
Start: 2021-01-29 | End: 2021-04-29

## 2021-01-29 RX ORDER — AMLODIPINE BESYLATE 10 MG/1
10 TABLET ORAL DAILY
Qty: 90 TABLET | Refills: 1 | Status: SHIPPED | OUTPATIENT
Start: 2021-01-29 | End: 2021-04-29 | Stop reason: SDUPTHER

## 2021-03-22 ENCOUNTER — HOSPITAL ENCOUNTER (OUTPATIENT)
Dept: RADIOLOGY | Facility: HOSPITAL | Age: 61
Discharge: HOME | End: 2021-03-22
Attending: PHYSICIAN ASSISTANT
Payer: MEDICAID

## 2021-03-22 DIAGNOSIS — K74.60 HEPATIC CIRRHOSIS, UNSPECIFIED HEPATIC CIRRHOSIS TYPE, UNSPECIFIED WHETHER ASCITES PRESENT: ICD-10-CM

## 2021-03-22 PROCEDURE — 76705 ECHO EXAM OF ABDOMEN: CPT | Mod: 26,,, | Performed by: RADIOLOGY

## 2021-03-22 PROCEDURE — 76705 ECHO EXAM OF ABDOMEN: CPT | Mod: TC

## 2021-03-22 PROCEDURE — 76705 US ABDOMEN LIMITED: ICD-10-PCS | Mod: 26,,, | Performed by: RADIOLOGY

## 2021-03-26 ENCOUNTER — PATIENT MESSAGE (OUTPATIENT)
Dept: HEPATOLOGY | Facility: CLINIC | Age: 61
End: 2021-03-26

## 2021-03-29 ENCOUNTER — IMMUNIZATION (OUTPATIENT)
Dept: PRIMARY CARE CLINIC | Facility: CLINIC | Age: 61
End: 2021-03-29
Payer: MEDICAID

## 2021-03-29 ENCOUNTER — OFFICE VISIT (OUTPATIENT)
Dept: HEPATOLOGY | Facility: CLINIC | Age: 61
End: 2021-03-29
Payer: MEDICAID

## 2021-03-29 VITALS
HEIGHT: 64 IN | HEART RATE: 79 BPM | RESPIRATION RATE: 18 BRPM | SYSTOLIC BLOOD PRESSURE: 148 MMHG | WEIGHT: 216.25 LBS | DIASTOLIC BLOOD PRESSURE: 78 MMHG | BODY MASS INDEX: 36.92 KG/M2

## 2021-03-29 DIAGNOSIS — K74.60 HEPATIC CIRRHOSIS, UNSPECIFIED HEPATIC CIRRHOSIS TYPE, UNSPECIFIED WHETHER ASCITES PRESENT: Primary | ICD-10-CM

## 2021-03-29 DIAGNOSIS — Z23 NEED FOR VACCINATION: Primary | ICD-10-CM

## 2021-03-29 PROCEDURE — 91300 COVID-19, MRNA, LNP-S, PF, 30 MCG/0.3 ML DOSE VACCINE: CPT | Mod: S$GLB,,, | Performed by: FAMILY MEDICINE

## 2021-03-29 PROCEDURE — 99213 OFFICE O/P EST LOW 20 MIN: CPT | Mod: S$PBB,,, | Performed by: PHYSICIAN ASSISTANT

## 2021-03-29 PROCEDURE — 99999 PR PBB SHADOW E&M-EST. PATIENT-LVL IV: ICD-10-PCS | Mod: PBBFAC,,, | Performed by: PHYSICIAN ASSISTANT

## 2021-03-29 PROCEDURE — 0001A COVID-19, MRNA, LNP-S, PF, 30 MCG/0.3 ML DOSE VACCINE: CPT | Mod: CV19,S$GLB,, | Performed by: FAMILY MEDICINE

## 2021-03-29 PROCEDURE — 99214 OFFICE O/P EST MOD 30 MIN: CPT | Mod: PBBFAC | Performed by: PHYSICIAN ASSISTANT

## 2021-03-29 PROCEDURE — 0001A COVID-19, MRNA, LNP-S, PF, 30 MCG/0.3 ML DOSE VACCINE: ICD-10-PCS | Mod: CV19,S$GLB,, | Performed by: FAMILY MEDICINE

## 2021-03-29 PROCEDURE — 91300 COVID-19, MRNA, LNP-S, PF, 30 MCG/0.3 ML DOSE VACCINE: ICD-10-PCS | Mod: S$GLB,,, | Performed by: FAMILY MEDICINE

## 2021-03-29 PROCEDURE — 99999 PR PBB SHADOW E&M-EST. PATIENT-LVL IV: CPT | Mod: PBBFAC,,, | Performed by: PHYSICIAN ASSISTANT

## 2021-03-29 PROCEDURE — 99213 PR OFFICE/OUTPT VISIT, EST, LEVL III, 20-29 MIN: ICD-10-PCS | Mod: S$PBB,,, | Performed by: PHYSICIAN ASSISTANT

## 2021-04-20 ENCOUNTER — IMMUNIZATION (OUTPATIENT)
Dept: PRIMARY CARE CLINIC | Facility: CLINIC | Age: 61
End: 2021-04-20
Payer: MEDICAID

## 2021-04-20 DIAGNOSIS — Z23 NEED FOR VACCINATION: Primary | ICD-10-CM

## 2021-04-20 PROCEDURE — 0002A COVID-19, MRNA, LNP-S, PF, 30 MCG/0.3 ML DOSE VACCINE: ICD-10-PCS | Mod: CV19,S$GLB,, | Performed by: FAMILY MEDICINE

## 2021-04-20 PROCEDURE — 91300 COVID-19, MRNA, LNP-S, PF, 30 MCG/0.3 ML DOSE VACCINE: ICD-10-PCS | Mod: S$GLB,,, | Performed by: FAMILY MEDICINE

## 2021-04-20 PROCEDURE — 0002A COVID-19, MRNA, LNP-S, PF, 30 MCG/0.3 ML DOSE VACCINE: CPT | Mod: CV19,S$GLB,, | Performed by: FAMILY MEDICINE

## 2021-04-20 PROCEDURE — 91300 COVID-19, MRNA, LNP-S, PF, 30 MCG/0.3 ML DOSE VACCINE: CPT | Mod: S$GLB,,, | Performed by: FAMILY MEDICINE

## 2021-04-29 ENCOUNTER — OFFICE VISIT (OUTPATIENT)
Dept: FAMILY MEDICINE | Facility: CLINIC | Age: 61
End: 2021-04-29
Payer: MEDICAID

## 2021-04-29 VITALS
HEIGHT: 64 IN | OXYGEN SATURATION: 96 % | WEIGHT: 214 LBS | SYSTOLIC BLOOD PRESSURE: 142 MMHG | BODY MASS INDEX: 36.54 KG/M2 | HEART RATE: 69 BPM | DIASTOLIC BLOOD PRESSURE: 86 MMHG | TEMPERATURE: 98 F

## 2021-04-29 DIAGNOSIS — I10 ESSENTIAL HYPERTENSION: Primary | ICD-10-CM

## 2021-04-29 DIAGNOSIS — M79.675 GREAT TOE PAIN, LEFT: ICD-10-CM

## 2021-04-29 DIAGNOSIS — K21.9 GASTROESOPHAGEAL REFLUX DISEASE, UNSPECIFIED WHETHER ESOPHAGITIS PRESENT: ICD-10-CM

## 2021-04-29 DIAGNOSIS — S93.325D LISFRANC DISLOCATION, LEFT, SUBSEQUENT ENCOUNTER: ICD-10-CM

## 2021-04-29 DIAGNOSIS — M15.9 OSTEOARTHRITIS OF MULTIPLE JOINTS, UNSPECIFIED OSTEOARTHRITIS TYPE: ICD-10-CM

## 2021-04-29 PROCEDURE — 99214 OFFICE O/P EST MOD 30 MIN: CPT | Mod: S$GLB,,, | Performed by: INTERNAL MEDICINE

## 2021-04-29 PROCEDURE — 99214 PR OFFICE/OUTPT VISIT, EST, LEVL IV, 30-39 MIN: ICD-10-PCS | Mod: S$GLB,,, | Performed by: INTERNAL MEDICINE

## 2021-04-29 RX ORDER — ESTRADIOL 0.1 MG/G
CREAM VAGINAL
Qty: 42.5 G | Refills: 3 | Status: SHIPPED | OUTPATIENT
Start: 2021-04-29 | End: 2021-11-10 | Stop reason: SDUPTHER

## 2021-04-29 RX ORDER — CELECOXIB 200 MG/1
200 CAPSULE ORAL DAILY
Qty: 90 CAPSULE | Refills: 1 | Status: SHIPPED | OUTPATIENT
Start: 2021-04-29 | End: 2021-07-29 | Stop reason: SDUPTHER

## 2021-04-29 RX ORDER — OLMESARTAN MEDOXOMIL 40 MG/1
40 TABLET ORAL DAILY
Qty: 90 TABLET | Refills: 1 | Status: SHIPPED | OUTPATIENT
Start: 2021-04-29 | End: 2021-11-10 | Stop reason: SDUPTHER

## 2021-04-29 RX ORDER — LISINOPRIL 20 MG/1
20 TABLET ORAL 2 TIMES DAILY
Qty: 180 TABLET | Refills: 1 | Status: CANCELLED | OUTPATIENT
Start: 2021-04-29

## 2021-04-29 RX ORDER — HYDROCHLOROTHIAZIDE 25 MG/1
25 TABLET ORAL DAILY
Qty: 90 TABLET | Refills: 1 | Status: SHIPPED | OUTPATIENT
Start: 2021-04-29 | End: 2021-07-29 | Stop reason: SDUPTHER

## 2021-04-29 RX ORDER — CARVEDILOL 6.25 MG/1
6.25 TABLET ORAL 2 TIMES DAILY WITH MEALS
Qty: 180 TABLET | Refills: 1 | Status: SHIPPED | OUTPATIENT
Start: 2021-04-29 | End: 2021-07-29 | Stop reason: SDUPTHER

## 2021-04-29 RX ORDER — DULOXETIN HYDROCHLORIDE 60 MG/1
60 CAPSULE, DELAYED RELEASE ORAL DAILY
Qty: 90 CAPSULE | Refills: 1 | Status: SHIPPED | OUTPATIENT
Start: 2021-04-29 | End: 2021-07-29 | Stop reason: SDUPTHER

## 2021-04-29 RX ORDER — AMLODIPINE BESYLATE 10 MG/1
10 TABLET ORAL NIGHTLY
Qty: 90 TABLET | Refills: 1 | Status: SHIPPED | OUTPATIENT
Start: 2021-04-29 | End: 2021-07-29 | Stop reason: SDUPTHER

## 2021-04-29 RX ORDER — COLCHICINE 0.6 MG/1
TABLET ORAL
Qty: 10 TABLET | Refills: 0 | Status: SHIPPED | OUTPATIENT
Start: 2021-04-29 | End: 2021-07-01 | Stop reason: SDUPTHER

## 2021-04-29 RX ORDER — TRAMADOL HYDROCHLORIDE 50 MG/1
50 TABLET ORAL EVERY 8 HOURS PRN
Qty: 90 TABLET | Refills: 2 | Status: SHIPPED | OUTPATIENT
Start: 2021-04-29 | End: 2021-07-29 | Stop reason: SDUPTHER

## 2021-04-29 RX ORDER — FLUTICASONE PROPIONATE 50 MCG
1 SPRAY, SUSPENSION (ML) NASAL DAILY
Qty: 48 G | Refills: 1 | Status: SHIPPED | OUTPATIENT
Start: 2021-04-29 | End: 2021-11-10 | Stop reason: SDUPTHER

## 2021-04-29 RX ORDER — PANTOPRAZOLE SODIUM 40 MG/1
40 TABLET, DELAYED RELEASE ORAL DAILY
Qty: 90 TABLET | Refills: 1 | Status: SHIPPED | OUTPATIENT
Start: 2021-04-29 | End: 2021-07-29 | Stop reason: SDUPTHER

## 2021-06-02 ENCOUNTER — HOSPITAL ENCOUNTER (OUTPATIENT)
Dept: RADIOLOGY | Facility: HOSPITAL | Age: 61
Discharge: HOME | End: 2021-06-02
Attending: OBSTETRICS & GYNECOLOGY
Payer: MEDICAID

## 2021-06-02 VITALS — BODY MASS INDEX: 36.55 KG/M2 | HEIGHT: 64 IN | WEIGHT: 214.06 LBS

## 2021-06-02 DIAGNOSIS — Z12.31 VISIT FOR SCREENING MAMMOGRAM: ICD-10-CM

## 2021-06-02 DIAGNOSIS — M85.88 MASS OF HARD PALATE: ICD-10-CM

## 2021-06-02 PROCEDURE — 77080 DXA BONE DENSITY AXIAL: CPT | Mod: TC,PO

## 2021-06-02 PROCEDURE — 77067 SCR MAMMO BI INCL CAD: CPT | Mod: TC,PO

## 2021-07-01 ENCOUNTER — PATIENT MESSAGE (OUTPATIENT)
Dept: ADMINISTRATIVE | Facility: OTHER | Age: 61
End: 2021-07-01

## 2021-07-01 DIAGNOSIS — M79.675 GREAT TOE PAIN, LEFT: ICD-10-CM

## 2021-07-01 RX ORDER — COLCHICINE 0.6 MG/1
TABLET ORAL
Qty: 10 TABLET | Refills: 1 | Status: SHIPPED | OUTPATIENT
Start: 2021-07-01 | End: 2021-07-29 | Stop reason: SDUPTHER

## 2021-07-29 ENCOUNTER — OFFICE VISIT (OUTPATIENT)
Dept: FAMILY MEDICINE | Facility: CLINIC | Age: 61
End: 2021-07-29
Payer: MEDICAID

## 2021-07-29 VITALS
HEART RATE: 80 BPM | BODY MASS INDEX: 37.56 KG/M2 | OXYGEN SATURATION: 97 % | TEMPERATURE: 98 F | HEIGHT: 64 IN | SYSTOLIC BLOOD PRESSURE: 126 MMHG | DIASTOLIC BLOOD PRESSURE: 80 MMHG | WEIGHT: 220 LBS

## 2021-07-29 DIAGNOSIS — S93.325D LISFRANC DISLOCATION, LEFT, SUBSEQUENT ENCOUNTER: ICD-10-CM

## 2021-07-29 DIAGNOSIS — Z86.19 HISTORY OF HEPATITIS C: ICD-10-CM

## 2021-07-29 DIAGNOSIS — M79.675 GREAT TOE PAIN, LEFT: ICD-10-CM

## 2021-07-29 DIAGNOSIS — I10 ESSENTIAL HYPERTENSION: Primary | ICD-10-CM

## 2021-07-29 DIAGNOSIS — K21.9 GASTROESOPHAGEAL REFLUX DISEASE, UNSPECIFIED WHETHER ESOPHAGITIS PRESENT: ICD-10-CM

## 2021-07-29 DIAGNOSIS — M15.9 OSTEOARTHRITIS OF MULTIPLE JOINTS, UNSPECIFIED OSTEOARTHRITIS TYPE: ICD-10-CM

## 2021-07-29 PROCEDURE — 99214 PR OFFICE/OUTPT VISIT, EST, LEVL IV, 30-39 MIN: ICD-10-PCS | Mod: S$GLB,,, | Performed by: INTERNAL MEDICINE

## 2021-07-29 PROCEDURE — 99214 OFFICE O/P EST MOD 30 MIN: CPT | Mod: S$GLB,,, | Performed by: INTERNAL MEDICINE

## 2021-07-29 RX ORDER — COLCHICINE 0.6 MG/1
TABLET ORAL
Qty: 10 TABLET | Refills: 1 | Status: SHIPPED | OUTPATIENT
Start: 2021-07-29 | End: 2022-04-19

## 2021-07-29 RX ORDER — CELECOXIB 200 MG/1
200 CAPSULE ORAL DAILY
Qty: 90 CAPSULE | Refills: 1 | Status: SHIPPED | OUTPATIENT
Start: 2021-07-29 | End: 2021-11-10 | Stop reason: SDUPTHER

## 2021-07-29 RX ORDER — AMLODIPINE BESYLATE 10 MG/1
10 TABLET ORAL NIGHTLY
Qty: 90 TABLET | Refills: 1 | Status: SHIPPED | OUTPATIENT
Start: 2021-07-29 | End: 2021-11-10 | Stop reason: SDUPTHER

## 2021-07-29 RX ORDER — DULOXETIN HYDROCHLORIDE 60 MG/1
60 CAPSULE, DELAYED RELEASE ORAL DAILY
Qty: 90 CAPSULE | Refills: 1 | Status: SHIPPED | OUTPATIENT
Start: 2021-07-29 | End: 2021-11-10 | Stop reason: SDUPTHER

## 2021-07-29 RX ORDER — HYDROCHLOROTHIAZIDE 25 MG/1
25 TABLET ORAL DAILY
Qty: 90 TABLET | Refills: 1 | Status: SHIPPED | OUTPATIENT
Start: 2021-07-29 | End: 2021-11-10 | Stop reason: SDUPTHER

## 2021-07-29 RX ORDER — CARVEDILOL 6.25 MG/1
6.25 TABLET ORAL 2 TIMES DAILY WITH MEALS
Qty: 180 TABLET | Refills: 1 | Status: SHIPPED | OUTPATIENT
Start: 2021-07-29 | End: 2021-11-10 | Stop reason: SDUPTHER

## 2021-07-29 RX ORDER — PANTOPRAZOLE SODIUM 40 MG/1
40 TABLET, DELAYED RELEASE ORAL DAILY
Qty: 90 TABLET | Refills: 1 | Status: SHIPPED | OUTPATIENT
Start: 2021-07-29 | End: 2021-11-10 | Stop reason: SDUPTHER

## 2021-07-29 RX ORDER — TRAMADOL HYDROCHLORIDE 50 MG/1
50 TABLET ORAL EVERY 8 HOURS PRN
Qty: 90 TABLET | Refills: 2 | Status: SHIPPED | OUTPATIENT
Start: 2021-07-29 | End: 2021-11-10 | Stop reason: SDUPTHER

## 2021-09-10 ENCOUNTER — OFFICE VISIT (OUTPATIENT)
Dept: URGENT CARE | Facility: CLINIC | Age: 61
End: 2021-09-10
Payer: MEDICAID

## 2021-09-10 VITALS
WEIGHT: 220.81 LBS | HEART RATE: 79 BPM | SYSTOLIC BLOOD PRESSURE: 148 MMHG | HEIGHT: 64 IN | DIASTOLIC BLOOD PRESSURE: 87 MMHG | OXYGEN SATURATION: 96 % | TEMPERATURE: 98 F | BODY MASS INDEX: 37.7 KG/M2

## 2021-09-10 DIAGNOSIS — H93.8X3 EAR CONGESTION, BILATERAL: ICD-10-CM

## 2021-09-10 DIAGNOSIS — Z20.822 COVID-19 VIRUS NOT DETECTED: ICD-10-CM

## 2021-09-10 DIAGNOSIS — R52 BODY ACHES: Primary | ICD-10-CM

## 2021-09-10 LAB
CTP QC/QA: YES
SARS-COV-2 RDRP RESP QL NAA+PROBE: NEGATIVE

## 2021-09-10 PROCEDURE — 87635 SARS-COV-2 COVID-19 AMP PRB: CPT | Mod: QW,S$GLB,, | Performed by: NURSE PRACTITIONER

## 2021-09-10 PROCEDURE — 99203 PR OFFICE/OUTPT VISIT, NEW, LEVL III, 30-44 MIN: ICD-10-PCS | Mod: S$GLB,,, | Performed by: NURSE PRACTITIONER

## 2021-09-10 PROCEDURE — 87635: ICD-10-PCS | Mod: QW,S$GLB,, | Performed by: NURSE PRACTITIONER

## 2021-09-10 PROCEDURE — 99203 OFFICE O/P NEW LOW 30 MIN: CPT | Mod: S$GLB,,, | Performed by: NURSE PRACTITIONER

## 2021-09-29 ENCOUNTER — HOSPITAL ENCOUNTER (OUTPATIENT)
Dept: RADIOLOGY | Facility: HOSPITAL | Age: 61
Discharge: HOME | End: 2021-09-29
Attending: PHYSICIAN ASSISTANT
Payer: MEDICAID

## 2021-09-29 DIAGNOSIS — K74.60 HEPATIC CIRRHOSIS, UNSPECIFIED HEPATIC CIRRHOSIS TYPE, UNSPECIFIED WHETHER ASCITES PRESENT: ICD-10-CM

## 2021-09-29 PROCEDURE — 76705 ECHO EXAM OF ABDOMEN: CPT | Mod: 26,,, | Performed by: RADIOLOGY

## 2021-09-29 PROCEDURE — 76705 US ABDOMEN LIMITED: ICD-10-PCS | Mod: 26,,, | Performed by: RADIOLOGY

## 2021-09-29 PROCEDURE — 76705 ECHO EXAM OF ABDOMEN: CPT | Mod: TC

## 2021-09-30 ENCOUNTER — TELEPHONE (OUTPATIENT)
Dept: HEPATOLOGY | Facility: CLINIC | Age: 61
End: 2021-09-30

## 2021-09-30 ENCOUNTER — LAB VISIT (OUTPATIENT)
Dept: LAB | Facility: HOSPITAL | Age: 61
End: 2021-09-30
Attending: PHYSICIAN ASSISTANT
Payer: MEDICAID

## 2021-09-30 DIAGNOSIS — K74.60 HEPATIC CIRRHOSIS, UNSPECIFIED HEPATIC CIRRHOSIS TYPE, UNSPECIFIED WHETHER ASCITES PRESENT: Primary | ICD-10-CM

## 2021-09-30 DIAGNOSIS — K74.60 CIRRHOSIS: Primary | ICD-10-CM

## 2021-09-30 LAB
BASOPHILS # BLD AUTO: 0.05 K/UL (ref 0–0.2)
BASOPHILS NFR BLD: 0.7 % (ref 0–1.9)
DIFFERENTIAL METHOD: ABNORMAL
EOSINOPHIL # BLD AUTO: 0.2 K/UL (ref 0–0.5)
EOSINOPHIL NFR BLD: 2.1 % (ref 0–8)
ERYTHROCYTE [DISTWIDTH] IN BLOOD BY AUTOMATED COUNT: 12.5 % (ref 11.5–14.5)
HCT VFR BLD AUTO: 39.8 % (ref 37–48.5)
HGB BLD-MCNC: 12.7 G/DL (ref 12–16)
IMM GRANULOCYTES # BLD AUTO: 0.02 K/UL (ref 0–0.04)
IMM GRANULOCYTES NFR BLD AUTO: 0.3 % (ref 0–0.5)
LYMPHOCYTES # BLD AUTO: 3.9 K/UL (ref 1–4.8)
LYMPHOCYTES NFR BLD: 54 % (ref 18–48)
MCH RBC QN AUTO: 31.6 PG (ref 27–31)
MCHC RBC AUTO-ENTMCNC: 31.9 G/DL (ref 32–36)
MCV RBC AUTO: 99 FL (ref 82–98)
MONOCYTES # BLD AUTO: 0.5 K/UL (ref 0.3–1)
MONOCYTES NFR BLD: 7.1 % (ref 4–15)
NEUTROPHILS # BLD AUTO: 2.6 K/UL (ref 1.8–7.7)
NEUTROPHILS NFR BLD: 35.8 % (ref 38–73)
NRBC BLD-RTO: 0 /100 WBC
PLATELET # BLD AUTO: 215 K/UL (ref 150–450)
PMV BLD AUTO: 9.4 FL (ref 9.2–12.9)
RBC # BLD AUTO: 4.02 M/UL (ref 4–5.4)
WBC # BLD AUTO: 7.21 K/UL (ref 3.9–12.7)

## 2021-09-30 PROCEDURE — 36415 COLL VENOUS BLD VENIPUNCTURE: CPT | Performed by: PHYSICIAN ASSISTANT

## 2021-09-30 PROCEDURE — 85025 COMPLETE CBC W/AUTO DIFF WBC: CPT | Performed by: PHYSICIAN ASSISTANT

## 2021-11-10 ENCOUNTER — OFFICE VISIT (OUTPATIENT)
Dept: FAMILY MEDICINE | Facility: CLINIC | Age: 61
End: 2021-11-10
Payer: MEDICAID

## 2021-11-10 VITALS
DIASTOLIC BLOOD PRESSURE: 70 MMHG | SYSTOLIC BLOOD PRESSURE: 126 MMHG | HEART RATE: 80 BPM | OXYGEN SATURATION: 97 % | TEMPERATURE: 97 F | BODY MASS INDEX: 37.22 KG/M2 | HEIGHT: 64 IN | WEIGHT: 218 LBS

## 2021-11-10 DIAGNOSIS — N95.2 ATROPHIC VAGINITIS: ICD-10-CM

## 2021-11-10 DIAGNOSIS — I10 ESSENTIAL HYPERTENSION: Primary | ICD-10-CM

## 2021-11-10 DIAGNOSIS — M15.9 OSTEOARTHRITIS OF MULTIPLE JOINTS, UNSPECIFIED OSTEOARTHRITIS TYPE: ICD-10-CM

## 2021-11-10 DIAGNOSIS — K21.9 GASTROESOPHAGEAL REFLUX DISEASE, UNSPECIFIED WHETHER ESOPHAGITIS PRESENT: ICD-10-CM

## 2021-11-10 DIAGNOSIS — S93.325D LISFRANC DISLOCATION, LEFT, SUBSEQUENT ENCOUNTER: ICD-10-CM

## 2021-11-10 DIAGNOSIS — Z23 NEED FOR PROPHYLACTIC VACCINATION AND INOCULATION AGAINST INFLUENZA: ICD-10-CM

## 2021-11-10 PROCEDURE — 90471 IMMUNIZATION ADMIN: CPT | Mod: S$GLB,,, | Performed by: INTERNAL MEDICINE

## 2021-11-10 PROCEDURE — 99214 OFFICE O/P EST MOD 30 MIN: CPT | Mod: 25,S$GLB,, | Performed by: INTERNAL MEDICINE

## 2021-11-10 PROCEDURE — 90686 FLU VACCINE (QUAD) GREATER THAN OR EQUAL TO 3YO PRESERVATIVE FREE IM: ICD-10-PCS | Mod: S$GLB,,, | Performed by: INTERNAL MEDICINE

## 2021-11-10 PROCEDURE — 99214 PR OFFICE/OUTPT VISIT, EST, LEVL IV, 30-39 MIN: ICD-10-PCS | Mod: 25,S$GLB,, | Performed by: INTERNAL MEDICINE

## 2021-11-10 PROCEDURE — 90686 IIV4 VACC NO PRSV 0.5 ML IM: CPT | Mod: S$GLB,,, | Performed by: INTERNAL MEDICINE

## 2021-11-10 PROCEDURE — 90471 FLU VACCINE (QUAD) GREATER THAN OR EQUAL TO 3YO PRESERVATIVE FREE IM: ICD-10-PCS | Mod: S$GLB,,, | Performed by: INTERNAL MEDICINE

## 2021-11-10 RX ORDER — CARVEDILOL 6.25 MG/1
6.25 TABLET ORAL 2 TIMES DAILY WITH MEALS
Qty: 180 TABLET | Refills: 1 | Status: SHIPPED | OUTPATIENT
Start: 2021-11-10 | End: 2022-03-04 | Stop reason: SDUPTHER

## 2021-11-10 RX ORDER — COLCHICINE 0.6 MG/1
TABLET ORAL
Qty: 10 TABLET | Refills: 1 | Status: CANCELLED | OUTPATIENT
Start: 2021-11-10

## 2021-11-10 RX ORDER — TRAMADOL HYDROCHLORIDE 50 MG/1
50 TABLET ORAL EVERY 8 HOURS PRN
Qty: 90 TABLET | Refills: 2 | Status: SHIPPED | OUTPATIENT
Start: 2021-11-10 | End: 2022-02-11 | Stop reason: SDUPTHER

## 2021-11-10 RX ORDER — HYDROCHLOROTHIAZIDE 25 MG/1
25 TABLET ORAL DAILY
Qty: 90 TABLET | Refills: 1 | Status: SHIPPED | OUTPATIENT
Start: 2021-11-10 | End: 2022-04-22 | Stop reason: SDUPTHER

## 2021-11-10 RX ORDER — ESTRADIOL 0.1 MG/G
CREAM VAGINAL
Qty: 42.5 G | Refills: 3 | Status: SHIPPED | OUTPATIENT
Start: 2021-11-10 | End: 2022-04-22 | Stop reason: SDUPTHER

## 2021-11-10 RX ORDER — FLUTICASONE PROPIONATE 50 MCG
1 SPRAY, SUSPENSION (ML) NASAL DAILY
Qty: 48 G | Refills: 1 | Status: SHIPPED | OUTPATIENT
Start: 2021-11-10 | End: 2022-04-22 | Stop reason: SDUPTHER

## 2021-11-10 RX ORDER — PANTOPRAZOLE SODIUM 40 MG/1
40 TABLET, DELAYED RELEASE ORAL DAILY
Qty: 90 TABLET | Refills: 1 | Status: SHIPPED | OUTPATIENT
Start: 2021-11-10 | End: 2022-04-22 | Stop reason: SDUPTHER

## 2021-11-10 RX ORDER — AMLODIPINE BESYLATE 10 MG/1
10 TABLET ORAL NIGHTLY
Qty: 90 TABLET | Refills: 1 | Status: SHIPPED | OUTPATIENT
Start: 2021-11-10 | End: 2022-04-22 | Stop reason: SDUPTHER

## 2021-11-10 RX ORDER — CELECOXIB 200 MG/1
200 CAPSULE ORAL DAILY
Qty: 90 CAPSULE | Refills: 1 | Status: SHIPPED | OUTPATIENT
Start: 2021-11-10 | End: 2022-04-22 | Stop reason: SDUPTHER

## 2021-11-10 RX ORDER — DULOXETIN HYDROCHLORIDE 60 MG/1
60 CAPSULE, DELAYED RELEASE ORAL DAILY
Qty: 90 CAPSULE | Refills: 1 | Status: SHIPPED | OUTPATIENT
Start: 2021-11-10 | End: 2022-04-19

## 2021-11-10 RX ORDER — OLMESARTAN MEDOXOMIL 40 MG/1
40 TABLET ORAL DAILY
Qty: 90 TABLET | Refills: 1 | Status: SHIPPED | OUTPATIENT
Start: 2021-11-10 | End: 2022-02-11 | Stop reason: SDUPTHER

## 2022-02-11 ENCOUNTER — OFFICE VISIT (OUTPATIENT)
Dept: FAMILY MEDICINE | Facility: CLINIC | Age: 62
End: 2022-02-11
Payer: MEDICAID

## 2022-02-11 VITALS
HEART RATE: 81 BPM | TEMPERATURE: 97 F | WEIGHT: 228 LBS | OXYGEN SATURATION: 95 % | SYSTOLIC BLOOD PRESSURE: 178 MMHG | BODY MASS INDEX: 38.93 KG/M2 | DIASTOLIC BLOOD PRESSURE: 90 MMHG | HEIGHT: 64 IN

## 2022-02-11 DIAGNOSIS — R42 DIZZINESS: ICD-10-CM

## 2022-02-11 DIAGNOSIS — S93.325D LISFRANC DISLOCATION, LEFT, SUBSEQUENT ENCOUNTER: ICD-10-CM

## 2022-02-11 DIAGNOSIS — M15.9 OSTEOARTHRITIS OF MULTIPLE JOINTS, UNSPECIFIED OSTEOARTHRITIS TYPE: ICD-10-CM

## 2022-02-11 DIAGNOSIS — I10 ESSENTIAL HYPERTENSION: Primary | ICD-10-CM

## 2022-02-11 PROCEDURE — 3008F BODY MASS INDEX DOCD: CPT | Mod: S$GLB,,, | Performed by: INTERNAL MEDICINE

## 2022-02-11 PROCEDURE — 99214 OFFICE O/P EST MOD 30 MIN: CPT | Mod: S$GLB,,, | Performed by: INTERNAL MEDICINE

## 2022-02-11 PROCEDURE — 3080F PR MOST RECENT DIASTOLIC BLOOD PRESSURE >= 90 MM HG: ICD-10-PCS | Mod: S$GLB,,, | Performed by: INTERNAL MEDICINE

## 2022-02-11 PROCEDURE — 3080F DIAST BP >= 90 MM HG: CPT | Mod: S$GLB,,, | Performed by: INTERNAL MEDICINE

## 2022-02-11 PROCEDURE — 99214 PR OFFICE/OUTPT VISIT, EST, LEVL IV, 30-39 MIN: ICD-10-PCS | Mod: S$GLB,,, | Performed by: INTERNAL MEDICINE

## 2022-02-11 PROCEDURE — 3008F PR BODY MASS INDEX (BMI) DOCUMENTED: ICD-10-PCS | Mod: S$GLB,,, | Performed by: INTERNAL MEDICINE

## 2022-02-11 PROCEDURE — 3077F PR MOST RECENT SYSTOLIC BLOOD PRESSURE >= 140 MM HG: ICD-10-PCS | Mod: S$GLB,,, | Performed by: INTERNAL MEDICINE

## 2022-02-11 PROCEDURE — 3077F SYST BP >= 140 MM HG: CPT | Mod: S$GLB,,, | Performed by: INTERNAL MEDICINE

## 2022-02-11 RX ORDER — OLMESARTAN MEDOXOMIL 40 MG/1
40 TABLET ORAL DAILY
Qty: 90 TABLET | Refills: 1 | Status: SHIPPED | OUTPATIENT
Start: 2022-02-11 | End: 2022-04-22

## 2022-02-11 RX ORDER — TRAMADOL HYDROCHLORIDE 50 MG/1
50 TABLET ORAL EVERY 8 HOURS PRN
Qty: 90 TABLET | Refills: 2 | Status: SHIPPED | OUTPATIENT
Start: 2022-02-11 | End: 2022-04-22 | Stop reason: SDUPTHER

## 2022-02-11 NOTE — PROGRESS NOTES
Subjective:       Patient ID: Chelsea Grossman is a 61 y.o. female.    Chief Complaint: Hypertension (3 months follow up and med refill), Foot Pain (Left foot pain), and Dizziness (Occasional lightheadedness)    Here for routine follow up.  Stable foot pain which radiates up her leg into her calf.  Tramadol does help and allows her to complete her ADLs.  Takes as much as TID, sometimes only once or twice.  Celebrex does help some without GI side effects she had with ibuprofen.      Hypertension  This is a chronic problem. Condition status: she reports BP at home is usually 120s/70 but she has been drinking strong tea the last few days. Pertinent negatives include no anxiety, chest pain, headaches, malaise/fatigue, neck pain, palpitations, peripheral edema or shortness of breath. Past treatments include beta blockers, calcium channel blockers, diuretics and ACE inhibitors. There are no compliance problems.    Gastroesophageal Reflux  She complains of heartburn. She reports no abdominal pain, no chest pain, no choking, no coughing, no nausea, no sore throat or no wheezing. This is a chronic problem. The heartburn is of moderate intensity. The heartburn wakes her from sleep. The symptoms are aggravated by certain foods, lying down and medications (red sauce, NSAIDs). Pertinent negatives include no fatigue. Risk factors include NSAIDs and obesity. She has tried a PPI for the symptoms. The treatment provided significant (well controlled on PPI) relief.   Dizziness:   Chronicity:  New  Onset:  In the past 7 days  Progression since onset:  Resolved, then recurrent  Duration:  Very brief  Dizziness characteristics:  Off-balance (mostly notices it when she first stands up; feels of balance for a few seconds)   Associated symptoms: light-headedness.no hearing loss, no ear pain, no fever, no headaches, no tinnitus, no nausea, no vomiting, no diaphoresis, no weakness, no syncope, no palpitations, no facial weakness, no  slurred speech, no numbness in extremities and no chest pain.  Aggravated by:  Position changes  Risk factors: started after she started drinking strong tea.no anxiety and no environmental allergies.    Review of Systems   Constitutional: Negative for activity change, appetite change, chills, diaphoresis, fatigue, fever, malaise/fatigue and unexpected weight change.   HENT: Negative for congestion, ear discharge, ear pain, hearing loss, mouth sores, nosebleeds, postnasal drip, rhinorrhea, sinus pressure, sinus pain, sneezing, sore throat, tinnitus, trouble swallowing and voice change.    Eyes: Negative for photophobia, pain, discharge, redness, itching and visual disturbance.   Respiratory: Negative for apnea, cough, choking, chest tightness, shortness of breath and wheezing.    Cardiovascular: Negative for chest pain, palpitations, leg swelling and syncope.   Gastrointestinal: Positive for heartburn. Negative for abdominal distention, abdominal pain, blood in stool, constipation, diarrhea, nausea and vomiting.   Endocrine: Negative for cold intolerance, heat intolerance, polydipsia and polyuria.   Genitourinary: Negative for decreased urine volume, difficulty urinating, dyspareunia, dysuria, enuresis, frequency, genital sores, hematuria, menstrual problem, pelvic pain, urgency, vaginal bleeding, vaginal discharge and vaginal pain.   Musculoskeletal: Positive for arthralgias (left foot). Negative for back pain, gait problem, joint swelling, myalgias, neck pain and neck stiffness.   Skin: Negative for rash and wound.   Allergic/Immunologic: Negative for environmental allergies, food allergies and immunocompromised state.   Neurological: Positive for light-headedness. Negative for dizziness, tremors, seizures, syncope, facial asymmetry, speech difficulty, weakness, numbness and headaches.   Hematological: Negative for adenopathy. Does not bruise/bleed easily.   Psychiatric/Behavioral: Negative for confusion, decreased  concentration, hallucinations, self-injury, sleep disturbance and suicidal ideas. The patient is not nervous/anxious.        Past Medical History:   Diagnosis Date    Cirrhosis     History of hepatitis C, s/p successful Rx w/ cure (SVR12 - 4/2020)     History of kidney stones     Hypertension     Hypertensive retinopathy of both eyes     Osteoarthritis     Osteopenia       Past Surgical History:   Procedure Laterality Date    CARPAL TUNNEL RELEASE      FOOT SURGERY Left 12/18/2018    HYSTEROTOMY      KIDNEY STONE SURGERY      Left ear surgery      TOTAL ABDOMINAL HYSTERECTOMY W/ BILATERAL SALPINGOOPHORECTOMY      TRIGGER FINGER RELEASE Left     2nd and 3rd fingers       Family History   Problem Relation Age of Onset    Heart disease Mother     Cancer Father         stomach    Breast cancer Sister     Stomach cancer Brother     Breast cancer Maternal Grandmother        Social History     Socioeconomic History    Marital status: Single   Occupational History    Occupation: disabled   Tobacco Use    Smoking status: Former Smoker     Types: Cigarettes     Quit date: 10/2018     Years since quitting: 3.3    Smokeless tobacco: Never Used   Substance and Sexual Activity    Alcohol use: Yes    Drug use: No    Sexual activity: Never     Partners: Female   Social History Narrative    Live with sister       Current Outpatient Medications   Medication Sig Dispense Refill    amLODIPine (NORVASC) 10 MG tablet Take 1 tablet (10 mg total) by mouth every evening. 90 tablet 1    carvediloL (COREG) 6.25 MG tablet Take 1 tablet (6.25 mg total) by mouth 2 (two) times daily with meals. 180 tablet 1    celecoxib (CELEBREX) 200 MG capsule Take 1 capsule (200 mg total) by mouth once daily. 90 capsule 1    colchicine (COLCRYS) 0.6 mg tablet Take 2 tablets initially followed by 1 tablet 2 hours later, then 1 tablet daily for 7 days. 10 tablet 1    DULoxetine (CYMBALTA) 60 MG capsule Take 1 capsule (60 mg total)  "by mouth once daily. 90 capsule 1    estradioL (ESTRACE) 0.01 % (0.1 mg/gram) vaginal cream INSERT 1 2 GRAM INTRAVAGINALLY EVERY NIGHT AT BEDTIME 2 TO 3 TIMES PER WEEK 42.5 g 3    fluticasone propionate (FLONASE) 50 mcg/actuation nasal spray 1 spray (50 mcg total) by Each Nostril route once daily. 48 g 1    hydroCHLOROthiazide (HYDRODIURIL) 25 MG tablet Take 1 tablet (25 mg total) by mouth once daily. 90 tablet 1    multivitamin (THERAGRAN) per tablet Take 1 tablet by mouth once daily.      pantoprazole (PROTONIX) 40 MG tablet Take 1 tablet (40 mg total) by mouth once daily. 90 tablet 1    olmesartan (BENICAR) 40 MG tablet Take 1 tablet (40 mg total) by mouth once daily. 90 tablet 1    traMADoL (ULTRAM) 50 mg tablet Take 1 tablet (50 mg total) by mouth every 8 (eight) hours as needed for Pain. 90 tablet 2     No current facility-administered medications for this visit.       Review of patient's allergies indicates:   Allergen Reactions    Sulfa (sulfonamide antibiotics) Itching    Sulfamethoxazole-trimethoprim Itching and Rash     Itchy Rash     Objective:    HPI     Hypertension      Additional comments: 3 months follow up and med refill              Foot Pain      Additional comments: Left foot pain              Dizziness      Additional comments: Occasional lightheadedness          Last edited by Hugh Rausch MA on 2/11/2022 10:44 AM. (History)      Blood pressure (!) 178/90, pulse 81, temperature 96.8 °F (36 °C), temperature source Temporal, height 5' 4" (1.626 m), weight 103.4 kg (228 lb), SpO2 95 %. Body mass index is 39.14 kg/m².   Physical Exam  Vitals and nursing note reviewed.   Constitutional:       General: She is not in acute distress.     Appearance: She is well-developed.      Comments: Obese     Eyes:      General: No scleral icterus.        Right eye: No discharge.         Left eye: No discharge.      Conjunctiva/sclera: Conjunctivae normal.      Comments: Arcus senilus     Neck:      " Vascular: No carotid bruit.   Cardiovascular:      Rate and Rhythm: Normal rate and regular rhythm.      Heart sounds: Normal heart sounds. No murmur heard.      Pulmonary:      Effort: Pulmonary effort is normal. No respiratory distress.      Breath sounds: Normal breath sounds. No decreased breath sounds, wheezing, rhonchi or rales.   Abdominal:      General: There is no distension.      Palpations: Abdomen is soft.      Tenderness: There is no abdominal tenderness. There is no guarding or rebound.   Skin:     General: Skin is warm and dry.      Findings: No rash.   Neurological:      Mental Status: She is alert and oriented to person, place, and time.      Cranial Nerves: Cranial nerves are intact.      Motor: No weakness, tremor or pronator drift.      Coordination: Romberg sign negative. Coordination normal. Finger-Nose-Finger Test and Heel to Shin Test normal.      Gait: Gait and tandem walk normal.   Psychiatric:         Speech: Speech normal.             Assessment:       1. Essential hypertension    2. Lisfranc dislocation, left, subsequent encounter    3. Osteoarthritis of multiple joints, unspecified osteoarthritis type    4. Dizziness        Plan:       Chelsea was seen today for hypertension, foot pain and dizziness.    Diagnoses and all orders for this visit:    Essential hypertension  Comments:  Normally well controlled in office and on home monitoring.  No changes to medications for now.  Avoid caffeine completely and recheck in 2 weeks  Orders:  -     olmesartan (BENICAR) 40 MG tablet; Take 1 tablet (40 mg total) by mouth once daily.    Lisfranc dislocation, left, subsequent encounter  Comments:  May have a neuropathic component  Orders:  -     traMADoL (ULTRAM) 50 mg tablet; Take 1 tablet (50 mg total) by mouth every 8 (eight) hours as needed for Pain.    Osteoarthritis of multiple joints, unspecified osteoarthritis type  -     traMADoL (ULTRAM) 50 mg tablet; Take 1 tablet (50 mg total) by mouth  every 8 (eight) hours as needed for Pain.    Dizziness  Comments:  May be r/t blood pressure given temporal association with tea.  To ER if has any numbness/tingling, focal weakness, slurred speech, etc.

## 2022-03-04 ENCOUNTER — OFFICE VISIT (OUTPATIENT)
Dept: FAMILY MEDICINE | Facility: CLINIC | Age: 62
End: 2022-03-04
Payer: MEDICAID

## 2022-03-04 VITALS
DIASTOLIC BLOOD PRESSURE: 64 MMHG | HEART RATE: 82 BPM | TEMPERATURE: 99 F | OXYGEN SATURATION: 97 % | BODY MASS INDEX: 39.27 KG/M2 | SYSTOLIC BLOOD PRESSURE: 150 MMHG | HEIGHT: 64 IN | WEIGHT: 230 LBS

## 2022-03-04 DIAGNOSIS — I10 ESSENTIAL HYPERTENSION: Primary | ICD-10-CM

## 2022-03-04 DIAGNOSIS — S93.325D LISFRANC DISLOCATION, LEFT, SUBSEQUENT ENCOUNTER: ICD-10-CM

## 2022-03-04 PROCEDURE — 4010F ACE/ARB THERAPY RXD/TAKEN: CPT | Mod: S$GLB,,, | Performed by: INTERNAL MEDICINE

## 2022-03-04 PROCEDURE — 3078F DIAST BP <80 MM HG: CPT | Mod: S$GLB,,, | Performed by: INTERNAL MEDICINE

## 2022-03-04 PROCEDURE — 3008F BODY MASS INDEX DOCD: CPT | Mod: S$GLB,,, | Performed by: INTERNAL MEDICINE

## 2022-03-04 PROCEDURE — 3078F PR MOST RECENT DIASTOLIC BLOOD PRESSURE < 80 MM HG: ICD-10-PCS | Mod: S$GLB,,, | Performed by: INTERNAL MEDICINE

## 2022-03-04 PROCEDURE — 99213 OFFICE O/P EST LOW 20 MIN: CPT | Mod: S$GLB,,, | Performed by: INTERNAL MEDICINE

## 2022-03-04 PROCEDURE — 99213 PR OFFICE/OUTPT VISIT, EST, LEVL III, 20-29 MIN: ICD-10-PCS | Mod: S$GLB,,, | Performed by: INTERNAL MEDICINE

## 2022-03-04 PROCEDURE — 3077F SYST BP >= 140 MM HG: CPT | Mod: S$GLB,,, | Performed by: INTERNAL MEDICINE

## 2022-03-04 PROCEDURE — 4010F PR ACE/ARB THEARPY RXD/TAKEN: ICD-10-PCS | Mod: S$GLB,,, | Performed by: INTERNAL MEDICINE

## 2022-03-04 PROCEDURE — 3008F PR BODY MASS INDEX (BMI) DOCUMENTED: ICD-10-PCS | Mod: S$GLB,,, | Performed by: INTERNAL MEDICINE

## 2022-03-04 PROCEDURE — 3077F PR MOST RECENT SYSTOLIC BLOOD PRESSURE >= 140 MM HG: ICD-10-PCS | Mod: S$GLB,,, | Performed by: INTERNAL MEDICINE

## 2022-03-04 RX ORDER — CARVEDILOL 12.5 MG/1
12.5 TABLET ORAL 2 TIMES DAILY WITH MEALS
Qty: 180 TABLET | Refills: 1 | Status: SHIPPED | OUTPATIENT
Start: 2022-03-04 | End: 2022-04-22 | Stop reason: SDUPTHER

## 2022-03-04 NOTE — PROGRESS NOTES
Subjective:       Patient ID: Chelsea Grossman is a 61 y.o. female.    Chief Complaint: Hypertension (2 week bp check)    Here for follow up to recheck blood pressure.  She would also like to go see someone about whether she needs a new shoe insert for her foot.      Review of Systems   Constitutional: Negative for activity change, appetite change, chills, diaphoresis, fatigue, fever and unexpected weight change.   HENT: Negative for congestion, ear discharge, ear pain, hearing loss, mouth sores, nosebleeds, postnasal drip, rhinorrhea, sinus pressure, sinus pain, sneezing, sore throat, tinnitus, trouble swallowing and voice change.    Eyes: Negative for photophobia, pain, discharge, redness, itching and visual disturbance.   Respiratory: Negative for apnea, cough, choking, chest tightness, shortness of breath and wheezing.    Cardiovascular: Negative for chest pain, palpitations and leg swelling.   Gastrointestinal: Negative for abdominal distention, abdominal pain, blood in stool, constipation, diarrhea, nausea and vomiting.   Endocrine: Negative for cold intolerance, heat intolerance, polydipsia and polyuria.   Genitourinary: Negative for decreased urine volume, difficulty urinating, dyspareunia, dysuria, enuresis, frequency, genital sores, hematuria, menstrual problem, pelvic pain, urgency, vaginal bleeding, vaginal discharge and vaginal pain.   Musculoskeletal: Positive for arthralgias and joint swelling. Negative for back pain, gait problem, myalgias, neck pain and neck stiffness.   Skin: Negative for rash and wound.   Allergic/Immunologic: Negative for environmental allergies, food allergies and immunocompromised state.   Neurological: Negative for dizziness, tremors, seizures, syncope, facial asymmetry, speech difficulty, weakness, light-headedness, numbness and headaches.   Hematological: Negative for adenopathy. Does not bruise/bleed easily.   Psychiatric/Behavioral: Negative for confusion, decreased  concentration, hallucinations, self-injury, sleep disturbance and suicidal ideas. The patient is not nervous/anxious.        Past Medical History:   Diagnosis Date    Cirrhosis     History of hepatitis C, s/p successful Rx w/ cure (SVR12 - 4/2020)     History of kidney stones     Hypertension     Hypertensive retinopathy of both eyes     Osteoarthritis     Osteopenia       Past Surgical History:   Procedure Laterality Date    CARPAL TUNNEL RELEASE      FOOT SURGERY Left 12/18/2018    HYSTEROTOMY      KIDNEY STONE SURGERY      Left ear surgery      TOTAL ABDOMINAL HYSTERECTOMY W/ BILATERAL SALPINGOOPHORECTOMY      TRIGGER FINGER RELEASE Left     2nd and 3rd fingers       Family History   Problem Relation Age of Onset    Heart disease Mother     Cancer Father         stomach    Breast cancer Sister     Stomach cancer Brother     Breast cancer Maternal Grandmother        Social History     Socioeconomic History    Marital status: Single   Occupational History    Occupation: disabled   Tobacco Use    Smoking status: Former Smoker     Types: Cigarettes     Quit date: 10/2018     Years since quitting: 3.4    Smokeless tobacco: Never Used   Substance and Sexual Activity    Alcohol use: Yes    Drug use: No    Sexual activity: Never     Partners: Female   Social History Narrative    Live with sister       Current Outpatient Medications   Medication Sig Dispense Refill    amLODIPine (NORVASC) 10 MG tablet Take 1 tablet (10 mg total) by mouth every evening. 90 tablet 1    celecoxib (CELEBREX) 200 MG capsule Take 1 capsule (200 mg total) by mouth once daily. 90 capsule 1    colchicine (COLCRYS) 0.6 mg tablet Take 2 tablets initially followed by 1 tablet 2 hours later, then 1 tablet daily for 7 days. 10 tablet 1    DULoxetine (CYMBALTA) 60 MG capsule Take 1 capsule (60 mg total) by mouth once daily. 90 capsule 1    estradioL (ESTRACE) 0.01 % (0.1 mg/gram) vaginal cream INSERT 1 2 GRAM  "INTRAVAGINALLY EVERY NIGHT AT BEDTIME 2 TO 3 TIMES PER WEEK 42.5 g 3    fluticasone propionate (FLONASE) 50 mcg/actuation nasal spray 1 spray (50 mcg total) by Each Nostril route once daily. 48 g 1    hydroCHLOROthiazide (HYDRODIURIL) 25 MG tablet Take 1 tablet (25 mg total) by mouth once daily. 90 tablet 1    multivitamin (THERAGRAN) per tablet Take 1 tablet by mouth once daily.      olmesartan (BENICAR) 40 MG tablet Take 1 tablet (40 mg total) by mouth once daily. 90 tablet 1    pantoprazole (PROTONIX) 40 MG tablet Take 1 tablet (40 mg total) by mouth once daily. 90 tablet 1    traMADoL (ULTRAM) 50 mg tablet Take 1 tablet (50 mg total) by mouth every 8 (eight) hours as needed for Pain. 90 tablet 2    carvediloL (COREG) 12.5 MG tablet Take 1 tablet (12.5 mg total) by mouth 2 (two) times daily with meals. 180 tablet 1     No current facility-administered medications for this visit.       Review of patient's allergies indicates:   Allergen Reactions    Sulfa (sulfonamide antibiotics) Itching    Sulfamethoxazole-trimethoprim Itching and Rash     Itchy Rash     Objective:    HPI     Hypertension      Additional comments: 2 week bp check          Last edited by Hugh Rausch MA on 3/4/2022 10:05 AM. (History)      Blood pressure (!) 150/64, pulse 82, temperature 98.8 °F (37.1 °C), temperature source Temporal, height 5' 4" (1.626 m), weight 104.3 kg (230 lb), SpO2 97 %. Body mass index is 39.48 kg/m².   Physical Exam  Vitals and nursing note reviewed.   Constitutional:       General: She is not in acute distress.     Appearance: She is well-developed.      Comments: Obese     Eyes:      General: No scleral icterus.        Right eye: No discharge.         Left eye: No discharge.      Conjunctiva/sclera: Conjunctivae normal.      Comments: Arcus senilus     Neck:      Vascular: No carotid bruit.   Cardiovascular:      Rate and Rhythm: Normal rate and regular rhythm.      Heart sounds: Normal heart sounds. No " murmur heard.  Pulmonary:      Effort: Pulmonary effort is normal. No respiratory distress.      Breath sounds: Normal breath sounds. No decreased breath sounds, wheezing, rhonchi or rales.   Abdominal:      General: There is no distension.      Palpations: Abdomen is soft.      Tenderness: There is no abdominal tenderness. There is no guarding or rebound.   Skin:     General: Skin is warm and dry.      Findings: No rash.   Neurological:      Mental Status: She is alert and oriented to person, place, and time.      Cranial Nerves: Cranial nerves are intact.      Motor: No weakness, tremor or pronator drift.      Coordination: Romberg sign negative. Coordination normal. Finger-Nose-Finger Test and Heel to Shin Test normal.      Gait: Gait and tandem walk normal.   Psychiatric:         Speech: Speech normal.             Assessment:       1. Essential hypertension    2. Roni vasques, left, subsequent encounter        Plan:       Chelsea was seen today for hypertension.    Diagnoses and all orders for this visit:    Essential hypertension  Comments:  Better but not at goal; increase coreg  Orders:  -     carvediloL (COREG) 12.5 MG tablet; Take 1 tablet (12.5 mg total) by mouth 2 (two) times daily with meals.    Roni vasques, left, subsequent encounter  Comments:  Discussed referral to Podiatry.  She thinks she has seen someone before and wants to check at home for the name.

## 2022-03-15 ENCOUNTER — IMMUNIZATION (OUTPATIENT)
Dept: PRIMARY CARE CLINIC | Facility: CLINIC | Age: 62
End: 2022-03-15
Payer: MEDICAID

## 2022-03-15 ENCOUNTER — HOSPITAL ENCOUNTER (OUTPATIENT)
Dept: RADIOLOGY | Facility: HOSPITAL | Age: 62
Discharge: HOME | End: 2022-03-15
Attending: PHYSICIAN ASSISTANT
Payer: MEDICAID

## 2022-03-15 DIAGNOSIS — Z23 NEED FOR VACCINATION: Primary | ICD-10-CM

## 2022-03-15 DIAGNOSIS — K74.60 HEPATIC CIRRHOSIS, UNSPECIFIED HEPATIC CIRRHOSIS TYPE, UNSPECIFIED WHETHER ASCITES PRESENT: ICD-10-CM

## 2022-03-15 PROCEDURE — 0054A COVID-19, MRNA, LNP-S, PF, 30 MCG/0.3 ML DOSE VACCINE (PFIZER): CPT | Mod: S$GLB,,, | Performed by: FAMILY MEDICINE

## 2022-03-15 PROCEDURE — 0053A COVID-19, MRNA, LNP-S, PF, 30 MCG/0.3 ML DOSE VACCINE (PFIZER): ICD-10-PCS | Mod: S$GLB,,, | Performed by: FAMILY MEDICINE

## 2022-03-15 PROCEDURE — 91305 COVID-19, MRNA, LNP-S, PF, 30 MCG/0.3 ML DOSE VACCINE (PFIZER): CPT | Mod: S$GLB,,, | Performed by: FAMILY MEDICINE

## 2022-03-15 PROCEDURE — 76705 ECHO EXAM OF ABDOMEN: CPT | Mod: TC

## 2022-03-15 PROCEDURE — 76705 ECHO EXAM OF ABDOMEN: CPT | Mod: 26,,, | Performed by: RADIOLOGY

## 2022-03-15 PROCEDURE — 0053A COVID-19, MRNA, LNP-S, PF, 30 MCG/0.3 ML DOSE VACCINE (PFIZER): CPT | Mod: S$GLB,,, | Performed by: FAMILY MEDICINE

## 2022-03-15 PROCEDURE — 0054A COVID-19, MRNA, LNP-S, PF, 30 MCG/0.3 ML DOSE VACCINE (PFIZER): ICD-10-PCS | Mod: S$GLB,,, | Performed by: FAMILY MEDICINE

## 2022-03-15 PROCEDURE — 91305 COVID-19, MRNA, LNP-S, PF, 30 MCG/0.3 ML DOSE VACCINE (PFIZER): ICD-10-PCS | Mod: S$GLB,,, | Performed by: FAMILY MEDICINE

## 2022-03-15 PROCEDURE — 76705 US ABDOMEN LIMITED: ICD-10-PCS | Mod: 26,,, | Performed by: RADIOLOGY

## 2022-04-19 ENCOUNTER — OFFICE VISIT (OUTPATIENT)
Dept: HEPATOLOGY | Facility: CLINIC | Age: 62
End: 2022-04-19
Payer: MEDICAID

## 2022-04-19 VITALS
RESPIRATION RATE: 20 BRPM | HEART RATE: 75 BPM | DIASTOLIC BLOOD PRESSURE: 75 MMHG | BODY MASS INDEX: 39.03 KG/M2 | HEIGHT: 64 IN | WEIGHT: 228.63 LBS | SYSTOLIC BLOOD PRESSURE: 138 MMHG | TEMPERATURE: 99 F

## 2022-04-19 DIAGNOSIS — K74.60 HEPATIC CIRRHOSIS, UNSPECIFIED HEPATIC CIRRHOSIS TYPE, UNSPECIFIED WHETHER ASCITES PRESENT: Primary | ICD-10-CM

## 2022-04-19 PROCEDURE — 1159F PR MEDICATION LIST DOCUMENTED IN MEDICAL RECORD: ICD-10-PCS | Mod: CPTII,,, | Performed by: PHYSICIAN ASSISTANT

## 2022-04-19 PROCEDURE — 1160F RVW MEDS BY RX/DR IN RCRD: CPT | Mod: CPTII,,, | Performed by: PHYSICIAN ASSISTANT

## 2022-04-19 PROCEDURE — 3075F SYST BP GE 130 - 139MM HG: CPT | Mod: CPTII,,, | Performed by: PHYSICIAN ASSISTANT

## 2022-04-19 PROCEDURE — 99999 PR PBB SHADOW E&M-EST. PATIENT-LVL IV: ICD-10-PCS | Mod: PBBFAC,,, | Performed by: PHYSICIAN ASSISTANT

## 2022-04-19 PROCEDURE — 1160F PR REVIEW ALL MEDS BY PRESCRIBER/CLIN PHARMACIST DOCUMENTED: ICD-10-PCS | Mod: CPTII,,, | Performed by: PHYSICIAN ASSISTANT

## 2022-04-19 PROCEDURE — 99213 PR OFFICE/OUTPT VISIT, EST, LEVL III, 20-29 MIN: ICD-10-PCS | Mod: S$PBB,,, | Performed by: PHYSICIAN ASSISTANT

## 2022-04-19 PROCEDURE — 3078F DIAST BP <80 MM HG: CPT | Mod: CPTII,,, | Performed by: PHYSICIAN ASSISTANT

## 2022-04-19 PROCEDURE — 4010F PR ACE/ARB THEARPY RXD/TAKEN: ICD-10-PCS | Mod: CPTII,,, | Performed by: PHYSICIAN ASSISTANT

## 2022-04-19 PROCEDURE — 1159F MED LIST DOCD IN RCRD: CPT | Mod: CPTII,,, | Performed by: PHYSICIAN ASSISTANT

## 2022-04-19 PROCEDURE — 99213 OFFICE O/P EST LOW 20 MIN: CPT | Mod: S$PBB,,, | Performed by: PHYSICIAN ASSISTANT

## 2022-04-19 PROCEDURE — 3078F PR MOST RECENT DIASTOLIC BLOOD PRESSURE < 80 MM HG: ICD-10-PCS | Mod: CPTII,,, | Performed by: PHYSICIAN ASSISTANT

## 2022-04-19 PROCEDURE — 3075F PR MOST RECENT SYSTOLIC BLOOD PRESS GE 130-139MM HG: ICD-10-PCS | Mod: CPTII,,, | Performed by: PHYSICIAN ASSISTANT

## 2022-04-19 PROCEDURE — 99214 OFFICE O/P EST MOD 30 MIN: CPT | Mod: PBBFAC,PO | Performed by: PHYSICIAN ASSISTANT

## 2022-04-19 PROCEDURE — 4010F ACE/ARB THERAPY RXD/TAKEN: CPT | Mod: CPTII,,, | Performed by: PHYSICIAN ASSISTANT

## 2022-04-19 PROCEDURE — 3008F BODY MASS INDEX DOCD: CPT | Mod: CPTII,,, | Performed by: PHYSICIAN ASSISTANT

## 2022-04-19 PROCEDURE — 3008F PR BODY MASS INDEX (BMI) DOCUMENTED: ICD-10-PCS | Mod: CPTII,,, | Performed by: PHYSICIAN ASSISTANT

## 2022-04-19 PROCEDURE — 99999 PR PBB SHADOW E&M-EST. PATIENT-LVL IV: CPT | Mod: PBBFAC,,, | Performed by: PHYSICIAN ASSISTANT

## 2022-04-19 NOTE — PROGRESS NOTES
HEPATOLOGY CLINIC VISIT NOTE - HCV clinic  CHIEF COMPLAINT: HCV Cirrhosis    HISTORY       This is a 61 y.o. Black or  female with  well compensated cirrhosis due to HCV (now treated and cured) here for f/u.     Interval history:  Returns today w/ routine cirrhosis labs / u/s:  · U/S 3/15/22 - no liver lesions or ascites. Spleen 9.3cm  · Labs 3/15/22 - stable CBC, CMP, INR. Normal AFP    Feels well  Current symptoms of hepatic decompensation:  · Ascites:              none  · TBili elevation:   none  · HE:                    none  · EV bleed:           none      Cirrhosis history:  FibroScan 8/23/19 - kPa 13.4, F4 (, no steatosis) // HCV FibroSURE 10/2019 - A2-3, F4  Well compensated  No evidence of portal HTN  MELD 3/2022 - 7     Cirrhosis maintenance:  - HCC screening - up to date 3/2022  - Varices screening - not indicated                      HCV history:  S/p 12 weeks epclusa w/ SVR12 (cure) - 4/2020  - Genotype 1a //  Treatment naive prior to epclusa    PMH, PSH, SOCIAL HX, FAMILY HX      Reviewed in [facility]  FAMILY HX: neg for liver diease  Alcohol - (+) alcohol use in past, none regular now.  Drugs - remote history 1970s      ROS:   ROS      PHYSICAL EXAM:  Friendly Black or  female, in no acute distress; alert and oriented to person, place and time  VITALS: reviewed  HEENT: Sclerae anicteric.   LUNGS: Normal respiratory effort.   ABDOMEN: Flat, soft, nontender.   SKIN: Warm and dry. No jaundice, No obvious rashes.   EXTREMITIES: No lower extremity edema  NEURO/PSYCH: Normal gate. Memory intact. Thought and speech pattern appropriate. Behavior normal. No depression or anxiety noted.    PERTINENT DIAGNOSTIC RESULTS      Lab Results   Component Value Date    WBC 7.46 03/15/2022    HGB 11.9 (L) 03/15/2022     03/15/2022     Lab Results   Component Value Date    INR 1.0 03/15/2022     Lab Results   Component Value Date    AST 32 03/15/2022    ALT 37 03/15/2022    BILITOT  0.3 03/15/2022    ALBUMIN 3.7 03/15/2022    ALKPHOS 82 03/15/2022    CREATININE 1.1 03/15/2022    BUN 22 03/15/2022     03/15/2022    K 4.4 03/15/2022    AFP 6.5 03/15/2022       US Abdomen Limited - 3/15/22  Narrative  CLINICAL HISTORY:Unspecified cirrhosis of liver  TECHNIQUE:Limited ultrasound of the right upper quadrant of the abdomen (including pancreas, liver, gallbladder, common bile duct, and spleen) was performed.  COMPARISON:09/29/2021    FINDINGS:  Liver: Mildly enlarged measuring 18 cm. Slightly coarsened echotexture.  No focal hepatic lesions.    Gallbladder: Several stones are present in the gallbladder with largest 0.6 cm.  No Black sign or wall thickening.    Biliary system: The common duct is not dilated, measuring 5 mm.  No intrahepatic ductal dilatation.    Spleen: Normal in size and echotexture, measuring 9.3 cm.    Miscellaneous: No upper abdominal ascites.  There is incidental note of a 1.7 cm anechoic lesion along the right renal cortex.    Impression  1. Mild liver enlargement with subtle coarsened echotexture and a provided history of cirrhosis.  No focal liver lesion.  2. Cholelithiasis.  3. Simple right renal cyst.      ASSESSMENT        61 y.o. Black or  female with:  1. WELL COMPENSATED CIRRHOSIS   -- based on FibroScan and FibroSURE  -- MELD 7  -- HCC screening - up to date 3/2022    2. HISTORY HEPATITIS C, GENOTYPE 1A - treated / cured  -- s/p 12 weeks epclusa w/ SVR12 (cure) - 4/2020  -- (+) immunity to HAV  -- s/p vaccine [id number]         PLAN      1. CBC, CMP, INR, AFP, U/S - 9/2022    RTC yearly or earlier PRN      __________________________________________________________________    Duration of encounter: 21 min  This includes face-to-face time and non face-to-face time preparing to see the patient (eg, review of tests), obtaining and/or reviewing separately obtained history, documenting clinical information in the electronic or other health record,  independently interpreting resultsand communicating results to the patient/family/caregiver, or care coordination.

## 2022-04-22 ENCOUNTER — OFFICE VISIT (OUTPATIENT)
Dept: FAMILY MEDICINE | Facility: CLINIC | Age: 62
End: 2022-04-22
Payer: MEDICAID

## 2022-04-22 VITALS
BODY MASS INDEX: 38.93 KG/M2 | HEIGHT: 64 IN | OXYGEN SATURATION: 97 % | WEIGHT: 228 LBS | DIASTOLIC BLOOD PRESSURE: 68 MMHG | SYSTOLIC BLOOD PRESSURE: 100 MMHG | HEART RATE: 79 BPM | TEMPERATURE: 97 F

## 2022-04-22 DIAGNOSIS — M15.9 OSTEOARTHRITIS OF MULTIPLE JOINTS, UNSPECIFIED OSTEOARTHRITIS TYPE: ICD-10-CM

## 2022-04-22 DIAGNOSIS — S93.325D LISFRANC DISLOCATION, LEFT, SUBSEQUENT ENCOUNTER: ICD-10-CM

## 2022-04-22 DIAGNOSIS — I10 ESSENTIAL HYPERTENSION: ICD-10-CM

## 2022-04-22 DIAGNOSIS — R73.9 HYPERGLYCEMIA: ICD-10-CM

## 2022-04-22 DIAGNOSIS — N95.2 ATROPHIC VAGINITIS: ICD-10-CM

## 2022-04-22 DIAGNOSIS — I10 ESSENTIAL HYPERTENSION: Primary | ICD-10-CM

## 2022-04-22 DIAGNOSIS — K21.9 GASTROESOPHAGEAL REFLUX DISEASE, UNSPECIFIED WHETHER ESOPHAGITIS PRESENT: ICD-10-CM

## 2022-04-22 DIAGNOSIS — Z12.31 VISIT FOR SCREENING MAMMOGRAM: ICD-10-CM

## 2022-04-22 LAB — HBA1C MFR BLD: 5.6 %

## 2022-04-22 PROCEDURE — 3078F PR MOST RECENT DIASTOLIC BLOOD PRESSURE < 80 MM HG: ICD-10-PCS | Mod: S$GLB,,, | Performed by: INTERNAL MEDICINE

## 2022-04-22 PROCEDURE — 3044F HG A1C LEVEL LT 7.0%: CPT | Mod: S$GLB,,, | Performed by: INTERNAL MEDICINE

## 2022-04-22 PROCEDURE — 99214 PR OFFICE/OUTPT VISIT, EST, LEVL IV, 30-39 MIN: ICD-10-PCS | Mod: S$GLB,,, | Performed by: INTERNAL MEDICINE

## 2022-04-22 PROCEDURE — 3074F PR MOST RECENT SYSTOLIC BLOOD PRESSURE < 130 MM HG: ICD-10-PCS | Mod: S$GLB,,, | Performed by: INTERNAL MEDICINE

## 2022-04-22 PROCEDURE — 3074F SYST BP LT 130 MM HG: CPT | Mod: S$GLB,,, | Performed by: INTERNAL MEDICINE

## 2022-04-22 PROCEDURE — 83036 HEMOGLOBIN GLYCOSYLATED A1C: CPT | Mod: QW,,, | Performed by: INTERNAL MEDICINE

## 2022-04-22 PROCEDURE — 99214 OFFICE O/P EST MOD 30 MIN: CPT | Mod: S$GLB,,, | Performed by: INTERNAL MEDICINE

## 2022-04-22 PROCEDURE — 4010F ACE/ARB THERAPY RXD/TAKEN: CPT | Mod: S$GLB,,, | Performed by: INTERNAL MEDICINE

## 2022-04-22 PROCEDURE — 83036 POCT HEMOGLOBIN A1C: ICD-10-PCS | Mod: QW,,, | Performed by: INTERNAL MEDICINE

## 2022-04-22 PROCEDURE — 3008F PR BODY MASS INDEX (BMI) DOCUMENTED: ICD-10-PCS | Mod: S$GLB,,, | Performed by: INTERNAL MEDICINE

## 2022-04-22 PROCEDURE — 3044F PR MOST RECENT HEMOGLOBIN A1C LEVEL <7.0%: ICD-10-PCS | Mod: S$GLB,,, | Performed by: INTERNAL MEDICINE

## 2022-04-22 PROCEDURE — 3008F BODY MASS INDEX DOCD: CPT | Mod: S$GLB,,, | Performed by: INTERNAL MEDICINE

## 2022-04-22 PROCEDURE — 3078F DIAST BP <80 MM HG: CPT | Mod: S$GLB,,, | Performed by: INTERNAL MEDICINE

## 2022-04-22 PROCEDURE — 4010F PR ACE/ARB THEARPY RXD/TAKEN: ICD-10-PCS | Mod: S$GLB,,, | Performed by: INTERNAL MEDICINE

## 2022-04-22 RX ORDER — PANTOPRAZOLE SODIUM 40 MG/1
40 TABLET, DELAYED RELEASE ORAL DAILY
Qty: 90 TABLET | Refills: 1 | Status: SHIPPED | OUTPATIENT
Start: 2022-04-22 | End: 2022-08-31 | Stop reason: SDUPTHER

## 2022-04-22 RX ORDER — CELECOXIB 200 MG/1
200 CAPSULE ORAL DAILY
Qty: 90 CAPSULE | Refills: 1 | Status: SHIPPED | OUTPATIENT
Start: 2022-04-22 | End: 2022-08-31 | Stop reason: SDUPTHER

## 2022-04-22 RX ORDER — FLUTICASONE PROPIONATE 50 MCG
1 SPRAY, SUSPENSION (ML) NASAL DAILY
Qty: 48 G | Refills: 1 | Status: SHIPPED | OUTPATIENT
Start: 2022-04-22 | End: 2022-08-31 | Stop reason: SDUPTHER

## 2022-04-22 RX ORDER — AMLODIPINE BESYLATE 10 MG/1
10 TABLET ORAL NIGHTLY
Qty: 90 TABLET | Refills: 1 | Status: SHIPPED | OUTPATIENT
Start: 2022-04-22 | End: 2022-08-31 | Stop reason: SDUPTHER

## 2022-04-22 RX ORDER — ESTRADIOL 0.1 MG/G
CREAM VAGINAL
Qty: 42.5 G | Refills: 3 | Status: SHIPPED | OUTPATIENT
Start: 2022-04-22 | End: 2022-08-31 | Stop reason: SDUPTHER

## 2022-04-22 RX ORDER — TRAMADOL HYDROCHLORIDE 50 MG/1
50 TABLET ORAL EVERY 8 HOURS PRN
Qty: 90 TABLET | Refills: 2 | Status: SHIPPED | OUTPATIENT
Start: 2022-04-22 | End: 2022-07-21

## 2022-04-22 RX ORDER — CARVEDILOL 12.5 MG/1
12.5 TABLET ORAL 2 TIMES DAILY WITH MEALS
Qty: 180 TABLET | Refills: 1 | Status: SHIPPED | OUTPATIENT
Start: 2022-04-22 | End: 2022-08-31 | Stop reason: SDUPTHER

## 2022-04-22 RX ORDER — HYDROCHLOROTHIAZIDE 25 MG/1
25 TABLET ORAL DAILY
Qty: 90 TABLET | Refills: 1 | Status: SHIPPED | OUTPATIENT
Start: 2022-04-22 | End: 2022-08-31 | Stop reason: SDUPTHER

## 2022-04-22 NOTE — PROGRESS NOTES
Subjective:       Patient ID: Chelsea Grossman is a 61 y.o. female.    Chief Complaint: Hypertension (6 weeks follow up)    Here for routine follow up.  Stable foot pain which radiates up her leg into her calf.  Tramadol does help and allows her to complete her ADLs.  Takes as much as TID, sometimes only once or twice.  Celebrex does help some without GI side effects she had with ibuprofen.   Continues to follow up with Hep C clinic.  She has had elevation in glucose on recent labs although not clear if she was fasting.     Hypertension  This is a chronic problem. The problem is controlled (she reports BP at home is usually 120s/70 ). Pertinent negatives include no anxiety, chest pain, headaches, malaise/fatigue, neck pain, palpitations, peripheral edema or shortness of breath. Past treatments include beta blockers, calcium channel blockers, diuretics and ACE inhibitors. There are no compliance problems.    Gastroesophageal Reflux  She complains of heartburn. She reports no abdominal pain, no chest pain, no choking, no coughing, no nausea, no sore throat or no wheezing. This is a chronic problem. The heartburn is of moderate intensity. The heartburn wakes her from sleep. The symptoms are aggravated by certain foods, lying down and medications (red sauce, NSAIDs). Pertinent negatives include no fatigue. Risk factors include NSAIDs and obesity. She has tried a PPI for the symptoms. The treatment provided significant (well controlled on PPI) relief.     Review of Systems   Constitutional: Negative for activity change, appetite change, chills, diaphoresis, fatigue, fever, malaise/fatigue and unexpected weight change.   HENT: Negative for congestion, ear discharge, ear pain, hearing loss, mouth sores, nosebleeds, postnasal drip, rhinorrhea, sinus pressure, sinus pain, sneezing, sore throat, tinnitus, trouble swallowing and voice change.    Eyes: Negative for photophobia, pain, discharge, redness, itching and visual  disturbance.   Respiratory: Negative for apnea, cough, choking, chest tightness, shortness of breath and wheezing.    Cardiovascular: Negative for chest pain, palpitations, leg swelling and syncope.   Gastrointestinal: Positive for heartburn. Negative for abdominal distention, abdominal pain, blood in stool, constipation, diarrhea, nausea and vomiting.   Endocrine: Negative for cold intolerance, heat intolerance, polydipsia and polyuria.   Genitourinary: Negative for decreased urine volume, difficulty urinating, dyspareunia, dysuria, enuresis, frequency, genital sores, hematuria, menstrual problem, pelvic pain, urgency, vaginal bleeding, vaginal discharge and vaginal pain.   Musculoskeletal: Negative for arthralgias, back pain, gait problem, joint swelling, myalgias, neck pain and neck stiffness.   Skin: Negative for rash and wound.   Allergic/Immunologic: Negative for environmental allergies, food allergies and immunocompromised state.   Neurological: Negative for dizziness, tremors, seizures, syncope, facial asymmetry, speech difficulty, weakness, light-headedness, numbness and headaches.   Hematological: Negative for adenopathy. Does not bruise/bleed easily.   Psychiatric/Behavioral: Negative for confusion, decreased concentration, hallucinations, self-injury, sleep disturbance and suicidal ideas. The patient is not nervous/anxious.        Past Medical History:   Diagnosis Date    Cirrhosis     History of hepatitis C, s/p successful Rx w/ cure (SVR12 - 4/2020)     History of kidney stones     Hypertension     Hypertensive retinopathy of both eyes     Osteoarthritis     Osteopenia       Past Surgical History:   Procedure Laterality Date    CARPAL TUNNEL RELEASE      FOOT SURGERY Left 12/18/2018    HYSTEROTOMY      KIDNEY STONE SURGERY      Left ear surgery      TOTAL ABDOMINAL HYSTERECTOMY W/ BILATERAL SALPINGOOPHORECTOMY      TRIGGER FINGER RELEASE Left     2nd and 3rd fingers       Family History    Problem Relation Age of Onset    Heart disease Mother     Cancer Father         stomach    Breast cancer Sister     Stomach cancer Brother     Breast cancer Maternal Grandmother        Social History     Socioeconomic History    Marital status: Single   Occupational History    Occupation: disabled   Tobacco Use    Smoking status: Former Smoker     Types: Cigarettes     Quit date: 10/2018     Years since quitting: 3.5    Smokeless tobacco: Never Used   Substance and Sexual Activity    Alcohol use: Yes    Drug use: No    Sexual activity: Never     Partners: Female   Social History Narrative    Live with sister       Current Outpatient Medications   Medication Sig Dispense Refill    multivitamin (THERAGRAN) per tablet Take 1 tablet by mouth once daily.      olmesartan (BENICAR) 40 MG tablet Take 1 tablet by mouth once daily 90 tablet 1    amLODIPine (NORVASC) 10 MG tablet Take 1 tablet (10 mg total) by mouth every evening. 90 tablet 1    carvediloL (COREG) 12.5 MG tablet Take 1 tablet (12.5 mg total) by mouth 2 (two) times daily with meals. 180 tablet 1    celecoxib (CELEBREX) 200 MG capsule Take 1 capsule (200 mg total) by mouth once daily. 90 capsule 1    estradioL (ESTRACE) 0.01 % (0.1 mg/gram) vaginal cream INSERT 1 2 GRAM INTRAVAGINALLY EVERY NIGHT AT BEDTIME 2 TO 3 TIMES PER WEEK 42.5 g 3    fluticasone propionate (FLONASE) 50 mcg/actuation nasal spray 1 spray (50 mcg total) by Each Nostril route once daily. 48 g 1    hydroCHLOROthiazide (HYDRODIURIL) 25 MG tablet Take 1 tablet (25 mg total) by mouth once daily. 90 tablet 1    pantoprazole (PROTONIX) 40 MG tablet Take 1 tablet (40 mg total) by mouth once daily. 90 tablet 1    traMADoL (ULTRAM) 50 mg tablet Take 1 tablet (50 mg total) by mouth every 8 (eight) hours as needed for Pain. 90 tablet 2     No current facility-administered medications for this visit.       Review of patient's allergies indicates:   Allergen Reactions    Sulfa  "(sulfonamide antibiotics) Itching    Sulfamethoxazole-trimethoprim Itching and Rash     Itchy Rash     Objective:    HPI     Hypertension      Additional comments: 6 weeks follow up          Last edited by Hugh Rausch MA on 4/22/2022  9:37 AM. (History)      Blood pressure 100/68, pulse 79, temperature 96.7 °F (35.9 °C), temperature source Temporal, height 5' 4" (1.626 m), weight 103.4 kg (228 lb), SpO2 97 %. Body mass index is 39.14 kg/m².   Physical Exam  Vitals and nursing note reviewed.   Constitutional:       General: She is not in acute distress.     Appearance: She is well-developed.      Comments: Obese     Eyes:      General: No scleral icterus.        Right eye: No discharge.         Left eye: No discharge.      Conjunctiva/sclera: Conjunctivae normal.      Comments: Arcus senilus     Neck:      Vascular: No carotid bruit.   Cardiovascular:      Rate and Rhythm: Normal rate and regular rhythm.      Heart sounds: Normal heart sounds. No murmur heard.  Pulmonary:      Effort: Pulmonary effort is normal. No respiratory distress.      Breath sounds: Normal breath sounds. No decreased breath sounds, wheezing, rhonchi or rales.   Abdominal:      General: There is no distension.      Palpations: Abdomen is soft.      Tenderness: There is no abdominal tenderness. There is no guarding or rebound.   Skin:     General: Skin is warm and dry.      Findings: No rash.   Neurological:      Mental Status: She is alert.      Motor: No tremor.   Psychiatric:         Speech: Speech normal.             Assessment:       1. Essential hypertension    2. Gastroesophageal reflux disease, unspecified whether esophagitis present    3. Atrophic vaginitis    4. Essential hypertension    5. Osteoarthritis of multiple joints, unspecified osteoarthritis type    6. Lisfranc dislocation, left, subsequent encounter    7. Hyperglycemia    8. Visit for screening mammogram        Plan:       Chelsea was seen today for " hypertension.    Diagnoses and all orders for this visit:    Essential hypertension  -     hydroCHLOROthiazide (HYDRODIURIL) 25 MG tablet; Take 1 tablet (25 mg total) by mouth once daily.  -     amLODIPine (NORVASC) 10 MG tablet; Take 1 tablet (10 mg total) by mouth every evening.  -     carvediloL (COREG) 12.5 MG tablet; Take 1 tablet (12.5 mg total) by mouth 2 (two) times daily with meals.    Gastroesophageal reflux disease, unspecified whether esophagitis present  -     pantoprazole (PROTONIX) 40 MG tablet; Take 1 tablet (40 mg total) by mouth once daily.    Atrophic vaginitis  -     estradioL (ESTRACE) 0.01 % (0.1 mg/gram) vaginal cream; INSERT 1 2 GRAM INTRAVAGINALLY EVERY NIGHT AT BEDTIME 2 TO 3 TIMES PER WEEK    Essential hypertension  Comments:  Better but not at goal; increase coreg  Orders:  -     hydroCHLOROthiazide (HYDRODIURIL) 25 MG tablet; Take 1 tablet (25 mg total) by mouth once daily.  -     amLODIPine (NORVASC) 10 MG tablet; Take 1 tablet (10 mg total) by mouth every evening.  -     carvediloL (COREG) 12.5 MG tablet; Take 1 tablet (12.5 mg total) by mouth 2 (two) times daily with meals.    Osteoarthritis of multiple joints, unspecified osteoarthritis type  -     celecoxib (CELEBREX) 200 MG capsule; Take 1 capsule (200 mg total) by mouth once daily.  -     traMADoL (ULTRAM) 50 mg tablet; Take 1 tablet (50 mg total) by mouth every 8 (eight) hours as needed for Pain.    Lisfranc dislocation, left, subsequent encounter  Comments:  May have a neuropathic component  Orders:  -     traMADoL (ULTRAM) 50 mg tablet; Take 1 tablet (50 mg total) by mouth every 8 (eight) hours as needed for Pain.    Hyperglycemia  Comments:  A1C at [facility].  Discussed watching carbs in diet  Orders:  -     Hemoglobin A1C, POCT    Visit for screening mammogram  Comments:  Exam done per GYN  Orders:  -     Mammo Digital Screening Bilat w/ Ralph; Future    Other orders  -     fluticasone propionate (FLONASE) 50 mcg/actuation nasal  spray; 1 spray (50 mcg total) by Each Nostril route once daily.

## 2022-06-22 ENCOUNTER — HOSPITAL ENCOUNTER (OUTPATIENT)
Dept: RADIOLOGY | Facility: HOSPITAL | Age: 62
Discharge: HOME | End: 2022-06-22
Attending: INTERNAL MEDICINE
Payer: MEDICAID

## 2022-06-22 VITALS — HEIGHT: 64 IN | BODY MASS INDEX: 38.91 KG/M2 | WEIGHT: 227.94 LBS

## 2022-06-22 DIAGNOSIS — Z12.31 VISIT FOR SCREENING MAMMOGRAM: ICD-10-CM

## 2022-06-22 PROCEDURE — 77067 SCR MAMMO BI INCL CAD: CPT | Mod: TC,PO

## 2022-06-30 DIAGNOSIS — I10 ESSENTIAL HYPERTENSION: ICD-10-CM

## 2022-06-30 RX ORDER — COLCHICINE 0.6 MG/1
TABLET ORAL
Qty: 10 TABLET | Refills: 1 | Status: SHIPPED | OUTPATIENT
Start: 2022-06-30 | End: 2022-08-31 | Stop reason: SDUPTHER

## 2022-06-30 NOTE — TELEPHONE ENCOUNTER
Patient called requesting a refill of Colchicine because she started having gout flare up/symptoms this week

## 2022-07-05 ENCOUNTER — TELEPHONE (OUTPATIENT)
Dept: FAMILY MEDICINE | Facility: CLINIC | Age: 62
End: 2022-07-05

## 2022-07-05 NOTE — TELEPHONE ENCOUNTER
Rayne from [facility] pharmacy called and said there is a interaction between colchicine and carvedilol.  Is it okay to fill?

## 2022-07-06 ENCOUNTER — TELEPHONE (OUTPATIENT)
Dept: FAMILY MEDICINE | Facility: CLINIC | Age: 62
End: 2022-07-06

## 2022-07-06 NOTE — TELEPHONE ENCOUNTER
Pharmacy called and said the colchicine has an interaction with carvedilol, causing an increase in toxicity. They would like to know if still ok to fill.

## 2022-08-31 ENCOUNTER — OFFICE VISIT (OUTPATIENT)
Dept: FAMILY MEDICINE | Facility: CLINIC | Age: 62
End: 2022-08-31
Payer: MEDICAID

## 2022-08-31 VITALS
OXYGEN SATURATION: 97 % | SYSTOLIC BLOOD PRESSURE: 120 MMHG | DIASTOLIC BLOOD PRESSURE: 70 MMHG | HEART RATE: 72 BPM | TEMPERATURE: 98 F | WEIGHT: 232 LBS | HEIGHT: 64 IN | BODY MASS INDEX: 39.61 KG/M2

## 2022-08-31 DIAGNOSIS — K21.9 GASTROESOPHAGEAL REFLUX DISEASE, UNSPECIFIED WHETHER ESOPHAGITIS PRESENT: ICD-10-CM

## 2022-08-31 DIAGNOSIS — S93.325D LISFRANC DISLOCATION, LEFT, SUBSEQUENT ENCOUNTER: ICD-10-CM

## 2022-08-31 DIAGNOSIS — M15.9 OSTEOARTHRITIS OF MULTIPLE JOINTS, UNSPECIFIED OSTEOARTHRITIS TYPE: ICD-10-CM

## 2022-08-31 DIAGNOSIS — I10 ESSENTIAL HYPERTENSION: Primary | ICD-10-CM

## 2022-08-31 DIAGNOSIS — N95.2 ATROPHIC VAGINITIS: ICD-10-CM

## 2022-08-31 PROCEDURE — 99214 PR OFFICE/OUTPT VISIT, EST, LEVL IV, 30-39 MIN: ICD-10-PCS | Mod: S$GLB,,, | Performed by: INTERNAL MEDICINE

## 2022-08-31 PROCEDURE — 4010F ACE/ARB THERAPY RXD/TAKEN: CPT | Mod: CPTII,S$GLB,, | Performed by: INTERNAL MEDICINE

## 2022-08-31 PROCEDURE — 3008F BODY MASS INDEX DOCD: CPT | Mod: CPTII,S$GLB,, | Performed by: INTERNAL MEDICINE

## 2022-08-31 PROCEDURE — 3044F PR MOST RECENT HEMOGLOBIN A1C LEVEL <7.0%: ICD-10-PCS | Mod: CPTII,S$GLB,, | Performed by: INTERNAL MEDICINE

## 2022-08-31 PROCEDURE — 3074F PR MOST RECENT SYSTOLIC BLOOD PRESSURE < 130 MM HG: ICD-10-PCS | Mod: CPTII,S$GLB,, | Performed by: INTERNAL MEDICINE

## 2022-08-31 PROCEDURE — 1159F MED LIST DOCD IN RCRD: CPT | Mod: CPTII,S$GLB,, | Performed by: INTERNAL MEDICINE

## 2022-08-31 PROCEDURE — 3078F DIAST BP <80 MM HG: CPT | Mod: CPTII,S$GLB,, | Performed by: INTERNAL MEDICINE

## 2022-08-31 PROCEDURE — 3074F SYST BP LT 130 MM HG: CPT | Mod: CPTII,S$GLB,, | Performed by: INTERNAL MEDICINE

## 2022-08-31 PROCEDURE — 99214 OFFICE O/P EST MOD 30 MIN: CPT | Mod: S$GLB,,, | Performed by: INTERNAL MEDICINE

## 2022-08-31 PROCEDURE — 3008F PR BODY MASS INDEX (BMI) DOCUMENTED: ICD-10-PCS | Mod: CPTII,S$GLB,, | Performed by: INTERNAL MEDICINE

## 2022-08-31 PROCEDURE — 1159F PR MEDICATION LIST DOCUMENTED IN MEDICAL RECORD: ICD-10-PCS | Mod: CPTII,S$GLB,, | Performed by: INTERNAL MEDICINE

## 2022-08-31 PROCEDURE — 3078F PR MOST RECENT DIASTOLIC BLOOD PRESSURE < 80 MM HG: ICD-10-PCS | Mod: CPTII,S$GLB,, | Performed by: INTERNAL MEDICINE

## 2022-08-31 PROCEDURE — 4010F PR ACE/ARB THEARPY RXD/TAKEN: ICD-10-PCS | Mod: CPTII,S$GLB,, | Performed by: INTERNAL MEDICINE

## 2022-08-31 PROCEDURE — 3044F HG A1C LEVEL LT 7.0%: CPT | Mod: CPTII,S$GLB,, | Performed by: INTERNAL MEDICINE

## 2022-08-31 RX ORDER — DULOXETIN HYDROCHLORIDE 60 MG/1
60 CAPSULE, DELAYED RELEASE ORAL DAILY
Qty: 30 CAPSULE | Refills: 6 | Status: SHIPPED | OUTPATIENT
Start: 2022-08-31 | End: 2023-03-08 | Stop reason: SDUPTHER

## 2022-08-31 RX ORDER — TRAMADOL HYDROCHLORIDE 50 MG/1
TABLET ORAL
Qty: 90 TABLET | Refills: 3 | Status: SHIPPED | OUTPATIENT
Start: 2022-08-31 | End: 2022-11-30 | Stop reason: SDUPTHER

## 2022-08-31 RX ORDER — AMLODIPINE BESYLATE 10 MG/1
10 TABLET ORAL NIGHTLY
Qty: 90 TABLET | Refills: 1 | Status: SHIPPED | OUTPATIENT
Start: 2022-08-31 | End: 2023-03-08 | Stop reason: SDUPTHER

## 2022-08-31 RX ORDER — FLUTICASONE PROPIONATE 50 MCG
1 SPRAY, SUSPENSION (ML) NASAL DAILY
Qty: 48 G | Refills: 1 | Status: SHIPPED | OUTPATIENT
Start: 2022-08-31 | End: 2023-03-08 | Stop reason: SDUPTHER

## 2022-08-31 RX ORDER — COLCHICINE 0.6 MG/1
TABLET ORAL
Qty: 9 TABLET | Refills: 3 | Status: SHIPPED | OUTPATIENT
Start: 2022-08-31 | End: 2023-03-08 | Stop reason: SDUPTHER

## 2022-08-31 RX ORDER — PANTOPRAZOLE SODIUM 40 MG/1
40 TABLET, DELAYED RELEASE ORAL DAILY
Qty: 90 TABLET | Refills: 1 | Status: SHIPPED | OUTPATIENT
Start: 2022-08-31 | End: 2023-03-08 | Stop reason: SDUPTHER

## 2022-08-31 RX ORDER — HYDROCHLOROTHIAZIDE 25 MG/1
25 TABLET ORAL DAILY
Qty: 90 TABLET | Refills: 1 | Status: SHIPPED | OUTPATIENT
Start: 2022-08-31 | End: 2023-03-08 | Stop reason: SDUPTHER

## 2022-08-31 RX ORDER — CARVEDILOL 12.5 MG/1
12.5 TABLET ORAL 2 TIMES DAILY WITH MEALS
Qty: 180 TABLET | Refills: 1 | Status: SHIPPED | OUTPATIENT
Start: 2022-08-31 | End: 2022-10-28

## 2022-08-31 RX ORDER — ESTRADIOL 0.1 MG/G
CREAM VAGINAL
Qty: 42.5 G | Refills: 3 | Status: SHIPPED | OUTPATIENT
Start: 2022-08-31 | End: 2023-03-08 | Stop reason: SDUPTHER

## 2022-08-31 RX ORDER — IBUPROFEN 800 MG/1
800 TABLET ORAL EVERY 6 HOURS PRN
Status: CANCELLED | OUTPATIENT
Start: 2022-08-31

## 2022-08-31 RX ORDER — IBUPROFEN 800 MG/1
800 TABLET ORAL EVERY 6 HOURS PRN
COMMUNITY
Start: 2022-07-20 | End: 2022-11-30 | Stop reason: SDUPTHER

## 2022-08-31 RX ORDER — OLMESARTAN MEDOXOMIL 40 MG/1
40 TABLET ORAL DAILY
Qty: 90 TABLET | Refills: 1 | Status: SHIPPED | OUTPATIENT
Start: 2022-08-31 | End: 2023-03-08 | Stop reason: SDUPTHER

## 2022-08-31 RX ORDER — CELECOXIB 200 MG/1
200 CAPSULE ORAL DAILY
Qty: 90 CAPSULE | Refills: 1 | Status: SHIPPED | OUTPATIENT
Start: 2022-08-31 | End: 2023-03-08 | Stop reason: SDUPTHER

## 2022-08-31 NOTE — PROGRESS NOTES
Subjective:       Patient ID: Chelsea Grossman is a 62 y.o. female.    Chief Complaint: Hypertension (Med refill)    Here for routine follow up.  Stable foot pain which radiates up her leg into her calf.  Tramadol does help and allows her to complete her ADLs.  Takes as much as TID, sometimes only once or twice.  Celebrex does help some without GI side effects she had with ibuprofen.   Continues to follow up with Hep C clinic.      Hypertension  This is a chronic problem. The problem is controlled (she reports BP at home is usually 120s/70 ). Pertinent negatives include no anxiety, chest pain, headaches, malaise/fatigue, neck pain, palpitations, peripheral edema or shortness of breath. Past treatments include beta blockers, calcium channel blockers, diuretics and ACE inhibitors. There are no compliance problems.    Gastroesophageal Reflux  She complains of heartburn. She reports no abdominal pain, no chest pain, no choking, no coughing, no nausea, no sore throat or no wheezing. This is a chronic problem. The heartburn is of moderate intensity. The heartburn wakes her from sleep. The symptoms are aggravated by certain foods, lying down and medications (red sauce, NSAIDs). Pertinent negatives include no fatigue. Risk factors include NSAIDs and obesity. She has tried a PPI for the symptoms. The treatment provided significant (well controlled on PPI) relief.   Review of Systems   Constitutional:  Negative for activity change, appetite change, chills, diaphoresis, fatigue, fever, malaise/fatigue and unexpected weight change.   HENT:  Negative for congestion, ear discharge, ear pain, hearing loss, mouth sores, nosebleeds, postnasal drip, rhinorrhea, sinus pressure, sinus pain, sneezing, sore throat, tinnitus, trouble swallowing and voice change.    Eyes:  Negative for photophobia, pain, discharge, redness, itching and visual disturbance.   Respiratory:  Negative for apnea, cough, choking, chest tightness, shortness  of breath and wheezing.    Cardiovascular:  Negative for chest pain, palpitations and leg swelling.   Gastrointestinal:  Positive for heartburn. Negative for abdominal distention, abdominal pain, blood in stool, constipation, diarrhea, nausea and vomiting.   Endocrine: Negative for cold intolerance, heat intolerance, polydipsia and polyuria.   Genitourinary:  Negative for decreased urine volume, difficulty urinating, dyspareunia, dysuria, enuresis, frequency, genital sores, hematuria, menstrual problem, pelvic pain, urgency, vaginal bleeding, vaginal discharge and vaginal pain.   Musculoskeletal:  Positive for back pain. Negative for arthralgias, gait problem, joint swelling, myalgias, neck pain and neck stiffness.   Skin:  Negative for rash and wound.   Allergic/Immunologic: Negative for environmental allergies, food allergies and immunocompromised state.   Neurological:  Negative for dizziness, tremors, seizures, syncope, facial asymmetry, speech difficulty, weakness, light-headedness, numbness and headaches.   Hematological:  Negative for adenopathy. Does not bruise/bleed easily.   Psychiatric/Behavioral:  Negative for confusion, decreased concentration, hallucinations, self-injury, sleep disturbance and suicidal ideas. The patient is not nervous/anxious.      Past Medical History:   Diagnosis Date    Cirrhosis     History of hepatitis C, s/p successful Rx w/ cure (SVR12 - 4/2020)     History of kidney stones     Hypertension     Hypertensive retinopathy of both eyes     Osteoarthritis     Osteopenia       Past Surgical History:   Procedure Laterality Date    CARPAL TUNNEL RELEASE      FOOT SURGERY Left 12/18/2018    HYSTEROTOMY      KIDNEY STONE SURGERY      Left ear surgery      TOTAL ABDOMINAL HYSTERECTOMY W/ BILATERAL SALPINGOOPHORECTOMY      TRIGGER FINGER RELEASE Left     2nd and 3rd fingers       Family History   Problem Relation Age of Onset    Heart disease Mother     Cancer Father         stomach     Breast cancer Sister     Breast cancer Maternal Grandmother 60    Stomach cancer Brother        Social History     Socioeconomic History    Marital status: Single   Occupational History    Occupation: disabled   Tobacco Use    Smoking status: Former     Types: Cigarettes     Quit date: 10/2018     Years since quitting: 3.9    Smokeless tobacco: Never   Substance and Sexual Activity    Alcohol use: Yes    Drug use: No    Sexual activity: Never     Partners: Female   Social History Narrative    Live with sister       Current Outpatient Medications   Medication Sig Dispense Refill    ibuprofen (ADVIL,MOTRIN) 800 MG tablet Take 800 mg by mouth every 6 (six) hours as needed.      multivitamin (THERAGRAN) per tablet Take 1 tablet by mouth once daily.      amLODIPine (NORVASC) 10 MG tablet Take 1 tablet (10 mg total) by mouth every evening. 90 tablet 1    carvediloL (COREG) 12.5 MG tablet Take 1 tablet (12.5 mg total) by mouth 2 (two) times daily with meals. 180 tablet 1    celecoxib (CELEBREX) 200 MG capsule Take 1 capsule (200 mg total) by mouth once daily. 90 capsule 1    colchicine (COLCRYS) 0.6 mg tablet Take 2 tablets at symptom onset, 1 tablet 2-3 hours later, then 1 tablet daily 9 tablet 3    DULoxetine (CYMBALTA) 60 MG capsule Take 1 capsule (60 mg total) by mouth once daily. 30 capsule 6    estradioL (ESTRACE) 0.01 % (0.1 mg/gram) vaginal cream INSERT 1 2 GRAM INTRAVAGINALLY EVERY NIGHT AT BEDTIME 2 TO 3 TIMES PER WEEK 42.5 g 3    fluticasone propionate (FLONASE) 50 mcg/actuation nasal spray 1 spray (50 mcg total) by Each Nostril route once daily. 48 g 1    hydroCHLOROthiazide (HYDRODIURIL) 25 MG tablet Take 1 tablet (25 mg total) by mouth once daily. 90 tablet 1    olmesartan (BENICAR) 40 MG tablet Take 1 tablet (40 mg total) by mouth once daily. 90 tablet 1    pantoprazole (PROTONIX) 40 MG tablet Take 1 tablet (40 mg total) by mouth once daily. 90 tablet 1    traMADoL (ULTRAM) 50 mg tablet AKE1 TABLET BY MOUTH  "EVERY 8 HOURS AS NEEDED FOR PAIN Strength: 50 mg 90 tablet 3     No current facility-administered medications for this visit.       Review of patient's allergies indicates:   Allergen Reactions    Sulfa (sulfonamide antibiotics) Itching    Sulfamethoxazole-trimethoprim Itching and Rash     Itchy Rash     Objective:    HPI     Hypertension     Additional comments: Med refill          Last edited by Hugh Rausch MA on 8/31/2022  9:47 AM.      Blood pressure 120/70, pulse 72, temperature 97.9 °F (36.6 °C), temperature source Temporal, height 5' 4" (1.626 m), weight 105.2 kg (232 lb), SpO2 97 %. Body mass index is 39.82 kg/m².   Physical Exam  Vitals and nursing note reviewed.   Constitutional:       General: She is not in acute distress.     Appearance: She is well-developed.      Comments: Obese     Eyes:      General: No scleral icterus.        Right eye: No discharge.         Left eye: No discharge.      Conjunctiva/sclera: Conjunctivae normal.      Comments: Arcus senilus     Neck:      Vascular: No carotid bruit.   Cardiovascular:      Rate and Rhythm: Normal rate and regular rhythm.      Heart sounds: Normal heart sounds. No murmur heard.  Pulmonary:      Effort: Pulmonary effort is normal. No respiratory distress.      Breath sounds: Normal breath sounds. No decreased breath sounds, wheezing, rhonchi or rales.   Abdominal:      General: There is no distension.      Palpations: Abdomen is soft.      Tenderness: There is no abdominal tenderness. There is no guarding or rebound.   Skin:     General: Skin is warm and dry.      Findings: No rash.   Neurological:      Mental Status: She is alert.      Motor: No tremor.   Psychiatric:         Speech: Speech normal.           Assessment:       1. Essential hypertension    2. Osteoarthritis of multiple joints, unspecified osteoarthritis type    3. Lisfranc dislocation, left, subsequent encounter    4. Atrophic vaginitis    5. Gastroesophageal reflux disease, " unspecified whether esophagitis present        Plan:       Chelsea was seen today for hypertension.    Diagnoses and all orders for this visit:    Essential hypertension  -     olmesartan (BENICAR) 40 MG tablet; Take 1 tablet (40 mg total) by mouth once daily.  -     amLODIPine (NORVASC) 10 MG tablet; Take 1 tablet (10 mg total) by mouth every evening.  -     carvediloL (COREG) 12.5 MG tablet; Take 1 tablet (12.5 mg total) by mouth 2 (two) times daily with meals.  -     hydroCHLOROthiazide (HYDRODIURIL) 25 MG tablet; Take 1 tablet (25 mg total) by mouth once daily.    Osteoarthritis of multiple joints, unspecified osteoarthritis type  -     traMADoL (ULTRAM) 50 mg tablet; AKE1 TABLET BY MOUTH EVERY 8 HOURS AS NEEDED FOR PAIN Strength: 50 mg  -     celecoxib (CELEBREX) 200 MG capsule; Take 1 capsule (200 mg total) by mouth once daily.    Lisfranc dislocation, left, subsequent encounter  Comments:  May have a neuropathic component  Orders:  -     traMADoL (ULTRAM) 50 mg tablet; AKE1 TABLET BY MOUTH EVERY 8 HOURS AS NEEDED FOR PAIN Strength: 50 mg  -     DULoxetine (CYMBALTA) 60 MG capsule; Take 1 capsule (60 mg total) by mouth once daily.    Atrophic vaginitis  -     estradioL (ESTRACE) 0.01 % (0.1 mg/gram) vaginal cream; INSERT 1 2 GRAM INTRAVAGINALLY EVERY NIGHT AT BEDTIME 2 TO 3 TIMES PER WEEK    Gastroesophageal reflux disease, unspecified whether esophagitis present  -     pantoprazole (PROTONIX) 40 MG tablet; Take 1 tablet (40 mg total) by mouth once daily.    Other orders  -     fluticasone propionate (FLONASE) 50 mcg/actuation nasal spray; 1 spray (50 mcg total) by Each Nostril route once daily.  -     colchicine (COLCRYS) 0.6 mg tablet; Take 2 tablets at symptom onset, 1 tablet 2-3 hours later, then 1 tablet daily  The following orders have not been finalized:  -     Cancel: ibuprofen (ADVIL,MOTRIN) 800 MG tablet

## 2022-09-02 ENCOUNTER — TELEPHONE (OUTPATIENT)
Dept: FAMILY MEDICINE | Facility: CLINIC | Age: 62
End: 2022-09-02

## 2022-09-07 ENCOUNTER — TELEPHONE (OUTPATIENT)
Dept: FAMILY MEDICINE | Facility: CLINIC | Age: 62
End: 2022-09-07

## 2022-09-13 ENCOUNTER — TELEPHONE (OUTPATIENT)
Dept: HEPATOLOGY | Facility: CLINIC | Age: 62
End: 2022-09-13
Payer: MEDICAID

## 2022-09-13 ENCOUNTER — HOSPITAL ENCOUNTER (OUTPATIENT)
Dept: RADIOLOGY | Facility: HOSPITAL | Age: 62
Discharge: HOME | End: 2022-09-13
Attending: PHYSICIAN ASSISTANT
Payer: MEDICAID

## 2022-09-13 DIAGNOSIS — K74.60 HEPATIC CIRRHOSIS, UNSPECIFIED HEPATIC CIRRHOSIS TYPE, UNSPECIFIED WHETHER ASCITES PRESENT: ICD-10-CM

## 2022-09-13 DIAGNOSIS — K74.60 HEPATIC CIRRHOSIS, UNSPECIFIED HEPATIC CIRRHOSIS TYPE, UNSPECIFIED WHETHER ASCITES PRESENT: Primary | ICD-10-CM

## 2022-09-13 PROCEDURE — 76705 US ABDOMEN LIMITED: ICD-10-PCS | Mod: 26,,, | Performed by: RADIOLOGY

## 2022-09-13 PROCEDURE — 76705 ECHO EXAM OF ABDOMEN: CPT | Mod: 26,,, | Performed by: RADIOLOGY

## 2022-09-13 PROCEDURE — 76705 ECHO EXAM OF ABDOMEN: CPT | Mod: TC

## 2022-09-13 NOTE — TELEPHONE ENCOUNTER
9/13/22 labs and ultrasound reviewed - stable    Please notify patient:  I have reviewed the recent labs and ultrasound.  Liver is working well.  Liver cancer screening looked fine. There are no tumors in the liver. Liver cancer blood test was normal.  Next liver monitoring is due in 6 months.    Please schedule: CBC, CMP, INR, AFP, U/S, VISIT in 3/2023      Thanks

## 2022-09-14 NOTE — TELEPHONE ENCOUNTER
I spoke with patient and msg from PA Scheuermann relayed.  F/u with testing scheduled 3/21/23; appt reminder notice mailed.

## 2022-10-03 ENCOUNTER — IMMUNIZATION (OUTPATIENT)
Dept: PRIMARY CARE CLINIC | Facility: CLINIC | Age: 62
End: 2022-10-03
Payer: MEDICAID

## 2022-10-03 DIAGNOSIS — Z23 NEED FOR VACCINATION: Primary | ICD-10-CM

## 2022-10-03 PROCEDURE — 0124A COVID-19, MRNA, LNP-S, BIVALENT BOOSTER, PF, 30 MCG/0.3 ML DOSE: ICD-10-PCS | Mod: S$GLB,,, | Performed by: FAMILY MEDICINE

## 2022-10-03 PROCEDURE — 91312 COVID-19, MRNA, LNP-S, BIVALENT BOOSTER, PF, 30 MCG/0.3 ML DOSE: ICD-10-PCS | Mod: S$GLB,,, | Performed by: FAMILY MEDICINE

## 2022-10-03 PROCEDURE — 91312 COVID-19, MRNA, LNP-S, BIVALENT BOOSTER, PF, 30 MCG/0.3 ML DOSE: CPT | Mod: S$GLB,,, | Performed by: FAMILY MEDICINE

## 2022-10-03 PROCEDURE — 0124A COVID-19, MRNA, LNP-S, BIVALENT BOOSTER, PF, 30 MCG/0.3 ML DOSE: CPT | Mod: S$GLB,,, | Performed by: FAMILY MEDICINE

## 2022-11-30 ENCOUNTER — OFFICE VISIT (OUTPATIENT)
Dept: FAMILY MEDICINE | Facility: CLINIC | Age: 62
End: 2022-11-30
Payer: MEDICAID

## 2022-11-30 VITALS
SYSTOLIC BLOOD PRESSURE: 136 MMHG | OXYGEN SATURATION: 97 % | DIASTOLIC BLOOD PRESSURE: 84 MMHG | WEIGHT: 239 LBS | BODY MASS INDEX: 40.8 KG/M2 | TEMPERATURE: 98 F | HEIGHT: 64 IN | HEART RATE: 77 BPM

## 2022-11-30 DIAGNOSIS — M15.9 OSTEOARTHRITIS OF MULTIPLE JOINTS, UNSPECIFIED OSTEOARTHRITIS TYPE: ICD-10-CM

## 2022-11-30 DIAGNOSIS — S93.325D LISFRANC DISLOCATION, LEFT, SUBSEQUENT ENCOUNTER: ICD-10-CM

## 2022-11-30 PROCEDURE — 3044F HG A1C LEVEL LT 7.0%: CPT | Mod: CPTII,S$GLB,, | Performed by: INTERNAL MEDICINE

## 2022-11-30 PROCEDURE — 4010F ACE/ARB THERAPY RXD/TAKEN: CPT | Mod: CPTII,S$GLB,, | Performed by: INTERNAL MEDICINE

## 2022-11-30 PROCEDURE — 3079F DIAST BP 80-89 MM HG: CPT | Mod: CPTII,S$GLB,, | Performed by: INTERNAL MEDICINE

## 2022-11-30 PROCEDURE — 3008F BODY MASS INDEX DOCD: CPT | Mod: CPTII,S$GLB,, | Performed by: INTERNAL MEDICINE

## 2022-11-30 PROCEDURE — 3075F PR MOST RECENT SYSTOLIC BLOOD PRESS GE 130-139MM HG: ICD-10-PCS | Mod: CPTII,S$GLB,, | Performed by: INTERNAL MEDICINE

## 2022-11-30 PROCEDURE — 3044F PR MOST RECENT HEMOGLOBIN A1C LEVEL <7.0%: ICD-10-PCS | Mod: CPTII,S$GLB,, | Performed by: INTERNAL MEDICINE

## 2022-11-30 PROCEDURE — 3079F PR MOST RECENT DIASTOLIC BLOOD PRESSURE 80-89 MM HG: ICD-10-PCS | Mod: CPTII,S$GLB,, | Performed by: INTERNAL MEDICINE

## 2022-11-30 PROCEDURE — 99213 OFFICE O/P EST LOW 20 MIN: CPT | Mod: S$GLB,,, | Performed by: INTERNAL MEDICINE

## 2022-11-30 PROCEDURE — 3075F SYST BP GE 130 - 139MM HG: CPT | Mod: CPTII,S$GLB,, | Performed by: INTERNAL MEDICINE

## 2022-11-30 PROCEDURE — 1159F MED LIST DOCD IN RCRD: CPT | Mod: CPTII,S$GLB,, | Performed by: INTERNAL MEDICINE

## 2022-11-30 PROCEDURE — 3008F PR BODY MASS INDEX (BMI) DOCUMENTED: ICD-10-PCS | Mod: CPTII,S$GLB,, | Performed by: INTERNAL MEDICINE

## 2022-11-30 PROCEDURE — 1159F PR MEDICATION LIST DOCUMENTED IN MEDICAL RECORD: ICD-10-PCS | Mod: CPTII,S$GLB,, | Performed by: INTERNAL MEDICINE

## 2022-11-30 PROCEDURE — 99213 PR OFFICE/OUTPT VISIT, EST, LEVL III, 20-29 MIN: ICD-10-PCS | Mod: S$GLB,,, | Performed by: INTERNAL MEDICINE

## 2022-11-30 PROCEDURE — 4010F PR ACE/ARB THEARPY RXD/TAKEN: ICD-10-PCS | Mod: CPTII,S$GLB,, | Performed by: INTERNAL MEDICINE

## 2022-11-30 RX ORDER — TRAMADOL HYDROCHLORIDE 50 MG/1
TABLET ORAL
Qty: 90 TABLET | Refills: 3 | Status: SHIPPED | OUTPATIENT
Start: 2022-11-30 | End: 2023-03-08 | Stop reason: SDUPTHER

## 2022-11-30 RX ORDER — IBUPROFEN 800 MG/1
800 TABLET ORAL EVERY 6 HOURS PRN
Qty: 90 TABLET | Refills: 1 | Status: SHIPPED | OUTPATIENT
Start: 2022-11-30 | End: 2023-06-13 | Stop reason: SDUPTHER

## 2022-11-30 NOTE — PROGRESS NOTES
Subjective:       Patient ID: Chelsea Grossman is a 62 y.o. female.    Chief Complaint: Hypertension (3 months follow up and med refill)    Here for routine follow up.  Foot has been bothering her more with changes in weather and recent stair climbing.  Tramadol does help and allows her to complete her ADLs.  Takes as much as TID, sometimes only once or twice.  Celebrex does help some without GI side effects she had with ibuprofen.       Hypertension  This is a chronic problem. The problem is controlled (she reports BP at home is usually 120s/70 ). Pertinent negatives include no anxiety, chest pain, headaches, malaise/fatigue, neck pain, palpitations, peripheral edema or shortness of breath. Past treatments include beta blockers, calcium channel blockers, diuretics and ACE inhibitors. There are no compliance problems.    Review of Systems   Constitutional:  Negative for activity change, appetite change, chills, diaphoresis, fatigue, fever, malaise/fatigue and unexpected weight change.   HENT:  Negative for congestion, ear discharge, ear pain, hearing loss, mouth sores, nosebleeds, postnasal drip, rhinorrhea, sinus pressure, sinus pain, sneezing, sore throat, tinnitus, trouble swallowing and voice change.    Eyes:  Negative for photophobia, pain, discharge, redness, itching and visual disturbance.   Respiratory:  Negative for apnea, cough, choking, chest tightness, shortness of breath and wheezing.    Cardiovascular:  Negative for chest pain, palpitations and leg swelling.   Gastrointestinal:  Negative for abdominal distention, abdominal pain, blood in stool, constipation, diarrhea, nausea and vomiting.   Endocrine: Negative for cold intolerance, heat intolerance, polydipsia and polyuria.   Genitourinary:  Negative for decreased urine volume, difficulty urinating, dyspareunia, dysuria, enuresis, frequency, genital sores, hematuria, menstrual problem, pelvic pain, urgency, vaginal bleeding, vaginal discharge and  vaginal pain.   Musculoskeletal:  Positive for arthralgias (left foot pain). Negative for back pain, gait problem, joint swelling, myalgias, neck pain and neck stiffness.   Skin:  Negative for rash and wound.   Allergic/Immunologic: Negative for environmental allergies, food allergies and immunocompromised state.   Neurological:  Negative for dizziness, tremors, seizures, syncope, facial asymmetry, speech difficulty, weakness, light-headedness, numbness and headaches.   Hematological:  Negative for adenopathy. Does not bruise/bleed easily.   Psychiatric/Behavioral:  Negative for confusion, decreased concentration, hallucinations, self-injury, sleep disturbance and suicidal ideas. The patient is not nervous/anxious.      Past Medical History:   Diagnosis Date    Cirrhosis     History of hepatitis C, s/p successful Rx w/ cure (SVR12 - 2020)     History of kidney stones     Hypertension     Hypertensive retinopathy of both eyes     Osteoarthritis     Osteopenia       Past Surgical History:   Procedure Laterality Date    CARPAL TUNNEL RELEASE      FOOT SURGERY Left 2018    HYSTEROTOMY      KIDNEY STONE SURGERY      Left ear surgery      TOTAL ABDOMINAL HYSTERECTOMY W/ BILATERAL SALPINGOOPHORECTOMY      TRIGGER FINGER RELEASE Left     2nd and 3rd fingers       Family History   Problem Relation Age of Onset    Heart disease Mother     Cancer Father         stomach    Breast cancer Sister     Breast cancer Maternal Grandmother 60    Stomach cancer Brother        Social History     Socioeconomic History    Marital status: Single   Occupational History    Occupation: disabled   Tobacco Use    Smoking status: Former     Types: Cigarettes     Quit date: 10/2018     Years since quittin.1    Smokeless tobacco: Never   Substance and Sexual Activity    Alcohol use: Yes    Drug use: No    Sexual activity: Never     Partners: Female   Social History Narrative    Live with sister       Current Outpatient Medications    Medication Sig Dispense Refill    amLODIPine (NORVASC) 10 MG tablet Take 1 tablet (10 mg total) by mouth every evening. 90 tablet 1    carvediloL (COREG) 12.5 MG tablet TAKE 1 TABLET BY MOUTH TWICE DAILY WITH MEALS 180 tablet 0    celecoxib (CELEBREX) 200 MG capsule Take 1 capsule (200 mg total) by mouth once daily. 90 capsule 1    colchicine (COLCRYS) 0.6 mg tablet Take 2 tablets at symptom onset, 1 tablet 2-3 hours later, then 1 tablet daily 9 tablet 3    DULoxetine (CYMBALTA) 60 MG capsule Take 1 capsule (60 mg total) by mouth once daily. 30 capsule 6    estradioL (ESTRACE) 0.01 % (0.1 mg/gram) vaginal cream INSERT 1 2 GRAM INTRAVAGINALLY EVERY NIGHT AT BEDTIME 2 TO 3 TIMES PER WEEK 42.5 g 3    fluticasone propionate (FLONASE) 50 mcg/actuation nasal spray 1 spray (50 mcg total) by Each Nostril route once daily. 48 g 1    hydroCHLOROthiazide (HYDRODIURIL) 25 MG tablet Take 1 tablet (25 mg total) by mouth once daily. 90 tablet 1    multivitamin (THERAGRAN) per tablet Take 1 tablet by mouth once daily.      olmesartan (BENICAR) 40 MG tablet Take 1 tablet (40 mg total) by mouth once daily. 90 tablet 1    pantoprazole (PROTONIX) 40 MG tablet Take 1 tablet (40 mg total) by mouth once daily. 90 tablet 1    ibuprofen (ADVIL,MOTRIN) 800 MG tablet Take 1 tablet (800 mg total) by mouth every 6 (six) hours as needed for Pain. 90 tablet 1    traMADoL (ULTRAM) 50 mg tablet AKE1 TABLET BY MOUTH EVERY 8 HOURS AS NEEDED FOR PAIN Strength: 50 mg 90 tablet 3     No current facility-administered medications for this visit.       Review of patient's allergies indicates:   Allergen Reactions    Sulfa (sulfonamide antibiotics) Itching    Sulfamethoxazole-trimethoprim Itching and Rash     Itchy Rash     Objective:    HPI     Hypertension     Additional comments: 3 months follow up and med refill          Last edited by Hugh Rausch MA on 11/30/2022 11:13 AM.      Blood pressure 136/84, pulse 77, temperature 97.9 °F (36.6 °C),  "temperature source Temporal, height 5' 4" (1.626 m), weight 108.4 kg (239 lb), SpO2 97 %. Body mass index is 41.02 kg/m².   Physical Exam  Vitals and nursing note reviewed.   Constitutional:       General: She is not in acute distress.     Appearance: She is well-developed.      Comments: Obese     Eyes:      General: No scleral icterus.        Right eye: No discharge.         Left eye: No discharge.      Conjunctiva/sclera: Conjunctivae normal.      Comments: Arcus senilus     Neck:      Vascular: No carotid bruit.   Cardiovascular:      Rate and Rhythm: Normal rate and regular rhythm.      Heart sounds: Normal heart sounds. No murmur heard.  Pulmonary:      Effort: Pulmonary effort is normal. No respiratory distress.      Breath sounds: Normal breath sounds. No decreased breath sounds, wheezing, rhonchi or rales.   Abdominal:      General: There is no distension.      Palpations: Abdomen is soft.      Tenderness: There is no abdominal tenderness. There is no guarding or rebound.   Skin:     General: Skin is warm and dry.      Findings: No rash.   Neurological:      Mental Status: She is alert.      Motor: No tremor.   Psychiatric:         Speech: Speech normal.           Assessment:       1. Osteoarthritis of multiple joints, unspecified osteoarthritis type    2. Lisfranc dislocation, left, subsequent encounter        Plan:       Chelsea was seen today for hypertension.    Diagnoses and all orders for this visit:    Osteoarthritis of multiple joints, unspecified osteoarthritis type  -     traMADoL (ULTRAM) 50 mg tablet; AKE1 TABLET BY MOUTH EVERY 8 HOURS AS NEEDED FOR PAIN Strength: 50 mg    Lisfranc dislocation, left, subsequent encounter  Comments:  May have a neuropathic component  Orders:  -     traMADoL (ULTRAM) 50 mg tablet; AKE1 TABLET BY MOUTH EVERY 8 HOURS AS NEEDED FOR PAIN Strength: 50 mg           "

## 2023-02-02 DIAGNOSIS — I10 ESSENTIAL HYPERTENSION: ICD-10-CM

## 2023-02-02 RX ORDER — CARVEDILOL 12.5 MG/1
12.5 TABLET ORAL 2 TIMES DAILY WITH MEALS
Qty: 180 TABLET | Refills: 1 | Status: SHIPPED | OUTPATIENT
Start: 2023-02-02 | End: 2023-06-13 | Stop reason: SDUPTHER

## 2023-03-07 ENCOUNTER — PATIENT MESSAGE (OUTPATIENT)
Dept: RESEARCH | Facility: HOSPITAL | Age: 63
End: 2023-03-07
Payer: MEDICAID

## 2023-03-08 ENCOUNTER — OFFICE VISIT (OUTPATIENT)
Dept: FAMILY MEDICINE | Facility: CLINIC | Age: 63
End: 2023-03-08
Payer: MEDICAID

## 2023-03-08 VITALS
DIASTOLIC BLOOD PRESSURE: 68 MMHG | HEART RATE: 71 BPM | BODY MASS INDEX: 42.17 KG/M2 | SYSTOLIC BLOOD PRESSURE: 120 MMHG | TEMPERATURE: 98 F | OXYGEN SATURATION: 98 % | HEIGHT: 64 IN | WEIGHT: 247 LBS

## 2023-03-08 DIAGNOSIS — K21.9 GASTROESOPHAGEAL REFLUX DISEASE, UNSPECIFIED WHETHER ESOPHAGITIS PRESENT: ICD-10-CM

## 2023-03-08 DIAGNOSIS — M65.322 TRIGGER INDEX FINGER OF LEFT HAND: ICD-10-CM

## 2023-03-08 DIAGNOSIS — S93.325D LISFRANC DISLOCATION, LEFT, SUBSEQUENT ENCOUNTER: ICD-10-CM

## 2023-03-08 DIAGNOSIS — I10 ESSENTIAL HYPERTENSION: Primary | ICD-10-CM

## 2023-03-08 DIAGNOSIS — M15.9 OSTEOARTHRITIS OF MULTIPLE JOINTS, UNSPECIFIED OSTEOARTHRITIS TYPE: ICD-10-CM

## 2023-03-08 DIAGNOSIS — N95.2 ATROPHIC VAGINITIS: ICD-10-CM

## 2023-03-08 PROCEDURE — 3074F PR MOST RECENT SYSTOLIC BLOOD PRESSURE < 130 MM HG: ICD-10-PCS | Mod: CPTII,S$GLB,, | Performed by: INTERNAL MEDICINE

## 2023-03-08 PROCEDURE — 1159F MED LIST DOCD IN RCRD: CPT | Mod: CPTII,S$GLB,, | Performed by: INTERNAL MEDICINE

## 2023-03-08 PROCEDURE — 3078F PR MOST RECENT DIASTOLIC BLOOD PRESSURE < 80 MM HG: ICD-10-PCS | Mod: CPTII,S$GLB,, | Performed by: INTERNAL MEDICINE

## 2023-03-08 PROCEDURE — 99214 PR OFFICE/OUTPT VISIT, EST, LEVL IV, 30-39 MIN: ICD-10-PCS | Mod: S$GLB,,, | Performed by: INTERNAL MEDICINE

## 2023-03-08 PROCEDURE — 3074F SYST BP LT 130 MM HG: CPT | Mod: CPTII,S$GLB,, | Performed by: INTERNAL MEDICINE

## 2023-03-08 PROCEDURE — 3008F BODY MASS INDEX DOCD: CPT | Mod: CPTII,S$GLB,, | Performed by: INTERNAL MEDICINE

## 2023-03-08 PROCEDURE — 4010F PR ACE/ARB THEARPY RXD/TAKEN: ICD-10-PCS | Mod: CPTII,S$GLB,, | Performed by: INTERNAL MEDICINE

## 2023-03-08 PROCEDURE — 3078F DIAST BP <80 MM HG: CPT | Mod: CPTII,S$GLB,, | Performed by: INTERNAL MEDICINE

## 2023-03-08 PROCEDURE — 3008F PR BODY MASS INDEX (BMI) DOCUMENTED: ICD-10-PCS | Mod: CPTII,S$GLB,, | Performed by: INTERNAL MEDICINE

## 2023-03-08 PROCEDURE — 1159F PR MEDICATION LIST DOCUMENTED IN MEDICAL RECORD: ICD-10-PCS | Mod: CPTII,S$GLB,, | Performed by: INTERNAL MEDICINE

## 2023-03-08 PROCEDURE — 99214 OFFICE O/P EST MOD 30 MIN: CPT | Mod: S$GLB,,, | Performed by: INTERNAL MEDICINE

## 2023-03-08 PROCEDURE — 4010F ACE/ARB THERAPY RXD/TAKEN: CPT | Mod: CPTII,S$GLB,, | Performed by: INTERNAL MEDICINE

## 2023-03-08 RX ORDER — FLUTICASONE PROPIONATE 50 MCG
1 SPRAY, SUSPENSION (ML) NASAL DAILY
Qty: 48 G | Refills: 1 | Status: SHIPPED | OUTPATIENT
Start: 2023-03-08 | End: 2023-10-09 | Stop reason: SDUPTHER

## 2023-03-08 RX ORDER — AMLODIPINE BESYLATE 10 MG/1
10 TABLET ORAL NIGHTLY
Qty: 90 TABLET | Refills: 1 | Status: SHIPPED | OUTPATIENT
Start: 2023-03-08 | End: 2023-06-19

## 2023-03-08 RX ORDER — OLMESARTAN MEDOXOMIL 40 MG/1
40 TABLET ORAL DAILY
Qty: 90 TABLET | Refills: 1 | Status: SHIPPED | OUTPATIENT
Start: 2023-03-08 | End: 2023-09-25

## 2023-03-08 RX ORDER — COLCHICINE 0.6 MG/1
TABLET ORAL
Qty: 9 TABLET | Refills: 3 | Status: SHIPPED | OUTPATIENT
Start: 2023-03-08 | End: 2023-06-13 | Stop reason: SDUPTHER

## 2023-03-08 RX ORDER — ESTRADIOL 0.1 MG/G
CREAM VAGINAL
Qty: 42.5 G | Refills: 3 | Status: SHIPPED | OUTPATIENT
Start: 2023-03-08 | End: 2023-06-13 | Stop reason: SDUPTHER

## 2023-03-08 RX ORDER — CELECOXIB 200 MG/1
200 CAPSULE ORAL DAILY
Qty: 90 CAPSULE | Refills: 1 | Status: SHIPPED | OUTPATIENT
Start: 2023-03-08

## 2023-03-08 RX ORDER — TRAMADOL HYDROCHLORIDE 50 MG/1
TABLET ORAL
Qty: 90 TABLET | Refills: 3 | Status: SHIPPED | OUTPATIENT
Start: 2023-03-08 | End: 2023-05-29

## 2023-03-08 RX ORDER — HYDROCHLOROTHIAZIDE 25 MG/1
25 TABLET ORAL DAILY
Qty: 90 TABLET | Refills: 1 | Status: SHIPPED | OUTPATIENT
Start: 2023-03-08 | End: 2023-11-30

## 2023-03-08 RX ORDER — PANTOPRAZOLE SODIUM 40 MG/1
40 TABLET, DELAYED RELEASE ORAL DAILY
Qty: 90 TABLET | Refills: 1 | Status: SHIPPED | OUTPATIENT
Start: 2023-03-08 | End: 2023-07-10

## 2023-03-08 RX ORDER — DULOXETIN HYDROCHLORIDE 60 MG/1
60 CAPSULE, DELAYED RELEASE ORAL DAILY
Qty: 30 CAPSULE | Refills: 6 | Status: SHIPPED | OUTPATIENT
Start: 2023-03-08 | End: 2023-10-09

## 2023-03-08 NOTE — PROGRESS NOTES
Subjective:       Patient ID: Chelsea Grossman is a 62 y.o. female.    Chief Complaint: Hypertension (3 months follow up)    Here for routine follow up; last visit note, most recent available labs, and health maintenance topics reviewed.   She has chronic pain following LisFranc fracture of foot. Tramadol does help and allows her to complete her ADLs.  Takes as much as TID, sometimes only once or twice.  Celebrex does help some without GI side effects she had with ibuprofen.   She has been noticing a clicking sensation in left index finger.     Hypertension  This is a chronic problem. The problem is controlled (she reports BP at home is usually 120s/70 ). Pertinent negatives include no anxiety, chest pain, headaches, malaise/fatigue, neck pain, palpitations, peripheral edema or shortness of breath. Past treatments include beta blockers, calcium channel blockers, diuretics and ACE inhibitors. There are no compliance problems.    Review of Systems   Constitutional:  Negative for activity change, appetite change, chills, diaphoresis, fatigue, fever, malaise/fatigue and unexpected weight change.   HENT:  Negative for congestion, ear discharge, ear pain, hearing loss, mouth sores, nosebleeds, postnasal drip, rhinorrhea, sinus pressure, sinus pain, sneezing, sore throat, tinnitus, trouble swallowing and voice change.    Eyes:  Negative for photophobia, pain, discharge, redness, itching and visual disturbance.   Respiratory:  Negative for apnea, cough, choking, chest tightness, shortness of breath and wheezing.    Cardiovascular:  Negative for chest pain, palpitations and leg swelling.   Gastrointestinal:  Negative for abdominal distention, abdominal pain, blood in stool, constipation, diarrhea, nausea and vomiting.   Endocrine: Negative for cold intolerance, heat intolerance, polydipsia and polyuria.   Genitourinary:  Negative for decreased urine volume, difficulty urinating, dyspareunia, dysuria, enuresis,  frequency, genital sores, hematuria, menstrual problem, pelvic pain, urgency, vaginal bleeding, vaginal discharge and vaginal pain.   Musculoskeletal:  Positive for arthralgias and joint swelling (left foot). Negative for back pain, gait problem, myalgias, neck pain and neck stiffness.   Skin:  Negative for rash and wound.   Allergic/Immunologic: Negative for environmental allergies, food allergies and immunocompromised state.   Neurological:  Negative for dizziness, tremors, seizures, syncope, facial asymmetry, speech difficulty, weakness, light-headedness, numbness and headaches.   Hematological:  Negative for adenopathy. Does not bruise/bleed easily.   Psychiatric/Behavioral:  Negative for confusion, decreased concentration, hallucinations, self-injury, sleep disturbance and suicidal ideas. The patient is not nervous/anxious.      Past Medical History:   Diagnosis Date    Cirrhosis     History of hepatitis C, s/p successful Rx w/ cure (SVR - 2020)     History of kidney stones     Hypertension     Hypertensive retinopathy of both eyes     Osteoarthritis     Osteopenia       Past Surgical History:   Procedure Laterality Date    CARPAL TUNNEL RELEASE      FOOT SURGERY Left 2018    HYSTEROTOMY      KIDNEY STONE SURGERY      Left ear surgery      TOTAL ABDOMINAL HYSTERECTOMY W/ BILATERAL SALPINGOOPHORECTOMY      TRIGGER FINGER RELEASE Left     2nd and 3rd fingers       Family History   Problem Relation Age of Onset    Heart disease Mother     Cancer Father         stomach    Breast cancer Sister     Breast cancer Maternal Grandmother 60    Stomach cancer Brother        Social History     Socioeconomic History    Marital status: Single   Occupational History    Occupation: disabled   Tobacco Use    Smoking status: Former     Types: Cigarettes     Quit date: 10/2018     Years since quittin.4    Smokeless tobacco: Never   Substance and Sexual Activity    Alcohol use: Yes    Drug use: No    Sexual activity:  Never     Partners: Female   Social History Narrative    Live with sister       Current Outpatient Medications   Medication Sig Dispense Refill    carvediloL (COREG) 12.5 MG tablet Take 1 tablet (12.5 mg total) by mouth 2 (two) times daily with meals. 180 tablet 1    ibuprofen (ADVIL,MOTRIN) 800 MG tablet Take 1 tablet (800 mg total) by mouth every 6 (six) hours as needed for Pain. 90 tablet 1    multivitamin (THERAGRAN) per tablet Take 1 tablet by mouth once daily.      amLODIPine (NORVASC) 10 MG tablet Take 1 tablet (10 mg total) by mouth every evening. 90 tablet 1    celecoxib (CELEBREX) 200 MG capsule Take 1 capsule (200 mg total) by mouth once daily. 90 capsule 1    colchicine (COLCRYS) 0.6 mg tablet Take 2 tablets at symptom onset, 1 tablet 2-3 hours later, then 1 tablet daily 9 tablet 3    DULoxetine (CYMBALTA) 60 MG capsule Take 1 capsule (60 mg total) by mouth once daily. 30 capsule 6    estradioL (ESTRACE) 0.01 % (0.1 mg/gram) vaginal cream INSERT 1 2 GRAM INTRAVAGINALLY EVERY NIGHT AT BEDTIME 2 TO 3 TIMES PER WEEK 42.5 g 3    fluticasone propionate (FLONASE) 50 mcg/actuation nasal spray 1 spray (50 mcg total) by Each Nostril route once daily. 48 g 1    hydroCHLOROthiazide (HYDRODIURIL) 25 MG tablet Take 1 tablet (25 mg total) by mouth once daily. 90 tablet 1    olmesartan (BENICAR) 40 MG tablet Take 1 tablet (40 mg total) by mouth once daily. 90 tablet 1    pantoprazole (PROTONIX) 40 MG tablet Take 1 tablet (40 mg total) by mouth once daily. 90 tablet 1    traMADoL (ULTRAM) 50 mg tablet AKE1 TABLET BY MOUTH EVERY 8 HOURS AS NEEDED FOR PAIN Strength: 50 mg 90 tablet 3     No current facility-administered medications for this visit.       Review of patient's allergies indicates:   Allergen Reactions    Sulfa (sulfonamide antibiotics) Itching    Sulfamethoxazole-trimethoprim Itching and Rash     Itchy Rash     Objective:    HPI     Hypertension     Additional comments: 3 months follow up          Last edited  "by Hugh Rausch MA on 3/8/2023 10:05 AM.      Blood pressure 120/68, pulse 71, temperature 97.9 °F (36.6 °C), temperature source Temporal, height 5' 4" (1.626 m), weight 112 kg (247 lb), SpO2 98 %. Body mass index is 42.4 kg/m².   Physical Exam  Vitals and nursing note reviewed.   Constitutional:       General: She is not in acute distress.     Appearance: She is well-developed.      Comments: Obese     Eyes:      General: No scleral icterus.        Right eye: No discharge.         Left eye: No discharge.      Conjunctiva/sclera: Conjunctivae normal.      Comments: Arcus senilus     Neck:      Vascular: No carotid bruit.   Cardiovascular:      Rate and Rhythm: Normal rate and regular rhythm.      Heart sounds: Normal heart sounds. No murmur heard.  Pulmonary:      Effort: Pulmonary effort is normal. No respiratory distress.      Breath sounds: Normal breath sounds. No decreased breath sounds, wheezing, rhonchi or rales.   Abdominal:      General: There is no distension.      Palpations: Abdomen is soft.      Tenderness: There is no abdominal tenderness. There is no guarding or rebound.   Musculoskeletal:      Comments: Left index trigger finger     Skin:     General: Skin is warm and dry.      Findings: No rash.   Neurological:      Mental Status: She is alert.      Motor: No tremor.   Psychiatric:         Speech: Speech normal.           Assessment:       1. Essential hypertension    2. Osteoarthritis of multiple joints, unspecified osteoarthritis type    3. Gastroesophageal reflux disease, unspecified whether esophagitis present    4. Lisfranc dislocation, left, subsequent encounter    5. Atrophic vaginitis    6. Trigger index finger of left hand        Plan:       Chelsea was seen today for hypertension.    Diagnoses and all orders for this visit:    Essential hypertension  -     hydroCHLOROthiazide (HYDRODIURIL) 25 MG tablet; Take 1 tablet (25 mg total) by mouth once daily.  -     amLODIPine (NORVASC) 10 MG " tablet; Take 1 tablet (10 mg total) by mouth every evening.  -     olmesartan (BENICAR) 40 MG tablet; Take 1 tablet (40 mg total) by mouth once daily.  -     Lipid Panel; Future  -     Urinalysis; Future    Osteoarthritis of multiple joints, unspecified osteoarthritis type  -     celecoxib (CELEBREX) 200 MG capsule; Take 1 capsule (200 mg total) by mouth once daily.  -     traMADoL (ULTRAM) 50 mg tablet; AKE1 TABLET BY MOUTH EVERY 8 HOURS AS NEEDED FOR PAIN Strength: 50 mg    Gastroesophageal reflux disease, unspecified whether esophagitis present  -     pantoprazole (PROTONIX) 40 MG tablet; Take 1 tablet (40 mg total) by mouth once daily.    Lisfranc dislocation, left, subsequent encounter  Comments:  May have a neuropathic component  Orders:  -     DULoxetine (CYMBALTA) 60 MG capsule; Take 1 capsule (60 mg total) by mouth once daily.  -     traMADoL (ULTRAM) 50 mg tablet; AKE1 TABLET BY MOUTH EVERY 8 HOURS AS NEEDED FOR PAIN Strength: 50 mg    Atrophic vaginitis  -     estradioL (ESTRACE) 0.01 % (0.1 mg/gram) vaginal cream; INSERT 1 2 GRAM INTRAVAGINALLY EVERY NIGHT AT BEDTIME 2 TO 3 TIMES PER WEEK    Trigger index finger of left hand  -     Ambulatory referral/consult to Orthopedics; Future    Other orders  -     colchicine (COLCRYS) 0.6 mg tablet; Take 2 tablets at symptom onset, 1 tablet 2-3 hours later, then 1 tablet daily  -     fluticasone propionate (FLONASE) 50 mcg/actuation nasal spray; 1 spray (50 mcg total) by Each Nostril route once daily.

## 2023-03-13 ENCOUNTER — TELEPHONE (OUTPATIENT)
Dept: ORTHOPEDICS | Facility: CLINIC | Age: 63
End: 2023-03-13
Payer: MEDICAID

## 2023-03-13 NOTE — TELEPHONE ENCOUNTER
----- Message from Hemal Donahue sent at 3/13/2023 10:07 AM CDT -----  Regarding: past medicaid pt wants to [facility], call pt   past medicaid pt wants to [facility], call pt

## 2023-03-16 ENCOUNTER — RESEARCH ENCOUNTER (OUTPATIENT)
Dept: RESEARCH | Facility: HOSPITAL | Age: 63
End: 2023-03-16
Payer: MEDICAID

## 2023-03-16 ENCOUNTER — PATIENT MESSAGE (OUTPATIENT)
Dept: RESEARCH | Facility: HOSPITAL | Age: 63
End: 2023-03-16
Payer: MEDICAID

## 2023-03-16 DIAGNOSIS — M79.642 LEFT HAND PAIN: Primary | ICD-10-CM

## 2023-03-16 NOTE — PROGRESS NOTES
RESEARCH STUDY CONSENT ENCOUNTER  ORGAN TRANSPLANT  [facility] MATTHEW PALACIOS    Study Title: Role of Tumor-Induced Immune Tolerance in the Patient Response to Locoregional Therapy: Implications in Assessment Risk of Hepatocellular Carcinoma Recurrence Following Liver Transplantation    IRB #: 2016.131.B    IRB Approval Date: 6/8/2016    : Bossman Hernandez MD  Sub-investigator: Stu Arteaga, PhD    Patient Number: [id number]    Patient was scheduled for labs on (date: 3/16/2023).     This study is an observational study to evaluate patients receiving transarterial chemoembolization (TACE) or transarterial radioembolization (TARE) therapy to define the link between tumor-elicited peripheral cell populations, and the risk of hepatocellular carcinoma (HCC) recurrence before orthotopic liver transplantation (OLT). [IRB 2016.131.B] Patient is being consented as a control for this study and participation in this study will end after specimen collection. This patient is NOT undergoing TACE or TARE, and DOES NOT have HCC.     Present for discussion: patient Chelsea Grossman, consenter Tyesha Matson, witness Enmanuel Hairston  Is LAR Consenting for Subject: YES/NO: no    Prior to the Informed Consent (IC) being signed, or any protocol required testing, procedure, or intervention being performed, the following was done or discussed with Chelsea Grossman:    Purpose of the Study, Qualifications to Participate: YES/NO: yes  Study Design, Schedule and Procedures: YES/NO: yes  Risks, Benefits, Alternative Treatments, Compensation and Costs: YES/NO: yes  Confidentiality and HIPAA Authorization for Release of Medical Records for the research trial/subject's right/study related injury: YES/NO: yes  Study related contact information: YES/NO: yes  Voluntary Participation and Withdrawal from the research trial at any time: YES/NO: yes  Patient has been offered the opportunity to ask questions regarding the study and all  questions were answered satisfactorily: YES/NO: yes  Patient verbalizes understanding of the study/procedures and agrees to participate: YES/NO: yes  CRC and PI contact information given to patient:YES/NO: yes  Verification the ICF was appropriately completed: YES/NO: yes  Signed copy given to patient: YES/NO: yes  Copy in patient's chart and original uploaded to Epic: YES/NO: yes    Research staff present during consent: consenter Tyesha Matson, witness Enmanuel Hairston      No study procedures (I.e. specimen collection) were performed before the informed consent was signed.     Tyesha Matson  Admin Research- Liver Transplant

## 2023-03-17 DIAGNOSIS — Z00.6 RESEARCH STUDY PATIENT: Primary | ICD-10-CM

## 2023-03-21 ENCOUNTER — OFFICE VISIT (OUTPATIENT)
Dept: HEPATOLOGY | Facility: CLINIC | Age: 63
End: 2023-03-21
Payer: MEDICAID

## 2023-03-21 ENCOUNTER — RESEARCH ENCOUNTER (OUTPATIENT)
Dept: RESEARCH | Facility: HOSPITAL | Age: 63
End: 2023-03-21
Payer: MEDICAID

## 2023-03-21 ENCOUNTER — LAB VISIT (OUTPATIENT)
Dept: LAB | Facility: HOSPITAL | Age: 63
End: 2023-03-21
Attending: PHYSICIAN ASSISTANT
Payer: MEDICAID

## 2023-03-21 ENCOUNTER — HOSPITAL ENCOUNTER (OUTPATIENT)
Dept: RADIOLOGY | Facility: HOSPITAL | Age: 63
Discharge: HOME | End: 2023-03-21
Attending: PHYSICIAN ASSISTANT
Payer: MEDICAID

## 2023-03-21 VITALS — HEIGHT: 63 IN | BODY MASS INDEX: 44.86 KG/M2 | WEIGHT: 253.19 LBS

## 2023-03-21 DIAGNOSIS — K74.60 HEPATIC CIRRHOSIS, UNSPECIFIED HEPATIC CIRRHOSIS TYPE, UNSPECIFIED WHETHER ASCITES PRESENT: ICD-10-CM

## 2023-03-21 DIAGNOSIS — Z00.6 RESEARCH STUDY PATIENT: ICD-10-CM

## 2023-03-21 DIAGNOSIS — K74.60 HEPATIC CIRRHOSIS, UNSPECIFIED HEPATIC CIRRHOSIS TYPE, UNSPECIFIED WHETHER ASCITES PRESENT: Primary | ICD-10-CM

## 2023-03-21 LAB
AFP SERPL-MCNC: 8.1 NG/ML (ref 0–8.4)
ALBUMIN SERPL BCP-MCNC: 4 G/DL (ref 3.5–5.2)
ALP SERPL-CCNC: 70 U/L (ref 55–135)
ALT SERPL W/O P-5'-P-CCNC: 30 U/L (ref 10–44)
ANION GAP SERPL CALC-SCNC: 9 MMOL/L (ref 8–16)
AST SERPL-CCNC: 23 U/L (ref 10–40)
BASOPHILS # BLD AUTO: 0.05 K/UL (ref 0–0.2)
BASOPHILS NFR BLD: 1 % (ref 0–1.9)
BILIRUB SERPL-MCNC: 0.4 MG/DL (ref 0.1–1)
BUN SERPL-MCNC: 16 MG/DL (ref 8–23)
CALCIUM SERPL-MCNC: 9.9 MG/DL (ref 8.7–10.5)
CHLORIDE SERPL-SCNC: 108 MMOL/L (ref 95–110)
CO2 SERPL-SCNC: 22 MMOL/L (ref 23–29)
CREAT SERPL-MCNC: 0.9 MG/DL (ref 0.5–1.4)
DIFFERENTIAL METHOD: ABNORMAL
EOSINOPHIL # BLD AUTO: 0.1 K/UL (ref 0–0.5)
EOSINOPHIL NFR BLD: 2 % (ref 0–8)
ERYTHROCYTE [DISTWIDTH] IN BLOOD BY AUTOMATED COUNT: 13 % (ref 11.5–14.5)
EST. GFR  (NO RACE VARIABLE): >60 ML/MIN/1.73 M^2
GLUCOSE SERPL-MCNC: 120 MG/DL (ref 70–110)
HCT VFR BLD AUTO: 38.7 % (ref 37–48.5)
HGB BLD-MCNC: 12.6 G/DL (ref 12–16)
IMM GRANULOCYTES # BLD AUTO: 0.01 K/UL (ref 0–0.04)
IMM GRANULOCYTES NFR BLD AUTO: 0.2 % (ref 0–0.5)
INR PPP: 1.1 (ref 0.8–1.2)
LYMPHOCYTES # BLD AUTO: 2.6 K/UL (ref 1–4.8)
LYMPHOCYTES NFR BLD: 51.1 % (ref 18–48)
MCH RBC QN AUTO: 31.5 PG (ref 27–31)
MCHC RBC AUTO-ENTMCNC: 32.6 G/DL (ref 32–36)
MCV RBC AUTO: 97 FL (ref 82–98)
MONOCYTES # BLD AUTO: 0.3 K/UL (ref 0.3–1)
MONOCYTES NFR BLD: 6.5 % (ref 4–15)
NEUTROPHILS # BLD AUTO: 2 K/UL (ref 1.8–7.7)
NEUTROPHILS NFR BLD: 39.2 % (ref 38–73)
NRBC BLD-RTO: 0 /100 WBC
PLATELET # BLD AUTO: 220 K/UL (ref 150–450)
PMV BLD AUTO: 9.6 FL (ref 9.2–12.9)
POTASSIUM SERPL-SCNC: 4 MMOL/L (ref 3.5–5.1)
PROT SERPL-MCNC: 7.7 G/DL (ref 6–8.4)
PROTHROMBIN TIME: 11.4 SEC (ref 9–12.5)
RBC # BLD AUTO: 4 M/UL (ref 4–5.4)
RESEARCH LAB: NORMAL
SODIUM SERPL-SCNC: 139 MMOL/L (ref 136–145)
WBC # BLD AUTO: 5.11 K/UL (ref 3.9–12.7)

## 2023-03-21 PROCEDURE — 99999 PR PBB SHADOW E&M-EST. PATIENT-LVL III: CPT | Mod: PBBFAC,,, | Performed by: PHYSICIAN ASSISTANT

## 2023-03-21 PROCEDURE — 4010F PR ACE/ARB THEARPY RXD/TAKEN: ICD-10-PCS | Mod: CPTII,,, | Performed by: PHYSICIAN ASSISTANT

## 2023-03-21 PROCEDURE — 1159F MED LIST DOCD IN RCRD: CPT | Mod: CPTII,,, | Performed by: PHYSICIAN ASSISTANT

## 2023-03-21 PROCEDURE — 1160F RVW MEDS BY RX/DR IN RCRD: CPT | Mod: CPTII,,, | Performed by: PHYSICIAN ASSISTANT

## 2023-03-21 PROCEDURE — 3008F BODY MASS INDEX DOCD: CPT | Mod: CPTII,,, | Performed by: PHYSICIAN ASSISTANT

## 2023-03-21 PROCEDURE — 36415 COLL VENOUS BLD VENIPUNCTURE: CPT | Performed by: SURGERY

## 2023-03-21 PROCEDURE — 76705 ECHO EXAM OF ABDOMEN: CPT | Mod: TC,PO

## 2023-03-21 PROCEDURE — 1160F PR REVIEW ALL MEDS BY PRESCRIBER/CLIN PHARMACIST DOCUMENTED: ICD-10-PCS | Mod: CPTII,,, | Performed by: PHYSICIAN ASSISTANT

## 2023-03-21 PROCEDURE — 76705 ECHO EXAM OF ABDOMEN: CPT | Mod: 26,,, | Performed by: RADIOLOGY

## 2023-03-21 PROCEDURE — 3008F PR BODY MASS INDEX (BMI) DOCUMENTED: ICD-10-PCS | Mod: CPTII,,, | Performed by: PHYSICIAN ASSISTANT

## 2023-03-21 PROCEDURE — 99213 PR OFFICE/OUTPT VISIT, EST, LEVL III, 20-29 MIN: ICD-10-PCS | Mod: S$PBB,,, | Performed by: PHYSICIAN ASSISTANT

## 2023-03-21 PROCEDURE — 85025 COMPLETE CBC W/AUTO DIFF WBC: CPT | Performed by: PHYSICIAN ASSISTANT

## 2023-03-21 PROCEDURE — 76705 US ABDOMEN LIMITED: ICD-10-PCS | Mod: 26,,, | Performed by: RADIOLOGY

## 2023-03-21 PROCEDURE — 4010F ACE/ARB THERAPY RXD/TAKEN: CPT | Mod: CPTII,,, | Performed by: PHYSICIAN ASSISTANT

## 2023-03-21 PROCEDURE — 99999 PR PBB SHADOW E&M-EST. PATIENT-LVL III: ICD-10-PCS | Mod: PBBFAC,,, | Performed by: PHYSICIAN ASSISTANT

## 2023-03-21 PROCEDURE — 82105 ALPHA-FETOPROTEIN SERUM: CPT | Performed by: PHYSICIAN ASSISTANT

## 2023-03-21 PROCEDURE — 99213 OFFICE O/P EST LOW 20 MIN: CPT | Mod: PBBFAC,25,PO | Performed by: PHYSICIAN ASSISTANT

## 2023-03-21 PROCEDURE — 99213 OFFICE O/P EST LOW 20 MIN: CPT | Mod: S$PBB,,, | Performed by: PHYSICIAN ASSISTANT

## 2023-03-21 PROCEDURE — 1159F PR MEDICATION LIST DOCUMENTED IN MEDICAL RECORD: ICD-10-PCS | Mod: CPTII,,, | Performed by: PHYSICIAN ASSISTANT

## 2023-03-21 PROCEDURE — 85610 PROTHROMBIN TIME: CPT | Performed by: PHYSICIAN ASSISTANT

## 2023-03-21 PROCEDURE — 80053 COMPREHEN METABOLIC PANEL: CPT | Performed by: PHYSICIAN ASSISTANT

## 2023-03-21 NOTE — PROGRESS NOTES
RESEARCH STUDY SPECIMEN COLLECTION ENCOUNTER  ORGAN TRANSPLANT  [facility] MATTHEW PALACIOS    Study Title: Role of Tumor-Induced Immune Tolerance in the Patient Response to Locoregional Therapy: Implications in Assessment Risk of Hepatocellular Carcinoma Recurrence Following Liver Transplantation    IRB #: 2016.131.B    IRB Approval Date: 6/8/2016    : Bossman Hernandez MD  Sub-investigator: Stu Arteaga, PhD    Patient Number: [id number]    In accordance with the study protocol, Research Lab orders were placed and specimens were collected on (date: 3/21/2023) in:  [facility] VNP: YES/NO: no  [facility]: YES/NO: no  [facility] IM: YES/NO: no  [facility]  Admin Research- Liver Transplant

## 2023-03-21 NOTE — PROGRESS NOTES
HEPATOLOGY CLINIC VISIT NOTE   CHIEF COMPLAINT: HCV Cirrhosis    HISTORY       This is a 62 y.o. Black or  female with well compensated cirrhosis due to HCV (now treated and cured) here for f/u.     Interval history:  Returns w/ routine cirrhosis labs / u/s:  U/S today - no liver lesions or ascites. Spleen 9cm  Labs today - stable CBC, INR. Pending CMP, AFP    Current symptoms of hepatic decompensation:  Ascites / JUANA - NO  Diuretic use - HCTZ 25 from PCP  TBili elevation - NO  HE / confusion / memory problems - NO  EV bleed / hematemesis melena - NO      Cirrhosis history:  FibroScan 8/23/19 - kPa 13.4, F4 (, no steatosis) // HCV FibroSURE 10/2019 - A2-3, F4  Well compensated  No evidence of portal HTN  MELD 3/2022 - 7, today's labs pending     Cirrhosis maintenance:  - HCC screening - u/s ok. AFP pending  - Varices screening - not indicated  - (+) immunity to HAV  - s/p vaccine [id number]                      HCV history:  S/p 12 weeks epclusa w/ SVR12 (cure) - 4/2020  - Genotype 1a //  Treatment naive prior to epclusa    PMH, PSH, SOCIAL HX, FAMILY HX      Reviewed in Epic  FAMILY HX: neg for liver diease  Alcohol - (+) alcohol use in past, none regular now.  Drugs - remote history 1970s      ROS: as per HPI  Recently got dentures - still adjusting  (+) trigger finger - left index finger clicking; has ortho appt this week      PHYSICAL EXAM:  Friendly Black or  female, in no acute distress; alert and oriented to person, place and time  HEENT: Sclerae anicteric.   LUNGS: Normal respiratory effort.   ABDOMEN: Flat, soft, nontender.   SKIN: Warm and dry. No jaundice, No obvious rashes.   EXTREMITIES: No lower extremity edema  NEURO/PSYCH: Normal gate. Memory intact. Thought and speech pattern appropriate. Behavior normal. No depression or anxiety noted.    PERTINENT DIAGNOSTIC RESULTS      Lab Results   Component Value Date    WBC 5.11 03/21/2023    HGB 12.6 03/21/2023    PLT  220 03/21/2023     Lab Results   Component Value Date    INR 1.1 03/21/2023     Lab Results   Component Value Date    AST 22 09/13/2022    ALT 34 09/13/2022    BILITOT 0.4 09/13/2022    ALBUMIN 3.7 09/13/2022    ALKPHOS 82 09/13/2022    CREATININE 1.0 09/13/2022    BUN 21 09/13/2022     09/13/2022    K 4.0 09/13/2022    AFP 7.1 09/13/2022       US Abdomen Limited - 3/21/23  CLINICAL HISTORY:  Unspecified cirrhosis of liver     TECHNIQUE:  Limited ultrasound of the right upper quadrant of the abdomen (including pancreas, liver, gallbladder, common bile duct, and right kidney) was performed.     COMPARISON:  09/13/2022     FINDINGS:  The pancreas is unremarkable.  Two gallstones identified measuring up to 9 mm.  Sonographic Black sign is negative.  The common bile duct is normal caliber at 6 mm.  The IVC is normal caliber.  The liver has a slightly nodular contour in keeping with cirrhosis.  There is no focal hepatic lesion.  The spleen is normal size at 9 cm.  The are the left knee the there is a 1.1 cm simple cyst of the upper pole of the right kidney.  There is no ascites     Impression:   Cirrhotic appearance of the liver without suspicious focal lesion.   Cholelithiasis.  Small simple right renal cyst.    ASSESSMENT        62 y.o. Black or  female with:  1. WELL COMPENSATED CIRRHOSIS   -- based on FibroScan and FibroSURE  -- MELD 7  -- HCC screening - u/s okay, AFP pending    2. HISTORY HEPATITIS C - treated / cured  -- s/p 12 weeks epclusa w/ SVR12 (cure) - 4/2020      PLAN      1. Await pending CMP, AFP. CBC, CMP, INR, AFP, U/S - 9/2023    RTC yearly or earlier PRN      __________________________________________________________________    Duration of encounter: 22 min  This includes face-to-face time and non face-to-face time preparing to see the patient (eg, review of tests), obtaining and/or reviewing separately obtained history, documenting clinical information in the electronic or  other health record, independently interpreting resultsand communicating results to the patient/family/caregiver, or care coordination.

## 2023-03-22 ENCOUNTER — TELEPHONE (OUTPATIENT)
Dept: HEPATOLOGY | Facility: CLINIC | Age: 63
End: 2023-03-22
Payer: MEDICAID

## 2023-03-22 NOTE — TELEPHONE ENCOUNTER
3/21 labs reviewed  Pls tell pt the rest of her labs from 3/21 came back fine  Liver working well  Liver cancer screening lab normal    Next labs, u/s due 9/2023 as planned    thanks

## 2023-03-28 ENCOUNTER — HOSPITAL ENCOUNTER (OUTPATIENT)
Dept: RADIOLOGY | Facility: HOSPITAL | Age: 63
Discharge: HOME | End: 2023-03-28
Attending: ORTHOPAEDIC SURGERY
Payer: MEDICAID

## 2023-03-28 ENCOUNTER — OFFICE VISIT (OUTPATIENT)
Dept: ORTHOPEDICS | Facility: CLINIC | Age: 63
End: 2023-03-28
Payer: MEDICAID

## 2023-03-28 VITALS — BODY MASS INDEX: 44.84 KG/M2 | WEIGHT: 253.06 LBS | HEIGHT: 63 IN

## 2023-03-28 DIAGNOSIS — Z01.818 PREOP TESTING: ICD-10-CM

## 2023-03-28 DIAGNOSIS — M79.642 LEFT HAND PAIN: ICD-10-CM

## 2023-03-28 DIAGNOSIS — M65.322 TRIGGER INDEX FINGER OF LEFT HAND: Primary | ICD-10-CM

## 2023-03-28 PROCEDURE — 1159F MED LIST DOCD IN RCRD: CPT | Mod: CPTII,,, | Performed by: ORTHOPAEDIC SURGERY

## 2023-03-28 PROCEDURE — 99999 PR PBB SHADOW E&M-EST. PATIENT-LVL III: CPT | Mod: PBBFAC,,, | Performed by: ORTHOPAEDIC SURGERY

## 2023-03-28 PROCEDURE — 99999 PR PBB SHADOW E&M-EST. PATIENT-LVL III: ICD-10-PCS | Mod: PBBFAC,,, | Performed by: ORTHOPAEDIC SURGERY

## 2023-03-28 PROCEDURE — 73130 XR HAND COMPLETE 3 VIEW LEFT: ICD-10-PCS | Mod: 26,LT,, | Performed by: RADIOLOGY

## 2023-03-28 PROCEDURE — 73130 X-RAY EXAM OF HAND: CPT | Mod: TC,PN,LT

## 2023-03-28 PROCEDURE — 1159F PR MEDICATION LIST DOCUMENTED IN MEDICAL RECORD: ICD-10-PCS | Mod: CPTII,,, | Performed by: ORTHOPAEDIC SURGERY

## 2023-03-28 PROCEDURE — 3008F BODY MASS INDEX DOCD: CPT | Mod: CPTII,,, | Performed by: ORTHOPAEDIC SURGERY

## 2023-03-28 PROCEDURE — 73130 X-RAY EXAM OF HAND: CPT | Mod: 26,LT,, | Performed by: RADIOLOGY

## 2023-03-28 PROCEDURE — 4010F ACE/ARB THERAPY RXD/TAKEN: CPT | Mod: CPTII,,, | Performed by: ORTHOPAEDIC SURGERY

## 2023-03-28 PROCEDURE — 3008F PR BODY MASS INDEX (BMI) DOCUMENTED: ICD-10-PCS | Mod: CPTII,,, | Performed by: ORTHOPAEDIC SURGERY

## 2023-03-28 PROCEDURE — 4010F PR ACE/ARB THEARPY RXD/TAKEN: ICD-10-PCS | Mod: CPTII,,, | Performed by: ORTHOPAEDIC SURGERY

## 2023-03-28 PROCEDURE — 99213 PR OFFICE/OUTPT VISIT, EST, LEVL III, 20-29 MIN: ICD-10-PCS | Mod: S$PBB,,, | Performed by: ORTHOPAEDIC SURGERY

## 2023-03-28 PROCEDURE — 99213 OFFICE O/P EST LOW 20 MIN: CPT | Mod: PBBFAC,PN | Performed by: ORTHOPAEDIC SURGERY

## 2023-03-28 PROCEDURE — 99213 OFFICE O/P EST LOW 20 MIN: CPT | Mod: S$PBB,,, | Performed by: ORTHOPAEDIC SURGERY

## 2023-03-28 RX ORDER — MUPIROCIN 20 MG/G
OINTMENT TOPICAL
Status: CANCELLED | OUTPATIENT
Start: 2023-03-28

## 2023-03-28 NOTE — PROGRESS NOTES
3/28/2023    Past Medical History:   Diagnosis Date    Cirrhosis     History of hepatitis C, s/p successful Rx w/ cure (SVR12 - 4/2020)     History of kidney stones     Hypertension     Hypertensive retinopathy of both eyes     Osteoarthritis     Osteopenia        Past Surgical History:   Procedure Laterality Date    CARPAL TUNNEL RELEASE      FOOT SURGERY Left 12/18/2018    HYSTEROTOMY      KIDNEY STONE SURGERY      Left ear surgery      TOTAL ABDOMINAL HYSTERECTOMY W/ BILATERAL SALPINGOOPHORECTOMY      TRIGGER FINGER RELEASE Left     2nd and 3rd fingers       Current Outpatient Medications   Medication Sig    amLODIPine (NORVASC) 10 MG tablet Take 1 tablet (10 mg total) by mouth every evening.    carvediloL (COREG) 12.5 MG tablet Take 1 tablet (12.5 mg total) by mouth 2 (two) times daily with meals.    celecoxib (CELEBREX) 200 MG capsule Take 1 capsule (200 mg total) by mouth once daily.    colchicine (COLCRYS) 0.6 mg tablet Take 2 tablets at symptom onset, 1 tablet 2-3 hours later, then 1 tablet daily    DULoxetine (CYMBALTA) 60 MG capsule Take 1 capsule (60 mg total) by mouth once daily.    estradioL (ESTRACE) 0.01 % (0.1 mg/gram) vaginal cream INSERT 1 2 GRAM INTRAVAGINALLY EVERY NIGHT AT BEDTIME 2 TO 3 TIMES PER WEEK    fluticasone propionate (FLONASE) 50 mcg/actuation nasal spray 1 spray (50 mcg total) by Each Nostril route once daily.    hydroCHLOROthiazide (HYDRODIURIL) 25 MG tablet Take 1 tablet (25 mg total) by mouth once daily.    ibuprofen (ADVIL,MOTRIN) 800 MG tablet Take 1 tablet (800 mg total) by mouth every 6 (six) hours as needed for Pain.    multivitamin (THERAGRAN) per tablet Take 1 tablet by mouth once daily.    olmesartan (BENICAR) 40 MG tablet Take 1 tablet (40 mg total) by mouth once daily.    pantoprazole (PROTONIX) 40 MG tablet Take 1 tablet (40 mg total) by mouth once daily.    traMADoL (ULTRAM) 50 mg tablet AKE1 TABLET BY MOUTH EVERY 8 HOURS AS NEEDED FOR PAIN Strength: 50 mg     No  current facility-administered medications for this visit.       Review of patient's allergies indicates:   Allergen Reactions    Sulfa (sulfonamide antibiotics) Itching    Sulfamethoxazole-trimethoprim Itching and Rash     Itchy Rash       Family History   Problem Relation Age of Onset    Heart disease Mother     Cancer Father         stomach    Breast cancer Sister     Breast cancer Maternal Grandmother 60    Stomach cancer Brother        Social History     Socioeconomic History    Marital status: Single   Occupational History    Occupation: disabled   Tobacco Use    Smoking status: Former     Types: Cigarettes     Quit date: 10/2018     Years since quittin.4    Smokeless tobacco: Never   Substance and Sexual Activity    Alcohol use: Yes    Drug use: No    Sexual activity: Never     Partners: Female   Social History Narrative    Live with sister       Chief Complaint:   Chief Complaint   Patient presents with    Left Hand - Pain, Initial Assessment     Left hand Trigger Finger (Index finger locks up, per patient). Onset about 2 weeks ago without any known injury/fall. Patient reports pain in palmar aspect of hand, swelling, and 6/10 pain to index finger. Pain is worse with use.     Hand Pain     H/o CTR to left wrist a few years ago (patient believes Dr. Rothman did procedure).          History of present illness:    This is a 62 y.o. year old female who complains of patient is being seen today with left hand pain pain involves the index finger which is triggering on her on said has been about 2 weeks no history of any injury pain level 6/10 patient has a history of carpal tunnel release in the left hand past    Review of Systems:    Constitution: Denies chills, fever, and sweats.  HENT: Denies headaches or blurry vision.  Cardiovascular: Denies chest pain or irregular heart beat.  Respiratory: Denies cough or shortness of breath.  Gastrointestinal: Denies abdominal pain, nausea, or vomiting.  Musculoskeletal:   "Denies muscle cramps.  Neurological: Denies dizziness or focal weakness.  Psychiatric/Behavioral: Normal mental status.  Hematologic/Lymphatic: Denies bleeding problem or easy bruising/bleeding.  Skin: Denies rash or suspicious lesions.    Examination:    Vital Signs:    Vitals:    03/28/23 1416   Weight: 114.8 kg (253 lb 1.4 oz)   Height: 5' 3" (1.6 m)   PainSc:   6   PainLoc: Hand       Body mass index is 44.83 kg/m².    This a well-developed, well nourished patient in no acute distress.    Alert and oriented x 3 and cooperative to examination.       Physical Exam:  Left hand-patient has pain over the A1 pulley and positive triggering of the index finger    Imaging:  X-ray of the left hand was negative       Assessment: Trigger index finger of left hand        Plan:  I discussed with the patient the options which would be possible injection of the left index finger with Kenalog.  I discussed with her the conservative therapy of possibly an injection to the index finger patient does not want to be treated conservatively she wants to go ahead and have it released.  We will go ahead and schedule her for a beer block to the left upper extremity for trigger finger release index finger      DISCLAIMER: This note may have been dictated using voice recognition software and may contain grammatical errors.     NOTE: Consult report sent to referring provider via EPIC EMR.    "

## 2023-04-10 ENCOUNTER — HOSPITAL ENCOUNTER (OUTPATIENT)
Dept: PREADMISSION TESTING | Facility: HOSPITAL | Age: 63
Discharge: HOME | End: 2023-04-10
Attending: ORTHOPAEDIC SURGERY
Payer: MEDICAID

## 2023-04-10 ENCOUNTER — HOSPITAL ENCOUNTER (OUTPATIENT)
Dept: RADIOLOGY | Facility: HOSPITAL | Age: 63
Discharge: HOME | End: 2023-04-10
Attending: ORTHOPAEDIC SURGERY
Payer: MEDICAID

## 2023-04-10 VITALS
OXYGEN SATURATION: 96 % | HEART RATE: 82 BPM | SYSTOLIC BLOOD PRESSURE: 123 MMHG | TEMPERATURE: 98 F | RESPIRATION RATE: 18 BRPM | BODY MASS INDEX: 44.83 KG/M2 | DIASTOLIC BLOOD PRESSURE: 80 MMHG | WEIGHT: 253 LBS | HEIGHT: 63 IN

## 2023-04-10 DIAGNOSIS — Z01.818 PREOP TESTING: ICD-10-CM

## 2023-04-10 DIAGNOSIS — M65.322 TRIGGER INDEX FINGER OF LEFT HAND: ICD-10-CM

## 2023-04-10 LAB
ALBUMIN SERPL BCP-MCNC: 4.2 G/DL (ref 3.5–5.2)
ALP SERPL-CCNC: 66 U/L (ref 55–135)
ALT SERPL W/O P-5'-P-CCNC: 27 U/L (ref 10–44)
ANION GAP SERPL CALC-SCNC: 7 MMOL/L (ref 8–16)
AST SERPL-CCNC: 24 U/L (ref 10–40)
BASOPHILS # BLD AUTO: 0.03 K/UL (ref 0–0.2)
BASOPHILS NFR BLD: 0.5 % (ref 0–1.9)
BILIRUB SERPL-MCNC: 0.3 MG/DL (ref 0.1–1)
BUN SERPL-MCNC: 22 MG/DL (ref 8–23)
CALCIUM SERPL-MCNC: 10.3 MG/DL (ref 8.7–10.5)
CHLORIDE SERPL-SCNC: 109 MMOL/L (ref 95–110)
CO2 SERPL-SCNC: 25 MMOL/L (ref 23–29)
CREAT SERPL-MCNC: 1 MG/DL (ref 0.5–1.4)
DIFFERENTIAL METHOD: ABNORMAL
EOSINOPHIL # BLD AUTO: 0.1 K/UL (ref 0–0.5)
EOSINOPHIL NFR BLD: 1.6 % (ref 0–8)
ERYTHROCYTE [DISTWIDTH] IN BLOOD BY AUTOMATED COUNT: 12.8 % (ref 11.5–14.5)
EST. GFR  (NO RACE VARIABLE): >60 ML/MIN/1.73 M^2
GLUCOSE SERPL-MCNC: 99 MG/DL (ref 70–110)
HCT VFR BLD AUTO: 37.7 % (ref 37–48.5)
HGB BLD-MCNC: 12.3 G/DL (ref 12–16)
IMM GRANULOCYTES # BLD AUTO: 0.01 K/UL (ref 0–0.04)
IMM GRANULOCYTES NFR BLD AUTO: 0.2 % (ref 0–0.5)
LYMPHOCYTES # BLD AUTO: 2.6 K/UL (ref 1–4.8)
LYMPHOCYTES NFR BLD: 48.2 % (ref 18–48)
MCH RBC QN AUTO: 30.9 PG (ref 27–31)
MCHC RBC AUTO-ENTMCNC: 32.6 G/DL (ref 32–36)
MCV RBC AUTO: 95 FL (ref 82–98)
MONOCYTES # BLD AUTO: 0.4 K/UL (ref 0.3–1)
MONOCYTES NFR BLD: 6.6 % (ref 4–15)
NEUTROPHILS # BLD AUTO: 2.4 K/UL (ref 1.8–7.7)
NEUTROPHILS NFR BLD: 42.9 % (ref 38–73)
NRBC BLD-RTO: 0 /100 WBC
PLATELET # BLD AUTO: 214 K/UL (ref 150–450)
PMV BLD AUTO: 9.5 FL (ref 9.2–12.9)
POTASSIUM SERPL-SCNC: 3.9 MMOL/L (ref 3.5–5.1)
PROT SERPL-MCNC: 8 G/DL (ref 6–8.4)
RBC # BLD AUTO: 3.98 M/UL (ref 4–5.4)
SODIUM SERPL-SCNC: 141 MMOL/L (ref 136–145)
WBC # BLD AUTO: 5.48 K/UL (ref 3.9–12.7)

## 2023-04-10 PROCEDURE — 36415 COLL VENOUS BLD VENIPUNCTURE: CPT | Performed by: ORTHOPAEDIC SURGERY

## 2023-04-10 PROCEDURE — 85025 COMPLETE CBC W/AUTO DIFF WBC: CPT | Performed by: ORTHOPAEDIC SURGERY

## 2023-04-10 PROCEDURE — 71046 X-RAY EXAM CHEST 2 VIEWS: CPT | Mod: TC

## 2023-04-10 PROCEDURE — 80053 COMPREHEN METABOLIC PANEL: CPT | Performed by: ORTHOPAEDIC SURGERY

## 2023-04-10 NOTE — DISCHARGE INSTRUCTIONS
To confirm, Your doctor has instructed you that surgery is scheduled for: April 13     Pre-Op will call the afternoon prior to surgery between 4:00 and 6:00 PM with the final arrival time.       1 Person can come with you the day of surgery.    Please park in the Garage Parking and come through front entrance.      GO TO REGISTRATION     After registration, proceed past gift shop and through glass door ( Outpatient Mobile) Check in at the nurses station to the left.   Do not eat or drink anything after midnight the night before your surgery - THIS INCLUDES  WATER, GUM, MINTS AND CANDY.  YOU MAY BRUSH YOUR TEETH BUT DO NOT SWALLOW     TAKE ONLY THESE MEDICATIONS WITH A SMALL SIP OF WATER THE MORNING OF YOUR PROCEDURE:  Amlodipine Carvedilol     Do not take Olmesartan morning of surgery .      PLEASE NOTE:  The surgery schedule has many variables which may affect the time of your surgery case.  Family members should be available if your surgery time changes.  Plan to be here the day of your procedure between 4-6 hours.      DO NOT TAKE THESE MEDICATIONS 5-7 DAYS PRIOR to your procedure or per your surgeon's request: ASPIRIN, ALEVE, ADVIL, IBUPROFEN,  NICHOLE SELTZER, BC , FISH OIL , VITAMIN E, HERBALS  (May take Tylenol)                                                          IMPORTANT INSTRUCTIONS      Do not smoke, vape or drink alcoholic beverages 24 hours prior to your procedure.  Shower the night before AND the morning of your procedure with a Chlorhexidine wash such as Hibiclens or Dial antibacterial soap from the neck down.   No lotions, powder or oils on your skin after you shower. DO NOT WEAR DEODORANT   Do not get it on your face or in your eyes.  You may use your own shampoo and face wash. This helps your skin to be as bacteria free as possible.    DO NOT remove hair from the surgery site.  Do not shave the incision site unless you are given specific instructions to do so.    Sleep in a bed with clean  sheets.    Do not sleep with a pet in the bed.   If you wear contact lenses, dentures, hearing aids or glasses, bring a container to put them in during surgery and give to a family member for safe keeping.    Please leave all jewelry, piercing's and valuables at home.   Wear rubber soled shoes (no flip flops).    If your doctor has scheduled you for an overnight stay, bring a small overnight bag with any personal items you need.    Make arrangements in advance for transportation home by a responsible adult.      You must make arrangements for transportation, TAXI'S, UBER'S OR LYFTS ARE NOT ALLOWED.        If you have any questions about these instructions, call (Monday - Friday) Pre-Op Admit  Nursing  at [phone] or the Pre-Op Day Surgery Unit at [phone].

## 2023-04-10 NOTE — PRE-PROCEDURE INSTRUCTIONS
Preadmit assessment complete. Review of pt health history and home medications.  Preop education per AVS. Pt voiced understanding       Advised not to take olmesartan am of surgery ; Advised to take amlodipine and carvedilol with sip of water am of surgery.  Advised to remove partials am of surgery .

## 2023-04-11 ENCOUNTER — TELEPHONE (OUTPATIENT)
Dept: ORTHOPEDICS | Facility: CLINIC | Age: 63
End: 2023-04-11
Payer: MEDICAID

## 2023-04-11 NOTE — TELEPHONE ENCOUNTER
----- Message from Hemal Donahue sent at 4/11/2023 11:40 AM CDT -----  Regarding: said her Sx was canceled and she wants to know what is going on, call pt   Contact: pt   said her Sx was canceled and she wants to know what is going on, call pt

## 2023-04-12 ENCOUNTER — TELEPHONE (OUTPATIENT)
Dept: ORTHOPEDICS | Facility: CLINIC | Age: 63
End: 2023-04-12
Payer: MEDICAID

## 2023-04-12 NOTE — TELEPHONE ENCOUNTER
----- Message from Sofiya Traylor sent at 4/12/2023  8:06 AM CDT -----  Regarding: procedure for tomorrow needs canceling  Contact: patient at [phone]  Type: Needs Medical Advice  Who Called:  patient    Best Call Back Number: [phone]    Additional Information: Patient needs to cancel procedure for tomorrow. Dr. Rothman is not in her network for insurance. Please call and advise. Thank you

## 2023-05-11 ENCOUNTER — TELEPHONE (OUTPATIENT)
Dept: FAMILY MEDICINE | Facility: CLINIC | Age: 63
End: 2023-05-11

## 2023-05-11 NOTE — TELEPHONE ENCOUNTER
Pt wants to know if she needs lab orders for upcoming appt and please sent to [facility] if she does.

## 2023-05-30 ENCOUNTER — TELEPHONE (OUTPATIENT)
Dept: FAMILY MEDICINE | Facility: CLINIC | Age: 63
End: 2023-05-30

## 2023-05-30 NOTE — TELEPHONE ENCOUNTER
----- Message from Kaitlin Flynn sent at 5/30/2023  9:38 AM CDT -----  Regarding: rx quantity in question  [facility] [address]  says she only has a 30 day supply  wanted a 90 day supply of Tramadol  can we check to see if we sent 90 day and they have it incorrect? Please call and let her know   thanks

## 2023-06-02 ENCOUNTER — OFFICE VISIT (OUTPATIENT)
Dept: ORTHOPEDICS | Facility: CLINIC | Age: 63
End: 2023-06-02
Payer: MEDICAID

## 2023-06-02 VITALS — WEIGHT: 253.06 LBS | HEIGHT: 63 IN | BODY MASS INDEX: 44.84 KG/M2

## 2023-06-02 DIAGNOSIS — M65.322 TRIGGER INDEX FINGER OF LEFT HAND: ICD-10-CM

## 2023-06-02 PROCEDURE — 4010F ACE/ARB THERAPY RXD/TAKEN: CPT | Mod: CPTII,,, | Performed by: ORTHOPAEDIC SURGERY

## 2023-06-02 PROCEDURE — 99214 PR OFFICE/OUTPT VISIT, EST, LEVL IV, 30-39 MIN: ICD-10-PCS | Mod: S$PBB,25,, | Performed by: ORTHOPAEDIC SURGERY

## 2023-06-02 PROCEDURE — 1159F MED LIST DOCD IN RCRD: CPT | Mod: CPTII,,, | Performed by: ORTHOPAEDIC SURGERY

## 2023-06-02 PROCEDURE — 99213 OFFICE O/P EST LOW 20 MIN: CPT | Mod: PBBFAC,PO | Performed by: ORTHOPAEDIC SURGERY

## 2023-06-02 PROCEDURE — 3008F PR BODY MASS INDEX (BMI) DOCUMENTED: ICD-10-PCS | Mod: CPTII,,, | Performed by: ORTHOPAEDIC SURGERY

## 2023-06-02 PROCEDURE — 1160F PR REVIEW ALL MEDS BY PRESCRIBER/CLIN PHARMACIST DOCUMENTED: ICD-10-PCS | Mod: CPTII,,, | Performed by: ORTHOPAEDIC SURGERY

## 2023-06-02 PROCEDURE — 20550 TENDON SHEATH: ICD-10-PCS | Mod: S$PBB,LT,, | Performed by: ORTHOPAEDIC SURGERY

## 2023-06-02 PROCEDURE — 99999 PR PBB SHADOW E&M-EST. PATIENT-LVL III: ICD-10-PCS | Mod: PBBFAC,,, | Performed by: ORTHOPAEDIC SURGERY

## 2023-06-02 PROCEDURE — 20550 NJX 1 TENDON SHEATH/LIGAMENT: CPT | Mod: PBBFAC,PO,LT | Performed by: ORTHOPAEDIC SURGERY

## 2023-06-02 PROCEDURE — 1159F PR MEDICATION LIST DOCUMENTED IN MEDICAL RECORD: ICD-10-PCS | Mod: CPTII,,, | Performed by: ORTHOPAEDIC SURGERY

## 2023-06-02 PROCEDURE — 1160F RVW MEDS BY RX/DR IN RCRD: CPT | Mod: CPTII,,, | Performed by: ORTHOPAEDIC SURGERY

## 2023-06-02 PROCEDURE — 99999 PR PBB SHADOW E&M-EST. PATIENT-LVL III: CPT | Mod: PBBFAC,,, | Performed by: ORTHOPAEDIC SURGERY

## 2023-06-02 PROCEDURE — 99214 OFFICE O/P EST MOD 30 MIN: CPT | Mod: S$PBB,25,, | Performed by: ORTHOPAEDIC SURGERY

## 2023-06-02 PROCEDURE — 4010F PR ACE/ARB THEARPY RXD/TAKEN: ICD-10-PCS | Mod: CPTII,,, | Performed by: ORTHOPAEDIC SURGERY

## 2023-06-02 PROCEDURE — 3008F BODY MASS INDEX DOCD: CPT | Mod: CPTII,,, | Performed by: ORTHOPAEDIC SURGERY

## 2023-06-02 RX ORDER — TRIAMCINOLONE ACETONIDE 40 MG/ML
40 INJECTION, SUSPENSION INTRA-ARTICULAR; INTRAMUSCULAR
Status: DISCONTINUED | OUTPATIENT
Start: 2023-06-02 | End: 2023-06-02 | Stop reason: HOSPADM

## 2023-06-02 RX ADMIN — TRIAMCINOLONE ACETONIDE 40 MG: 40 INJECTION, SUSPENSION INTRA-ARTICULAR; INTRAMUSCULAR at 02:06

## 2023-06-02 NOTE — PROCEDURES
Tendon Sheath    Date/Time: 6/2/2023 2:00 PM  Performed by: Goldy Burns MD  Authorized by: Goldy Burns MD     Consent Done?:  Yes (Verbal)  Indications:  Pain  Site marked: the procedure site was marked    Timeout: prior to procedure the correct patient, procedure, and site was verified    Prep: patient was prepped and draped in usual sterile fashion      Local anesthesia used?: Yes    Anesthesia:  Local infiltration  Local anesthetic:  Lidocaine 1% without epinephrine and bupivacaine 0.25% without epinephrine  Anesthetic total (ml):  2    Location:  Index finger  Site:  L index flexor tendon sheath  Ultrasonic guidance for needle placement?: No    Needle size:  25 G  Approach:  Volar  Medications:  40 mg triamcinolone acetonide 40 mg/mL  Patient tolerance:  Patient tolerated the procedure well with no immediate complications

## 2023-06-02 NOTE — PROGRESS NOTES
Subjective:      Patient ID: Chelsea Grossman is a 63 y.o. female.    Chief Complaint: Pain of the Left Hand    HPI  63-year-old female with several month history somewhat progressive left index finger pain catching and locking.  Was seen previously for a trigger digit but there was some issues with her insurance covered with the coverage with the other provider.  She was tentatively scheduled for surgery.  She would like to know if there any other treatment options prior to surgery.  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  ROS      Objective:    Ortho Exam     Constitutional:   Patient is alert  and oriented in no acute distress  HEENT:  normocephalic atraumatic; PERRL EOMI  Neck:  Supple without adenopathy  Cardiovascular:  Normal rate and rhythm  Pulmonary:  Normal respiratory effort normal chest wall expansion  Abdominal:  Nonprotuberant nondistended  Musculoskeletal:  Patient has obvious triggering of her left index finger  She has intact flexor and extensor tendon function brisk capillary refill of her digits  Neurological:  No focal defect; cranial nerves 2-12 grossly intact  Psychiatric/behavioral:  Mood and behavior normal      X-Ray Chest PA And Lateral  XR CHEST 2 VIEWS    CLINICAL HISTORY:  62 years Female Preop testing    COMPARISON: None    FINDINGS: Cardiomediastinal silhouette is within normal limits. Lungs are normally expanded with no airspace consolidation. No pleural effusion or pneumothorax. No acute osseous abnormality.    IMPRESSION: No acute pulmonary process.    Electronically signed by:  Randall Newman MD  4/10/2023 8:58 AM CDT Workstation: [id number]       My Radiographs Findings:    Radiographs left hand were reviewed without acute osseous abnormalities  Assessment:       Encounter Diagnosis   Name Primary?    Trigger index finger of left hand          Plan:       I have discussed medical condition and treatment options with her at length.   After a verbal consent and sterile prep I injected her left ring finger at the A1 pulley region without complication with a combination of cc of Kenalog 2 cc of lidocaine.  We have discussed gentle range-of-motion exercises NSAIDs if approved by her PCP.  If symptoms fail to improve we can consider a repeat injection or surgical A1 pulley release.  I will see her back in 6 weeks sooner if any questions or problems.        Past Medical History:   Diagnosis Date    Cirrhosis 2020    History of hepatitis C, s/p successful Rx w/ cure (SVR12 - 4/2020)     History of kidney stones     Hypertension     Hypertensive retinopathy of both eyes     Osteoarthritis     Osteopenia     Trigger finger of left hand 2023     Past Surgical History:   Procedure Laterality Date    CARPAL TUNNEL RELEASE Bilateral     FOOT SURGERY Left 12/18/2018    KIDNEY STONE SURGERY      Left ear surgery      TOTAL ABDOMINAL HYSTERECTOMY W/ BILATERAL SALPINGOOPHORECTOMY      TRIGGER FINGER RELEASE Right     2nd and 3rd fingers         Current Outpatient Medications:     amLODIPine (NORVASC) 10 MG tablet, Take 1 tablet (10 mg total) by mouth every evening. (Patient taking differently: Take 10 mg by mouth once daily.), Disp: 90 tablet, Rfl: 1    carvediloL (COREG) 12.5 MG tablet, Take 1 tablet (12.5 mg total) by mouth 2 (two) times daily with meals., Disp: 180 tablet, Rfl: 1    celecoxib (CELEBREX) 200 MG capsule, Take 1 capsule (200 mg total) by mouth once daily., Disp: 90 capsule, Rfl: 1    colchicine (COLCRYS) 0.6 mg tablet, Take 2 tablets at symptom onset, 1 tablet 2-3 hours later, then 1 tablet daily (Patient taking differently: Take 0.6 mg by mouth once daily. Take 2 tablets at symptom onset, 1 tablet 2-3 hours later, then 1 tablet daily), Disp: 9 tablet, Rfl: 3    DULoxetine (CYMBALTA) 60 MG capsule, Take 1 capsule (60 mg total) by mouth once daily., Disp: 30 capsule, Rfl: 6    estradioL (ESTRACE) 0.01 % (0.1 mg/gram) vaginal cream, INSERT 1 2  GRAM INTRAVAGINALLY EVERY NIGHT AT BEDTIME 2 TO 3 TIMES PER WEEK (Patient taking differently: once daily. INSERT 1 2 GRAM INTRAVAGINALLY EVERY NIGHT AT BEDTIME 2 TO 3 TIMES PER WEEK), Disp: 42.5 g, Rfl: 3    fluticasone propionate (FLONASE) 50 mcg/actuation nasal spray, 1 spray (50 mcg total) by Each Nostril route once daily., Disp: 48 g, Rfl: 1    hydroCHLOROthiazide (HYDRODIURIL) 25 MG tablet, Take 1 tablet (25 mg total) by mouth once daily., Disp: 90 tablet, Rfl: 1    ibuprofen (ADVIL,MOTRIN) 800 MG tablet, Take 1 tablet (800 mg total) by mouth every 6 (six) hours as needed for Pain., Disp: 90 tablet, Rfl: 1    multivitamin (THERAGRAN) per tablet, Take 1 tablet by mouth once daily., Disp: , Rfl:     olmesartan (BENICAR) 40 MG tablet, Take 1 tablet (40 mg total) by mouth once daily., Disp: 90 tablet, Rfl: 1    pantoprazole (PROTONIX) 40 MG tablet, Take 1 tablet (40 mg total) by mouth once daily., Disp: 90 tablet, Rfl: 1    traMADoL (ULTRAM) 50 mg tablet, TAKE 1 TABLET BY MOUTH EVERY 8 HOURS AS NEEDED FOR PAIN, Disp: 90 tablet, Rfl: 0    Review of patient's allergies indicates:   Allergen Reactions    Sulfa (sulfonamide antibiotics) Itching    Sulfamethoxazole-trimethoprim Itching and Rash     Itchy Rash       Family History   Problem Relation Age of Onset    Heart disease Mother     Cancer Father         stomach    Breast cancer Sister     Breast cancer Maternal Grandmother 60    Stomach cancer Brother      Social History     Occupational History    Occupation: disabled   Tobacco Use    Smoking status: Former     Types: Cigarettes     Quit date: 10/2018     Years since quittin.6    Smokeless tobacco: Never   Substance and Sexual Activity    Alcohol use: Yes    Drug use: No    Sexual activity: Never     Partners: Female

## 2023-06-13 ENCOUNTER — TELEPHONE (OUTPATIENT)
Dept: FAMILY MEDICINE | Facility: CLINIC | Age: 63
End: 2023-06-13

## 2023-06-13 ENCOUNTER — OFFICE VISIT (OUTPATIENT)
Dept: FAMILY MEDICINE | Facility: CLINIC | Age: 63
End: 2023-06-13
Payer: MEDICAID

## 2023-06-13 VITALS
TEMPERATURE: 98 F | WEIGHT: 247 LBS | OXYGEN SATURATION: 95 % | BODY MASS INDEX: 43.77 KG/M2 | DIASTOLIC BLOOD PRESSURE: 76 MMHG | HEIGHT: 63 IN | HEART RATE: 69 BPM | SYSTOLIC BLOOD PRESSURE: 138 MMHG

## 2023-06-13 DIAGNOSIS — S93.325D LISFRANC DISLOCATION, LEFT, SUBSEQUENT ENCOUNTER: ICD-10-CM

## 2023-06-13 DIAGNOSIS — R30.0 DYSURIA: ICD-10-CM

## 2023-06-13 DIAGNOSIS — N95.2 ATROPHIC VAGINITIS: ICD-10-CM

## 2023-06-13 DIAGNOSIS — Z12.31 VISIT FOR SCREENING MAMMOGRAM: ICD-10-CM

## 2023-06-13 DIAGNOSIS — M15.9 OSTEOARTHRITIS OF MULTIPLE JOINTS, UNSPECIFIED OSTEOARTHRITIS TYPE: ICD-10-CM

## 2023-06-13 DIAGNOSIS — I10 ESSENTIAL HYPERTENSION: Primary | ICD-10-CM

## 2023-06-13 LAB
BILIRUB UR QL STRIP: NEGATIVE
GLUCOSE UR QL STRIP: NEGATIVE
KETONES UR QL STRIP: NEGATIVE
LEUKOCYTE ESTERASE UR QL STRIP: NEGATIVE
PH, POC UA: 5.5
POC BLOOD, URINE: NEGATIVE
POC NITRATES, URINE: NEGATIVE
PROT UR QL STRIP: NEGATIVE
SP GR UR STRIP: 1.02 (ref 1–1.03)
UROBILINOGEN UR STRIP-ACNC: NORMAL (ref 0.1–1.1)

## 2023-06-13 PROCEDURE — 3008F PR BODY MASS INDEX (BMI) DOCUMENTED: ICD-10-PCS | Mod: CPTII,S$GLB,, | Performed by: INTERNAL MEDICINE

## 2023-06-13 PROCEDURE — 81003 POCT URINALYSIS, DIPSTICK, AUTOMATED, W/O SCOPE: ICD-10-PCS | Mod: QW,S$GLB,, | Performed by: INTERNAL MEDICINE

## 2023-06-13 PROCEDURE — 99214 PR OFFICE/OUTPT VISIT, EST, LEVL IV, 30-39 MIN: ICD-10-PCS | Mod: S$GLB,,, | Performed by: INTERNAL MEDICINE

## 2023-06-13 PROCEDURE — 1159F PR MEDICATION LIST DOCUMENTED IN MEDICAL RECORD: ICD-10-PCS | Mod: CPTII,S$GLB,, | Performed by: INTERNAL MEDICINE

## 2023-06-13 PROCEDURE — 3075F PR MOST RECENT SYSTOLIC BLOOD PRESS GE 130-139MM HG: ICD-10-PCS | Mod: CPTII,S$GLB,, | Performed by: INTERNAL MEDICINE

## 2023-06-13 PROCEDURE — 3078F DIAST BP <80 MM HG: CPT | Mod: CPTII,S$GLB,, | Performed by: INTERNAL MEDICINE

## 2023-06-13 PROCEDURE — 3075F SYST BP GE 130 - 139MM HG: CPT | Mod: CPTII,S$GLB,, | Performed by: INTERNAL MEDICINE

## 2023-06-13 PROCEDURE — 4010F PR ACE/ARB THEARPY RXD/TAKEN: ICD-10-PCS | Mod: CPTII,S$GLB,, | Performed by: INTERNAL MEDICINE

## 2023-06-13 PROCEDURE — 3008F BODY MASS INDEX DOCD: CPT | Mod: CPTII,S$GLB,, | Performed by: INTERNAL MEDICINE

## 2023-06-13 PROCEDURE — 1159F MED LIST DOCD IN RCRD: CPT | Mod: CPTII,S$GLB,, | Performed by: INTERNAL MEDICINE

## 2023-06-13 PROCEDURE — 99214 OFFICE O/P EST MOD 30 MIN: CPT | Mod: S$GLB,,, | Performed by: INTERNAL MEDICINE

## 2023-06-13 PROCEDURE — 4010F ACE/ARB THERAPY RXD/TAKEN: CPT | Mod: CPTII,S$GLB,, | Performed by: INTERNAL MEDICINE

## 2023-06-13 PROCEDURE — 3078F PR MOST RECENT DIASTOLIC BLOOD PRESSURE < 80 MM HG: ICD-10-PCS | Mod: CPTII,S$GLB,, | Performed by: INTERNAL MEDICINE

## 2023-06-13 PROCEDURE — 81003 URINALYSIS AUTO W/O SCOPE: CPT | Mod: QW,S$GLB,, | Performed by: INTERNAL MEDICINE

## 2023-06-13 RX ORDER — TRAMADOL HYDROCHLORIDE 50 MG/1
TABLET ORAL
Qty: 90 TABLET | Refills: 0 | Status: SHIPPED | OUTPATIENT
Start: 2023-06-13 | End: 2023-07-26 | Stop reason: SDUPTHER

## 2023-06-13 RX ORDER — CARVEDILOL 12.5 MG/1
12.5 TABLET ORAL 2 TIMES DAILY WITH MEALS
Qty: 180 TABLET | Refills: 1 | Status: SHIPPED | OUTPATIENT
Start: 2023-06-13 | End: 2024-01-03 | Stop reason: SDUPTHER

## 2023-06-13 RX ORDER — ESTRADIOL 0.1 MG/G
CREAM VAGINAL
Qty: 42.5 G | Refills: 3 | Status: SHIPPED | OUTPATIENT
Start: 2023-06-13 | End: 2024-01-19

## 2023-06-13 RX ORDER — COLCHICINE 0.6 MG/1
TABLET ORAL
Qty: 10 TABLET | Refills: 3 | Status: SHIPPED | OUTPATIENT
Start: 2023-06-13 | End: 2024-01-19 | Stop reason: SDUPTHER

## 2023-06-13 RX ORDER — TRAMADOL HYDROCHLORIDE 50 MG/1
TABLET ORAL
Qty: 90 TABLET | Refills: 0 | Status: SHIPPED | OUTPATIENT
Start: 2023-06-13 | End: 2023-06-13 | Stop reason: SDUPTHER

## 2023-06-13 RX ORDER — IBUPROFEN 800 MG/1
800 TABLET ORAL EVERY 6 HOURS PRN
Qty: 90 TABLET | Refills: 1 | Status: SHIPPED | OUTPATIENT
Start: 2023-06-13

## 2023-06-13 NOTE — TELEPHONE ENCOUNTER
----- Message from Tonia Velasco LPN sent at 6/13/2023 10:33 AM CDT -----  [facility]

## 2023-06-13 NOTE — PROGRESS NOTES
Subjective:       Patient ID: Chelsea Grossman is a 63 y.o. female.    Chief Complaint: Hypertension (3 months follow up /Med refill)    Here for routine follow up; last visit note, most recent available labs, and health maintenance topics reviewed.   She has chronic pain following LisFranc fracture of foot. Tramadol does help and allows her to complete her ADLs.  Takes as much as TID, sometimes only once or twice.  Celebrex does help some without GI side effects she had with ibuprofen.       Hypertension  This is a chronic problem. The problem is controlled (she reports BP at home is usually 120s/70 ). Pertinent negatives include no anxiety, chest pain, headaches, malaise/fatigue, neck pain, palpitations, peripheral edema or shortness of breath. Past treatments include beta blockers, calcium channel blockers, diuretics and ACE inhibitors. There are no compliance problems.    Urinary Tract Infection   This is a new problem. The current episode started 1 to 4 weeks ago (about 3 weeks). The problem occurs intermittently. The quality of the pain is described as burning. There has been no fever. Associated symptoms include frequency, hesitancy and urgency. Pertinent negatives include no chills, hematuria, nausea, vomiting, constipation or rash. She has tried nothing for the symptoms. Her past medical history is significant for hypertension.   Review of Systems   Constitutional:  Negative for activity change, appetite change, chills, diaphoresis, fatigue, fever, malaise/fatigue and unexpected weight change.   HENT:  Negative for congestion, ear discharge, ear pain, hearing loss, mouth sores, nosebleeds, postnasal drip, rhinorrhea, sinus pressure, sinus pain, sneezing, sore throat, tinnitus, trouble swallowing and voice change.    Eyes:  Negative for photophobia, pain, discharge, redness, itching and visual disturbance.   Respiratory:  Negative for apnea, cough, choking, chest tightness, shortness of breath and  wheezing.    Cardiovascular:  Negative for chest pain, palpitations and leg swelling.   Gastrointestinal:  Negative for abdominal distention, abdominal pain, blood in stool, constipation, diarrhea, nausea and vomiting.   Endocrine: Negative for cold intolerance, heat intolerance, polydipsia and polyuria.   Genitourinary:  Positive for frequency, hesitancy and urgency. Negative for decreased urine volume, difficulty urinating, dyspareunia, dysuria, enuresis, genital sores, hematuria, menstrual problem, pelvic pain, vaginal bleeding, vaginal discharge and vaginal pain.   Musculoskeletal:  Negative for arthralgias, back pain, gait problem, joint swelling, myalgias, neck pain and neck stiffness.   Skin:  Negative for rash and wound.   Allergic/Immunologic: Negative for environmental allergies, food allergies and immunocompromised state.   Neurological:  Negative for dizziness, tremors, seizures, syncope, facial asymmetry, speech difficulty, weakness, light-headedness, numbness and headaches.   Hematological:  Negative for adenopathy. Does not bruise/bleed easily.   Psychiatric/Behavioral:  Negative for confusion, decreased concentration, hallucinations, self-injury, sleep disturbance and suicidal ideas. The patient is not nervous/anxious.      Past Medical History:   Diagnosis Date    Cirrhosis 2020    History of hepatitis C, s/p successful Rx w/ cure (SVR12 - 4/2020)     History of kidney stones     Hypertension     Hypertensive retinopathy of both eyes     Osteoarthritis     Osteopenia     Trigger finger of left hand 2023      Past Surgical History:   Procedure Laterality Date    CARPAL TUNNEL RELEASE Bilateral     FOOT SURGERY Left 12/18/2018    KIDNEY STONE SURGERY      Left ear surgery      TOTAL ABDOMINAL HYSTERECTOMY W/ BILATERAL SALPINGOOPHORECTOMY      TRIGGER FINGER RELEASE Right     2nd and 3rd fingers       Family History   Problem Relation Age of Onset    Heart disease Mother     Cancer Father          stomach    Breast cancer Sister     Breast cancer Maternal Grandmother 60    Stomach cancer Brother        Social History     Socioeconomic History    Marital status: Single   Occupational History    Occupation: disabled   Tobacco Use    Smoking status: Former     Types: Cigarettes     Quit date: 10/2018     Years since quittin.7    Smokeless tobacco: Never   Substance and Sexual Activity    Alcohol use: Yes    Drug use: No    Sexual activity: Never     Partners: Female   Social History Narrative    Live with sister       Current Outpatient Medications   Medication Sig Dispense Refill    amLODIPine (NORVASC) 10 MG tablet Take 1 tablet (10 mg total) by mouth every evening. (Patient taking differently: Take 10 mg by mouth once daily.) 90 tablet 1    celecoxib (CELEBREX) 200 MG capsule Take 1 capsule (200 mg total) by mouth once daily. 90 capsule 1    DULoxetine (CYMBALTA) 60 MG capsule Take 1 capsule (60 mg total) by mouth once daily. 30 capsule 6    fluticasone propionate (FLONASE) 50 mcg/actuation nasal spray 1 spray (50 mcg total) by Each Nostril route once daily. 48 g 1    hydroCHLOROthiazide (HYDRODIURIL) 25 MG tablet Take 1 tablet (25 mg total) by mouth once daily. 90 tablet 1    multivitamin (THERAGRAN) per tablet Take 1 tablet by mouth once daily.      olmesartan (BENICAR) 40 MG tablet Take 1 tablet (40 mg total) by mouth once daily. 90 tablet 1    pantoprazole (PROTONIX) 40 MG tablet Take 1 tablet (40 mg total) by mouth once daily. 90 tablet 1    carvediloL (COREG) 12.5 MG tablet Take 1 tablet (12.5 mg total) by mouth 2 (two) times daily with meals. 180 tablet 1    colchicine (COLCRYS) 0.6 mg tablet Take 2 tablets at symptom onset, 1 tablet 2-3 hours later, then 1 tablet daily for 7 days as needed for gout attack 10 tablet 3    estradioL (ESTRACE) 0.01 % (0.1 mg/gram) vaginal cream INSERT 1 2 GRAM INTRAVAGINALLY EVERY NIGHT AT BEDTIME 2 TO 3 TIMES PER WEEK 42.5 g 3    ibuprofen (ADVIL,MOTRIN) 800 MG  "tablet Take 1 tablet (800 mg total) by mouth every 6 (six) hours as needed for Pain. 90 tablet 1    traMADoL (ULTRAM) 50 mg tablet TAKE 1 TABLET BY MOUTH EVERY 8 HOURS AS NEEDED FOR PAIN 90 tablet 0     No current facility-administered medications for this visit.       Review of patient's allergies indicates:   Allergen Reactions    Sulfa (sulfonamide antibiotics) Itching    Sulfamethoxazole-trimethoprim Itching and Rash     Itchy Rash     Objective:    HPI     Hypertension     Additional comments: 3 months follow up   Med refill          Last edited by Spenser Curry Jr., MD on 6/13/2023  9:26 AM.      Blood pressure 138/76, pulse 69, temperature 98.2 °F (36.8 °C), temperature source Temporal, height 5' 3" (1.6 m), weight 112 kg (247 lb), SpO2 95 %. Body mass index is 43.75 kg/m².   Physical Exam        Assessment:       1. Essential hypertension    2. Atrophic vaginitis    3. Osteoarthritis of multiple joints, unspecified osteoarthritis type    4. Lisfranc dislocation, left, subsequent encounter    5. Dysuria    6. Visit for screening mammogram        Plan:       Chelsea was seen today for hypertension.    Diagnoses and all orders for this visit:    Essential hypertension  -     carvediloL (COREG) 12.5 MG tablet; Take 1 tablet (12.5 mg total) by mouth 2 (two) times daily with meals.    Atrophic vaginitis  -     estradioL (ESTRACE) 0.01 % (0.1 mg/gram) vaginal cream; INSERT 1 2 GRAM INTRAVAGINALLY EVERY NIGHT AT BEDTIME 2 TO 3 TIMES PER WEEK    Osteoarthritis of multiple joints, unspecified osteoarthritis type  -     traMADoL (ULTRAM) 50 mg tablet; TAKE 1 TABLET BY MOUTH EVERY 8 HOURS AS NEEDED FOR PAIN    Lisfranc dislocation, left, subsequent encounter  Comments:  May have a neuropathic component  Orders:  -     traMADoL (ULTRAM) 50 mg tablet; TAKE 1 TABLET BY MOUTH EVERY 8 HOURS AS NEEDED FOR PAIN    Dysuria  Comments:  UA fine.  Will check culture.  Dysuria may be r/t her atrophic vagnitis.  Orders:  -     " POCT Urinalysis, Dipstick, Automated, W/O Scope  -     Urine Culture, Routine    Visit for screening mammogram  Comments:  Due mammogram.  Will plan CBE in about 6 months.  Discussed monthly SBE  Orders:  -     Mammo Digital Screening Bilat w/ Ralph; Future    Other orders  -     ibuprofen (ADVIL,MOTRIN) 800 MG tablet; Take 1 tablet (800 mg total) by mouth every 6 (six) hours as needed for Pain.  -     colchicine (COLCRYS) 0.6 mg tablet; Take 2 tablets at symptom onset, 1 tablet 2-3 hours later, then 1 tablet daily for 7 days as needed for gout attack

## 2023-06-15 LAB
BACTERIA UR CULT: NORMAL
BACTERIA UR CULT: NORMAL

## 2023-06-17 DIAGNOSIS — I10 ESSENTIAL HYPERTENSION: ICD-10-CM

## 2023-06-19 RX ORDER — AMLODIPINE BESYLATE 10 MG/1
TABLET ORAL
Qty: 90 TABLET | Refills: 1 | Status: SHIPPED | OUTPATIENT
Start: 2023-06-19 | End: 2023-10-09 | Stop reason: SDUPTHER

## 2023-06-26 ENCOUNTER — HOSPITAL ENCOUNTER (OUTPATIENT)
Dept: RADIOLOGY | Facility: HOSPITAL | Age: 63
Discharge: HOME | End: 2023-06-26
Attending: INTERNAL MEDICINE
Payer: MEDICAID

## 2023-06-26 ENCOUNTER — HOSPITAL ENCOUNTER (INPATIENT)
Facility: HOSPITAL | Age: 63
LOS: 3 days | Discharge: HOME | DRG: 690 | End: 2023-06-29
Attending: EMERGENCY MEDICINE | Admitting: STUDENT IN AN ORGANIZED HEALTH CARE EDUCATION/TRAINING PROGRAM
Payer: MEDICAID

## 2023-06-26 DIAGNOSIS — N39.0 URINARY TRACT INFECTION WITHOUT HEMATURIA, SITE UNSPECIFIED: ICD-10-CM

## 2023-06-26 DIAGNOSIS — Z12.31 VISIT FOR SCREENING MAMMOGRAM: ICD-10-CM

## 2023-06-26 DIAGNOSIS — R55 NEAR SYNCOPE: Primary | ICD-10-CM

## 2023-06-26 DIAGNOSIS — R19.7 DIARRHEA, UNSPECIFIED TYPE: ICD-10-CM

## 2023-06-26 DIAGNOSIS — I95.9 HYPOTENSION, UNSPECIFIED HYPOTENSION TYPE: ICD-10-CM

## 2023-06-26 PROBLEM — N17.9 AKI (ACUTE KIDNEY INJURY): Status: ACTIVE | Noted: 2023-06-26

## 2023-06-26 LAB
ALBUMIN SERPL BCP-MCNC: 3.5 G/DL (ref 3.5–5.2)
ALP SERPL-CCNC: 96 U/L (ref 55–135)
ALT SERPL W/O P-5'-P-CCNC: 37 U/L (ref 10–44)
ANION GAP SERPL CALC-SCNC: 12 MMOL/L (ref 8–16)
AST SERPL-CCNC: 29 U/L (ref 10–40)
BACTERIA #/AREA URNS HPF: ABNORMAL /HPF
BASOPHILS # BLD AUTO: 0.04 K/UL (ref 0–0.2)
BASOPHILS NFR BLD: 0.2 % (ref 0–1.9)
BILIRUB SERPL-MCNC: 2 MG/DL (ref 0.1–1)
BILIRUB UR QL STRIP: NEGATIVE
BNP SERPL-MCNC: 27 PG/ML (ref 0–99)
BUN SERPL-MCNC: 37 MG/DL (ref 8–23)
CALCIUM SERPL-MCNC: 8.6 MG/DL (ref 8.7–10.5)
CHLORIDE SERPL-SCNC: 102 MMOL/L (ref 95–110)
CLARITY UR: ABNORMAL
CO2 SERPL-SCNC: 19 MMOL/L (ref 23–29)
COLOR UR: YELLOW
CREAT SERPL-MCNC: 2.8 MG/DL (ref 0.5–1.4)
DIFFERENTIAL METHOD: ABNORMAL
EOSINOPHIL # BLD AUTO: 0 K/UL (ref 0–0.5)
EOSINOPHIL NFR BLD: 0 % (ref 0–8)
ERYTHROCYTE [DISTWIDTH] IN BLOOD BY AUTOMATED COUNT: 13.3 % (ref 11.5–14.5)
EST. GFR  (NO RACE VARIABLE): 18.3 ML/MIN/1.73 M^2
GLUCOSE SERPL-MCNC: 173 MG/DL (ref 70–110)
GLUCOSE UR QL STRIP: NEGATIVE
HCT VFR BLD AUTO: 34.9 % (ref 37–48.5)
HGB BLD-MCNC: 11.7 G/DL (ref 12–16)
HGB UR QL STRIP: ABNORMAL
HYALINE CASTS #/AREA URNS LPF: 21 /LPF
IMM GRANULOCYTES # BLD AUTO: 0.24 K/UL (ref 0–0.04)
IMM GRANULOCYTES NFR BLD AUTO: 1.3 % (ref 0–0.5)
KETONES UR QL STRIP: NEGATIVE
LACTATE SERPL-SCNC: 1.8 MMOL/L (ref 0.5–1.9)
LEUKOCYTE ESTERASE UR QL STRIP: ABNORMAL
LYMPHOCYTES # BLD AUTO: 2.4 K/UL (ref 1–4.8)
LYMPHOCYTES NFR BLD: 12.6 % (ref 18–48)
MAGNESIUM SERPL-MCNC: 1.8 MG/DL (ref 1.6–2.6)
MCH RBC QN AUTO: 31.7 PG (ref 27–31)
MCHC RBC AUTO-ENTMCNC: 33.5 G/DL (ref 32–36)
MCV RBC AUTO: 95 FL (ref 82–98)
MICROSCOPIC COMMENT: ABNORMAL
MONOCYTES # BLD AUTO: 1.6 K/UL (ref 0.3–1)
MONOCYTES NFR BLD: 8.4 % (ref 4–15)
NEUTROPHILS # BLD AUTO: 14.7 K/UL (ref 1.8–7.7)
NEUTROPHILS NFR BLD: 77.5 % (ref 38–73)
NITRITE UR QL STRIP: NEGATIVE
NRBC BLD-RTO: 0 /100 WBC
OB PNL STL: NEGATIVE
PH UR STRIP: 6 [PH] (ref 5–8)
PLATELET # BLD AUTO: 181 K/UL (ref 150–450)
PMV BLD AUTO: 9.6 FL (ref 9.2–12.9)
POTASSIUM SERPL-SCNC: 3.9 MMOL/L (ref 3.5–5.1)
PROCALCITONIN SERPL IA-MCNC: 5.04 NG/ML (ref 0–0.5)
PROT SERPL-MCNC: 7.6 G/DL (ref 6–8.4)
PROT UR QL STRIP: ABNORMAL
RBC # BLD AUTO: 3.69 M/UL (ref 4–5.4)
RBC #/AREA URNS HPF: 24 /HPF (ref 0–4)
SODIUM SERPL-SCNC: 133 MMOL/L (ref 136–145)
SP GR UR STRIP: 1.01 (ref 1–1.03)
SQUAMOUS #/AREA URNS HPF: 1 /HPF
TROPONIN I SERPL HS-MCNC: 9.3 PG/ML (ref 0–14.9)
URN SPEC COLLECT METH UR: ABNORMAL
UROBILINOGEN UR STRIP-ACNC: ABNORMAL EU/DL
WBC # BLD AUTO: 19.01 K/UL (ref 3.9–12.7)
WBC #/AREA STL HPF: NORMAL /[HPF]
WBC #/AREA URNS HPF: >100 /HPF (ref 0–5)

## 2023-06-26 PROCEDURE — 87449 NOS EACH ORGANISM AG IA: CPT | Performed by: EMERGENCY MEDICINE

## 2023-06-26 PROCEDURE — 93010 ELECTROCARDIOGRAM REPORT: CPT | Mod: ,,, | Performed by: INTERNAL MEDICINE

## 2023-06-26 PROCEDURE — 82272 OCCULT BLD FECES 1-3 TESTS: CPT | Performed by: EMERGENCY MEDICINE

## 2023-06-26 PROCEDURE — 87040 BLOOD CULTURE FOR BACTERIA: CPT | Mod: 59 | Performed by: EMERGENCY MEDICINE

## 2023-06-26 PROCEDURE — 21400001 HC TELEMETRY ROOM

## 2023-06-26 PROCEDURE — 80053 COMPREHEN METABOLIC PANEL: CPT | Performed by: EMERGENCY MEDICINE

## 2023-06-26 PROCEDURE — 87045 FECES CULTURE AEROBIC BACT: CPT | Performed by: EMERGENCY MEDICINE

## 2023-06-26 PROCEDURE — 83880 ASSAY OF NATRIURETIC PEPTIDE: CPT | Performed by: EMERGENCY MEDICINE

## 2023-06-26 PROCEDURE — 87209 SMEAR COMPLEX STAIN: CPT | Performed by: EMERGENCY MEDICINE

## 2023-06-26 PROCEDURE — 63600175 PHARM REV CODE 636 W HCPCS: Performed by: NURSE PRACTITIONER

## 2023-06-26 PROCEDURE — 25000003 PHARM REV CODE 250: Performed by: EMERGENCY MEDICINE

## 2023-06-26 PROCEDURE — 99285 EMERGENCY DEPT VISIT HI MDM: CPT | Mod: 25

## 2023-06-26 PROCEDURE — 87086 URINE CULTURE/COLONY COUNT: CPT | Performed by: EMERGENCY MEDICINE

## 2023-06-26 PROCEDURE — 25000003 PHARM REV CODE 250: Performed by: NURSE PRACTITIONER

## 2023-06-26 PROCEDURE — 96365 THER/PROPH/DIAG IV INF INIT: CPT

## 2023-06-26 PROCEDURE — 63600175 PHARM REV CODE 636 W HCPCS: Performed by: EMERGENCY MEDICINE

## 2023-06-26 PROCEDURE — 96361 HYDRATE IV INFUSION ADD-ON: CPT

## 2023-06-26 PROCEDURE — 81001 URINALYSIS AUTO W/SCOPE: CPT | Performed by: EMERGENCY MEDICINE

## 2023-06-26 PROCEDURE — 84484 ASSAY OF TROPONIN QUANT: CPT | Performed by: EMERGENCY MEDICINE

## 2023-06-26 PROCEDURE — 89055 LEUKOCYTE ASSESSMENT FECAL: CPT | Performed by: EMERGENCY MEDICINE

## 2023-06-26 PROCEDURE — 93005 ELECTROCARDIOGRAM TRACING: CPT | Performed by: INTERNAL MEDICINE

## 2023-06-26 PROCEDURE — 36415 COLL VENOUS BLD VENIPUNCTURE: CPT | Performed by: EMERGENCY MEDICINE

## 2023-06-26 PROCEDURE — 93010 EKG 12-LEAD: ICD-10-PCS | Mod: ,,, | Performed by: INTERNAL MEDICINE

## 2023-06-26 PROCEDURE — 85025 COMPLETE CBC W/AUTO DIFF WBC: CPT | Performed by: EMERGENCY MEDICINE

## 2023-06-26 PROCEDURE — 84145 PROCALCITONIN (PCT): CPT | Performed by: EMERGENCY MEDICINE

## 2023-06-26 PROCEDURE — 87046 STOOL CULTR AEROBIC BACT EA: CPT | Mod: 59 | Performed by: EMERGENCY MEDICINE

## 2023-06-26 PROCEDURE — 83735 ASSAY OF MAGNESIUM: CPT | Performed by: EMERGENCY MEDICINE

## 2023-06-26 PROCEDURE — 83605 ASSAY OF LACTIC ACID: CPT | Performed by: EMERGENCY MEDICINE

## 2023-06-26 RX ORDER — VALSARTAN 80 MG/1
320 TABLET ORAL DAILY
Status: DISCONTINUED | OUTPATIENT
Start: 2023-06-27 | End: 2023-06-27

## 2023-06-26 RX ORDER — ONDANSETRON 2 MG/ML
4 INJECTION INTRAMUSCULAR; INTRAVENOUS EVERY 8 HOURS PRN
Status: DISCONTINUED | OUTPATIENT
Start: 2023-06-26 | End: 2023-06-29 | Stop reason: HOSPADM

## 2023-06-26 RX ORDER — TALC
6 POWDER (GRAM) TOPICAL NIGHTLY PRN
Status: DISCONTINUED | OUTPATIENT
Start: 2023-06-26 | End: 2023-06-29 | Stop reason: HOSPADM

## 2023-06-26 RX ORDER — PANTOPRAZOLE SODIUM 40 MG/1
40 TABLET, DELAYED RELEASE ORAL DAILY
Status: DISCONTINUED | OUTPATIENT
Start: 2023-06-27 | End: 2023-06-29 | Stop reason: HOSPADM

## 2023-06-26 RX ORDER — ACETAMINOPHEN 325 MG/1
650 TABLET ORAL EVERY 4 HOURS PRN
Status: DISCONTINUED | OUTPATIENT
Start: 2023-06-26 | End: 2023-06-29 | Stop reason: HOSPADM

## 2023-06-26 RX ORDER — HYDROCHLOROTHIAZIDE 25 MG/1
25 TABLET ORAL DAILY
Status: DISCONTINUED | OUTPATIENT
Start: 2023-06-27 | End: 2023-06-27

## 2023-06-26 RX ORDER — SODIUM CHLORIDE 9 MG/ML
INJECTION, SOLUTION INTRAVENOUS CONTINUOUS
Status: DISCONTINUED | OUTPATIENT
Start: 2023-06-26 | End: 2023-06-29 | Stop reason: HOSPADM

## 2023-06-26 RX ORDER — HYDROCODONE BITARTRATE AND ACETAMINOPHEN 10; 325 MG/1; MG/1
1 TABLET ORAL EVERY 6 HOURS PRN
Status: DISCONTINUED | OUTPATIENT
Start: 2023-06-26 | End: 2023-06-29 | Stop reason: HOSPADM

## 2023-06-26 RX ORDER — CELECOXIB 100 MG/1
200 CAPSULE ORAL DAILY
Status: DISCONTINUED | OUTPATIENT
Start: 2023-06-27 | End: 2023-06-29 | Stop reason: HOSPADM

## 2023-06-26 RX ORDER — HYDROCODONE BITARTRATE AND ACETAMINOPHEN 5; 325 MG/1; MG/1
1 TABLET ORAL EVERY 6 HOURS PRN
Status: DISCONTINUED | OUTPATIENT
Start: 2023-06-26 | End: 2023-06-29 | Stop reason: HOSPADM

## 2023-06-26 RX ORDER — ESTRADIOL 0.1 MG/G
2 CREAM VAGINAL
Status: DISCONTINUED | OUTPATIENT
Start: 2023-06-26 | End: 2023-06-29 | Stop reason: HOSPADM

## 2023-06-26 RX ORDER — SODIUM CHLORIDE 0.9 % (FLUSH) 0.9 %
10 SYRINGE (ML) INJECTION
Status: DISCONTINUED | OUTPATIENT
Start: 2023-06-26 | End: 2023-06-29 | Stop reason: HOSPADM

## 2023-06-26 RX ORDER — SODIUM CHLORIDE, SODIUM LACTATE, POTASSIUM CHLORIDE, CALCIUM CHLORIDE 600; 310; 30; 20 MG/100ML; MG/100ML; MG/100ML; MG/100ML
1000 INJECTION, SOLUTION INTRAVENOUS
Status: COMPLETED | OUTPATIENT
Start: 2023-06-26 | End: 2023-06-26

## 2023-06-26 RX ORDER — CARVEDILOL 12.5 MG/1
12.5 TABLET ORAL 2 TIMES DAILY WITH MEALS
Status: DISCONTINUED | OUTPATIENT
Start: 2023-06-26 | End: 2023-06-27

## 2023-06-26 RX ORDER — ONDANSETRON 4 MG/1
8 TABLET, ORALLY DISINTEGRATING ORAL EVERY 8 HOURS PRN
Status: DISCONTINUED | OUTPATIENT
Start: 2023-06-26 | End: 2023-06-29 | Stop reason: HOSPADM

## 2023-06-26 RX ORDER — AMLODIPINE BESYLATE 5 MG/1
10 TABLET ORAL DAILY
Status: DISCONTINUED | OUTPATIENT
Start: 2023-06-27 | End: 2023-06-27

## 2023-06-26 RX ADMIN — PIPERACILLIN SODIUM AND TAZOBACTAM SODIUM 3.38 G: 3; .375 INJECTION, POWDER, LYOPHILIZED, FOR SOLUTION INTRAVENOUS at 08:06

## 2023-06-26 RX ADMIN — SODIUM CHLORIDE, SODIUM LACTATE, POTASSIUM CHLORIDE, AND CALCIUM CHLORIDE 1000 ML: .6; .31; .03; .02 INJECTION, SOLUTION INTRAVENOUS at 09:06

## 2023-06-26 RX ADMIN — CARVEDILOL 12.5 MG: 12.5 TABLET, FILM COATED ORAL at 08:06

## 2023-06-26 RX ADMIN — SODIUM CHLORIDE, SODIUM LACTATE, POTASSIUM CHLORIDE, AND CALCIUM CHLORIDE 1000 ML: .6; .31; .03; .02 INJECTION, SOLUTION INTRAVENOUS at 08:06

## 2023-06-26 RX ADMIN — SODIUM CHLORIDE, SODIUM LACTATE, POTASSIUM CHLORIDE, AND CALCIUM CHLORIDE 1000 ML: .6; .31; .03; .02 INJECTION, SOLUTION INTRAVENOUS at 03:06

## 2023-06-26 RX ADMIN — PIPERACILLIN AND TAZOBACTAM 3.38 G: 3; .375 INJECTION, POWDER, LYOPHILIZED, FOR SOLUTION INTRAVENOUS; PARENTERAL at 12:06

## 2023-06-26 NOTE — ASSESSMENT & PLAN NOTE
BP 70/50 when EMS activated  Hx HTN  IV given for hydration  78/54 after fluid bolus  NS @125ml/hr  /60   VS q4 hrs  Hold all BP meds for SBP <100

## 2023-06-26 NOTE — PLAN OF CARE
[facility] - Emergency Dept  Initial Discharge Assessment       Primary Care Provider: Spenser Curry Jr, MD    Admission Diagnosis: Near syncope [R55]    Admission Date: 6/26/2023  Expected Discharge Date: TBD    Assessment completed with patient at bedside in ED09. Patient lives alone, completes activities of daily living independently, no DME or HH required prior to admission. Cousin to provide transportation home. Case management to continue to follow.    Transition of Care Barriers: None    Payor: MEDICAID / Plan: [employer] [facility] / Product Type: Managed Medicaid /     Extended Emergency Contact Information  Primary Emergency Contact: FLORI JULES  Mobile Phone: [phone]  Relation: Brother   needed? No    Discharge Plan A: Home  Discharge Plan B: Home      [facility] - [place] - [email or web address]  [address]  [place] [address]  Phone: [phone] Fax: [phone]    [id number] [place] - [address]  [address]  [facility] [address]  Phone: [phone] Fax: [phone]      Initial Assessment (most recent)       Adult Discharge Assessment - 06/26/23 1557          Discharge Assessment    Assessment Type Discharge Planning Assessment     Confirmed/corrected address, phone number and insurance Yes     Confirmed Demographics Correct on Facesheet     Source of Information patient     Communicated JAYANT with patient/caregiver Date not available/Unable to determine     Reason For Admission JOSE (acute kidney injury)     People in Home alone     Facility Arrived From: home     Do you expect to return to your current living situation? Yes     Do you have help at home or someone to help you manage your care at home? No     Prior to hospitilization cognitive status: Alert/Oriented     Current cognitive status: Alert/Oriented     Equipment Currently Used at Home none     Readmission within 30 days? No      Patient currently being followed by outpatient case management? No     Do you currently have service(s) that help you manage your care at home? No     Do you take prescription medications? Yes     Do you have prescription coverage? Yes     Coverage Payor:  MEDICAID - [employer] [place]     Do you have any problems affording any of your prescribed medications? No     Is the patient taking medications as prescribed? yes     Who is going to help you get home at discharge? cousin     How do you get to doctors appointments? health plan transportation;family or friend will provide     Are you on dialysis? No     Do you take coumadin? No     Discharge Plan A Home     Discharge Plan B Home     DME Needed Upon Discharge  none     Discharge Plan discussed with: Patient     Transition of Care Barriers None

## 2023-06-26 NOTE — H&P
"[facility] - Emergency Dept  Hospital Medicine  History & Physical    Patient Name: Chelsea Grossman  MRN: [id number]  Patient Class: IP- Inpatient  Admission Date: 6/26/2023  Attending Physician: Goldy Massey, *   Primary Care Provider: Spenser Curry Jr, MD         Patient information was obtained from patient and ER records.     Subjective:     Principal Problem:JOSE (acute kidney injury)    Chief Complaint:   Chief Complaint   Patient presents with    Diarrhea     "Started this morning", possible syncopal episode.        HPI: 62 yo presents to ER due to not feeling good with near syncope. Patient reports hse went to have a routine mammogram this morning and while waiting to be called back started feeling bad. Reports she was diaphoretic and hot and felt fainty (light headed and weak). Reports going to the restroom and had a episode of soft/diarrhea stool. The mammography personal suggested not proceeding with mammogram and coming to ED to be checked out at which EMS was called. EMS was notified patient found with decreased blood pressure of 70/50. Patient was given IV hydration transferred to the emergency department for further evaluation. Patient denies chest pain or discomfort. No nausea or vomiting. No abdominal pain or cramping. Patient does reports she has had some urinary pain, urinary frequency, and urinary urgency X 2 weeks or more. Denies hematuria or odor associated with urine. Hx Hypertension. Patient reports taking BP medicines this am. Other history osteoarthritis and Hep C.        Past Medical History:   Diagnosis Date    Cirrhosis 2020    History of hepatitis C, s/p successful Rx w/ cure (SVR12 - 4/2020)     History of kidney stones     Hypertension     Hypertensive retinopathy of both eyes     Osteoarthritis     Osteopenia     Trigger finger of left hand 2023       Past Surgical History:   Procedure Laterality Date    CARPAL TUNNEL RELEASE Bilateral     " FOOT SURGERY Left 12/18/2018    KIDNEY STONE SURGERY      Left ear surgery      TOTAL ABDOMINAL HYSTERECTOMY W/ BILATERAL SALPINGOOPHORECTOMY      TRIGGER FINGER RELEASE Right     2nd and 3rd fingers       Review of patient's allergies indicates:   Allergen Reactions    Sulfa (sulfonamide antibiotics) Itching    Sulfamethoxazole-trimethoprim Itching and Rash     Itchy Rash       No current facility-administered medications on file prior to encounter.     Current Outpatient Medications on File Prior to Encounter   Medication Sig    amLODIPine (NORVASC) 10 MG tablet TAKE 1 TABLET BY MOUTH ONCE DAILY IN THE EVENING (Patient taking differently: Take 10 mg by mouth once daily.)    carvediloL (COREG) 12.5 MG tablet Take 1 tablet (12.5 mg total) by mouth 2 (two) times daily with meals.    celecoxib (CELEBREX) 200 MG capsule Take 1 capsule (200 mg total) by mouth once daily.    DULoxetine (CYMBALTA) 60 MG capsule Take 1 capsule (60 mg total) by mouth once daily.    hydroCHLOROthiazide (HYDRODIURIL) 25 MG tablet Take 1 tablet (25 mg total) by mouth once daily.    multivitamin (THERAGRAN) per tablet Take 1 tablet by mouth once daily.    olmesartan (BENICAR) 40 MG tablet Take 1 tablet (40 mg total) by mouth once daily.    pantoprazole (PROTONIX) 40 MG tablet Take 1 tablet (40 mg total) by mouth once daily.    colchicine (COLCRYS) 0.6 mg tablet Take 2 tablets at symptom onset, 1 tablet 2-3 hours later, then 1 tablet daily for 7 days as needed for gout attack    estradioL (ESTRACE) 0.01 % (0.1 mg/gram) vaginal cream INSERT 1 2 GRAM INTRAVAGINALLY EVERY NIGHT AT BEDTIME 2 TO 3 TIMES PER WEEK (Patient taking differently: Place 2 g vaginally twice a week. INSERT 1 2 GRAM INTRAVAGINALLY EVERY NIGHT AT BEDTIME 2 TO 3 TIMES PER WEEK)    fluticasone propionate (FLONASE) 50 mcg/actuation nasal spray 1 spray (50 mcg total) by Each Nostril route once daily.    ibuprofen (ADVIL,MOTRIN) 800 MG tablet Take 1 tablet (800 mg  total) by mouth every 6 (six) hours as needed for Pain.    traMADoL (ULTRAM) 50 mg tablet TAKE 1 TABLET BY MOUTH EVERY 8 HOURS AS NEEDED FOR PAIN (Patient taking differently: Take 50 mg by mouth every 8 (eight) hours as needed. TAKE 1 TABLET BY MOUTH EVERY 8 HOURS AS NEEDED FOR PAIN)     Family History       Problem Relation (Age of Onset)    Breast cancer Sister, Maternal Grandmother (60)    Cancer Father    Heart disease Mother    Stomach cancer Brother          Tobacco Use    Smoking status: Former     Types: Cigarettes     Quit date: 10/2018     Years since quittin.7    Smokeless tobacco: Never   Substance and Sexual Activity    Alcohol use: Yes    Drug use: No    Sexual activity: Never     Partners: Female     Review of Systems   Constitutional:  Positive for diaphoresis.   HENT:  Negative for congestion and sore throat.    Eyes:  Negative for pain, discharge, redness and itching.   Respiratory:  Negative for cough, chest tightness, shortness of breath and wheezing.    Cardiovascular:  Negative for chest pain, palpitations and leg swelling.   Gastrointestinal:  Positive for diarrhea. Negative for abdominal distention and abdominal pain.   Genitourinary:  Positive for dysuria, frequency and urgency. Negative for decreased urine volume, difficulty urinating, flank pain and hematuria.   Musculoskeletal:  Negative for back pain, gait problem, joint swelling, myalgias and neck pain.   Skin:  Negative for rash.   Neurological:  Positive for syncope and weakness. Negative for dizziness, speech difficulty, light-headedness, numbness and headaches.   Hematological:  Does not bruise/bleed easily.   Psychiatric/Behavioral:  Negative for agitation, confusion, hallucinations, self-injury, sleep disturbance and suicidal ideas. The patient is not nervous/anxious and is not hyperactive.    Objective:     Vital Signs (Most Recent):  Temp: 97.7 °F (36.5 °C) (23 1132)  Pulse: 81 (23 1455)  Resp: 16 (23  1455)  BP: (!) 109/59 (06/26/23 1455)  SpO2: 96 % (06/26/23 1455) Vital Signs (24h Range):  Temp:  [97.7 °F (36.5 °C)-98 °F (36.7 °C)] 97.7 °F (36.5 °C)  Pulse:  [75-87] 81  Resp:  [16-23] 16  SpO2:  [93 %-98 %] 96 %  BP: ()/(39-74) 109/59     Weight: 104.3 kg (230 lb)  Body mass index is 40.74 kg/m².     Physical Exam  Vitals and nursing note reviewed.   Constitutional:       General: She is not in acute distress.     Appearance: Normal appearance. She is not toxic-appearing.   HENT:      Head: Normocephalic.      Right Ear: Tympanic membrane, ear canal and external ear normal. There is no impacted cerumen.      Left Ear: Tympanic membrane, ear canal and external ear normal. There is no impacted cerumen.      Nose: Nose normal. No congestion or rhinorrhea.      Mouth/Throat:      Mouth: Mucous membranes are moist.      Pharynx: Oropharynx is clear. No oropharyngeal exudate or posterior oropharyngeal erythema.   Eyes:      General: No scleral icterus.        Right eye: No discharge.         Left eye: No discharge.      Extraocular Movements: Extraocular movements intact.      Conjunctiva/sclera: Conjunctivae normal.      Pupils: Pupils are equal, round, and reactive to light.   Neck:      Vascular: No carotid bruit.   Cardiovascular:      Rate and Rhythm: Normal rate and regular rhythm.      Pulses: Normal pulses.      Heart sounds: No murmur heard.    No friction rub. No gallop.   Pulmonary:      Effort: Pulmonary effort is normal. No respiratory distress.      Breath sounds: Normal breath sounds. No stridor. No wheezing, rhonchi or rales.   Chest:      Chest wall: No tenderness.   Abdominal:      General: Abdomen is flat. Bowel sounds are normal. There is no distension.      Palpations: Abdomen is soft. There is no mass.      Tenderness: There is no abdominal tenderness. There is no right CVA tenderness, left CVA tenderness, guarding or rebound.      Hernia: No hernia is present.   Genitourinary:     Rectum:  Normal.   Musculoskeletal:         General: No swelling, tenderness, deformity or signs of injury. Normal range of motion.      Cervical back: Normal range of motion and neck supple. No rigidity or tenderness.      Right lower leg: No edema.      Left lower leg: No edema.   Lymphadenopathy:      Cervical: No cervical adenopathy.   Skin:     General: Skin is warm and dry.      Capillary Refill: Capillary refill takes 2 to 3 seconds.      Coloration: Skin is not jaundiced or pale.      Findings: No bruising, erythema, lesion or rash.   Neurological:      General: No focal deficit present.      Mental Status: She is alert and oriented to person, place, and time. Mental status is at baseline.      Cranial Nerves: No cranial nerve deficit.      Sensory: No sensory deficit.      Motor: No weakness.   Psychiatric:         Mood and Affect: Mood normal.         Behavior: Behavior normal.         Thought Content: Thought content normal.         Judgment: Judgment normal.            CRANIAL NERVES     CN III, IV, VI   Pupils are equal, round, and reactive to light.     Significant Labs: All pertinent labs within the past 24 hours have been reviewed.  Recent Lab Results         06/26/23  1258   06/26/23  1130   06/26/23  0934   06/26/23  0816        Procalcitonin       5.04  Comment: Procalcitonin Result Interpretation:    <=0.50 ng/mL  Systemic infection (sepsis) is not likely. Local bacterial infection   is   possible.    >0.50 and <= 2.00 ng/mL  Systemic infection (sepsis) is possible, but other conditions are   also known   to elevate procalcitonin.     >2.00 ng/mL  Systemic infection (sepsis) is likely unless other causes are known.    >=10.00 ng/mL  Important systemic inflammatory response, almost exclusively due to   severe   bacterial sepsis or septic shock.         Albumin       3.5       Alkaline Phosphatase       96       ALT       37       Anion Gap       12       Appearance, UA   Cloudy           AST       29        Bacteria, UA   Moderate           Baso #       0.04       Basophil %       0.2       Bilirubin (UA)   Negative           BILIRUBIN TOTAL       2.0  Comment: For infants and newborns, interpretation of results should be based  on gestational age, weight and in agreement with clinical  observations.    Premature Infant recommended reference ranges:  Up to 24 hours.............<8.0 mg/dL  Up to 48 hours............<12.0 mg/dL  3-5 days..................<15.0 mg/dL  6-29 days.................<15.0 mg/dL         BNP       27  Comment: Values of less than 100 pg/ml are consistent with non-CHF populations.       BUN       37       Calcium       8.6       Chloride       102       CO2       19       Color, UA   Yellow           Creatinine       2.8       Differential Method       Automated       eGFR       18.3       Eos #       0.0       Eosinophil %       0.0       Glucose       173       Glucose, UA   Negative           Gran # (ANC)       14.7       Gran %       77.5       Hematocrit       34.9       Hemoglobin       11.7       Hyaline Casts, UA   21           Immature Grans (Abs)       0.24  Comment: Mild elevation in immature granulocytes is non specific and   can be seen in a variety of conditions including stress response,   acute inflammation, trauma and pregnancy. Correlation with other   laboratory and clinical findings is essential.         Immature Granulocytes       1.3       Ketones, UA   Negative           Lactate, Rao     1.8  Comment: Falsely low lactic acid results can be found in samples   containing >=13.0 mg/dL total bilirubin and/or >=3.5 mg/dL   direct bilirubin.           Leukocytes, UA   3+           Lymph #       2.4       Lymph %       12.6       Magnesium       1.8       MCH       31.7       MCHC       33.5       MCV       95       Microscopic Comment   SEE COMMENT  Comment: Other formed elements not mentioned in the report are not   present in the microscopic examination.              Mono #        1.6       Mono %       8.4       MPV       9.6       NITRITE UA   Negative           nRBC       0       Occult Blood Negative             Occult Blood UA   3+           pH, UA   6.0           Platelets       181       Potassium       3.9       PROTEIN TOTAL       7.6       Protein, UA   2+  Comment: Recommend a 24 hour urine protein or a urine   protein/creatinine ratio if globulin induced proteinuria is  clinically suspected.             RBC       3.69       RBC, UA   24           RDW       13.3       Sodium       133       Specific [place], UA   1.010           Specimen UA   Urine, Clean Catch           Squam Epithel, UA   1           Troponin I High Sensitivity       9.3  Comment: Troponin results differ between methods. Do not use   results between Troponin methods interchangeably.    Alkaline Phospatase levels above 400 U/L may   cause false positive results.    Access hsTnI should not be used for patients taking   Asfotase erendira (Strensiq).         UROBILINOGEN UA   2.0-3.0           WBC, UA   >100           Stool WBC No neutrophils seen             WBC       19.01               Significant Imaging: I have reviewed all pertinent imaging results/findings within the past 24 hours.    Assessment/Plan:     * JOSE (acute kidney injury)  Patient with acute kidney injury likely due to dehydration. JOSE is currently improving.   Labs reviewed- Renal function/electrolytes: BUN/Cr 37/2.8, GFR 18.3,  Na 133. Lactic and troponin negative  NS @125ml/hr  Monitor urine output   serial BMP and adjust therapy as needed.   Avoid nephrotoxins and renally dose meds for GFR listed above.       Hypotension  BP 70/50 when EMS activated  Hx HTN  IV given for hydration  78/54 after fluid bolus  NS @125ml/hr  /60   VS q4 hrs  Hold all BP meds for SBP <100    UTI (urinary tract infection)  C/O urinary frequency, urinary urgency, and dysuria X 2 weeks  C/O feeling bad with near syncope episode  NS @ 125ml/hr  Zosyn IVPB 3.375 @  8hr  Labs reviewed:  Specimen UA Urine, Clean Catch Ketones, UA Negative   Color, UA Yellow Bilirubin (UA) Negative   Appearance, UA Cloudy Abnormal  Occult Blood UA 3+ Abnormal    pH, UA 6.0 Nitrite, UA Negative   Specific Gravity, UA 1.010 Urobilinogen, UA 2.0-3.0 Abnormal  EU/dL   Protein, UA 2+ Abnormal   Leukocytes, UA 3+ Abnormal    Glucose, UA Negative     Procal 5.04, WBC 19.01  Repeat UA in the am  Repeat CBC in am        VTE Risk Mitigation (From admission, onward)         Ordered     IP VTE HIGH RISK PATIENT  Once         06/26/23 1403                           GILSON ROBERTSON-ROBERT  Department of Hospital Medicine  Formerly [facility] - Emergency Dept

## 2023-06-26 NOTE — ED PROVIDER NOTES
"Encounter Date: 6/26/2023       History     Chief Complaint   Patient presents with    Diarrhea     "Started this morning", possible syncopal episode.     63-year-old female with history of hypertension, osteoarthritis, kidney stones, cirrhotic liver disease secondary to hep C treated for care 2020.  Patient was having outpatient mammogram performed at [facility] in which time she felt nauseated had a bowel movement which was consistent with diarrhea and had near syncopal episode.  EMS was notified patient found with decreased blood pressure of 70/50.  Patient was given IV hydration transferred to the emergency department for further evaluation.  Patient states had one moderate to large diarrhea stool.  She described as nonbloody in nature.  No history of hemoptysis, hematemesis, no hematochezia.  Patient states she did feel lightheaded and weak.  After EMS gave patient initial fluid bolus she is transferred to the emergency department further evaluation.  Patient denies recent history of illness including cough, fever, chest pain, no abdominal pain, no URI symptoms.  Patient was scheduled to have routine mammogram performed today.    Review of patient's allergies indicates:   Allergen Reactions    Sulfa (sulfonamide antibiotics) Itching    Sulfamethoxazole-trimethoprim Itching and Rash     Itchy Rash     Past Medical History:   Diagnosis Date    Cirrhosis 2020    History of hepatitis C, s/p successful Rx w/ cure (SVR12 - 4/2020)     History of kidney stones     Hypertension     Hypertensive retinopathy of both eyes     Osteoarthritis     Osteopenia     Trigger finger of left hand 2023     Past Surgical History:   Procedure Laterality Date    CARPAL TUNNEL RELEASE Bilateral     FOOT SURGERY Left 12/18/2018    KIDNEY STONE SURGERY      Left ear surgery      TOTAL ABDOMINAL HYSTERECTOMY W/ BILATERAL SALPINGOOPHORECTOMY      TRIGGER FINGER RELEASE Right     2nd and 3rd fingers     Family History   Problem " Relation Age of Onset    Heart disease Mother     Cancer Father         stomach    Breast cancer Sister     Breast cancer Maternal Grandmother 60    Stomach cancer Brother      Social History     Tobacco Use    Smoking status: Former     Types: Cigarettes     Quit date: 10/2018     Years since quittin.7    Smokeless tobacco: Never   Substance Use Topics    Alcohol use: Yes    Drug use: No     Review of Systems   Constitutional:  Positive for fatigue. Negative for fever.   HENT:  Negative for sore throat.    Respiratory:  Negative for shortness of breath.    Cardiovascular:  Negative for chest pain, palpitations and leg swelling.   Gastrointestinal:  Positive for diarrhea. Negative for abdominal pain, nausea and vomiting.   Genitourinary:  Positive for urgency. Negative for dysuria, flank pain, frequency, vaginal bleeding, vaginal discharge and vaginal pain.   Musculoskeletal:  Negative for back pain.   Skin:  Negative for rash.   Neurological:  Positive for light-headedness. Negative for dizziness, syncope, weakness, numbness and headaches.   Hematological:  Does not bruise/bleed easily.   Psychiatric/Behavioral:  Negative for confusion. The patient is not nervous/anxious.      Physical Exam     Initial Vitals   BP Pulse Resp Temp SpO2   23 0753 23 0747 23 0747 23 0746 23 0758   (!) 83/52 86 19 98 °F (36.7 °C) 97 %      MAP       --                Physical Exam    Nursing note and vitals reviewed.  Constitutional: She appears well-developed and well-nourished. She is not diaphoretic. No distress.   HENT:   Head: Normocephalic and atraumatic.   Nose: Nose normal.   Mouth/Throat: Oropharynx is clear and moist.   Eyes: Conjunctivae and EOM are normal. Pupils are equal, round, and reactive to light.   Neck: Neck supple. No thyromegaly present. No tracheal deviation present.   Normal range of motion.  Cardiovascular:  Normal rate, regular rhythm, normal heart sounds and intact distal  pulses.     Exam reveals no gallop and no friction rub.       No murmur heard.  Pulmonary/Chest: No stridor. No respiratory distress.   Course bilateral breath sounds no adventitious sounds   Abdominal: Abdomen is soft. Bowel sounds are normal. She exhibits no mass. There is no rebound and no guarding.   Musculoskeletal:         General: No tenderness or edema. Normal range of motion.      Cervical back: Normal range of motion and neck supple.     Lymphadenopathy:     She has no cervical adenopathy.   Neurological: She is alert and oriented to person, place, and time. She has normal strength and normal reflexes. GCS score is 15. GCS eye subscore is 4. GCS verbal subscore is 5. GCS motor subscore is 6.   Skin: Skin is warm and dry. Capillary refill takes less than 2 seconds.   Psychiatric: She has a normal mood and affect.       ED Course   Procedures  Labs Reviewed   CBC W/ AUTO DIFFERENTIAL - Abnormal; Notable for the following components:       Result Value    WBC 19.01 (*)     RBC 3.69 (*)     Hemoglobin 11.7 (*)     Hematocrit 34.9 (*)     MCH 31.7 (*)     Immature Granulocytes 1.3 (*)     Gran # (ANC) 14.7 (*)     Immature Grans (Abs) 0.24 (*)     Mono # 1.6 (*)     Gran % 77.5 (*)     Lymph % 12.6 (*)     All other components within normal limits   COMPREHENSIVE METABOLIC PANEL - Abnormal; Notable for the following components:    Sodium 133 (*)     CO2 19 (*)     Glucose 173 (*)     BUN 37 (*)     Creatinine 2.8 (*)     Calcium 8.6 (*)     Total Bilirubin 2.0 (*)     eGFR 18.3 (*)     All other components within normal limits   URINALYSIS, REFLEX TO URINE CULTURE - Abnormal; Notable for the following components:    Appearance, UA Cloudy (*)     Protein, UA 2+ (*)     Occult Blood UA 3+ (*)     Urobilinogen, UA 2.0-3.0 (*)     Leukocytes, UA 3+ (*)     All other components within normal limits    Narrative:     Specimen Source->Urine   PROCALCITONIN - Abnormal; Notable for the following components:     Procalcitonin 5.04 (*)     All other components within normal limits   URINALYSIS MICROSCOPIC - Abnormal; Notable for the following components:    RBC, UA 24 (*)     WBC, UA >100 (*)     Bacteria Moderate (*)     Hyaline Casts, UA 21 (*)     All other components within normal limits    Narrative:     Specimen Source->Urine   CULTURE, BLOOD   CULTURE, BLOOD   CULTURE, URINE   CULTURE, STOOL   TROPONIN I HIGH SENSITIVITY   MAGNESIUM   B-TYPE NATRIURETIC PEPTIDE   LACTIC ACID, PLASMA   PROCALCITONIN   WBC, STOOL   OCCULT BLOOD X 1, STOOL   STOOL EXAM-OVA,CYSTS,PARASITES     EKG Readings: (Independently Interpreted)   Initial Reading: No STEMI. Rhythm: Normal Sinus Rhythm. Heart Rate: 86. Ectopy: No Ectopy. Axis: Normal.   Normal sinus rhythm, 86, normal axis, no ischemic changes noted, / with FL interval 128   ECG Results              EKG 12-lead (In process)  Result time 06/26/23 10:11:59      In process by Interface, Lab In [facility] (06/26/23 10:11:59)                   Narrative:    Test Reason : R55,    Vent. Rate : 086 BPM     Atrial Rate : 086 BPM     P-R Int : 128 ms          QRS Dur : 078 ms      QT Int : 376 ms       P-R-T Axes : 035 022 009 degrees     QTc Int : 449 ms    Normal sinus rhythm  Minimal voltage criteria for LVH, may be normal variant  Borderline Abnormal ECG  No previous ECGs available    Referred By: [email or web address]   SELF           Confirmed By:                                   Imaging Results    None          Medications   lactated ringers infusion (has no administration in time range)   sodium chloride 0.9% flush 10 mL (has no administration in time range)   acetaminophen tablet 650 mg (has no administration in time range)   HYDROcodone-acetaminophen 5-325 mg per tablet 1 tablet (has no administration in time range)   HYDROcodone-acetaminophen  mg per tablet 1 tablet (has no administration in time range)   ondansetron injection 4 mg (has no administration in time range)    ondansetron disintegrating tablet 8 mg (has no administration in time range)   melatonin tablet 6 mg (has no administration in time range)   piperacillin-tazobactam 3.375 g in dextrose 5 % 100 mL IVPB (ready to mix) (has no administration in time range)   0.9%  NaCl infusion (has no administration in time range)   lactated ringers bolus 1,000 mL (0 mLs Intravenous Stopped 6/26/23 0931)   lactated ringers bolus 1,000 mL (0 mLs Intravenous Stopped 6/26/23 1129)   piperacillin-tazobactam 3.375 g in dextrose 5 % 100 mL IVPB (ready to mix) (0 g Intravenous Stopped 6/26/23 1300)     Medical Decision Making:   Initial Assessment:   63-year-old female with history of hypertension, osteoarthritis, kidney stones, cirrhotic liver disease secondary to hep C treated for care 2020.  Patient was having outpatient mammogram performed at [facility] in which time she felt nauseated had a bowel movement which was consistent with diarrhea and had near syncopal episode.  EMS was notified patient found with decreased blood pressure of 70/50.  Patient was given IV hydration transferred to the emergency department for further evaluation.  Patient states had one moderate to large diarrhea stool.  She described as nonbloody in nature.  No history of hemoptysis, hematemesis, no hematochezia.  Patient states she did feel lightheaded and weak.  After EMS gave patient initial fluid bolus she is transferred to the emergency department further evaluation.  Patient denies recent history of illness including cough, fever, chest pain, no abdominal pain, no URI symptoms.  Patient was scheduled to have routine mammogram performed today.    Differential Diagnosis:   Vasovagal syncope, hypovolemia, cardiac arrhythmia, dehydration,  Clinical Tests:   Lab Tests: Ordered and Reviewed  Radiological Study: Ordered and Reviewed  Medical Tests: Ordered and Reviewed           ED Course as of 06/26/23 1453   Mon Jun 26, 2023   1226 Patient seen  evaluated emergency department.  Patient did have outpatient episode of near syncope at the imaging center earlier today.  Patient states that she did take her blood pressure medicines prior to going to the emergency center including amlodipine, hydrochlorothiazide, and carvedilol.  Patient denied having recent illness prior to attending the imaging center.  Patient did have 1 episode of diarrheal stool in which she felt lightheaded afterwards.  Per workup patient was given IV hydration in which she did have persistence and hypotension.  Workup was extended to include blood cultures and urine urine cultures.  Patient did have a urine which reveal 3+ leukocyte esterase, greater than 100 WBCs, moderate bacteria.  [place] [address].  Procalcitonin 5.04.  BUN 37, creatinine 2.8 which was a change in patient's baseline from 2 months ago which was found to be 22/1.0.  At this time patient was given Zosyn and placed on 130 cc lactated Ringer's after 2 L already initiated.  Patient had reperfusion which showed good capillary refill.  Blood pressure did improved to 130/80.  At this time patient will be admitted for urinary tract infection, evaluation for bacteremia and acute kidney injury.  Patient remained hemodynamically adequate currently awaiting admission to hospital medicine services.  Will be continued on IV hydration.  Blood pressure will be monitored.  Will recheck patient's labs including creatinine/ BUN after fluid resuscitation.  Will monitor patient's blood cultures.  Stain stool study if available.  Lactic acid on admit found to be 1.8. [RM]      ED Course User Index  [RM] Rock Su MD                 Clinical Impression:   Final diagnoses:  [R55] Near syncope (Primary)  [I95.9] Hypotension, unspecified hypotension type  [N39.0] Urinary tract infection without hematuria, site unspecified  [R19.7] Diarrhea, unspecified type        ED Disposition Condition    Admit Stable                 Rock Su MD  06/26/23 1225       Rock Su MD  06/26/23 8555

## 2023-06-26 NOTE — ASSESSMENT & PLAN NOTE
Patient with acute kidney injury likely due to dehydration. JOSE is currently improving.   Labs reviewed- Renal function/electrolytes: BUN/Cr 37/2.8, GFR 18.3,  Na 133. Lactic and troponin negative  NS @125ml/hr  Monitor urine output   serial BMP and adjust therapy as needed.   Avoid nephrotoxins and renally dose meds for GFR listed above.

## 2023-06-26 NOTE — HPI
62 yo presents to ER due to not feeling good with near syncope. Patient reports hse went to have a routine mammogram this morning and while waiting to be called back started feeling bad. Reports she was diaphoretic and hot and felt fainty (light headed and weak). Reports going to the restroom and had a episode of soft/diarrhea stool. The mammography personal suggested not proceeding with mammogram and coming to ED to be checked out at which EMS was called. EMS was notified patient found with decreased blood pressure of 70/50. Patient was given IV hydration transferred to the emergency department for further evaluation. Patient denies chest pain or discomfort. No nausea or vomiting. No abdominal pain or cramping. Patient does reports she has had some urinary pain, urinary frequency, and urinary urgency X 2 weeks or more. Denies hematuria or odor associated with urine. Hx Hypertension. Patient reports taking BP medicines this am. Other history osteoarthritis and Hep C.

## 2023-06-26 NOTE — ASSESSMENT & PLAN NOTE
C/O urinary frequency, urinary urgency, and dysuria X 2 weeks  C/O feeling bad with near syncope episode  NS @ 125ml/hr  Zosyn IVPB 3.375 @ 8hr  Labs reviewed:  Specimen UA Urine, Clean Catch Ketones, UA Negative   Color, UA Yellow Bilirubin (UA) Negative   Appearance, UA Cloudy Abnormal  Occult Blood UA 3+ Abnormal    pH, UA 6.0 Nitrite, UA Negative   Specific Gravity, UA 1.010 Urobilinogen, UA 2.0-3.0 Abnormal  EU/dL   Protein, UA 2+ Abnormal   Leukocytes, UA 3+ Abnormal    Glucose, UA Negative     Procal 5.04, WBC 19.01  Repeat UA in the am  Repeat CBC in am

## 2023-06-26 NOTE — ED NOTES
"Pt stated "I went to go get my mammogram this morning and I started feeling bad and sweating so I went to the bathroom and I had diarrhea". Pt denies any abdominal pain at this time. EMS reported syncopal episode but pt denies this.  "

## 2023-06-26 NOTE — PHARMACY MED REC
"          Admission Medication History     The home medication history was taken by Leanne Resendiz.    You may go to "Admission" then "Reconcile Home Medications" tabs to review and/or act upon these items.     The home medication list has been updated by the Pharmacy department.   Please read ALL comments highlighted in yellow.   Please address this information as you see fit.    Feel free to contact us if you have any questions or require assistance.      Medications listed below were obtained from: Patient/family and Analytic software- [employer]  No current facility-administered medications on file prior to encounter.     Current Outpatient Medications on File Prior to Encounter   Medication Sig Dispense Refill    amLODIPine (NORVASC) 10 MG tablet TAKE 1 TABLET BY MOUTH ONCE DAILY IN THE EVENING (Patient taking differently: Take 10 mg by mouth once daily.) 90 tablet 1    carvediloL (COREG) 12.5 MG tablet Take 1 tablet (12.5 mg total) by mouth 2 (two) times daily with meals. 180 tablet 1    celecoxib (CELEBREX) 200 MG capsule Take 1 capsule (200 mg total) by mouth once daily. 90 capsule 1    DULoxetine (CYMBALTA) 60 MG capsule Take 1 capsule (60 mg total) by mouth once daily. 30 capsule 6    hydroCHLOROthiazide (HYDRODIURIL) 25 MG tablet Take 1 tablet (25 mg total) by mouth once daily. 90 tablet 1    multivitamin (THERAGRAN) per tablet Take 1 tablet by mouth once daily.      olmesartan (BENICAR) 40 MG tablet Take 1 tablet (40 mg total) by mouth once daily. 90 tablet 1    pantoprazole (PROTONIX) 40 MG tablet Take 1 tablet (40 mg total) by mouth once daily. 90 tablet 1    colchicine (COLCRYS) 0.6 mg tablet Take 2 tablets at symptom onset, 1 tablet 2-3 hours later, then 1 tablet daily for 7 days as needed for gout attack 10 tablet 3    estradioL (ESTRACE) 0.01 % (0.1 mg/gram) vaginal cream INSERT 1 2 GRAM INTRAVAGINALLY EVERY NIGHT AT BEDTIME 2 TO 3 TIMES PER WEEK (Patient taking differently: Place 2 g vaginally twice a " week. INSERT 1 2 GRAM INTRAVAGINALLY EVERY NIGHT AT BEDTIME 2 TO 3 TIMES PER WEEK) 42.5 g 3    fluticasone propionate (FLONASE) 50 mcg/actuation nasal spray 1 spray (50 mcg total) by Each Nostril route once daily. 48 g 1    ibuprofen (ADVIL,MOTRIN) 800 MG tablet Take 1 tablet (800 mg total) by mouth every 6 (six) hours as needed for Pain. 90 tablet 1    traMADoL (ULTRAM) 50 mg tablet TAKE 1 TABLET BY MOUTH EVERY 8 HOURS AS NEEDED FOR PAIN (Patient taking differently: Take 50 mg by mouth every 8 (eight) hours as needed. TAKE 1 TABLET BY MOUTH EVERY 8 HOURS AS NEEDED FOR PAIN) 90 tablet 0         Leanne Resendiz  [id number]        .

## 2023-06-26 NOTE — SUBJECTIVE & OBJECTIVE
Past Medical History:   Diagnosis Date    Cirrhosis 2020    History of hepatitis C, s/p successful Rx w/ cure (SVR12 - 4/2020)     History of kidney stones     Hypertension     Hypertensive retinopathy of both eyes     Osteoarthritis     Osteopenia     Trigger finger of left hand 2023       Past Surgical History:   Procedure Laterality Date    CARPAL TUNNEL RELEASE Bilateral     FOOT SURGERY Left 12/18/2018    KIDNEY STONE SURGERY      Left ear surgery      TOTAL ABDOMINAL HYSTERECTOMY W/ BILATERAL SALPINGOOPHORECTOMY      TRIGGER FINGER RELEASE Right     2nd and 3rd fingers       Review of patient's allergies indicates:   Allergen Reactions    Sulfa (sulfonamide antibiotics) Itching    Sulfamethoxazole-trimethoprim Itching and Rash     Itchy Rash       No current facility-administered medications on file prior to encounter.     Current Outpatient Medications on File Prior to Encounter   Medication Sig    amLODIPine (NORVASC) 10 MG tablet TAKE 1 TABLET BY MOUTH ONCE DAILY IN THE EVENING (Patient taking differently: Take 10 mg by mouth once daily.)    carvediloL (COREG) 12.5 MG tablet Take 1 tablet (12.5 mg total) by mouth 2 (two) times daily with meals.    celecoxib (CELEBREX) 200 MG capsule Take 1 capsule (200 mg total) by mouth once daily.    DULoxetine (CYMBALTA) 60 MG capsule Take 1 capsule (60 mg total) by mouth once daily.    hydroCHLOROthiazide (HYDRODIURIL) 25 MG tablet Take 1 tablet (25 mg total) by mouth once daily.    multivitamin (THERAGRAN) per tablet Take 1 tablet by mouth once daily.    olmesartan (BENICAR) 40 MG tablet Take 1 tablet (40 mg total) by mouth once daily.    pantoprazole (PROTONIX) 40 MG tablet Take 1 tablet (40 mg total) by mouth once daily.    colchicine (COLCRYS) 0.6 mg tablet Take 2 tablets at symptom onset, 1 tablet 2-3 hours later, then 1 tablet daily for 7 days as needed for gout attack    estradioL (ESTRACE) 0.01 % (0.1 mg/gram) vaginal cream INSERT 1 2 GRAM INTRAVAGINALLY EVERY  NIGHT AT BEDTIME 2 TO 3 TIMES PER WEEK (Patient taking differently: Place 2 g vaginally twice a week. INSERT 1 2 GRAM INTRAVAGINALLY EVERY NIGHT AT BEDTIME 2 TO 3 TIMES PER WEEK)    fluticasone propionate (FLONASE) 50 mcg/actuation nasal spray 1 spray (50 mcg total) by Each Nostril route once daily.    ibuprofen (ADVIL,MOTRIN) 800 MG tablet Take 1 tablet (800 mg total) by mouth every 6 (six) hours as needed for Pain.    traMADoL (ULTRAM) 50 mg tablet TAKE 1 TABLET BY MOUTH EVERY 8 HOURS AS NEEDED FOR PAIN (Patient taking differently: Take 50 mg by mouth every 8 (eight) hours as needed. TAKE 1 TABLET BY MOUTH EVERY 8 HOURS AS NEEDED FOR PAIN)     Family History       Problem Relation (Age of Onset)    Breast cancer Sister, Maternal Grandmother (60)    Cancer Father    Heart disease Mother    Stomach cancer Brother          Tobacco Use    Smoking status: Former     Types: Cigarettes     Quit date: 10/2018     Years since quittin.7    Smokeless tobacco: Never   Substance and Sexual Activity    Alcohol use: Yes    Drug use: No    Sexual activity: Never     Partners: Female     Review of Systems   Constitutional:  Positive for diaphoresis.   HENT:  Negative for congestion and sore throat.    Eyes:  Negative for pain, discharge, redness and itching.   Respiratory:  Negative for cough, chest tightness, shortness of breath and wheezing.    Cardiovascular:  Negative for chest pain, palpitations and leg swelling.   Gastrointestinal:  Positive for diarrhea. Negative for abdominal distention and abdominal pain.   Genitourinary:  Positive for dysuria, frequency and urgency. Negative for decreased urine volume, difficulty urinating, flank pain and hematuria.   Musculoskeletal:  Negative for back pain, gait problem, joint swelling, myalgias and neck pain.   Skin:  Negative for rash.   Neurological:  Positive for syncope and weakness. Negative for dizziness, speech difficulty, light-headedness, numbness and headaches.    Hematological:  Does not bruise/bleed easily.   Psychiatric/Behavioral:  Negative for agitation, confusion, hallucinations, self-injury, sleep disturbance and suicidal ideas. The patient is not nervous/anxious and is not hyperactive.    Objective:     Vital Signs (Most Recent):  Temp: 97.7 °F (36.5 °C) (06/26/23 1132)  Pulse: 81 (06/26/23 1455)  Resp: 16 (06/26/23 1455)  BP: (!) 109/59 (06/26/23 1455)  SpO2: 96 % (06/26/23 1455) Vital Signs (24h Range):  Temp:  [97.7 °F (36.5 °C)-98 °F (36.7 °C)] 97.7 °F (36.5 °C)  Pulse:  [75-87] 81  Resp:  [16-23] 16  SpO2:  [93 %-98 %] 96 %  BP: ()/(39-74) 109/59     Weight: 104.3 kg (230 lb)  Body mass index is 40.74 kg/m².     Physical Exam  Vitals and nursing note reviewed.   Constitutional:       General: She is not in acute distress.     Appearance: Normal appearance. She is not toxic-appearing.   HENT:      Head: Normocephalic.      Right Ear: Tympanic membrane, ear canal and external ear normal. There is no impacted cerumen.      Left Ear: Tympanic membrane, ear canal and external ear normal. There is no impacted cerumen.      Nose: Nose normal. No congestion or rhinorrhea.      Mouth/Throat:      Mouth: Mucous membranes are moist.      Pharynx: Oropharynx is clear. No oropharyngeal exudate or posterior oropharyngeal erythema.   Eyes:      General: No scleral icterus.        Right eye: No discharge.         Left eye: No discharge.      Extraocular Movements: Extraocular movements intact.      Conjunctiva/sclera: Conjunctivae normal.      Pupils: Pupils are equal, round, and reactive to light.   Neck:      Vascular: No carotid bruit.   Cardiovascular:      Rate and Rhythm: Normal rate and regular rhythm.      Pulses: Normal pulses.      Heart sounds: No murmur heard.    No friction rub. No gallop.   Pulmonary:      Effort: Pulmonary effort is normal. No respiratory distress.      Breath sounds: Normal breath sounds. No stridor. No wheezing, rhonchi or rales.    Chest:      Chest wall: No tenderness.   Abdominal:      General: Abdomen is flat. Bowel sounds are normal. There is no distension.      Palpations: Abdomen is soft. There is no mass.      Tenderness: There is no abdominal tenderness. There is no right CVA tenderness, left CVA tenderness, guarding or rebound.      Hernia: No hernia is present.   Genitourinary:     Rectum: Normal.   Musculoskeletal:         General: No swelling, tenderness, deformity or signs of injury. Normal range of motion.      Cervical back: Normal range of motion and neck supple. No rigidity or tenderness.      Right lower leg: No edema.      Left lower leg: No edema.   Lymphadenopathy:      Cervical: No cervical adenopathy.   Skin:     General: Skin is warm and dry.      Capillary Refill: Capillary refill takes 2 to 3 seconds.      Coloration: Skin is not jaundiced or pale.      Findings: No bruising, erythema, lesion or rash.   Neurological:      General: No focal deficit present.      Mental Status: She is alert and oriented to person, place, and time. Mental status is at baseline.      Cranial Nerves: No cranial nerve deficit.      Sensory: No sensory deficit.      Motor: No weakness.   Psychiatric:         Mood and Affect: Mood normal.         Behavior: Behavior normal.         Thought Content: Thought content normal.         Judgment: Judgment normal.            CRANIAL NERVES     CN III, IV, VI   Pupils are equal, round, and reactive to light.     Significant Labs: All pertinent labs within the past 24 hours have been reviewed.  Recent Lab Results         06/26/23  1258   06/26/23  1130   06/26/23  0934   06/26/23  0816        Procalcitonin       5.04  Comment: Procalcitonin Result Interpretation:    <=0.50 ng/mL  Systemic infection (sepsis) is not likely. Local bacterial infection   is   possible.    >0.50 and <= 2.00 ng/mL  Systemic infection (sepsis) is possible, but other conditions are   also known   to elevate procalcitonin.      >2.00 ng/mL  Systemic infection (sepsis) is likely unless other causes are known.    >=10.00 ng/mL  Important systemic inflammatory response, almost exclusively due to   severe   bacterial sepsis or septic shock.         Albumin       3.5       Alkaline Phosphatase       96       ALT       37       Anion Gap       12       Appearance, UA   Cloudy           AST       29       Bacteria, UA   Moderate           Baso #       0.04       Basophil %       0.2       Bilirubin (UA)   Negative           BILIRUBIN TOTAL       2.0  Comment: For infants and newborns, interpretation of results should be based  on gestational age, weight and in agreement with clinical  observations.    Premature Infant recommended reference ranges:  Up to 24 hours.............<8.0 mg/dL  Up to 48 hours............<12.0 mg/dL  3-5 days..................<15.0 mg/dL  6-29 days.................<15.0 mg/dL         BNP       27  Comment: Values of less than 100 pg/ml are consistent with non-CHF populations.       BUN       37       Calcium       8.6       Chloride       102       CO2       19       Color, UA   Yellow           Creatinine       2.8       Differential Method       Automated       eGFR       18.3       Eos #       0.0       Eosinophil %       0.0       Glucose       173       Glucose, UA   Negative           Gran # (ANC)       14.7       Gran %       77.5       Hematocrit       34.9       Hemoglobin       11.7       Hyaline Casts, UA   21           Immature Grans (Abs)       0.24  Comment: Mild elevation in immature granulocytes is non specific and   can be seen in a variety of conditions including stress response,   acute inflammation, trauma and pregnancy. Correlation with other   laboratory and clinical findings is essential.         Immature Granulocytes       1.3       Ketones, UA   Negative           Lactate, Rao     1.8  Comment: Falsely low lactic acid results can be found in samples   containing >=13.0 mg/dL total bilirubin  and/or >=3.5 mg/dL   direct bilirubin.           Leukocytes, UA   3+           Lymph #       2.4       Lymph %       12.6       Magnesium       1.8       MCH       31.7       MCHC       33.5       MCV       95       Microscopic Comment   SEE COMMENT  Comment: Other formed elements not mentioned in the report are not   present in the microscopic examination.              Mono #       1.6       Mono %       8.4       MPV       9.6       NITRITE UA   Negative           nRBC       0       Occult Blood Negative             Occult Blood UA   3+           pH, UA   6.0           Platelets       181       Potassium       3.9       PROTEIN TOTAL       7.6       Protein, UA   2+  Comment: Recommend a 24 hour urine protein or a urine   protein/creatinine ratio if globulin induced proteinuria is  clinically suspected.             RBC       3.69       RBC, UA   24           RDW       13.3       Sodium       133       Specific Holden, UA   1.010           Specimen UA   Urine, Clean Catch           Squam Epithel, UA   1           Troponin I High Sensitivity       9.3  Comment: Troponin results differ between methods. Do not use   results between Troponin methods interchangeably.    Alkaline Phospatase levels above 400 U/L may   cause false positive results.    Access hsTnI should not be used for patients taking   Asfotase erendira (Strensiq).         UROBILINOGEN UA   2.0-3.0           WBC, UA   >100           Stool WBC No neutrophils seen             WBC       19.01               Significant Imaging: I have reviewed all pertinent imaging results/findings within the past 24 hours.

## 2023-06-27 LAB
ALBUMIN SERPL BCP-MCNC: 3 G/DL (ref 3.5–5.2)
ALP SERPL-CCNC: 80 U/L (ref 55–135)
ALT SERPL W/O P-5'-P-CCNC: 34 U/L (ref 10–44)
ANION GAP SERPL CALC-SCNC: 8 MMOL/L (ref 8–16)
AST SERPL-CCNC: 25 U/L (ref 10–40)
BACTERIA #/AREA URNS HPF: NEGATIVE /HPF
BASOPHILS # BLD AUTO: 0.02 K/UL (ref 0–0.2)
BASOPHILS NFR BLD: 0.2 % (ref 0–1.9)
BILIRUB SERPL-MCNC: 1 MG/DL (ref 0.1–1)
BILIRUB UR QL STRIP: NEGATIVE
BUN SERPL-MCNC: 36 MG/DL (ref 8–23)
CALCIUM SERPL-MCNC: 8.5 MG/DL (ref 8.7–10.5)
CHLORIDE SERPL-SCNC: 104 MMOL/L (ref 95–110)
CLARITY UR: CLEAR
CO2 SERPL-SCNC: 24 MMOL/L (ref 23–29)
COLOR UR: YELLOW
CREAT SERPL-MCNC: 1.6 MG/DL (ref 0.5–1.4)
DIFFERENTIAL METHOD: ABNORMAL
EOSINOPHIL # BLD AUTO: 0 K/UL (ref 0–0.5)
EOSINOPHIL NFR BLD: 0.1 % (ref 0–8)
ERYTHROCYTE [DISTWIDTH] IN BLOOD BY AUTOMATED COUNT: 13.4 % (ref 11.5–14.5)
EST. GFR  (NO RACE VARIABLE): 35.8 ML/MIN/1.73 M^2
GLUCOSE SERPL-MCNC: 105 MG/DL (ref 70–110)
GLUCOSE UR QL STRIP: NEGATIVE
HCT VFR BLD AUTO: 32.4 % (ref 37–48.5)
HGB BLD-MCNC: 10.8 G/DL (ref 12–16)
HGB UR QL STRIP: ABNORMAL
HYALINE CASTS #/AREA URNS LPF: 3 /LPF
IMM GRANULOCYTES # BLD AUTO: 0.07 K/UL (ref 0–0.04)
IMM GRANULOCYTES NFR BLD AUTO: 0.6 % (ref 0–0.5)
KETONES UR QL STRIP: NEGATIVE
LEUKOCYTE ESTERASE UR QL STRIP: ABNORMAL
LYMPHOCYTES # BLD AUTO: 1.9 K/UL (ref 1–4.8)
LYMPHOCYTES NFR BLD: 15.7 % (ref 18–48)
MCH RBC QN AUTO: 31.5 PG (ref 27–31)
MCHC RBC AUTO-ENTMCNC: 33.3 G/DL (ref 32–36)
MCV RBC AUTO: 95 FL (ref 82–98)
MICROSCOPIC COMMENT: ABNORMAL
MONOCYTES # BLD AUTO: 1.1 K/UL (ref 0.3–1)
MONOCYTES NFR BLD: 8.8 % (ref 4–15)
NEUTROPHILS # BLD AUTO: 9.2 K/UL (ref 1.8–7.7)
NEUTROPHILS NFR BLD: 74.6 % (ref 38–73)
NITRITE UR QL STRIP: NEGATIVE
NRBC BLD-RTO: 0 /100 WBC
PH UR STRIP: 6 [PH] (ref 5–8)
PLATELET # BLD AUTO: 187 K/UL (ref 150–450)
PMV BLD AUTO: 10 FL (ref 9.2–12.9)
POTASSIUM SERPL-SCNC: 3.6 MMOL/L (ref 3.5–5.1)
PROT SERPL-MCNC: 6.9 G/DL (ref 6–8.4)
PROT UR QL STRIP: ABNORMAL
RBC # BLD AUTO: 3.43 M/UL (ref 4–5.4)
RBC #/AREA URNS HPF: 3 /HPF (ref 0–4)
SODIUM SERPL-SCNC: 136 MMOL/L (ref 136–145)
SP GR UR STRIP: 1.01 (ref 1–1.03)
SQUAMOUS #/AREA URNS HPF: 5 /HPF
URN SPEC COLLECT METH UR: ABNORMAL
UROBILINOGEN UR STRIP-ACNC: NEGATIVE EU/DL
WBC # BLD AUTO: 12.3 K/UL (ref 3.9–12.7)
WBC #/AREA URNS HPF: 70 /HPF (ref 0–5)

## 2023-06-27 PROCEDURE — 81001 URINALYSIS AUTO W/SCOPE: CPT | Performed by: NURSE PRACTITIONER

## 2023-06-27 PROCEDURE — 36415 COLL VENOUS BLD VENIPUNCTURE: CPT | Performed by: NURSE PRACTITIONER

## 2023-06-27 PROCEDURE — 25000003 PHARM REV CODE 250: Performed by: NURSE PRACTITIONER

## 2023-06-27 PROCEDURE — 21400001 HC TELEMETRY ROOM

## 2023-06-27 PROCEDURE — 80053 COMPREHEN METABOLIC PANEL: CPT | Performed by: NURSE PRACTITIONER

## 2023-06-27 PROCEDURE — 85025 COMPLETE CBC W/AUTO DIFF WBC: CPT | Performed by: NURSE PRACTITIONER

## 2023-06-27 PROCEDURE — 87086 URINE CULTURE/COLONY COUNT: CPT | Performed by: NURSE PRACTITIONER

## 2023-06-27 PROCEDURE — 63600175 PHARM REV CODE 636 W HCPCS: Performed by: NURSE PRACTITIONER

## 2023-06-27 RX ADMIN — SODIUM CHLORIDE: 0.9 INJECTION, SOLUTION INTRAVENOUS at 01:06

## 2023-06-27 RX ADMIN — PIPERACILLIN SODIUM AND TAZOBACTAM SODIUM 3.38 G: 3; .375 INJECTION, POWDER, LYOPHILIZED, FOR SOLUTION INTRAVENOUS at 04:06

## 2023-06-27 RX ADMIN — HYDROCHLOROTHIAZIDE 25 MG: 25 TABLET ORAL at 09:06

## 2023-06-27 RX ADMIN — THERA TABS 1 TABLET: TAB at 09:06

## 2023-06-27 RX ADMIN — PANTOPRAZOLE SODIUM 40 MG: 40 TABLET, DELAYED RELEASE ORAL at 05:06

## 2023-06-27 RX ADMIN — SODIUM CHLORIDE: 0.9 INJECTION, SOLUTION INTRAVENOUS at 12:06

## 2023-06-27 RX ADMIN — AMLODIPINE BESYLATE 10 MG: 5 TABLET ORAL at 09:06

## 2023-06-27 RX ADMIN — PIPERACILLIN SODIUM AND TAZOBACTAM SODIUM 3.38 G: 3; .375 INJECTION, POWDER, LYOPHILIZED, FOR SOLUTION INTRAVENOUS at 10:06

## 2023-06-27 RX ADMIN — CARVEDILOL 12.5 MG: 12.5 TABLET, FILM COATED ORAL at 09:06

## 2023-06-27 RX ADMIN — CELECOXIB 200 MG: 100 CAPSULE ORAL at 09:06

## 2023-06-27 RX ADMIN — HYDROCODONE BITARTRATE AND ACETAMINOPHEN 1 TABLET: 5; 325 TABLET ORAL at 04:06

## 2023-06-27 RX ADMIN — PIPERACILLIN SODIUM AND TAZOBACTAM SODIUM 3.38 G: 3; .375 INJECTION, POWDER, LYOPHILIZED, FOR SOLUTION INTRAVENOUS at 01:06

## 2023-06-27 NOTE — NURSING
Nurses Note -- 4 Eyes      6/26/2023   10:06 PM      Skin assessed during: Admit      [x] No Altered Skin Integrity Present    []Prevention Measures Documented      [] Yes- Altered Skin Integrity Present or Discovered   [] LDA Added if Not in Epic (Describe Wound)   [] New Altered Skin Integrity was Present on Admit and Documented in LDA   [] Wound Image Taken    Wound Care Consulted? No    Attending Nurse:  Lisa Tabares LPN     Second RN/Staff Member:  Andrés Leyva RN [id number]

## 2023-06-28 LAB
ALBUMIN SERPL BCP-MCNC: 3 G/DL (ref 3.5–5.2)
ALP SERPL-CCNC: 125 U/L (ref 55–135)
ALT SERPL W/O P-5'-P-CCNC: 81 U/L (ref 10–44)
ANION GAP SERPL CALC-SCNC: 10 MMOL/L (ref 8–16)
AST SERPL-CCNC: 83 U/L (ref 10–40)
BASOPHILS # BLD AUTO: 0.04 K/UL (ref 0–0.2)
BASOPHILS NFR BLD: 0.5 % (ref 0–1.9)
BILIRUB SERPL-MCNC: 0.7 MG/DL (ref 0.1–1)
BUN SERPL-MCNC: 22 MG/DL (ref 8–23)
CALCIUM SERPL-MCNC: 9 MG/DL (ref 8.7–10.5)
CHLORIDE SERPL-SCNC: 107 MMOL/L (ref 95–110)
CO2 SERPL-SCNC: 23 MMOL/L (ref 23–29)
CREAT SERPL-MCNC: 1.2 MG/DL (ref 0.5–1.4)
DIFFERENTIAL METHOD: ABNORMAL
EOSINOPHIL # BLD AUTO: 0.1 K/UL (ref 0–0.5)
EOSINOPHIL NFR BLD: 0.8 % (ref 0–8)
ERYTHROCYTE [DISTWIDTH] IN BLOOD BY AUTOMATED COUNT: 13.3 % (ref 11.5–14.5)
EST. GFR  (NO RACE VARIABLE): 50.5 ML/MIN/1.73 M^2
GLUCOSE SERPL-MCNC: 130 MG/DL (ref 70–110)
HCT VFR BLD AUTO: 33.1 % (ref 37–48.5)
HGB BLD-MCNC: 10.8 G/DL (ref 12–16)
IMM GRANULOCYTES # BLD AUTO: 0.11 K/UL (ref 0–0.04)
IMM GRANULOCYTES NFR BLD AUTO: 1.3 % (ref 0–0.5)
LYMPHOCYTES # BLD AUTO: 2 K/UL (ref 1–4.8)
LYMPHOCYTES NFR BLD: 23.5 % (ref 18–48)
MCH RBC QN AUTO: 31.2 PG (ref 27–31)
MCHC RBC AUTO-ENTMCNC: 32.6 G/DL (ref 32–36)
MCV RBC AUTO: 96 FL (ref 82–98)
MONOCYTES # BLD AUTO: 0.8 K/UL (ref 0.3–1)
MONOCYTES NFR BLD: 9.4 % (ref 4–15)
NEUTROPHILS # BLD AUTO: 5.4 K/UL (ref 1.8–7.7)
NEUTROPHILS NFR BLD: 64.5 % (ref 38–73)
NRBC BLD-RTO: 0 /100 WBC
PLATELET # BLD AUTO: 206 K/UL (ref 150–450)
PMV BLD AUTO: 9.5 FL (ref 9.2–12.9)
POTASSIUM SERPL-SCNC: 3.6 MMOL/L (ref 3.5–5.1)
PROT SERPL-MCNC: 7.1 G/DL (ref 6–8.4)
RBC # BLD AUTO: 3.46 M/UL (ref 4–5.4)
SODIUM SERPL-SCNC: 140 MMOL/L (ref 136–145)
STOOL CULTURE: NORMAL
STOOL CULTURE: NORMAL
WBC # BLD AUTO: 8.38 K/UL (ref 3.9–12.7)

## 2023-06-28 PROCEDURE — 63600175 PHARM REV CODE 636 W HCPCS: Performed by: STUDENT IN AN ORGANIZED HEALTH CARE EDUCATION/TRAINING PROGRAM

## 2023-06-28 PROCEDURE — 80053 COMPREHEN METABOLIC PANEL: CPT | Performed by: STUDENT IN AN ORGANIZED HEALTH CARE EDUCATION/TRAINING PROGRAM

## 2023-06-28 PROCEDURE — 36415 COLL VENOUS BLD VENIPUNCTURE: CPT | Performed by: NURSE PRACTITIONER

## 2023-06-28 PROCEDURE — 63600175 PHARM REV CODE 636 W HCPCS: Performed by: NURSE PRACTITIONER

## 2023-06-28 PROCEDURE — 25000003 PHARM REV CODE 250: Performed by: NURSE PRACTITIONER

## 2023-06-28 PROCEDURE — 36415 COLL VENOUS BLD VENIPUNCTURE: CPT | Performed by: STUDENT IN AN ORGANIZED HEALTH CARE EDUCATION/TRAINING PROGRAM

## 2023-06-28 PROCEDURE — 21400001 HC TELEMETRY ROOM

## 2023-06-28 PROCEDURE — 25000003 PHARM REV CODE 250: Performed by: STUDENT IN AN ORGANIZED HEALTH CARE EDUCATION/TRAINING PROGRAM

## 2023-06-28 PROCEDURE — 85025 COMPLETE CBC W/AUTO DIFF WBC: CPT | Performed by: NURSE PRACTITIONER

## 2023-06-28 RX ADMIN — DIPHENHYDRAMINE HYDROCHLORIDE 10 ML: 25 SOLUTION ORAL at 01:06

## 2023-06-28 RX ADMIN — PANTOPRAZOLE SODIUM 40 MG: 40 TABLET, DELAYED RELEASE ORAL at 05:06

## 2023-06-28 RX ADMIN — SODIUM CHLORIDE: 0.9 INJECTION, SOLUTION INTRAVENOUS at 07:06

## 2023-06-28 RX ADMIN — THERA TABS 1 TABLET: TAB at 08:06

## 2023-06-28 RX ADMIN — Medication 6 MG: at 08:06

## 2023-06-28 RX ADMIN — SODIUM CHLORIDE: 0.9 INJECTION, SOLUTION INTRAVENOUS at 10:06

## 2023-06-28 RX ADMIN — DEXTROSE MONOHYDRATE 1 G: 50 INJECTION, SOLUTION INTRAVENOUS at 01:06

## 2023-06-28 RX ADMIN — CELECOXIB 200 MG: 100 CAPSULE ORAL at 08:06

## 2023-06-28 RX ADMIN — DIPHENHYDRAMINE HYDROCHLORIDE 10 ML: 25 SOLUTION ORAL at 06:06

## 2023-06-28 RX ADMIN — HYDROCODONE BITARTRATE AND ACETAMINOPHEN 1 TABLET: 5; 325 TABLET ORAL at 08:06

## 2023-06-28 RX ADMIN — PIPERACILLIN SODIUM AND TAZOBACTAM SODIUM 3.38 G: 3; .375 INJECTION, POWDER, LYOPHILIZED, FOR SOLUTION INTRAVENOUS at 05:06

## 2023-06-28 NOTE — PROGRESS NOTES
[facility]  Progress Note    Patient name: Chelsea Grossman  MRN: [id number]  Admit Date: 6/26/2023   LOS: 2 days     SUBJECTIVE:     Principal problem: TONEY (acute kidney injury)    Interval History:      6/28- feels much better today, afebrile.  HD stable.  Toney improving.  Will keep for one more day for iv abx and ivf for toney.  Likely dc in am.     6/27- sitting up to bedside eating, she had diaphoresis earlier and is drenched in sweat, which is what also happened during her hypotensive episode.  She feels fine now.      Scheduled Meds:   celecoxib  200 mg Oral Daily    estradioL  2 g Vaginal Twice Weekly    multivitamin  1 tablet Oral Daily    pantoprazole  40 mg Oral Daily    piperacillin-tazobactam (Zosyn) IV (PEDS and ADULTS) (extended infusion is not appropriate)  3.375 g Intravenous Q8H     Continuous Infusions:   sodium chloride 0.9% 125 mL/hr at 06/28/23 1055     PRN Meds:(Magic mouthwash) 1:1:1 diphenhydrAMINE(Benadryl) 12.5mg/5ml liq, aluminum & magnesium hydroxide-simethicone (Maalox), LIDOcaine viscous 2%, acetaminophen, HYDROcodone-acetaminophen, HYDROcodone-acetaminophen, melatonin, ondansetron, ondansetron, sodium chloride 0.9%    Review of patient's allergies indicates:   Allergen Reactions    Sulfa (sulfonamide antibiotics) Itching    Sulfamethoxazole-trimethoprim Itching and Rash     Itchy Rash       Review of Systems: As per interval history    OBJECTIVE:     Vital Signs (Most Recent)  Temp: 98.8 °F (37.1 °C) (06/28/23 1115)  Pulse: 77 (06/28/23 1115)  Resp: 18 (06/28/23 1115)  BP: 112/69 (06/28/23 1115)  SpO2: 97 % (06/28/23 1115)    Vital Signs Range (Last 24H):  Temp:  [97.8 °F (36.6 °C)-98.8 °F (37.1 °C)]   Pulse:  [77-81]   Resp:  [16-19]   BP: ()/(51-69)   SpO2:  [96 %-97 %]     I & O (Last 24H):  Intake/Output Summary (Last 24 hours) at 6/28/2023 1249  Last data filed at 6/28/2023 1136  Gross per 24 hour   Intake 460 ml   Output 1650 ml   Net -1190  ml         Physical Exam:  General: Patient resting comfortably in no acute distress. Appears as stated age. Calm  Eyes: No conjunctival injection. No scleral icterus.  ENT: Hearing grossly intact. No discharge from ears. No nasal discharge.   Neck: Supple, trachea midline. No JVD  CVS: RRR. No LE edema BL  Lungs:  No tachypnea or accessory muscle use.  Clear to auscultation bilaterally  Abdomen:  Soft, nontender and nondistended.  No organomegaly  Neuro: AOx3. Moves all extremities. Follows commands. Responds appropriately         Laboratory:  I have reviewed all pertinent lab results within the past 24 hours.    Diagnostic Results:  Labs: Reviewed  ECG: Reviewed  X-Ray: Reviewed    ASSESSMENT/PLAN:         Active Hospital Problems    Diagnosis  POA    *JOSE (acute kidney injury) [N17.9]  Yes    UTI (urinary tract infection) [N39.0]  No    Hypotension [I95.9]  Yes      Resolved Hospital Problems   No resolved problems to display.         Plan:     JOSE (acute kidney injury)  Patient with acute kidney injury likely due to hypotension in setting of infection  JOSE is currently improving.   Creat 2.8 > 1.6  NS @125ml/hr  Monitor urine output   Cmp daily   Avoid nephrotoxins and renally dose meds for GFR listed above.         Hypotension  BP 70/50 when EMS activated  Hx HTN  78/54 after fluid bolus  NS @125ml/hr  /60   VS q4 hrs  Hold all BP meds      UTI (urinary tract infection)  C/O urinary frequency, urinary urgency, and dysuria X 2 weeks  C/O feeling bad with near syncope episode  UA+ wbcs, bacteria, 3+LE  NS @ 125ml/hr  Zosyn IVPB 3.375 @ 8hr started on admission,   de escalate to ceftriaxone  Procal 5.04, WBC 19.01  Repeat CBC in am           VTE Risk Mitigation (From admission, onward)           Ordered     IP VTE HIGH RISK PATIENT  Once         06/26/23 1403                        Department Hospital Medicine  [facility]  Goldy Massey MD  Date of service: 06/28/2023

## 2023-06-28 NOTE — PROGRESS NOTES
[facility]  Progress Note    Patient name: Chelsea Grossman  MRN: [id number]  Admit Date: 6/26/2023   LOS: 1 day     SUBJECTIVE:     Principal problem: JOSE (acute kidney injury)    Interval History:      6/27- sitting up to bedside eating, she had diaphoresis earlier and is drenched in sweat, which is what also happened during her hypotensive episode.  She feels fine now.      Scheduled Meds:   amLODIPine  10 mg Oral Daily    carvediloL  12.5 mg Oral BID WM    celecoxib  200 mg Oral Daily    estradioL  2 g Vaginal Twice Weekly    hydroCHLOROthiazide  25 mg Oral Daily    multivitamin  1 tablet Oral Daily    pantoprazole  40 mg Oral Daily    piperacillin-tazobactam (Zosyn) IV (PEDS and ADULTS) (extended infusion is not appropriate)  3.375 g Intravenous Q8H    valsartan  320 mg Oral Daily     Continuous Infusions:   sodium chloride 0.9% 125 mL/hr at 06/27/23 1301     PRN Meds:acetaminophen, HYDROcodone-acetaminophen, HYDROcodone-acetaminophen, melatonin, ondansetron, ondansetron, sodium chloride 0.9%    Review of patient's allergies indicates:   Allergen Reactions    Sulfa (sulfonamide antibiotics) Itching    Sulfamethoxazole-trimethoprim Itching and Rash     Itchy Rash       Review of Systems: As per interval history    OBJECTIVE:     Vital Signs (Most Recent)  Temp: 98.1 °F (36.7 °C) (06/27/23 1513)  Pulse: 77 (06/27/23 1652)  Resp: 19 (06/27/23 1653)  BP: 102/61 (06/27/23 1652)  SpO2: 96 % (06/27/23 1513)    Vital Signs Range (Last 24H):  Temp:  [98.1 °F (36.7 °C)-99.9 °F (37.7 °C)]   Pulse:  [77-95]   Resp:  [18-20]   BP: ()/(51-71)   SpO2:  [92 %-97 %]     I & O (Last 24H):  Intake/Output Summary (Last 24 hours) at 6/27/2023 1947  Last data filed at 6/27/2023 1301  Gross per 24 hour   Intake 0 ml   Output 1300 ml   Net -1300 ml       Physical Exam:  General: Patient resting comfortably in no acute distress. Appears as stated age. Calm  Eyes: No conjunctival injection. No  scleral icterus.  ENT: Hearing grossly intact. No discharge from ears. No nasal discharge.   Neck: Supple, trachea midline. No JVD  CVS: RRR. No LE edema BL  Lungs:  No tachypnea or accessory muscle use.  Clear to auscultation bilaterally  Abdomen:  Soft, nontender and nondistended.  No organomegaly  Neuro: AOx3. Moves all extremities. Follows commands. Responds appropriately   Skin: gown is soaked from diaphoresis      Laboratory:  I have reviewed all pertinent lab results within the past 24 hours.    Diagnostic Results:  Labs: Reviewed  ECG: Reviewed  X-Ray: Reviewed    ASSESSMENT/PLAN:         Active Hospital Problems    Diagnosis  POA    *JOSE (acute kidney injury) [N17.9]  Yes    UTI (urinary tract infection) [N39.0]  No    Hypotension [I95.9]  Yes      Resolved Hospital Problems   No resolved problems to display.         Plan:     JOSE (acute kidney injury)  Patient with acute kidney injury likely due to hypotension in setting of infection  JOSE is currently improving.   Creat 2.8 > 1.6  NS @125ml/hr  Monitor urine output   Cmp daily   Avoid nephrotoxins and renally dose meds for GFR listed above.         Hypotension  BP 70/50 when EMS activated  Hx HTN  78/54 after fluid bolus  NS @125ml/hr  /60   VS q4 hrs  Hold all BP meds      UTI (urinary tract infection)  C/O urinary frequency, urinary urgency, and dysuria X 2 weeks  C/O feeling bad with near syncope episode  UA+ wbcs, bacteria, 3+LE  NS @ 125ml/hr  Zosyn IVPB 3.375 @ 8hr started on admission, will de escalate to ceftriaxone  Procal 5.04, WBC 19.01  Repeat CBC in am           VTE Risk Mitigation (From admission, onward)           Ordered     IP VTE HIGH RISK PATIENT  Once         06/26/23 1403                        Department Hospital Medicine  [facility]  Goldy Massey MD  Date of service: 06/27/2023

## 2023-06-29 VITALS
HEIGHT: 64 IN | OXYGEN SATURATION: 94 % | RESPIRATION RATE: 17 BRPM | BODY MASS INDEX: 39.7 KG/M2 | TEMPERATURE: 98 F | DIASTOLIC BLOOD PRESSURE: 59 MMHG | WEIGHT: 232.56 LBS | HEART RATE: 74 BPM | SYSTOLIC BLOOD PRESSURE: 125 MMHG

## 2023-06-29 LAB
ALBUMIN SERPL BCP-MCNC: 2.8 G/DL (ref 3.5–5.2)
ALP SERPL-CCNC: 118 U/L (ref 55–135)
ALT SERPL W/O P-5'-P-CCNC: 81 U/L (ref 10–44)
ANION GAP SERPL CALC-SCNC: 6 MMOL/L (ref 8–16)
AST SERPL-CCNC: 57 U/L (ref 10–40)
BACTERIA UR CULT: NO GROWTH
BASOPHILS # BLD AUTO: 0.04 K/UL (ref 0–0.2)
BASOPHILS NFR BLD: 0.6 % (ref 0–1.9)
BILIRUB SERPL-MCNC: 0.5 MG/DL (ref 0.1–1)
BUN SERPL-MCNC: 14 MG/DL (ref 8–23)
CALCIUM SERPL-MCNC: 8.9 MG/DL (ref 8.7–10.5)
CHLORIDE SERPL-SCNC: 111 MMOL/L (ref 95–110)
CO2 SERPL-SCNC: 23 MMOL/L (ref 23–29)
CREAT SERPL-MCNC: 1.1 MG/DL (ref 0.5–1.4)
DIFFERENTIAL METHOD: ABNORMAL
EOSINOPHIL # BLD AUTO: 0.1 K/UL (ref 0–0.5)
EOSINOPHIL NFR BLD: 1.3 % (ref 0–8)
ERYTHROCYTE [DISTWIDTH] IN BLOOD BY AUTOMATED COUNT: 13.2 % (ref 11.5–14.5)
EST. GFR  (NO RACE VARIABLE): 56.1 ML/MIN/1.73 M^2
GLUCOSE SERPL-MCNC: 116 MG/DL (ref 70–110)
HCT VFR BLD AUTO: 34.5 % (ref 37–48.5)
HGB BLD-MCNC: 11 G/DL (ref 12–16)
IMM GRANULOCYTES # BLD AUTO: 0.16 K/UL (ref 0–0.04)
IMM GRANULOCYTES NFR BLD AUTO: 2.2 % (ref 0–0.5)
LYMPHOCYTES # BLD AUTO: 2.7 K/UL (ref 1–4.8)
LYMPHOCYTES NFR BLD: 38.5 % (ref 18–48)
MCH RBC QN AUTO: 31 PG (ref 27–31)
MCHC RBC AUTO-ENTMCNC: 31.9 G/DL (ref 32–36)
MCV RBC AUTO: 97 FL (ref 82–98)
MONOCYTES # BLD AUTO: 0.5 K/UL (ref 0.3–1)
MONOCYTES NFR BLD: 6.9 % (ref 4–15)
NEUTROPHILS # BLD AUTO: 3.6 K/UL (ref 1.8–7.7)
NEUTROPHILS NFR BLD: 50.5 % (ref 38–73)
NRBC BLD-RTO: 0 /100 WBC
O+P SPEC MICRO: NORMAL
O+P STL CONC: NORMAL
PLATELET # BLD AUTO: 243 K/UL (ref 150–450)
PMV BLD AUTO: 9.5 FL (ref 9.2–12.9)
POTASSIUM SERPL-SCNC: 3.4 MMOL/L (ref 3.5–5.1)
PROT SERPL-MCNC: 6.6 G/DL (ref 6–8.4)
RBC # BLD AUTO: 3.55 M/UL (ref 4–5.4)
SODIUM SERPL-SCNC: 140 MMOL/L (ref 136–145)
WBC # BLD AUTO: 7.12 K/UL (ref 3.9–12.7)

## 2023-06-29 PROCEDURE — 25000003 PHARM REV CODE 250: Performed by: NURSE PRACTITIONER

## 2023-06-29 PROCEDURE — 36415 COLL VENOUS BLD VENIPUNCTURE: CPT | Performed by: NURSE PRACTITIONER

## 2023-06-29 PROCEDURE — 85025 COMPLETE CBC W/AUTO DIFF WBC: CPT | Performed by: NURSE PRACTITIONER

## 2023-06-29 PROCEDURE — 36415 COLL VENOUS BLD VENIPUNCTURE: CPT | Performed by: STUDENT IN AN ORGANIZED HEALTH CARE EDUCATION/TRAINING PROGRAM

## 2023-06-29 PROCEDURE — 80053 COMPREHEN METABOLIC PANEL: CPT | Performed by: STUDENT IN AN ORGANIZED HEALTH CARE EDUCATION/TRAINING PROGRAM

## 2023-06-29 PROCEDURE — 25000003 PHARM REV CODE 250: Performed by: STUDENT IN AN ORGANIZED HEALTH CARE EDUCATION/TRAINING PROGRAM

## 2023-06-29 RX ORDER — CIPROFLOXACIN 500 MG/1
500 TABLET ORAL EVERY 12 HOURS
Status: DISCONTINUED | OUTPATIENT
Start: 2023-06-29 | End: 2023-06-29 | Stop reason: HOSPADM

## 2023-06-29 RX ORDER — CIPROFLOXACIN 500 MG/1
500 TABLET ORAL EVERY 12 HOURS
Qty: 10 TABLET | Refills: 0 | Status: SHIPPED | OUTPATIENT
Start: 2023-06-29 | End: 2023-07-04

## 2023-06-29 RX ADMIN — THERA TABS 1 TABLET: TAB at 08:06

## 2023-06-29 RX ADMIN — CIPROFLOXACIN 500 MG: 500 TABLET, FILM COATED ORAL at 12:06

## 2023-06-29 RX ADMIN — DIPHENHYDRAMINE HYDROCHLORIDE 10 ML: 25 SOLUTION ORAL at 04:06

## 2023-06-29 RX ADMIN — PANTOPRAZOLE SODIUM 40 MG: 40 TABLET, DELAYED RELEASE ORAL at 05:06

## 2023-06-29 RX ADMIN — SODIUM CHLORIDE: 0.9 INJECTION, SOLUTION INTRAVENOUS at 04:06

## 2023-06-29 RX ADMIN — POTASSIUM BICARBONATE 50 MEQ: 977.5 TABLET, EFFERVESCENT ORAL at 12:06

## 2023-06-29 RX ADMIN — CELECOXIB 200 MG: 100 CAPSULE ORAL at 08:06

## 2023-06-29 NOTE — NURSING
Discharge instructions given to patient. Patient verbalized understanding of follow up appointments and medication changes. PIV removed without difficulty, catheter tip intact. Tele removed and returned. Patient to discharge home via private vehicle.

## 2023-06-29 NOTE — PLAN OF CARE
Problem: Adult Inpatient Plan of Care  Goal: Plan of Care Review  Outcome: Met  Goal: Patient-Specific Goal (Individualized)  Outcome: Met  Goal: Absence of Hospital-Acquired Illness or Injury  Outcome: Met  Goal: Optimal Comfort and Wellbeing  Outcome: Met  Goal: Readiness for Transition of Care  Outcome: Met     Problem: Bariatric Environmental Safety  Goal: Safety Maintained with Care  Outcome: Met     Problem: Fluid and Electrolyte Imbalance (Acute Kidney Injury/Impairment)  Goal: Fluid and Electrolyte Balance  Outcome: Met     Problem: Oral Intake Inadequate (Acute Kidney Injury/Impairment)  Goal: Optimal Nutrition Intake  Outcome: Met     Problem: Renal Function Impairment (Acute Kidney Injury/Impairment)  Goal: Effective Renal Function  Outcome: Met     Problem: Infection  Goal: Absence of Infection Signs and Symptoms  Outcome: Met

## 2023-06-29 NOTE — PLAN OF CARE
06/29/23 1429   Final Note   Assessment Type Final Discharge Note   Anticipated Discharge Disposition Home   Post-Acute Status   Post-Acute Authorization Other   Other Status No Post-Acute Service Needs   Discharge Delays None known at this time

## 2023-06-29 NOTE — DISCHARGE SUMMARY
"[facility] hospital  Discharge Summary  Patient Name: Chelsea Grossman MRN: [id number]   Patient Class: IP- Inpatient  Length of Stay: 3   Admission Date: 6/26/2023  7:39 AM Attending Physician: Goldy Massey MD   Primary Care Provider: Spenser Curry Jr, MD Face-to-Face encounter date: 06/29/2023   Chief Complaint: Diarrhea ("Started this morning", possible syncopal episode.)    Date of Discharge: 6/29/2023  Discharge Disposition:Home or Self Care   Condition: Stable       Reason for Hospitalization     Active Hospital Problems    Diagnosis    *JOSE (acute kidney injury)    UTI (urinary tract infection)    Hypotension         Brief History of Present Illness      62 yo presents to ER due to not feeling good with near syncope. Patient reports hse went to have a routine mammogram this morning and while waiting to be called back started feeling bad. Reports she was diaphoretic and hot and felt fainty (light headed and weak). Reports going to the restroom and had a episode of soft/diarrhea stool. The mammography personal suggested not proceeding with mammogram and coming to ED to be checked out at which EMS was called. EMS was notified patient found with decreased blood pressure of 70/50. Patient was given IV hydration transferred to the emergency department for further evaluation. Patient denies chest pain or discomfort. No nausea or vomiting. No abdominal pain or cramping. Patient does reports she has had some urinary pain, urinary frequency, and urinary urgency X 2 weeks or more. Denies hematuria or odor associated with urine. Hx Hypertension. Patient reports taking BP medicines this am. Other history osteoarthritis and Hep C.      For the full HPI please refer to the History & Physical from this admission.    Hospital Course By Problem with Pertinent Findings     JOSE (acute kidney injury)  Patient with acute kidney injury likely due to hypotension in setting of infection  JOSE is currently improving. " "  Creat 2.8 > 1.6> 1.1  NS @125ml/hr  Monitor urine output   Cmp daily   Avoid nephrotoxins and renally dose meds for GFR listed above.         Hypotension  BP 70/50 when EMS activated  Hx HTN  78/54 after fluid bolus  NS @125ml/hr  /60   VS q4 hrs  Hold all BP meds   Restart as needed     UTI (urinary tract infection)  C/O urinary frequency, urinary urgency, and dysuria X 2 weeks  C/O feeling bad with near syncope episode  UA+ wbcs, bacteria, 3+LE  NS @ 125ml/hr  Zosyn IVPB 3.375 @ 8hr started on admission,   de escalate to ceftriaxone  Procal 5.04, WBC 19.01  Repeat CBC gran and wbc improved  Dc with cipro to complete course      Patient was seen and examined on the date of discharge and determined to be suitable for discharge.    Physical Exam  BP (!) 125/59 (BP Location: Right arm, Patient Position: Lying)   Pulse 74   Temp 97.9 °F (36.6 °C) (Oral)   Resp 17   Ht 5' 4" (1.626 m)   Wt 105.5 kg (232 lb 9.4 oz)   SpO2 (!) 94%   BMI 39.92 kg/m²   Vitals reviewed.    Constitutional: No distress.   HENT: Atraumatic.   Cardiovascular: Normal rate, regular rhythm.  S1 S2.    Pulmonary/Chest: Effort normal. Clear to auscultation bilaterally. No wheezes.   Abdominal: Soft. Bowel sounds are normal. Exhibits no distension and no mass. No tenderness  Neurological: Alert.   Skin: Skin is warm and dry.     Following labs were Reviewed   Recent Labs   Lab 06/29/23  0311 06/29/23  0954   WBC 7.12  --    HGB 11.0*  --    HCT 34.5*  --      --    CALCIUM  --  8.9   ALBUMIN  --  2.8*   PROT  --  6.6   NA  --  140   K  --  3.4*   CO2  --  23   CL  --  111*   BUN  --  14   CREATININE  --  1.1   ALKPHOS  --  118   ALT  --  81*   AST  --  57*   BILITOT  --  0.5       No results found for: POCTGLUCOSE     All labs within the past 24 hours have been reviewed    Microbiology Results (last 7 days)       Procedure Component Value Units Date/Time    Blood culture [[id number]] Collected: 06/26/23 1156    Order Status: " Completed Specimen: Blood Updated: 06/29/23 1232     Blood Culture, Routine No Growth to date      No Growth to date      No Growth to date      No Growth to date    Blood culture [[id number]] Collected: 06/26/23 0934    Order Status: Completed Specimen: Blood from Wrist, Left Updated: 06/29/23 1032     Blood Culture, Routine No Growth to date      No Growth to date      No Growth to date      No Growth to date    Urine culture [[id number]] Collected: 06/27/23 0620    Order Status: Completed Specimen: Urine Updated: 06/29/23 0739     Urine Culture, Routine No growth to date    Narrative:      Specimen Source->Urine    Urine culture [[id number]] Collected: 06/26/23 1130    Order Status: Completed Specimen: Urine Updated: 06/29/23 0725     Urine Culture, Routine No growth    Narrative:      Specimen Source->Urine    Stool culture **cannot be ordered stat** [[id number]] Collected: 06/26/23 1258    Order Status: Completed Specimen: Stool Updated: 06/28/23 1409     Stool Culture No Salmonella,Shigella,Vibrio,Campylobacter.      No E coli 0157:H7 isolated.    Blood culture [[id number]]     Order Status: Canceled Specimen: Blood           No orders to display       No results found for this or any previous visit.        Procedures:   * No surgery found *     Discharge Information:   Diet:  Resume Cardiac diet/Diabetic Diet    Physical Activity:  Activity as tolerated    Instructions:  1. Take all medications as prescribed  2. Keep all follow-up appointments  3. Return to the hospital or call your primary care physicians if any worsening symptoms such as chest pain, shortness of breath, bleeding,  intractable pain, fever >101 occur.      Follow-Up Appointments:  Please call your primary care physician to schedule an appointment in 1 week time.     Follow-up Information       Spenser Curry Jr, MD Follow up in 1 week(s).    Specialty: Internal Medicine  Contact information:  [address] [place]  [address]  [phone]                               Pending laboratory work/Tests to be performed/followed by the Primary care Physician:    The patient was discharged with discharge instructions reviewed in written and verbal form. All questions were answered and prescriptions were provided. The importance of making follow up appointments and compliance of medications has been emphasized. The patient will follow up in 1 week or sooner as needed with the PCP. Tthe patient understands and agrees with the plan. Upon discharge, patient needs to be on following medications.    Discharge Medications:     Medication List        START taking these medications      (MAGIC MOUTHWASH) 1:1:1 BENADRYL 12.5MG/5ML LIQ, ALUMINUM & MAGNESIUM  Swish and spit 10 mLs every 4 (four) hours as needed (mouth pain). for mouth sores     ciprofloxacin HCl 500 MG tablet  Commonly known as: CIPRO  Take 1 tablet (500 mg total) by mouth every 12 (twelve) hours. for 5 days            CHANGE how you take these medications      amLODIPine 10 MG tablet  Commonly known as: NORVASC  TAKE 1 TABLET BY MOUTH ONCE DAILY IN THE EVENING  What changed: when to take this     estradioL 0.01 % (0.1 mg/gram) vaginal cream  Commonly known as: ESTRACE  INSERT 1 2 GRAM INTRAVAGINALLY EVERY NIGHT AT BEDTIME 2 TO 3 TIMES PER WEEK  What changed:   how much to take  how to take this  when to take this     traMADoL 50 mg tablet  Commonly known as: ULTRAM  TAKE 1 TABLET BY MOUTH EVERY 8 HOURS AS NEEDED FOR PAIN  What changed:   how much to take  how to take this  when to take this  reasons to take this            CONTINUE taking these medications      carvediloL 12.5 MG tablet  Commonly known as: COREG  Take 1 tablet (12.5 mg total) by mouth 2 (two) times daily with meals.     celecoxib 200 MG capsule  Commonly known as: CeleBREX  Take 1 capsule (200 mg total) by mouth once daily.     colchicine 0.6 mg tablet  Commonly known as: COLCRYS  Take 2 tablets at symptom onset, 1  tablet 2-3 hours later, then 1 tablet daily for 7 days as needed for gout attack     DULoxetine 60 MG capsule  Commonly known as: CYMBALTA  Take 1 capsule (60 mg total) by mouth once daily.     fluticasone propionate 50 mcg/actuation nasal spray  Commonly known as: FLONASE  1 spray (50 mcg total) by Each Nostril route once daily.     hydroCHLOROthiazide 25 MG tablet  Commonly known as: HYDRODIURIL  Take 1 tablet (25 mg total) by mouth once daily.     ibuprofen 800 MG tablet  Commonly known as: ADVIL,MOTRIN  Take 1 tablet (800 mg total) by mouth every 6 (six) hours as needed for Pain.     multivitamin per tablet  Commonly known as: THERAGRAN     olmesartan 40 MG tablet  Commonly known as: BENICAR  Take 1 tablet (40 mg total) by mouth once daily.     pantoprazole 40 MG tablet  Commonly known as: PROTONIX  Take 1 tablet (40 mg total) by mouth once daily.               Where to Get Your Medications        These medications were sent to [facility] [address] [facility] [address] [place] [id number]      Phone: [phone]   (MAGIC MOUTHWASH) 1:1:1 BENADRYL 12.5MG/5ML LIQ, ALUMINUM & MAGNESIUM  ciprofloxacin HCl 500 MG tablet           I spent 33 minutes preparing the discharge for this patient including reviewing records from previous encounters, preparation of discharge summary, assessing and final examination of the patient, discharge medicine reconciliation, discussing plan of care, follow up and education and prescriptions.       Goldy Massey  [facility] Hospitalist

## 2023-06-29 NOTE — PLAN OF CARE
Problem: Adult Inpatient Plan of Care  Goal: Plan of Care Review  Outcome: Ongoing, Progressing     Problem: Adult Inpatient Plan of Care  Goal: Optimal Comfort and Wellbeing  Outcome: Ongoing, Progressing     Problem: Bariatric Environmental Safety  Goal: Safety Maintained with Care  Outcome: Ongoing, Progressing     Problem: Infection  Goal: Absence of Infection Signs and Symptoms  Outcome: Ongoing, Progressing     Problem: Fluid and Electrolyte Imbalance (Acute Kidney Injury/Impairment)  Goal: Fluid and Electrolyte Balance  Outcome: Ongoing, Progressing

## 2023-06-29 NOTE — PROGRESS NOTES
"[facility]  Discharge Summary  Patient Name: Chelsea Grossman MRN: [id number]   Patient Class: IP- Inpatient  Length of Stay: 3   Admission Date: 6/26/2023  7:39 AM Attending Physician: Goldy Massey MD   Primary Care Provider: Spenser Curry Jr, MD Face-to-Face encounter date: 06/29/2023   Chief Complaint: Diarrhea ("Started this morning", possible syncopal episode.)    Date of Discharge: 6/29/2023  Discharge Disposition:Home or Self Care   Condition: Stable       Reason for Hospitalization     Active Hospital Problems    Diagnosis    *JOSE (acute kidney injury)    UTI (urinary tract infection)    Hypotension         Brief History of Present Illness      62 yo presents to ER due to not feeling good with near syncope. Patient reports hse went to have a routine mammogram this morning and while waiting to be called back started feeling bad. Reports she was diaphoretic and hot and felt fainty (light headed and weak). Reports going to the restroom and had a episode of soft/diarrhea stool. The mammography personal suggested not proceeding with mammogram and coming to ED to be checked out at which EMS was called. EMS was notified patient found with decreased blood pressure of 70/50. Patient was given IV hydration transferred to the emergency department for further evaluation. Patient denies chest pain or discomfort. No nausea or vomiting. No abdominal pain or cramping. Patient does reports she has had some urinary pain, urinary frequency, and urinary urgency X 2 weeks or more. Denies hematuria or odor associated with urine. Hx Hypertension. Patient reports taking BP medicines this am. Other history osteoarthritis and Hep C.      For the full HPI please refer to the History & Physical from this admission.    Hospital Course By Problem with Pertinent Findings     JOSE (acute kidney injury)  Patient with acute kidney injury likely due to hypotension in setting of infection  JOSE is currently improving. " "  Creat 2.8 > 1.6  NS @125ml/hr  Monitor urine output   Cmp daily   Avoid nephrotoxins and renally dose meds for GFR listed above.         Hypotension  BP 70/50 when EMS activated  Hx HTN  78/54 after fluid bolus  NS @125ml/hr  /60   VS q4 hrs  Hold all BP meds      UTI (urinary tract infection)  C/O urinary frequency, urinary urgency, and dysuria X 2 weeks  C/O feeling bad with near syncope episode  UA+ wbcs, bacteria, 3+LE  NS @ 125ml/hr  Zosyn IVPB 3.375 @ 8hr started on admission,   de escalate to ceftriaxone  Procal 5.04, WBC 19.01  Repeat CBC in am      Patient was seen and examined on the date of discharge and determined to be suitable for discharge.    Physical Exam  BP (!) 125/59 (BP Location: Right arm, Patient Position: Lying)   Pulse 74   Temp 97.9 °F (36.6 °C) (Oral)   Resp 17   Ht 5' 4" (1.626 m)   Wt 105.5 kg (232 lb 9.4 oz)   SpO2 (!) 94%   BMI 39.92 kg/m²   Vitals reviewed.    Constitutional: No distress.   HENT: Atraumatic.   Cardiovascular: Normal rate, regular rhythm.  S1 S2.    Pulmonary/Chest: Effort normal. Clear to auscultation bilaterally. No wheezes.   Abdominal: Soft. Bowel sounds are normal. Exhibits no distension and no mass. No tenderness  Neurological: Alert.   Skin: Skin is warm and dry.     Following labs were Reviewed   Recent Labs   Lab 06/29/23  0311 06/29/23  0954   WBC 7.12  --    HGB 11.0*  --    HCT 34.5*  --      --    CALCIUM  --  8.9   ALBUMIN  --  2.8*   PROT  --  6.6   NA  --  140   K  --  3.4*   CO2  --  23   CL  --  111*   BUN  --  14   CREATININE  --  1.1   ALKPHOS  --  118   ALT  --  81*   AST  --  57*   BILITOT  --  0.5     No results found for: POCTGLUCOSE     All labs within the past 24 hours have been reviewed    Microbiology Results (last 7 days)       Procedure Component Value Units Date/Time    Blood culture [[id number]] Collected: 06/26/23 1156    Order Status: Completed Specimen: Blood Updated: 06/29/23 1232     Blood Culture, Routine No " Growth to date      No Growth to date      No Growth to date      No Growth to date    Blood culture [[id number]] Collected: 06/26/23 0934    Order Status: Completed Specimen: Blood from Wrist, Left Updated: 06/29/23 1032     Blood Culture, Routine No Growth to date      No Growth to date      No Growth to date      No Growth to date    Urine culture [[id number]] Collected: 06/27/23 0620    Order Status: Completed Specimen: Urine Updated: 06/29/23 0739     Urine Culture, Routine No growth to date    Narrative:      Specimen Source->Urine    Urine culture [[id number]] Collected: 06/26/23 1130    Order Status: Completed Specimen: Urine Updated: 06/29/23 0725     Urine Culture, Routine No growth    Narrative:      Specimen Source->Urine    Stool culture **cannot be ordered stat** [[id number]] Collected: 06/26/23 1258    Order Status: Completed Specimen: Stool Updated: 06/28/23 1409     Stool Culture No Salmonella,Shigella,Vibrio,Campylobacter.      No E coli 0157:H7 isolated.    Blood culture [[id number]]     Order Status: Canceled Specimen: Blood           No orders to display       No results found for this or any previous visit.      Consultants and Procedures   Consultants:      Procedures:   * No surgery found *     Discharge Information:   Diet:  Resume Cardiac diet/Diabetic Diet    Physical Activity:  Activity as tolerated    Instructions:  1. Take all medications as prescribed  2. Keep all follow-up appointments  3. Return to the hospital or call your primary care physicians if any worsening symptoms such as chest pain, shortness of breath, bleeding,  intractable pain, fever >101 occur.      Follow-Up Appointments:  Please call your primary care physician to schedule an appointment in 1 week time.     Follow-up Information       Spenser Curry Jr, MD Follow up in 1 week(s).    Specialty: Internal Medicine  Contact information:  [address] [place] [id number] [phone]                                Pending laboratory work/Tests to be performed/followed by the Primary care Physician:    The patient was discharged with discharge instructions reviewed in written and verbal form. All questions were answered and prescriptions were provided. The importance of making follow up appointments and compliance of medications has been emphasized. The patient will follow up in 1 week or sooner as needed with the PCP. Tthe patient understands and agrees with the plan. Upon discharge, patient needs to be on following medications.    Discharge Medications:     Medication List        START taking these medications      (MAGIC MOUTHWASH) 1:1:1 BENADRYL 12.5MG/5ML LIQ, ALUMINUM & MAGNESIUM  Swish and spit 10 mLs every 4 (four) hours as needed (mouth pain). for mouth sores     ciprofloxacin HCl 500 MG tablet  Commonly known as: CIPRO  Take 1 tablet (500 mg total) by mouth every 12 (twelve) hours. for 5 days            CHANGE how you take these medications      amLODIPine 10 MG tablet  Commonly known as: NORVASC  TAKE 1 TABLET BY MOUTH ONCE DAILY IN THE EVENING  What changed: when to take this     estradioL 0.01 % (0.1 mg/gram) vaginal cream  Commonly known as: ESTRACE  INSERT 1 2 GRAM INTRAVAGINALLY EVERY NIGHT AT BEDTIME 2 TO 3 TIMES PER WEEK  What changed:   how much to take  how to take this  when to take this     traMADoL 50 mg tablet  Commonly known as: ULTRAM  TAKE 1 TABLET BY MOUTH EVERY 8 HOURS AS NEEDED FOR PAIN  What changed:   how much to take  how to take this  when to take this  reasons to take this            CONTINUE taking these medications      carvediloL 12.5 MG tablet  Commonly known as: COREG  Take 1 tablet (12.5 mg total) by mouth 2 (two) times daily with meals.     celecoxib 200 MG capsule  Commonly known as: CeleBREX  Take 1 capsule (200 mg total) by mouth once daily.     colchicine 0.6 mg tablet  Commonly known as: COLCRYS  Take 2 tablets at symptom onset, 1 tablet 2-3 hours later, then 1 tablet  daily for 7 days as needed for gout attack     DULoxetine 60 MG capsule  Commonly known as: CYMBALTA  Take 1 capsule (60 mg total) by mouth once daily.     fluticasone propionate 50 mcg/actuation nasal spray  Commonly known as: FLONASE  1 spray (50 mcg total) by Each Nostril route once daily.     hydroCHLOROthiazide 25 MG tablet  Commonly known as: HYDRODIURIL  Take 1 tablet (25 mg total) by mouth once daily.     ibuprofen 800 MG tablet  Commonly known as: ADVIL,MOTRIN  Take 1 tablet (800 mg total) by mouth every 6 (six) hours as needed for Pain.     multivitamin per tablet  Commonly known as: THERAGRAN     olmesartan 40 MG tablet  Commonly known as: BENICAR  Take 1 tablet (40 mg total) by mouth once daily.     pantoprazole 40 MG tablet  Commonly known as: PROTONIX  Take 1 tablet (40 mg total) by mouth once daily.               Where to Get Your Medications        [facility] [address] [facility] [address] [address]      Phone: [phone]   (MAGIC MOUTHWASH) 1:1:1 BENADRYL 12.5MG/5ML LIQ, ALUMINUM & MAGNESIUM  ciprofloxacin HCl 500 MG tablet           I spent 33 minutes preparing the discharge for this patient including reviewing records from previous encounters, preparation of discharge summary, assessing and final examination of the patient, discharge medicine reconciliation, discussing plan of care, follow up and education and prescriptions.       Goldy Massey  [facility] Hospitalist

## 2023-06-30 LAB — BACTERIA UR CULT: NO GROWTH

## 2023-07-01 LAB
BACTERIA BLD CULT: NORMAL
BACTERIA BLD CULT: NORMAL

## 2023-07-10 ENCOUNTER — OFFICE VISIT (OUTPATIENT)
Dept: FAMILY MEDICINE | Facility: CLINIC | Age: 63
End: 2023-07-10
Payer: MEDICAID

## 2023-07-10 VITALS
DIASTOLIC BLOOD PRESSURE: 85 MMHG | HEIGHT: 64 IN | TEMPERATURE: 98 F | HEART RATE: 77 BPM | SYSTOLIC BLOOD PRESSURE: 136 MMHG | WEIGHT: 243 LBS | BODY MASS INDEX: 41.48 KG/M2 | OXYGEN SATURATION: 97 %

## 2023-07-10 DIAGNOSIS — K21.9 GASTROESOPHAGEAL REFLUX DISEASE, UNSPECIFIED WHETHER ESOPHAGITIS PRESENT: ICD-10-CM

## 2023-07-10 DIAGNOSIS — E86.1 HYPOTENSION DUE TO HYPOVOLEMIA: Primary | ICD-10-CM

## 2023-07-10 DIAGNOSIS — R30.0 DYSURIA: ICD-10-CM

## 2023-07-10 PROCEDURE — 3079F DIAST BP 80-89 MM HG: CPT | Mod: CPTII,S$GLB,, | Performed by: INTERNAL MEDICINE

## 2023-07-10 PROCEDURE — 1111F DSCHRG MED/CURRENT MED MERGE: CPT | Mod: CPTII,S$GLB,, | Performed by: INTERNAL MEDICINE

## 2023-07-10 PROCEDURE — 3008F BODY MASS INDEX DOCD: CPT | Mod: CPTII,S$GLB,, | Performed by: INTERNAL MEDICINE

## 2023-07-10 PROCEDURE — 4010F ACE/ARB THERAPY RXD/TAKEN: CPT | Mod: CPTII,S$GLB,, | Performed by: INTERNAL MEDICINE

## 2023-07-10 PROCEDURE — 3079F PR MOST RECENT DIASTOLIC BLOOD PRESSURE 80-89 MM HG: ICD-10-PCS | Mod: CPTII,S$GLB,, | Performed by: INTERNAL MEDICINE

## 2023-07-10 PROCEDURE — 99213 PR OFFICE/OUTPT VISIT, EST, LEVL III, 20-29 MIN: ICD-10-PCS | Mod: S$GLB,,, | Performed by: INTERNAL MEDICINE

## 2023-07-10 PROCEDURE — 99213 OFFICE O/P EST LOW 20 MIN: CPT | Mod: S$GLB,,, | Performed by: INTERNAL MEDICINE

## 2023-07-10 PROCEDURE — 1159F MED LIST DOCD IN RCRD: CPT | Mod: CPTII,S$GLB,, | Performed by: INTERNAL MEDICINE

## 2023-07-10 PROCEDURE — 3075F SYST BP GE 130 - 139MM HG: CPT | Mod: CPTII,S$GLB,, | Performed by: INTERNAL MEDICINE

## 2023-07-10 PROCEDURE — 1111F PR DISCHARGE MEDS RECONCILED W/ CURRENT OUTPATIENT MED LIST: ICD-10-PCS | Mod: CPTII,S$GLB,, | Performed by: INTERNAL MEDICINE

## 2023-07-10 PROCEDURE — 3008F PR BODY MASS INDEX (BMI) DOCUMENTED: ICD-10-PCS | Mod: CPTII,S$GLB,, | Performed by: INTERNAL MEDICINE

## 2023-07-10 PROCEDURE — 4010F PR ACE/ARB THEARPY RXD/TAKEN: ICD-10-PCS | Mod: CPTII,S$GLB,, | Performed by: INTERNAL MEDICINE

## 2023-07-10 PROCEDURE — 1159F PR MEDICATION LIST DOCUMENTED IN MEDICAL RECORD: ICD-10-PCS | Mod: CPTII,S$GLB,, | Performed by: INTERNAL MEDICINE

## 2023-07-10 PROCEDURE — 3075F PR MOST RECENT SYSTOLIC BLOOD PRESS GE 130-139MM HG: ICD-10-PCS | Mod: CPTII,S$GLB,, | Performed by: INTERNAL MEDICINE

## 2023-07-10 RX ORDER — FAMOTIDINE 20 MG/1
20 TABLET, FILM COATED ORAL 2 TIMES DAILY
Qty: 180 TABLET | Refills: 1 | Status: SHIPPED | OUTPATIENT
Start: 2023-07-10 | End: 2024-01-19 | Stop reason: SDUPTHER

## 2023-07-10 NOTE — PROGRESS NOTES
Subjective:       Patient ID: Chelsea Grossman is a 63 y.o. female.    Chief Complaint: Follow-up (Hospital follow up low bp)    Here for hospital follow up.  She went in for mammogram but wasn't feeling well and was noted to be diaphoretic and weak; nearly passed out.  EMS activated and she was noted to be hypotensive and was taken to the ER.  Admitted for hypotension and JOSE.  Improved with IVF, antibiotics for presumed UTI.  BP meds held during hospitalization but she is back on the at home.  Discharged with cipro which she has completed.  Still feels like she is having some dysuria.  Urine and blood cultures were negative.   Creatinine returned to baseline.  She reports that her insurance isn't paying for pantoprazole because she has been on it longer than 6 months.  She is still symptomatic.    Review of Systems   Constitutional:  Negative for activity change, appetite change, chills, diaphoresis, fatigue, fever and unexpected weight change.   HENT:  Negative for congestion, ear discharge, ear pain, hearing loss, mouth sores, nosebleeds, postnasal drip, rhinorrhea, sinus pressure, sinus pain, sneezing, sore throat, tinnitus, trouble swallowing and voice change.    Eyes:  Negative for photophobia, pain, discharge, redness, itching and visual disturbance.   Respiratory:  Negative for apnea, cough, choking, chest tightness, shortness of breath and wheezing.    Cardiovascular:  Negative for chest pain, palpitations and leg swelling.   Gastrointestinal:  Negative for abdominal distention, abdominal pain, blood in stool, constipation, diarrhea, nausea and vomiting.   Endocrine: Negative for cold intolerance, heat intolerance, polydipsia and polyuria.   Genitourinary:  Positive for dysuria and pelvic pain. Negative for decreased urine volume, difficulty urinating, dyspareunia, enuresis, frequency, genital sores, hematuria, menstrual problem, urgency, vaginal bleeding, vaginal discharge and vaginal pain.    Musculoskeletal:  Positive for arthralgias (left foot pain). Negative for back pain, gait problem, joint swelling, myalgias, neck pain and neck stiffness.   Skin:  Negative for rash and wound.   Allergic/Immunologic: Negative for environmental allergies, food allergies and immunocompromised state.   Neurological:  Negative for dizziness, tremors, seizures, syncope, facial asymmetry, speech difficulty, weakness, light-headedness, numbness and headaches.   Hematological:  Negative for adenopathy. Does not bruise/bleed easily.   Psychiatric/Behavioral:  Negative for confusion, decreased concentration, hallucinations, self-injury, sleep disturbance and suicidal ideas. The patient is not nervous/anxious.      Past Medical History:   Diagnosis Date    Cirrhosis     History of hepatitis C, s/p successful Rx w/ cure (SVR - 2020)     History of kidney stones     Hypertension     Hypertensive retinopathy of both eyes     Osteoarthritis     Osteopenia     Trigger finger of left hand       Past Surgical History:   Procedure Laterality Date    CARPAL TUNNEL RELEASE Bilateral     FOOT SURGERY Left 2018    KIDNEY STONE SURGERY      Left ear surgery      TOTAL ABDOMINAL HYSTERECTOMY W/ BILATERAL SALPINGOOPHORECTOMY      TRIGGER FINGER RELEASE Right     2nd and 3rd fingers       Family History   Problem Relation Age of Onset    Heart disease Mother     Cancer Father         stomach    Breast cancer Sister     Breast cancer Maternal Grandmother 60    Stomach cancer Brother        Social History     Socioeconomic History    Marital status: Single   Occupational History    Occupation: disabled   Tobacco Use    Smoking status: Former     Types: Cigarettes     Quit date: 10/2018     Years since quittin.7    Smokeless tobacco: Never   Substance and Sexual Activity    Alcohol use: Yes    Drug use: No    Sexual activity: Never     Partners: Female   Social History Narrative    Live with sister       Current  Outpatient Medications   Medication Sig Dispense Refill    amLODIPine (NORVASC) 10 MG tablet TAKE 1 TABLET BY MOUTH ONCE DAILY IN THE EVENING (Patient taking differently: Take 10 mg by mouth once daily.) 90 tablet 1    carvediloL (COREG) 12.5 MG tablet Take 1 tablet (12.5 mg total) by mouth 2 (two) times daily with meals. 180 tablet 1    celecoxib (CELEBREX) 200 MG capsule Take 1 capsule (200 mg total) by mouth once daily. 90 capsule 1    colchicine (COLCRYS) 0.6 mg tablet Take 2 tablets at symptom onset, 1 tablet 2-3 hours later, then 1 tablet daily for 7 days as needed for gout attack 10 tablet 3    estradioL (ESTRACE) 0.01 % (0.1 mg/gram) vaginal cream INSERT 1 2 GRAM INTRAVAGINALLY EVERY NIGHT AT BEDTIME 2 TO 3 TIMES PER WEEK (Patient taking differently: Place 2 g vaginally twice a week. INSERT 1 2 GRAM INTRAVAGINALLY EVERY NIGHT AT BEDTIME 2 TO 3 TIMES PER WEEK) 42.5 g 3    fluticasone propionate (FLONASE) 50 mcg/actuation nasal spray 1 spray (50 mcg total) by Each Nostril route once daily. 48 g 1    hydroCHLOROthiazide (HYDRODIURIL) 25 MG tablet Take 1 tablet (25 mg total) by mouth once daily. 90 tablet 1    ibuprofen (ADVIL,MOTRIN) 800 MG tablet Take 1 tablet (800 mg total) by mouth every 6 (six) hours as needed for Pain. 90 tablet 1    multivitamin (THERAGRAN) per tablet Take 1 tablet by mouth once daily.      olmesartan (BENICAR) 40 MG tablet Take 1 tablet (40 mg total) by mouth once daily. 90 tablet 1    traMADoL (ULTRAM) 50 mg tablet TAKE 1 TABLET BY MOUTH EVERY 8 HOURS AS NEEDED FOR PAIN (Patient taking differently: Take 50 mg by mouth every 8 (eight) hours as needed. TAKE 1 TABLET BY MOUTH EVERY 8 HOURS AS NEEDED FOR PAIN) 90 tablet 0    (Magic mouthwash) 1:1:1 diphenhydrAMINE(Benadryl) 12.5mg/5ml liq, aluminum & magnesium hydroxide-simethicone (Maalox), LIDOcaine viscous 2% Swish and spit 5 mLs every 4 (four) hours as needed (stomach pain). for mouth sores 360 mL 3    DULoxetine (CYMBALTA) 60 MG  "capsule Take 1 capsule (60 mg total) by mouth once daily. (Patient not taking: Reported on 7/10/2023) 30 capsule 6    famotidine (PEPCID) 20 MG tablet Take 1 tablet (20 mg total) by mouth 2 (two) times daily. 180 tablet 1     No current facility-administered medications for this visit.       Review of patient's allergies indicates:   Allergen Reactions    Sulfa (sulfonamide antibiotics) Itching    Sulfamethoxazole-trimethoprim Itching and Rash     Itchy Rash     Objective:    HPI     Follow-up     Additional comments: Hospital follow up low bp          Last edited by Hugh Rausch MA on 7/10/2023  4:03 PM.      Blood pressure 136/85, pulse 77, temperature 98.2 °F (36.8 °C), temperature source Temporal, height 5' 4" (1.626 m), weight 110.2 kg (243 lb), SpO2 97 %. Body mass index is 41.71 kg/m².   Physical Exam        Assessment:       1. Hypotension due to hypovolemia    2. Gastroesophageal reflux disease, unspecified whether esophagitis present    3. Dysuria        Plan:       Chelsea was seen today for follow-up.    Diagnoses and all orders for this visit:    Hypotension due to hypovolemia  Comments:  resolved    Gastroesophageal reflux disease, unspecified whether esophagitis present  -     Ambulatory referral/consult to Gastroenterology; Future  -     famotidine (PEPCID) 20 MG tablet; Take 1 tablet (20 mg total) by mouth 2 (two) times daily.  -     (Magic mouthwash) 1:1:1 diphenhydrAMINE(Benadryl) 12.5mg/5ml liq, aluminum & magnesium hydroxide-simethicone (Maalox), LIDOcaine viscous 2%; Swish and spit 5 mLs every 4 (four) hours as needed (stomach pain). for mouth sores    Dysuria  -     Urinalysis, Reflex to Urine Culture Urine, Clean Catch           "

## 2023-07-12 LAB
APPEARANCE UR: CLEAR
BACTERIA #/AREA URNS HPF: NORMAL /[HPF]
BILIRUB UR QL STRIP: NEGATIVE
CASTS URNS QL MICRO: NORMAL /LPF
COLOR UR: YELLOW
EPI CELLS #/AREA URNS HPF: NORMAL /HPF (ref 0–10)
GLUCOSE UR QL STRIP: NEGATIVE
HGB UR QL STRIP: NEGATIVE
KETONES UR QL STRIP: NEGATIVE
LEUKOCYTE ESTERASE UR QL STRIP: NEGATIVE
MICRO URNS: NORMAL
MICRO URNS: NORMAL
NITRITE UR QL STRIP: NEGATIVE
PH UR STRIP: 5.5 [PH] (ref 5–7.5)
PROT UR QL STRIP: NEGATIVE
RBC #/AREA URNS HPF: NORMAL /HPF (ref 0–2)
SP GR UR STRIP: 1.01 (ref 1–1.03)
URINALYSIS REFLEX: NORMAL
UROBILINOGEN UR STRIP-MCNC: 0.2 MG/DL (ref 0.2–1)
WBC #/AREA URNS HPF: NORMAL /HPF (ref 0–5)

## 2023-07-14 ENCOUNTER — HOSPITAL ENCOUNTER (OUTPATIENT)
Dept: RADIOLOGY | Facility: HOSPITAL | Age: 63
Discharge: HOME | End: 2023-07-14
Attending: INTERNAL MEDICINE
Payer: MEDICAID

## 2023-07-14 VITALS — HEIGHT: 64 IN | BODY MASS INDEX: 41.48 KG/M2 | WEIGHT: 242.94 LBS

## 2023-07-14 PROCEDURE — 77067 SCR MAMMO BI INCL CAD: CPT | Mod: TC,PO

## 2023-07-18 ENCOUNTER — TELEPHONE (OUTPATIENT)
Dept: FAMILY MEDICINE | Facility: CLINIC | Age: 63
End: 2023-07-18

## 2023-07-18 NOTE — TELEPHONE ENCOUNTER
Pt called and said Dr. Almonte is not taking new pt's and needs new referral sent to a different provider.

## 2023-07-26 DIAGNOSIS — S93.325D LISFRANC DISLOCATION, LEFT, SUBSEQUENT ENCOUNTER: ICD-10-CM

## 2023-07-26 DIAGNOSIS — M15.9 OSTEOARTHRITIS OF MULTIPLE JOINTS, UNSPECIFIED OSTEOARTHRITIS TYPE: ICD-10-CM

## 2023-07-26 RX ORDER — TRAMADOL HYDROCHLORIDE 50 MG/1
TABLET ORAL
Qty: 90 TABLET | Refills: 0 | Status: SHIPPED | OUTPATIENT
Start: 2023-07-26 | End: 2023-07-26 | Stop reason: SDUPTHER

## 2023-07-26 RX ORDER — TRAMADOL HYDROCHLORIDE 50 MG/1
TABLET ORAL
Qty: 90 TABLET | Refills: 0 | Status: SHIPPED | OUTPATIENT
Start: 2023-07-26 | End: 2023-08-29

## 2023-08-29 DIAGNOSIS — S93.325D LISFRANC DISLOCATION, LEFT, SUBSEQUENT ENCOUNTER: ICD-10-CM

## 2023-08-29 DIAGNOSIS — M15.9 OSTEOARTHRITIS OF MULTIPLE JOINTS, UNSPECIFIED OSTEOARTHRITIS TYPE: ICD-10-CM

## 2023-08-29 RX ORDER — TRAMADOL HYDROCHLORIDE 50 MG/1
50 TABLET ORAL EVERY 8 HOURS PRN
Qty: 90 TABLET | Refills: 0 | Status: SHIPPED | OUTPATIENT
Start: 2023-08-29 | End: 2023-10-09 | Stop reason: SDUPTHER

## 2023-08-29 RX ORDER — TRAMADOL HYDROCHLORIDE 50 MG/1
TABLET ORAL
Qty: 90 TABLET | Refills: 0 | Status: SHIPPED | OUTPATIENT
Start: 2023-08-29 | End: 2023-08-29 | Stop reason: SDUPTHER

## 2023-09-21 DIAGNOSIS — I10 ESSENTIAL HYPERTENSION: ICD-10-CM

## 2023-09-25 PROBLEM — N39.0 UTI (URINARY TRACT INFECTION): Status: RESOLVED | Noted: 2023-06-26 | Resolved: 2023-09-25

## 2023-09-25 PROBLEM — N17.9 AKI (ACUTE KIDNEY INJURY): Status: RESOLVED | Noted: 2023-06-26 | Resolved: 2023-09-25

## 2023-09-25 RX ORDER — OLMESARTAN MEDOXOMIL 40 MG/1
40 TABLET ORAL
Qty: 90 TABLET | Refills: 1 | Status: SHIPPED | OUTPATIENT
Start: 2023-09-25 | End: 2023-11-02

## 2023-10-06 ENCOUNTER — TELEPHONE (OUTPATIENT)
Dept: HEPATOLOGY | Facility: CLINIC | Age: 63
End: 2023-10-06
Payer: MEDICAID

## 2023-10-06 ENCOUNTER — HOSPITAL ENCOUNTER (OUTPATIENT)
Dept: RADIOLOGY | Facility: HOSPITAL | Age: 63
Discharge: HOME | End: 2023-10-06
Attending: PHYSICIAN ASSISTANT
Payer: MEDICAID

## 2023-10-06 DIAGNOSIS — K74.60 HEPATIC CIRRHOSIS, UNSPECIFIED HEPATIC CIRRHOSIS TYPE, UNSPECIFIED WHETHER ASCITES PRESENT: ICD-10-CM

## 2023-10-06 PROCEDURE — 76705 ECHO EXAM OF ABDOMEN: CPT | Mod: TC

## 2023-10-06 NOTE — TELEPHONE ENCOUNTER
10/6/23 ultrasound reviewed - stable    Pls tell pt  U/S looked fine  No liver lesions  Appears labs still needed    Please schedule: CBC, CMP, INR, AFP    Thanks

## 2023-10-09 ENCOUNTER — OFFICE VISIT (OUTPATIENT)
Dept: FAMILY MEDICINE | Facility: CLINIC | Age: 63
End: 2023-10-09
Payer: MEDICAID

## 2023-10-09 VITALS
SYSTOLIC BLOOD PRESSURE: 122 MMHG | HEART RATE: 79 BPM | HEIGHT: 64 IN | DIASTOLIC BLOOD PRESSURE: 76 MMHG | OXYGEN SATURATION: 96 % | BODY MASS INDEX: 42.34 KG/M2 | WEIGHT: 248 LBS | TEMPERATURE: 98 F

## 2023-10-09 DIAGNOSIS — M15.9 OSTEOARTHRITIS OF MULTIPLE JOINTS, UNSPECIFIED OSTEOARTHRITIS TYPE: ICD-10-CM

## 2023-10-09 DIAGNOSIS — S93.325D LISFRANC DISLOCATION, LEFT, SUBSEQUENT ENCOUNTER: ICD-10-CM

## 2023-10-09 DIAGNOSIS — I10 ESSENTIAL HYPERTENSION: Primary | ICD-10-CM

## 2023-10-09 DIAGNOSIS — K21.9 GASTROESOPHAGEAL REFLUX DISEASE, UNSPECIFIED WHETHER ESOPHAGITIS PRESENT: ICD-10-CM

## 2023-10-09 DIAGNOSIS — R30.0 DYSURIA: ICD-10-CM

## 2023-10-09 LAB
BILIRUB UR QL STRIP: NEGATIVE
GLUCOSE UR QL STRIP: NEGATIVE
KETONES UR QL STRIP: NEGATIVE
LEUKOCYTE ESTERASE UR QL STRIP: NEGATIVE
PH, POC UA: 6.5
POC BLOOD, URINE: NEGATIVE
POC NITRATES, URINE: NEGATIVE
PROT UR QL STRIP: NEGATIVE
SP GR UR STRIP: 1.01 (ref 1–1.03)
UROBILINOGEN UR STRIP-ACNC: NORMAL (ref 0.1–1.1)

## 2023-10-09 PROCEDURE — 3008F BODY MASS INDEX DOCD: CPT | Mod: CPTII,S$GLB,, | Performed by: INTERNAL MEDICINE

## 2023-10-09 PROCEDURE — 3074F SYST BP LT 130 MM HG: CPT | Mod: CPTII,S$GLB,, | Performed by: INTERNAL MEDICINE

## 2023-10-09 PROCEDURE — 99214 OFFICE O/P EST MOD 30 MIN: CPT | Mod: S$GLB,,, | Performed by: INTERNAL MEDICINE

## 2023-10-09 PROCEDURE — 3078F PR MOST RECENT DIASTOLIC BLOOD PRESSURE < 80 MM HG: ICD-10-PCS | Mod: CPTII,S$GLB,, | Performed by: INTERNAL MEDICINE

## 2023-10-09 PROCEDURE — 1159F MED LIST DOCD IN RCRD: CPT | Mod: CPTII,S$GLB,, | Performed by: INTERNAL MEDICINE

## 2023-10-09 PROCEDURE — 81003 URINALYSIS AUTO W/O SCOPE: CPT | Mod: QW,S$GLB,, | Performed by: INTERNAL MEDICINE

## 2023-10-09 PROCEDURE — 4010F PR ACE/ARB THEARPY RXD/TAKEN: ICD-10-PCS | Mod: CPTII,S$GLB,, | Performed by: INTERNAL MEDICINE

## 2023-10-09 PROCEDURE — 3074F PR MOST RECENT SYSTOLIC BLOOD PRESSURE < 130 MM HG: ICD-10-PCS | Mod: CPTII,S$GLB,, | Performed by: INTERNAL MEDICINE

## 2023-10-09 PROCEDURE — 1159F PR MEDICATION LIST DOCUMENTED IN MEDICAL RECORD: ICD-10-PCS | Mod: CPTII,S$GLB,, | Performed by: INTERNAL MEDICINE

## 2023-10-09 PROCEDURE — 99214 PR OFFICE/OUTPT VISIT, EST, LEVL IV, 30-39 MIN: ICD-10-PCS | Mod: S$GLB,,, | Performed by: INTERNAL MEDICINE

## 2023-10-09 PROCEDURE — 81003 POCT URINALYSIS, DIPSTICK, AUTOMATED, W/O SCOPE: ICD-10-PCS | Mod: QW,S$GLB,, | Performed by: INTERNAL MEDICINE

## 2023-10-09 PROCEDURE — 3078F DIAST BP <80 MM HG: CPT | Mod: CPTII,S$GLB,, | Performed by: INTERNAL MEDICINE

## 2023-10-09 PROCEDURE — 3008F PR BODY MASS INDEX (BMI) DOCUMENTED: ICD-10-PCS | Mod: CPTII,S$GLB,, | Performed by: INTERNAL MEDICINE

## 2023-10-09 PROCEDURE — 4010F ACE/ARB THERAPY RXD/TAKEN: CPT | Mod: CPTII,S$GLB,, | Performed by: INTERNAL MEDICINE

## 2023-10-09 RX ORDER — FLUTICASONE PROPIONATE 50 MCG
1 SPRAY, SUSPENSION (ML) NASAL DAILY
Qty: 48 G | Refills: 1 | Status: SHIPPED | OUTPATIENT
Start: 2023-10-09

## 2023-10-09 RX ORDER — TRAMADOL HYDROCHLORIDE 50 MG/1
50 TABLET ORAL EVERY 8 HOURS PRN
Qty: 90 TABLET | Refills: 2 | Status: SHIPPED | OUTPATIENT
Start: 2023-10-09 | End: 2023-10-16 | Stop reason: SDUPTHER

## 2023-10-09 RX ORDER — AMLODIPINE BESYLATE 10 MG/1
10 TABLET ORAL DAILY
Qty: 90 TABLET | Refills: 1 | Status: SHIPPED | OUTPATIENT
Start: 2023-10-09 | End: 2024-01-03 | Stop reason: SDUPTHER

## 2023-10-09 RX ORDER — OMEPRAZOLE 40 MG/1
40 CAPSULE, DELAYED RELEASE ORAL DAILY
Qty: 90 CAPSULE | Refills: 1 | Status: SHIPPED | OUTPATIENT
Start: 2023-10-09 | End: 2024-10-08

## 2023-10-09 RX ORDER — TRAMADOL HYDROCHLORIDE 50 MG/1
50 TABLET ORAL EVERY 8 HOURS PRN
Qty: 90 TABLET | Refills: 0 | Status: SHIPPED | OUTPATIENT
Start: 2023-10-09 | End: 2023-10-09 | Stop reason: SDUPTHER

## 2023-10-09 NOTE — PROGRESS NOTES
Subjective:       Patient ID: Chelsea Grossman is a 63 y.o. female.    Chief Complaint: Hypertension (Med refill)    Here for routine follow up; last visit note, most recent available labs, and health maintenance topics reviewed.   No further hypotensive symptoms.  She has chronic pain following LisFranc fracture of foot. Tramadol does help and allows her to complete her ADLs.  Takes as much as TID, sometimes only once or twice.  Celebrex does help some without GI side effects she had with ibuprofen.   She is still having some GERD symptoms; pepcid does help but incomplete relief.   Dr. Almonte not accepting new Medicaid currently.  She reports she needs a new insert for her shoe.  Dr. Rothman not in network on her insurance now.     Hypertension  This is a chronic problem. The problem is controlled (she reports BP at home is usually 120s/70 ). Pertinent negatives include no anxiety, chest pain, headaches, malaise/fatigue, neck pain, palpitations, peripheral edema or shortness of breath. Past treatments include beta blockers, calcium channel blockers, diuretics and ACE inhibitors. There are no compliance problems.    Urinary Tract Infection   This is a recurrent problem. The current episode started in the past 7 days. The problem occurs intermittently. The quality of the pain is described as burning. There has been no fever. Associated symptoms include hesitancy. Pertinent negatives include no chills, frequency, hematuria, nausea, urgency, vomiting, constipation or rash. She has tried nothing for the symptoms. Her past medical history is significant for hypertension.     Review of Systems   Constitutional:  Negative for activity change, appetite change, chills, diaphoresis, fatigue, fever, malaise/fatigue and unexpected weight change.   HENT:  Negative for congestion, ear discharge, ear pain, hearing loss, mouth sores, nosebleeds, postnasal drip, rhinorrhea, sinus pressure, sinus pain, sneezing, sore throat,  tinnitus, trouble swallowing and voice change.    Eyes:  Negative for photophobia, pain, discharge, redness, itching and visual disturbance.   Respiratory:  Negative for apnea, cough, choking, chest tightness, shortness of breath and wheezing.    Cardiovascular:  Negative for chest pain, palpitations and leg swelling.   Gastrointestinal:  Negative for abdominal distention, abdominal pain, blood in stool, constipation, diarrhea, nausea and vomiting.   Endocrine: Negative for cold intolerance, heat intolerance, polydipsia and polyuria.   Genitourinary:  Positive for dysuria and hesitancy. Negative for decreased urine volume, difficulty urinating, dyspareunia, enuresis, frequency, genital sores, hematuria, menstrual problem, pelvic pain, urgency, vaginal bleeding, vaginal discharge and vaginal pain.   Musculoskeletal:  Positive for arthralgias (left foot pain). Negative for back pain, gait problem, joint swelling, myalgias, neck pain and neck stiffness.   Skin:  Negative for rash and wound.   Allergic/Immunologic: Negative for environmental allergies, food allergies and immunocompromised state.   Neurological:  Negative for dizziness, tremors, seizures, syncope, facial asymmetry, speech difficulty, weakness, light-headedness, numbness and headaches.   Hematological:  Negative for adenopathy. Does not bruise/bleed easily.   Psychiatric/Behavioral:  Negative for confusion, decreased concentration, hallucinations, self-injury, sleep disturbance and suicidal ideas. The patient is not nervous/anxious.        Past Medical History:   Diagnosis Date    Cirrhosis 2020    History of hepatitis C, s/p successful Rx w/ cure (SVR12 - 4/2020)     History of kidney stones     Hypertension     Hypertensive retinopathy of both eyes     Osteoarthritis     Osteopenia     Trigger finger of left hand 2023      Past Surgical History:   Procedure Laterality Date    CARPAL TUNNEL RELEASE Bilateral     FOOT SURGERY Left 12/18/2018    KIDNEY  STONE SURGERY      Left ear surgery      TOTAL ABDOMINAL HYSTERECTOMY W/ BILATERAL SALPINGOOPHORECTOMY      TRIGGER FINGER RELEASE Right     2nd and 3rd fingers       Family History   Problem Relation Age of Onset    Heart disease Mother     Cancer Father         stomach    Breast cancer Sister     Breast cancer Maternal Grandmother 60    Stomach cancer Brother        Social History     Socioeconomic History    Marital status: Single   Occupational History    Occupation: disabled   Tobacco Use    Smoking status: Former     Current packs/day: 0.00     Types: Cigarettes     Quit date: 10/2018     Years since quittin.0    Smokeless tobacco: Never   Substance and Sexual Activity    Alcohol use: Yes    Drug use: No    Sexual activity: Never     Partners: Female   Social History Narrative    Live with sister     Social Determinants of Health     Stress: No Stress Concern Present (2020)    [employer] [place] [employer] - Occupational Stress Questionnaire     Feeling of Stress : Not at all       Current Outpatient Medications   Medication Sig Dispense Refill    (Magic mouthwash) 1:1:1 diphenhydrAMINE(Benadryl) 12.5mg/5ml liq, aluminum & magnesium hydroxide-simethicone (Maalox), LIDOcaine viscous 2% Swish and spit 5 mLs every 4 (four) hours as needed (stomach pain). for mouth sores 360 mL 3    carvediloL (COREG) 12.5 MG tablet Take 1 tablet (12.5 mg total) by mouth 2 (two) times daily with meals. 180 tablet 1    celecoxib (CELEBREX) 200 MG capsule Take 1 capsule (200 mg total) by mouth once daily. 90 capsule 1    colchicine (COLCRYS) 0.6 mg tablet Take 2 tablets at symptom onset, 1 tablet 2-3 hours later, then 1 tablet daily for 7 days as needed for gout attack 10 tablet 3    estradioL (ESTRACE) 0.01 % (0.1 mg/gram) vaginal cream INSERT 1 2 GRAM INTRAVAGINALLY EVERY NIGHT AT BEDTIME 2 TO 3 TIMES PER WEEK (Patient taking differently: Place 2 g vaginally twice a week. INSERT 1 2 GRAM INTRAVAGINALLY EVERY  "NIGHT AT BEDTIME 2 TO 3 TIMES PER WEEK) 42.5 g 3    famotidine (PEPCID) 20 MG tablet Take 1 tablet (20 mg total) by mouth 2 (two) times daily. 180 tablet 1    hydroCHLOROthiazide (HYDRODIURIL) 25 MG tablet Take 1 tablet (25 mg total) by mouth once daily. 90 tablet 1    ibuprofen (ADVIL,MOTRIN) 800 MG tablet Take 1 tablet (800 mg total) by mouth every 6 (six) hours as needed for Pain. 90 tablet 1    multivitamin (THERAGRAN) per tablet Take 1 tablet by mouth once daily.      olmesartan (BENICAR) 40 MG tablet TAKE 1 TABLET BY MOUTH EVERY DAY 90 tablet 1    amLODIPine (NORVASC) 10 MG tablet Take 1 tablet (10 mg total) by mouth once daily. 90 tablet 1    fluticasone propionate (FLONASE) 50 mcg/actuation nasal spray 1 spray (50 mcg total) by Each Nostril route once daily. 48 g 1    omeprazole (PRILOSEC) 40 MG capsule Take 1 capsule (40 mg total) by mouth once daily. 90 capsule 1    traMADoL (ULTRAM) 50 mg tablet Take 1 tablet (50 mg total) by mouth every 8 (eight) hours as needed. 90 tablet 0     No current facility-administered medications for this visit.       Review of patient's allergies indicates:   Allergen Reactions    Sulfa (sulfonamide antibiotics) Itching    Sulfamethoxazole-trimethoprim Itching and Rash     Itchy Rash     Objective:    HPI     Hypertension     Additional comments: Med refill          Last edited by Hugh Rausch MA on 10/9/2023  8:49 AM.      Blood pressure 122/76, pulse 79, temperature 98.1 °F (36.7 °C), temperature source Temporal, height 5' 4" (1.626 m), weight 112.5 kg (248 lb), SpO2 96 %. Body mass index is 42.57 kg/m².   Physical Exam        Assessment:       1. Essential hypertension    2. Osteoarthritis of multiple joints, unspecified osteoarthritis type    3. Lisfranc dislocation, left, subsequent encounter    4. Gastroesophageal reflux disease, unspecified whether esophagitis present    5. Dysuria        Plan:       Chelsea was seen today for hypertension.    Diagnoses and all " orders for this visit:    Essential hypertension  -     amLODIPine (NORVASC) 10 MG tablet; Take 1 tablet (10 mg total) by mouth once daily.    Osteoarthritis of multiple joints, unspecified osteoarthritis type  -     traMADoL (ULTRAM) 50 mg tablet; Take 1 tablet (50 mg total) by mouth every 8 (eight) hours as needed.    Lisfranc dislocation, left, subsequent encounter  Comments:  May have a neuropathic component  Orders:  -     traMADoL (ULTRAM) 50 mg tablet; Take 1 tablet (50 mg total) by mouth every 8 (eight) hours as needed.  -     Ambulatory referral/consult to Podiatry; Future    Gastroesophageal reflux disease, unspecified whether esophagitis present  -     Ambulatory referral/consult to Gastroenterology; Future  -     omeprazole (PRILOSEC) 40 MG capsule; Take 1 capsule (40 mg total) by mouth once daily.    Dysuria  -     POCT Urinalysis, Dipstick, Automated, W/O Scope  -     Urine Culture, Routine    Other orders  -     fluticasone propionate (FLONASE) 50 mcg/actuation nasal spray; 1 spray (50 mcg total) by Each Nostril route once daily.

## 2023-10-09 NOTE — TELEPHONE ENCOUNTER
I spoke with patient and msg from PA Scheuermann relayed.  Lab draw scheduled 10/17/23; appt reminder notice mailed.

## 2023-10-12 LAB
BACTERIA UR CULT: NO GROWTH
BACTERIA UR CULT: NORMAL

## 2023-10-16 DIAGNOSIS — M15.9 OSTEOARTHRITIS OF MULTIPLE JOINTS, UNSPECIFIED OSTEOARTHRITIS TYPE: ICD-10-CM

## 2023-10-16 DIAGNOSIS — S93.325D LISFRANC DISLOCATION, LEFT, SUBSEQUENT ENCOUNTER: ICD-10-CM

## 2023-10-16 RX ORDER — TRAMADOL HYDROCHLORIDE 50 MG/1
50 TABLET ORAL EVERY 8 HOURS PRN
Qty: 90 TABLET | Refills: 2 | Status: SHIPPED | OUTPATIENT
Start: 2023-10-16 | End: 2024-01-19 | Stop reason: SDUPTHER

## 2023-10-16 NOTE — PATIENT INSTRUCTIONS
Foot Fracture  You have a broken bone (fracture) in your foot. This will cause pain, swelling, and often bruising. It will usually take about 4 to 8 weeks to heal. A foot fracture may be treated with a special shoe, splint, cast, or boot.  Home care  Follow these guidelines when caring for yourself at home:  You may be given a splint, cast, shoe, or boot to keep the injured area from moving. Unless you were told otherwise, use crutches or a walker. Dont put weight on the injured foot until your health care provider says you can do so. (You can rent crutches and a walker at many pharmacies and surgical or orthopedic supply stores.) Dont put weight on a splint, or it will break.  Keep your leg elevated to reduce pain and swelling. When sleeping, put a pillow under the injured leg. When sitting, support the injured leg so it is above your waist. This is very important during the first 2 days (48 hours).  Put an ice pack on the injured area. Do this for 20 minutes every 1 to 2 hours the first day for pain relief. You can make an ice pack by wrapping a plastic bag of ice cubes in a thin towel. As the ice melts, be careful that the splint, cast, boot, or shoe doesnt get wet. You can place the ice pack directly over the splint or cast. Unless told otherwise, you can open the boot or shoe to apply the ice pack. Continue using the ice pack 3 to 4 times a day for the next 2 days. Then use the ice pack as needed to ease pain and swelling.  Keep the splint, cast, boot, or shoe dry. When bathing, protect it with a large plastic bag, rubber-banded at the top end. If a fiberglass splint or cast or boot gets wet, you can dry it with a hair dryer. Unless told otherwise, you can take off the boot or shoe to bathe.  You may use acetaminophen or ibuprofen to control pain, unless another pain medicine was prescribed. If you have chronic liver or kidney disease, talk with your healthcare provider before using these medicines. Also  talk with your provider if youve had a stomach ulcer or gastrointestinal bleeding.  Dont put creams or objects under the cast if you have itching.  Follow-up care  Follow up with your healthcare provider, or as advised. This is to make sure the bone is healing the way it should. If you were given a splint, it may be changed to a cast or boot at your follow-up visit.  X-rays may be taken. You will be told of any new findings that may affect your care.  When to seek medical advice  Call your healthcare provider right away if any of these occur:  The cast or splint cracks  The plaster cast or splint becomes wet or soft  The fiberglass cast or splint stays wet for more than 24 hours  Bad odor from the cast or wound fluid stains the cast  Tightness or pain under the cast or splint gets worse  Toes become swollen, cold, blue, numb, or tingly  You cant move your toes  Skin around cast or splint becomes red  Fever of 100.4ºF (38ºC) or higher, or as directed by your healthcare provider  Date Last Reviewed: 2/1/2017 © 2000-2017 [employer]. [address] [place] [id number]. All rights reserved. This information is not intended as a substitute for professional medical care. Always follow your healthcare professional's instructions.

## 2023-10-17 ENCOUNTER — LAB VISIT (OUTPATIENT)
Dept: LAB | Facility: HOSPITAL | Age: 63
End: 2023-10-17
Attending: PHYSICIAN ASSISTANT
Payer: MEDICAID

## 2023-10-17 DIAGNOSIS — K74.60 HEPATIC CIRRHOSIS, UNSPECIFIED HEPATIC CIRRHOSIS TYPE, UNSPECIFIED WHETHER ASCITES PRESENT: ICD-10-CM

## 2023-10-17 LAB
ALBUMIN SERPL BCP-MCNC: 4.3 G/DL (ref 3.5–5.2)
ALP SERPL-CCNC: 70 U/L (ref 55–135)
ALT SERPL W/O P-5'-P-CCNC: 20 U/L (ref 10–44)
ANION GAP SERPL CALC-SCNC: 7 MMOL/L (ref 8–16)
AST SERPL-CCNC: 15 U/L (ref 10–40)
BASOPHILS # BLD AUTO: 0.06 K/UL (ref 0–0.2)
BASOPHILS NFR BLD: 0.8 % (ref 0–1.9)
BILIRUB SERPL-MCNC: 0.3 MG/DL (ref 0.1–1)
BUN SERPL-MCNC: 24 MG/DL (ref 8–23)
CALCIUM SERPL-MCNC: 10 MG/DL (ref 8.7–10.5)
CHLORIDE SERPL-SCNC: 105 MMOL/L (ref 95–110)
CO2 SERPL-SCNC: 28 MMOL/L (ref 23–29)
CREAT SERPL-MCNC: 1.1 MG/DL (ref 0.5–1.4)
DIFFERENTIAL METHOD: ABNORMAL
EOSINOPHIL # BLD AUTO: 0.2 K/UL (ref 0–0.5)
EOSINOPHIL NFR BLD: 2.2 % (ref 0–8)
ERYTHROCYTE [DISTWIDTH] IN BLOOD BY AUTOMATED COUNT: 12.6 % (ref 11.5–14.5)
EST. GFR  (NO RACE VARIABLE): 56.1 ML/MIN/1.73 M^2
GLUCOSE SERPL-MCNC: 103 MG/DL (ref 70–110)
HCT VFR BLD AUTO: 38.4 % (ref 37–48.5)
HGB BLD-MCNC: 12.7 G/DL (ref 12–16)
IMM GRANULOCYTES # BLD AUTO: 0.05 K/UL (ref 0–0.04)
IMM GRANULOCYTES NFR BLD AUTO: 0.6 % (ref 0–0.5)
INR PPP: 1 (ref 0.8–1.2)
LYMPHOCYTES # BLD AUTO: 3.9 K/UL (ref 1–4.8)
LYMPHOCYTES NFR BLD: 50.3 % (ref 18–48)
MCH RBC QN AUTO: 31.9 PG (ref 27–31)
MCHC RBC AUTO-ENTMCNC: 33.1 G/DL (ref 32–36)
MCV RBC AUTO: 97 FL (ref 82–98)
MONOCYTES # BLD AUTO: 0.5 K/UL (ref 0.3–1)
MONOCYTES NFR BLD: 6.6 % (ref 4–15)
NEUTROPHILS # BLD AUTO: 3.1 K/UL (ref 1.8–7.7)
NEUTROPHILS NFR BLD: 39.5 % (ref 38–73)
NRBC BLD-RTO: 0 /100 WBC
PLATELET # BLD AUTO: 252 K/UL (ref 150–450)
PMV BLD AUTO: 9.6 FL (ref 9.2–12.9)
POTASSIUM SERPL-SCNC: 4.2 MMOL/L (ref 3.5–5.1)
PROT SERPL-MCNC: 8 G/DL (ref 6–8.4)
PROTHROMBIN TIME: 10.8 SEC (ref 9–12.5)
RBC # BLD AUTO: 3.98 M/UL (ref 4–5.4)
SODIUM SERPL-SCNC: 140 MMOL/L (ref 136–145)
WBC # BLD AUTO: 7.76 K/UL (ref 3.9–12.7)

## 2023-10-17 PROCEDURE — 82105 ALPHA-FETOPROTEIN SERUM: CPT | Performed by: PHYSICIAN ASSISTANT

## 2023-10-17 PROCEDURE — 85610 PROTHROMBIN TIME: CPT | Performed by: PHYSICIAN ASSISTANT

## 2023-10-17 PROCEDURE — 80053 COMPREHEN METABOLIC PANEL: CPT | Performed by: PHYSICIAN ASSISTANT

## 2023-10-17 PROCEDURE — 85025 COMPLETE CBC W/AUTO DIFF WBC: CPT | Performed by: PHYSICIAN ASSISTANT

## 2023-10-18 ENCOUNTER — TELEPHONE (OUTPATIENT)
Dept: HEPATOLOGY | Facility: CLINIC | Age: 63
End: 2023-10-18
Payer: MEDICAID

## 2023-10-18 DIAGNOSIS — K74.60 HEPATIC CIRRHOSIS, UNSPECIFIED HEPATIC CIRRHOSIS TYPE, UNSPECIFIED WHETHER ASCITES PRESENT: Primary | ICD-10-CM

## 2023-10-18 LAB — AFP-TM SERPL-MCNC: 7.3 NG/ML (ref 0–9.2)

## 2023-10-19 NOTE — TELEPHONE ENCOUNTER
10/17/23 CBC, CMP, INR, AFP stable    Please notify patient:  Labs look fine  Liver is working well.  Liver cancer blood test was normal.  Next liver monitoring is due in 6 months.    Please schedule: CBC, CMP, INR, AFP, U/S, VISIT in 4/2024      Thanks

## 2023-10-19 NOTE — TELEPHONE ENCOUNTER
I spoke with patient.  Testing scheduled 4/9/24 and f/u visit scheduled 4/16/24; appt reminder notices mailed.

## 2023-10-23 ENCOUNTER — OFFICE VISIT (OUTPATIENT)
Dept: PODIATRY | Facility: CLINIC | Age: 63
End: 2023-10-23
Payer: MEDICAID

## 2023-10-23 ENCOUNTER — HOSPITAL ENCOUNTER (OUTPATIENT)
Dept: RADIOLOGY | Facility: CLINIC | Age: 63
Discharge: HOME | End: 2023-10-23
Attending: PODIATRIST
Payer: MEDICAID

## 2023-10-23 VITALS — BODY MASS INDEX: 42.3 KG/M2 | HEIGHT: 64 IN | WEIGHT: 247.81 LBS

## 2023-10-23 DIAGNOSIS — R20.2 PARESTHESIA OF LEFT FOOT: ICD-10-CM

## 2023-10-23 DIAGNOSIS — S93.325D LISFRANC DISLOCATION, LEFT, SUBSEQUENT ENCOUNTER: Primary | ICD-10-CM

## 2023-10-23 DIAGNOSIS — M19.072 OSTEOARTHRITIS OF LEFT ANKLE OR FOOT: ICD-10-CM

## 2023-10-23 DIAGNOSIS — M79.672 LEFT FOOT PAIN: ICD-10-CM

## 2023-10-23 PROCEDURE — 1160F RVW MEDS BY RX/DR IN RCRD: CPT | Mod: CPTII,,, | Performed by: PODIATRIST

## 2023-10-23 PROCEDURE — 1159F PR MEDICATION LIST DOCUMENTED IN MEDICAL RECORD: ICD-10-PCS | Mod: CPTII,,, | Performed by: PODIATRIST

## 2023-10-23 PROCEDURE — 1160F PR REVIEW ALL MEDS BY PRESCRIBER/CLIN PHARMACIST DOCUMENTED: ICD-10-PCS | Mod: CPTII,,, | Performed by: PODIATRIST

## 2023-10-23 PROCEDURE — 73630 X-RAY EXAM OF FOOT: CPT | Mod: LT,S$GLB,, | Performed by: RADIOLOGY

## 2023-10-23 PROCEDURE — 1159F MED LIST DOCD IN RCRD: CPT | Mod: CPTII,,, | Performed by: PODIATRIST

## 2023-10-23 PROCEDURE — 99203 OFFICE O/P NEW LOW 30 MIN: CPT | Mod: S$PBB,,, | Performed by: PODIATRIST

## 2023-10-23 PROCEDURE — 4010F ACE/ARB THERAPY RXD/TAKEN: CPT | Mod: CPTII,,, | Performed by: PODIATRIST

## 2023-10-23 PROCEDURE — 3008F PR BODY MASS INDEX (BMI) DOCUMENTED: ICD-10-PCS | Mod: CPTII,,, | Performed by: PODIATRIST

## 2023-10-23 PROCEDURE — 73630 XR FOOT COMPLETE 3 VIEW LEFT: ICD-10-PCS | Mod: LT,S$GLB,, | Performed by: RADIOLOGY

## 2023-10-23 PROCEDURE — 4010F PR ACE/ARB THEARPY RXD/TAKEN: ICD-10-PCS | Mod: CPTII,,, | Performed by: PODIATRIST

## 2023-10-23 PROCEDURE — 99999 PR PBB SHADOW E&M-EST. PATIENT-LVL IV: ICD-10-PCS | Mod: PBBFAC,,, | Performed by: PODIATRIST

## 2023-10-23 PROCEDURE — 99214 OFFICE O/P EST MOD 30 MIN: CPT | Mod: PBBFAC,PN | Performed by: PODIATRIST

## 2023-10-23 PROCEDURE — 3008F BODY MASS INDEX DOCD: CPT | Mod: CPTII,,, | Performed by: PODIATRIST

## 2023-10-23 PROCEDURE — 99203 PR OFFICE/OUTPT VISIT, NEW, LEVL III, 30-44 MIN: ICD-10-PCS | Mod: S$PBB,,, | Performed by: PODIATRIST

## 2023-10-23 PROCEDURE — 99999 PR PBB SHADOW E&M-EST. PATIENT-LVL IV: CPT | Mod: PBBFAC,,, | Performed by: PODIATRIST

## 2023-10-23 NOTE — PROGRESS NOTES
"  [address] [facility] [address] [place] [address]  Phone: [phone]   [phone]    Patient's PCP:Spenser Curry Jr., MD  Referring Provider: Dr. Spenser RODRIGUEZ*    Subjective:      Chief Complaint:: Foot Pain (Pain in the top of the left foot up toward the front of the ankle)    Foot Pain  Associated symptoms include arthralgias. Pertinent negatives include no abdominal pain, chest pain, chills, coughing, fatigue, fever, headaches, joint swelling, myalgias, nausea, neck pain, numbness, rash or weakness.     Chelsea Grossman is a 63 y.o. female who presents today with a complaint of left foot pain. The current episode started about 2 weeks .  The symptoms include shooting pain that can go up the leg and throbbing as well as burning pain, pt states if feels like the pain makes her foot tighten up.  Patient has history of previous Lisfranc fracture and subsequent surgery. The problem has stayed the same. Treatment to date have included elevation  which provided some relief.         Vitals:    10/23/23 0839   Weight: 112.4 kg (247 lb 12.8 oz)   Height: 5' 4" (1.626 m)   PainSc:   7      Shoe Size: 9-9.5-10    Past Surgical History:   Procedure Laterality Date    CARPAL TUNNEL RELEASE Bilateral     FOOT SURGERY Left 12/18/2018    KIDNEY STONE SURGERY      Left ear surgery      TOTAL ABDOMINAL HYSTERECTOMY W/ BILATERAL SALPINGOOPHORECTOMY      TRIGGER FINGER RELEASE Right     2nd and 3rd fingers     Past Medical History:   Diagnosis Date    Cirrhosis 2020    History of hepatitis C, s/p successful Rx w/ cure (SVR12 - 4/2020)     History of kidney stones     Hypertension     Hypertensive retinopathy of both eyes     Osteoarthritis     Osteopenia     Trigger finger of left hand 2023     Family History   Problem Relation Age of Onset    Heart disease Mother     Cancer Father         stomach    Breast cancer Sister     Breast cancer Maternal Grandmother 60    Stomach cancer Brother         Social " History:   Marital Status: Single  Alcohol History:  reports current alcohol use.  Tobacco History:  reports that she quit smoking about 5 years ago. Her smoking use included cigarettes. She has never used smokeless tobacco.  Drug History:  reports no history of drug use.    Review of patient's allergies indicates:   Allergen Reactions    Sulfa (sulfonamide antibiotics) Itching    Sulfamethoxazole-trimethoprim Itching and Rash     Itchy Rash       Current Outpatient Medications   Medication Sig Dispense Refill    (Magic mouthwash) 1:1:1 diphenhydrAMINE(Benadryl) 12.5mg/5ml liq, aluminum & magnesium hydroxide-simethicone (Maalox), LIDOcaine viscous 2% Swish and spit 5 mLs every 4 (four) hours as needed (stomach pain). for mouth sores 360 mL 3    amLODIPine (NORVASC) 10 MG tablet Take 1 tablet (10 mg total) by mouth once daily. 90 tablet 1    carvediloL (COREG) 12.5 MG tablet Take 1 tablet (12.5 mg total) by mouth 2 (two) times daily with meals. 180 tablet 1    celecoxib (CELEBREX) 200 MG capsule Take 1 capsule (200 mg total) by mouth once daily. 90 capsule 1    colchicine (COLCRYS) 0.6 mg tablet Take 2 tablets at symptom onset, 1 tablet 2-3 hours later, then 1 tablet daily for 7 days as needed for gout attack 10 tablet 3    estradioL (ESTRACE) 0.01 % (0.1 mg/gram) vaginal cream INSERT 1 2 GRAM INTRAVAGINALLY EVERY NIGHT AT BEDTIME 2 TO 3 TIMES PER WEEK (Patient taking differently: Place 2 g vaginally twice a week. INSERT 1 2 GRAM INTRAVAGINALLY EVERY NIGHT AT BEDTIME 2 TO 3 TIMES PER WEEK) 42.5 g 3    famotidine (PEPCID) 20 MG tablet Take 1 tablet (20 mg total) by mouth 2 (two) times daily. 180 tablet 1    fluticasone propionate (FLONASE) 50 mcg/actuation nasal spray 1 spray (50 mcg total) by Each Nostril route once daily. 48 g 1    hydroCHLOROthiazide (HYDRODIURIL) 25 MG tablet Take 1 tablet (25 mg total) by mouth once daily. 90 tablet 1    ibuprofen (ADVIL,MOTRIN) 800 MG tablet Take 1 tablet (800 mg total) by mouth  every 6 (six) hours as needed for Pain. 90 tablet 1    multivitamin (THERAGRAN) per tablet Take 1 tablet by mouth once daily.      olmesartan (BENICAR) 40 MG tablet TAKE 1 TABLET BY MOUTH EVERY DAY 90 tablet 1    omeprazole (PRILOSEC) 40 MG capsule Take 1 capsule (40 mg total) by mouth once daily. 90 capsule 1    traMADoL (ULTRAM) 50 mg tablet Take 1 tablet (50 mg total) by mouth every 8 (eight) hours as needed for Pain. 90 tablet 2     No current facility-administered medications for this visit.       Review of Systems   Constitutional:  Negative for chills, fatigue, fever and unexpected weight change.   HENT:  Negative for hearing loss and trouble swallowing.    Eyes:  Negative for photophobia and visual disturbance.   Respiratory:  Negative for cough, shortness of breath and wheezing.    Cardiovascular:  Negative for chest pain, palpitations and leg swelling.   Gastrointestinal:  Negative for abdominal pain and nausea.   Genitourinary:  Negative for dysuria and frequency.   Musculoskeletal:  Positive for arthralgias. Negative for back pain, gait problem, joint swelling, myalgias and neck pain.   Skin:  Negative for rash and wound.   Neurological:  Negative for tremors, seizures, weakness, numbness and headaches.   Hematological:  Does not bruise/bleed easily.         Objective:        Physical Exam:   Foot Exam    General  General Appearance: appears stated age and healthy   Orientation: alert and oriented to person, place, and time   Affect: appropriate   Gait: unimpaired       Left Foot/Ankle      Inspection and Palpation  Ecchymosis: none  Tenderness: lisfranc joint and midtarsal joint   Swelling: lisfranc joint   Arch: pes planus  Hammertoes: absent  Claw toes: absent  Hallux valgus: no  Hallux limitus: no  Skin Exam: skin intact; no drainage, no ulcer and no erythema   Neurovascular  Dorsalis pedis: 2+  Posterior tibial: 2+  Capillary refill: 2+  Varicose veins: not present  Saphenous nerve sensation:  normal  Tibial nerve sensation: normal  Superficial peroneal nerve sensation: normal  Deep peroneal nerve sensation: normal  Sural nerve sensation: normal    Muscle Strength  Ankle dorsiflexion: 5  Ankle plantar flexion: 5  Ankle inversion: 5  Ankle eversion: 5  Great toe extension: 5  Great toe flexion: 5    Range of Motion    Normal left ankle ROM    Tests  Anterior drawer: negative   Talar tilt: negative   PT Tinel's sign: negative  Paresthesia: positive      Physical Exam  Cardiovascular:      Pulses:           Dorsalis pedis pulses are 2+ on the left side.        Posterior tibial pulses are 2+ on the left side.   Musculoskeletal:      Left foot: No bunion.   Feet:      Left foot:      Skin integrity: No ulcer or erythema.               Left Ankle/Foot Exam     Range of Motion   The patient has normal left ankle ROM.       Muscle Strength   Left Lower Extremity   Ankle Dorsiflexion:  5   Plantar flexion:  5/5     Reflexes     Left Side  Paresthesia: present    Vascular Exam       Left Pulses  Dorsalis Pedis:      2+  Posterior Tibial:      2+           Imaging: X-Ray Foot Complete Left  Narrative: EXAMINATION:  XR FOOT COMPLETE 3 VIEW LEFT    CLINICAL HISTORY:  .  Pain in left foot    TECHNIQUE:  AP, lateral and oblique views of the left foot were performed.    COMPARISON:  10/08/2020    FINDINGS:  There has been arthrodesis of the midfoot with U shaped nails and a partially threaded cannulated lag screw.  The hardware is intact and engaged.  Good alignment is present.  There is mild degenerative change of the 1st metatarsophalangeal joint.  Minimal dorsal and plantar heel spurs are present.  Impression: As above    Electronically signed by: Elan Marte MD  Date:    10/23/2023  Time:    09:31               Assessment:       1. Lisfranc dislocation, left, subsequent encounter    2. Osteoarthritis of left ankle or foot    3. Paresthesia of left foot    4. Left foot pain      Plan:   Lisfranc dislocation,  left, subsequent encounter  Comments:  May have a neuropathic component  Orders:  -     Ambulatory referral/consult to Podiatry    Osteoarthritis of left ankle or foot    Paresthesia of left foot    Left foot pain  -     X-Ray Foot Complete Left      Follow up if symptoms worsen or fail to improve.    Procedures        I discussed with the patient that the pain in her foot is stemming from her previous Lisfranc fracture dislocation.  I explained that the previous arthrodesis of the Lisfranc joint appeared to be well healed with no signs of complications from the hardware.  I explained that it is normal to have pain in this area even with successful surgical intervention.  She is had good benefit previously from her orthotics.  I believe that these need to be updated.  I am going to send in a prescription for new orthotics for her.    I did also discussed with her that she has signs and symptoms more nerve related as well.  For this I am going to send in a prescription for professional Arts topical pain and anti-inflammatory medication which does also include gabapentin.  I explained that she can use this topically several times a day.    Counseling:     I provided patient education verbally regarding:   Patient diagnosis, treatment options, as well as alternatives, risks, and benefits.     This note was created using Dragon voice recognition software that occasionally misinterpreted phrases or words.

## 2023-11-02 DIAGNOSIS — I10 ESSENTIAL HYPERTENSION: ICD-10-CM

## 2023-11-02 RX ORDER — OLMESARTAN MEDOXOMIL 40 MG/1
40 TABLET ORAL
Qty: 90 TABLET | Refills: 1 | Status: SHIPPED | OUTPATIENT
Start: 2023-11-02 | End: 2024-01-03 | Stop reason: SDUPTHER

## 2023-11-30 DIAGNOSIS — I10 ESSENTIAL HYPERTENSION: ICD-10-CM

## 2023-11-30 RX ORDER — HYDROCHLOROTHIAZIDE 25 MG/1
25 TABLET ORAL
Qty: 90 TABLET | Refills: 1 | Status: SHIPPED | OUTPATIENT
Start: 2023-11-30 | End: 2023-12-11 | Stop reason: SDUPTHER

## 2023-12-11 DIAGNOSIS — I10 ESSENTIAL HYPERTENSION: ICD-10-CM

## 2023-12-11 RX ORDER — HYDROCHLOROTHIAZIDE 25 MG/1
25 TABLET ORAL DAILY
Qty: 90 TABLET | Refills: 1 | Status: SHIPPED | OUTPATIENT
Start: 2023-12-11 | End: 2024-01-03 | Stop reason: SDUPTHER

## 2024-01-03 DIAGNOSIS — I10 ESSENTIAL HYPERTENSION: ICD-10-CM

## 2024-01-03 RX ORDER — OLMESARTAN MEDOXOMIL 40 MG/1
40 TABLET ORAL DAILY
Qty: 90 TABLET | Refills: 1 | Status: SHIPPED | OUTPATIENT
Start: 2024-01-03

## 2024-01-03 RX ORDER — CARVEDILOL 12.5 MG/1
12.5 TABLET ORAL 2 TIMES DAILY WITH MEALS
Qty: 180 TABLET | Refills: 1 | Status: SHIPPED | OUTPATIENT
Start: 2024-01-03

## 2024-01-03 RX ORDER — HYDROCHLOROTHIAZIDE 25 MG/1
25 TABLET ORAL DAILY
Qty: 90 TABLET | Refills: 1 | Status: SHIPPED | OUTPATIENT
Start: 2024-01-03

## 2024-01-03 RX ORDER — AMLODIPINE BESYLATE 10 MG/1
10 TABLET ORAL DAILY
Qty: 90 TABLET | Refills: 1 | Status: SHIPPED | OUTPATIENT
Start: 2024-01-03

## 2024-01-03 NOTE — TELEPHONE ENCOUNTER
----- Message from Gely Hernandez sent at 1/3/2024  9:34 AM CST -----  Pt needs refill on hydrochlorothiazide 25 mg, amlodipine 10 mg, carvedilol, olmesartan 40 mg    [facility]   [phone]

## 2024-01-19 ENCOUNTER — TELEPHONE (OUTPATIENT)
Dept: FAMILY MEDICINE | Facility: CLINIC | Age: 64
End: 2024-01-19

## 2024-01-19 ENCOUNTER — OFFICE VISIT (OUTPATIENT)
Dept: FAMILY MEDICINE | Facility: CLINIC | Age: 64
End: 2024-01-19
Payer: MEDICAID

## 2024-01-19 VITALS
OXYGEN SATURATION: 96 % | HEIGHT: 64 IN | SYSTOLIC BLOOD PRESSURE: 160 MMHG | TEMPERATURE: 98 F | WEIGHT: 250.56 LBS | DIASTOLIC BLOOD PRESSURE: 86 MMHG | HEART RATE: 95 BPM | BODY MASS INDEX: 42.78 KG/M2

## 2024-01-19 DIAGNOSIS — I10 ESSENTIAL HYPERTENSION: Primary | ICD-10-CM

## 2024-01-19 DIAGNOSIS — M10.9 GOUT INVOLVING TOE, UNSPECIFIED CAUSE, UNSPECIFIED CHRONICITY, UNSPECIFIED LATERALITY: ICD-10-CM

## 2024-01-19 DIAGNOSIS — S93.325D LISFRANC DISLOCATION, LEFT, SUBSEQUENT ENCOUNTER: ICD-10-CM

## 2024-01-19 DIAGNOSIS — M15.9 OSTEOARTHRITIS OF MULTIPLE JOINTS, UNSPECIFIED OSTEOARTHRITIS TYPE: ICD-10-CM

## 2024-01-19 DIAGNOSIS — J06.9 UPPER RESPIRATORY TRACT INFECTION, UNSPECIFIED TYPE: ICD-10-CM

## 2024-01-19 DIAGNOSIS — K21.9 GASTROESOPHAGEAL REFLUX DISEASE, UNSPECIFIED WHETHER ESOPHAGITIS PRESENT: ICD-10-CM

## 2024-01-19 PROCEDURE — 3079F DIAST BP 80-89 MM HG: CPT | Mod: CPTII,,, | Performed by: INTERNAL MEDICINE

## 2024-01-19 PROCEDURE — 3008F BODY MASS INDEX DOCD: CPT | Mod: CPTII,,, | Performed by: INTERNAL MEDICINE

## 2024-01-19 PROCEDURE — 99214 OFFICE O/P EST MOD 30 MIN: CPT | Mod: PBBFAC,PN | Performed by: INTERNAL MEDICINE

## 2024-01-19 PROCEDURE — 99999 PR PBB SHADOW E&M-EST. PATIENT-LVL IV: CPT | Mod: PBBFAC,,, | Performed by: INTERNAL MEDICINE

## 2024-01-19 PROCEDURE — 1159F MED LIST DOCD IN RCRD: CPT | Mod: CPTII,,, | Performed by: INTERNAL MEDICINE

## 2024-01-19 PROCEDURE — 4010F ACE/ARB THERAPY RXD/TAKEN: CPT | Mod: CPTII,,, | Performed by: INTERNAL MEDICINE

## 2024-01-19 PROCEDURE — 99214 OFFICE O/P EST MOD 30 MIN: CPT | Mod: S$PBB,,, | Performed by: INTERNAL MEDICINE

## 2024-01-19 PROCEDURE — 3077F SYST BP >= 140 MM HG: CPT | Mod: CPTII,,, | Performed by: INTERNAL MEDICINE

## 2024-01-19 RX ORDER — FAMOTIDINE 20 MG/1
20 TABLET, FILM COATED ORAL 2 TIMES DAILY
Qty: 180 TABLET | Refills: 1 | Status: SHIPPED | OUTPATIENT
Start: 2024-01-19 | End: 2024-05-09 | Stop reason: SDUPTHER

## 2024-01-19 RX ORDER — TRAMADOL HYDROCHLORIDE 50 MG/1
50 TABLET ORAL EVERY 8 HOURS PRN
Qty: 90 TABLET | Refills: 2 | Status: SHIPPED | OUTPATIENT
Start: 2024-01-19 | End: 2024-01-30 | Stop reason: SDUPTHER

## 2024-01-19 RX ORDER — AZITHROMYCIN 250 MG/1
TABLET, FILM COATED ORAL
Qty: 6 TABLET | Refills: 0 | Status: SHIPPED | OUTPATIENT
Start: 2024-01-19 | End: 2024-01-24

## 2024-01-19 RX ORDER — COLCHICINE 0.6 MG/1
TABLET ORAL
Qty: 10 TABLET | Refills: 3 | Status: SHIPPED | OUTPATIENT
Start: 2024-01-19 | End: 2024-05-09 | Stop reason: SDUPTHER

## 2024-01-19 NOTE — PATIENT INSTRUCTIONS
Symptoms are most likely due to viral infection.  Advised to take OTC medications such as tylenol and motrin for fever, Mucinex DM or similar for cough, antihistamine for nasal symptoms.  Monitor blood pressure.  Get plenty of rest and fluids to maintain hydration. Gargle with warm salt water if sore throat is present.  An antibiotic Rx was provided in case symptoms worsen or do not resolve within 10-14 days.

## 2024-01-19 NOTE — PROGRESS NOTES
Subjective:       Patient ID: Chelsea Grossman is a 63 y.o. female.    Chief Complaint: Hypertension (3 months follow up) and Cough (Chest congestion x 1 week)    Here for routine follow up; last visit note, most recent available labs, and health maintenance topics reviewed.   She has chronic pain following LisFranc fracture of foot. Tramadol does help and allows her to complete her ADLs.  Takes as much as TID, sometimes only once or twice.  Celebrex does help some without GI side effects she had with ibuprofen.  GERD symptoms have been better. Podiatry ordered new orthotics but she was unable to get them due to cost.      Hypertension  This is a chronic problem. The problem is uncontrolled (usually fine on home monitoring but hasn't checked recently). Pertinent negatives include no anxiety, chest pain, headaches, malaise/fatigue, neck pain, palpitations, peripheral edema or shortness of breath. Past treatments include beta blockers, calcium channel blockers, diuretics and ACE inhibitors. There are no compliance problems.    Cough  Pertinent negatives include no chest pain, chills, ear pain, eye redness, fever, headaches, myalgias, postnasal drip, rash, rhinorrhea, sore throat, shortness of breath or wheezing. There is no history of environmental allergies.   Urinary Tract Infection   This is a recurrent problem. The current episode started in the past 7 days. The problem occurs intermittently. The quality of the pain is described as burning. There has been no fever. Associated symptoms include hesitancy. Pertinent negatives include no chills, frequency, hematuria, nausea, urgency, vomiting, constipation or rash. She has tried nothing for the symptoms. Her past medical history is significant for hypertension.   URI   This is a new problem. The current episode started in the past 7 days (about 5 days). There has been no fever. Associated symptoms include congestion and coughing (yellowish sputum). Pertinent  negatives include no abdominal pain, chest pain, diarrhea, dysuria, ear pain, headaches, nausea, neck pain, rash, rhinorrhea, sinus pain, sneezing, sore throat, vomiting or wheezing. She has tried nothing for the symptoms.     Review of Systems   Constitutional:  Negative for activity change, appetite change, chills, diaphoresis, fatigue, fever, malaise/fatigue and unexpected weight change.   HENT:  Positive for congestion. Negative for ear discharge, ear pain, hearing loss, mouth sores, nosebleeds, postnasal drip, rhinorrhea, sinus pressure, sinus pain, sneezing, sore throat, tinnitus, trouble swallowing and voice change.    Eyes:  Negative for photophobia, pain, discharge, redness, itching and visual disturbance.   Respiratory:  Positive for cough (yellowish sputum). Negative for apnea, choking, chest tightness, shortness of breath and wheezing.    Cardiovascular:  Negative for chest pain, palpitations and leg swelling.   Gastrointestinal:  Negative for abdominal distention, abdominal pain, blood in stool, constipation, diarrhea, nausea and vomiting.   Endocrine: Negative for cold intolerance, heat intolerance, polydipsia and polyuria.   Genitourinary:  Positive for hesitancy. Negative for decreased urine volume, difficulty urinating, dyspareunia, dysuria, enuresis, frequency, genital sores, hematuria, menstrual problem, pelvic pain, urgency, vaginal bleeding, vaginal discharge and vaginal pain.   Musculoskeletal:  Positive for arthralgias (left foot and ankle) and neck stiffness. Negative for back pain, gait problem, joint swelling, myalgias and neck pain.   Skin:  Negative for rash and wound.   Allergic/Immunologic: Negative for environmental allergies, food allergies and immunocompromised state.   Neurological:  Negative for dizziness, tremors, seizures, syncope, facial asymmetry, speech difficulty, weakness, light-headedness, numbness and headaches.   Hematological:  Negative for adenopathy. Does not  bruise/bleed easily.   Psychiatric/Behavioral:  Negative for confusion, decreased concentration, hallucinations, self-injury, sleep disturbance and suicidal ideas. The patient is not nervous/anxious.        Past Medical History:   Diagnosis Date    Cirrhosis     History of hepatitis C, s/p successful Rx w/ cure (SVR12 - 2020)     History of kidney stones     Hypertension     Hypertensive retinopathy of both eyes     Osteoarthritis     Osteopenia     Trigger finger of left hand       Past Surgical History:   Procedure Laterality Date    CARPAL TUNNEL RELEASE Bilateral     FOOT SURGERY Left 2018    KIDNEY STONE SURGERY      Left ear surgery      TOTAL ABDOMINAL HYSTERECTOMY W/ BILATERAL SALPINGOOPHORECTOMY      TRIGGER FINGER RELEASE Right     2nd and 3rd fingers       Family History   Problem Relation Age of Onset    Heart disease Mother     Cancer Father         stomach    Breast cancer Sister     Breast cancer Maternal Grandmother 60    Stomach cancer Brother        Social History     Socioeconomic History    Marital status: Single   Occupational History    Occupation: disabled   Tobacco Use    Smoking status: Former     Current packs/day: 0.00     Types: Cigarettes     Quit date: 10/2018     Years since quittin.3    Smokeless tobacco: Never   Substance and Sexual Activity    Alcohol use: Yes    Drug use: No    Sexual activity: Never     Partners: Female   Social History Narrative    Live with sister     Social Determinants of Health     Stress: No Stress Concern Present (2020)    [employer] - Occupational Stress Questionnaire     Feeling of Stress : Not at all       Current Outpatient Medications   Medication Sig Dispense Refill    (Magic mouthwash) 1:1:1 diphenhydrAMINE(Benadryl) 12.5mg/5ml liq, aluminum & magnesium hydroxide-simethicone (Maalox), LIDOcaine viscous 2% Swish and spit 5 mLs every 4 (four) hours as needed (stomach pain). for mouth sores 360 mL 3     amLODIPine (NORVASC) 10 MG tablet Take 1 tablet (10 mg total) by mouth once daily. 90 tablet 1    carvediloL (COREG) 12.5 MG tablet Take 1 tablet (12.5 mg total) by mouth 2 (two) times daily with meals. 180 tablet 1    celecoxib (CELEBREX) 200 MG capsule Take 1 capsule (200 mg total) by mouth once daily. 90 capsule 1    fluticasone propionate (FLONASE) 50 mcg/actuation nasal spray 1 spray (50 mcg total) by Each Nostril route once daily. 48 g 1    hydroCHLOROthiazide (HYDRODIURIL) 25 MG tablet Take 1 tablet (25 mg total) by mouth once daily. 90 tablet 1    ibuprofen (ADVIL,MOTRIN) 800 MG tablet Take 1 tablet (800 mg total) by mouth every 6 (six) hours as needed for Pain. 90 tablet 1    multivitamin (THERAGRAN) per tablet Take 1 tablet by mouth once daily.      olmesartan (BENICAR) 40 MG tablet Take 1 tablet (40 mg total) by mouth once daily. 90 tablet 1    omeprazole (PRILOSEC) 40 MG capsule Take 1 capsule (40 mg total) by mouth once daily. 90 capsule 1    traMADoL (ULTRAM) 50 mg tablet Take 1 tablet (50 mg total) by mouth every 8 (eight) hours as needed for Pain. 90 tablet 2    azithromycin (Z-LISETH) 250 MG tablet Take 2 tablets by mouth on day 1; Take 1 tablet by mouth on days 2-5 6 tablet 0    colchicine (COLCRYS) 0.6 mg tablet Take 2 tablets at symptom onset, 1 tablet 2-3 hours later, then 1 tablet daily for 7 days as needed for gout attack 10 tablet 3    famotidine (PEPCID) 20 MG tablet Take 1 tablet (20 mg total) by mouth 2 (two) times daily. 180 tablet 1     No current facility-administered medications for this visit.       Review of patient's allergies indicates:   Allergen Reactions    Sulfa (sulfonamide antibiotics) Itching    Sulfamethoxazole-trimethoprim Itching and Rash     Itchy Rash     Objective:    HPI     Hypertension     Additional comments: 3 months follow up           Cough     Additional comments: Chest congestion x 1 week          Last edited by Hugh Rausch MA on 1/19/2024  8:29 AM.     "  Blood pressure (!) 160/86, pulse 95, temperature 98.1 °F (36.7 °C), temperature source Temporal, height 5' 4" (1.626 m), weight 113.6 kg (250 lb 8.8 oz), SpO2 96 %. Body mass index is 43.01 kg/m².   Physical Exam  Vitals and nursing note reviewed.   Constitutional:       General: She is not in acute distress.     Appearance: She is well-developed. She is obese. She is not ill-appearing, toxic-appearing or diaphoretic.   HENT:      Head: Normocephalic and atraumatic.   Eyes:      General: No scleral icterus.        Right eye: No discharge.         Left eye: No discharge.      Conjunctiva/sclera: Conjunctivae normal.   Neck:      Vascular: No carotid bruit.   Cardiovascular:      Rate and Rhythm: Normal rate and regular rhythm.      Heart sounds: Normal heart sounds. No murmur heard.  Pulmonary:      Effort: Pulmonary effort is normal. No respiratory distress.      Breath sounds: Normal breath sounds. No decreased breath sounds, wheezing, rhonchi or rales.   Abdominal:      General: There is no distension.      Palpations: Abdomen is soft.      Tenderness: There is no abdominal tenderness. There is no guarding or rebound.   Musculoskeletal:      Right lower leg: No edema.      Left lower leg: No edema.   Skin:     General: Skin is warm and dry.   Neurological:      Mental Status: She is alert.      Motor: No tremor.   Psychiatric:         Mood and Affect: Mood normal.         Speech: Speech normal.         Behavior: Behavior normal.             Assessment:       1. Essential hypertension    2. Gastroesophageal reflux disease, unspecified whether esophagitis present    3. Upper respiratory tract infection, unspecified type    4. Lisfranc dislocation, left, subsequent encounter    5. Gout involving toe, unspecified cause, unspecified chronicity, unspecified laterality        Plan:       Chelsea was seen today for hypertension and cough.    Diagnoses and all orders for this visit:    Essential " hypertension  Comments:  She is usually well controlled.  May be up d/t acute illness.  No changes for now.  Start monitoring at home again    Gastroesophageal reflux disease, unspecified whether esophagitis present  -     famotidine (PEPCID) 20 MG tablet; Take 1 tablet (20 mg total) by mouth 2 (two) times daily.    Upper respiratory tract infection, unspecified type  Comments:  She is 5 days in so should see some improvement in the next couple of days.  I would defer antibiotics for now.  Orders:  -     azithromycin (Z-LISETH) 250 MG tablet; Take 2 tablets by mouth on day 1; Take 1 tablet by mouth on days 2-5    Lisfranc dislocation, left, subsequent encounter    Gout involving toe, unspecified cause, unspecified chronicity, unspecified laterality  -     colchicine (COLCRYS) 0.6 mg tablet; Take 2 tablets at symptom onset, 1 tablet 2-3 hours later, then 1 tablet daily for 7 days as needed for gout attack

## 2024-01-19 NOTE — TELEPHONE ENCOUNTER
----- Message from Krystin Morales sent at 1/19/2024  9:42 AM CST -----  Regarding: pharmacy  Contact: Rayne with [facility]  Type:  Pharmacy Calling to Clarify an RX    Name of Caller:  Rayne  Pharmacy Name:  [facility]  Prescription Name:  colchicine (COLCRYS) 0.6 mg tablet  What do they need to clarify?:  interaction  Best Call Back Number:  [phone]  Additional Information:  Please call Rayne to advise.  Thanks!

## 2024-01-23 ENCOUNTER — TELEPHONE (OUTPATIENT)
Dept: FAMILY MEDICINE | Facility: CLINIC | Age: 64
End: 2024-01-23
Payer: MEDICAID

## 2024-01-23 NOTE — TELEPHONE ENCOUNTER
----- Message from Patricio Conrad, Patient Care Assistant sent at 1/23/2024  9:29 AM CST -----  Contact: [facility]  Type: Needs Medical Advice    Who Called: [facility]  Best Call Back Number: [phone]  Inquiry/Question: Pharm is calling to get approval to fill azithromycin (Z-LISETH) 250 MG tablet due to an interaction with colchicine (COLCRYS) 0.6 mg tablet Please advise  Thank you~

## 2024-01-29 DIAGNOSIS — M15.9 OSTEOARTHRITIS OF MULTIPLE JOINTS, UNSPECIFIED OSTEOARTHRITIS TYPE: ICD-10-CM

## 2024-01-29 DIAGNOSIS — S93.325D LISFRANC DISLOCATION, LEFT, SUBSEQUENT ENCOUNTER: ICD-10-CM

## 2024-01-29 RX ORDER — TRAMADOL HYDROCHLORIDE 50 MG/1
50 TABLET ORAL EVERY 8 HOURS PRN
Qty: 90 TABLET | Refills: 2 | OUTPATIENT
Start: 2024-01-29

## 2024-01-29 NOTE — TELEPHONE ENCOUNTER
----- Message from Katharine Hunt sent at 1/29/2024  8:10 AM CST -----  Refill for tramadol. [facility] [address] Pt #[phone]

## 2024-01-30 ENCOUNTER — TELEPHONE (OUTPATIENT)
Dept: FAMILY MEDICINE | Facility: CLINIC | Age: 64
End: 2024-01-30
Payer: MEDICAID

## 2024-01-30 DIAGNOSIS — S93.325D LISFRANC DISLOCATION, LEFT, SUBSEQUENT ENCOUNTER: ICD-10-CM

## 2024-01-30 DIAGNOSIS — M15.9 OSTEOARTHRITIS OF MULTIPLE JOINTS, UNSPECIFIED OSTEOARTHRITIS TYPE: ICD-10-CM

## 2024-01-30 RX ORDER — TRAMADOL HYDROCHLORIDE 50 MG/1
50 TABLET ORAL EVERY 8 HOURS PRN
Qty: 90 TABLET | Refills: 2 | Status: SHIPPED | OUTPATIENT
Start: 2024-01-30 | End: 2024-04-29 | Stop reason: SDUPTHER

## 2024-01-30 NOTE — TELEPHONE ENCOUNTER
----- Message from Hugh Rausch MA sent at 1/30/2024  3:34 PM CST -----    ----- Message -----  From: Sally Rowe  Sent: 1/30/2024   2:37 PM CST  To: Spenser Curry Staff    Pt would like her Tramadol to be sent to [facility] on [address] [place] [address] [phone]. Pt like always have her Tramadol sent to [facility] and everything sent to the [facility].     [phone]

## 2024-03-13 DIAGNOSIS — R73.03 PREDIABETES: ICD-10-CM

## 2024-04-09 ENCOUNTER — HOSPITAL ENCOUNTER (OUTPATIENT)
Dept: RADIOLOGY | Facility: HOSPITAL | Age: 64
Discharge: HOME | End: 2024-04-09
Attending: PHYSICIAN ASSISTANT
Payer: MEDICAID

## 2024-04-09 DIAGNOSIS — K74.60 HEPATIC CIRRHOSIS, UNSPECIFIED HEPATIC CIRRHOSIS TYPE, UNSPECIFIED WHETHER ASCITES PRESENT: ICD-10-CM

## 2024-04-09 PROCEDURE — 76705 ECHO EXAM OF ABDOMEN: CPT | Mod: TC

## 2024-04-09 PROCEDURE — 76705 ECHO EXAM OF ABDOMEN: CPT | Mod: 26,,, | Performed by: RADIOLOGY

## 2024-04-16 ENCOUNTER — OFFICE VISIT (OUTPATIENT)
Dept: HEPATOLOGY | Facility: CLINIC | Age: 64
End: 2024-04-16
Payer: MEDICAID

## 2024-04-16 VITALS — BODY MASS INDEX: 42.29 KG/M2 | WEIGHT: 247.69 LBS | HEIGHT: 64 IN

## 2024-04-16 DIAGNOSIS — K74.60 HEPATIC CIRRHOSIS, UNSPECIFIED HEPATIC CIRRHOSIS TYPE, UNSPECIFIED WHETHER ASCITES PRESENT: Primary | ICD-10-CM

## 2024-04-16 PROCEDURE — 99999 PR PBB SHADOW E&M-EST. PATIENT-LVL III: CPT | Mod: PBBFAC,,, | Performed by: PHYSICIAN ASSISTANT

## 2024-04-16 PROCEDURE — 1160F RVW MEDS BY RX/DR IN RCRD: CPT | Mod: CPTII,,, | Performed by: PHYSICIAN ASSISTANT

## 2024-04-16 PROCEDURE — 4010F ACE/ARB THERAPY RXD/TAKEN: CPT | Mod: CPTII,,, | Performed by: PHYSICIAN ASSISTANT

## 2024-04-16 PROCEDURE — 1159F MED LIST DOCD IN RCRD: CPT | Mod: CPTII,,, | Performed by: PHYSICIAN ASSISTANT

## 2024-04-16 PROCEDURE — 3008F BODY MASS INDEX DOCD: CPT | Mod: CPTII,,, | Performed by: PHYSICIAN ASSISTANT

## 2024-04-16 PROCEDURE — 99213 OFFICE O/P EST LOW 20 MIN: CPT | Mod: PBBFAC,PN | Performed by: PHYSICIAN ASSISTANT

## 2024-04-16 PROCEDURE — 99213 OFFICE O/P EST LOW 20 MIN: CPT | Mod: S$PBB,,, | Performed by: PHYSICIAN ASSISTANT

## 2024-04-16 NOTE — PROGRESS NOTES
HEPATOLOGY CLINIC VISIT NOTE   CHIEF COMPLAINT: HCV Cirrhosis    HISTORY       This is a 63 y.o. Black or  female with well compensated cirrhosis due to HCV (now treated and cured) here for f/u.     Cirrhosis history:  FibroScan 8/23/19 - kPa 13.4, F4 (, no steatosis) // HCV FibroSURE 10/2019 - A2-3, F4  Well compensated  No evidence of portal HTN     Cirrhosis maintenance:  - Varices screening - not indicated  - HAV status: (+) immunity to HAV  - HBV status: s/p vaccine [id number]                    HCV history:  S/p 12 weeks epclusa w/ SVR12 (cure) - 4/2020  - Genotype 1a //  Treatment naive prior to epclusa    Interval history (4/16/24):  Recent routine labs / u/s  U/S 4/2024 - no liver lesions or ascites. Spleen 9cm  Labs 4/2024 - stable. MELD 7. Normal AFP    Current symptoms of hepatic decompensation:  Ascites / JUANA - NO  Diuretic use - HCTZ 25 from PCP  TBili elevation - NO  HE / confusion / memory problems - NO  EV bleed / hematemesis melena - NO    PMH, PSH, SOCIAL HX, FAMILY HX      Reviewed in Epic  FAMILY HX: neg for liver diease  Alcohol - (+) alcohol use in past, none regular now.  Drugs - remote history 1970s      ROS: as per HPI      PHYSICAL EXAM:  Friendly Black or  female, in no acute distress; alert and oriented to person, place and time  HEENT: Sclerae anicteric.   LUNGS: Normal respiratory effort.   ABDOMEN: Flat, soft, nontender.   SKIN: Warm and dry. No jaundice, No obvious rashes.   EXTREMITIES: No lower extremity edema  NEURO/PSYCH: Normal gate. Memory intact. Thought and speech pattern appropriate. Behavior normal. No depression or anxiety noted.    PERTINENT DIAGNOSTIC RESULTS      Lab Results   Component Value Date    WBC 6.70 04/09/2024    HGB 13.1 04/09/2024     04/09/2024     Lab Results   Component Value Date    INR 1.0 04/09/2024     Lab Results   Component Value Date    AST 20 04/09/2024    ALT 30 04/09/2024    BILITOT 0.4 04/09/2024     ALBUMIN 4.4 04/09/2024    ALKPHOS 68 04/09/2024    CREATININE 1.0 04/09/2024    BUN 21 04/09/2024     04/09/2024    K 4.2 04/09/2024    AFP 8.2 04/09/2024       US Abdomen Limited - 4/9/24  CLINICAL HISTORY:unspecified cirrhosis of liver    Right upper quadrant ultrasound, images obtained in grayscale and color. Additional Doppler images of the portal vein were obtained.     COMPARISON:Ultrasound from 10/06/2023     FINDINGS:  The LIVER is normal in size at 16.9 cm (sagittal right lobe). Hepatic parenchyma has heterogeneous echogenicity with decreased delineation of the periportal triads, and a nodular contour.  No definite intrahepatic masses are noted. The portal vein is patent with hepatopetal flow .     The BILIARY SYSTEM is normal in caliber; the common hepatic duct measures 4-5 mm. There are small stones and/or sludge seen in the GALL BLADDER. There is no evidence of acute cholecystitis. Negative sonographic Black sign.     The PANCREATIC head and body are partially visualized but grossly normal in appearance. The pancreatic duct is not dilated.     The right KIDNEY is small in size at 7.1 x 4.3 x 4.2 cm and increased in echogenicity/texture.  There is a 13 x 11 mm simple cyst in the upper pole the right kidney.  No stones or hydronephrosis seen. No solid masses are noted.     The AORTA and IVC are partially visualized and unremarkable.     Impression:   1.  Morphologic features of cirrhosis, with no finding to suggest hepatocellular carcinoma.   2.  Small echogenic right kidney in keeping with medical renal disease with no hydronephrosis or shadowing stone.   3.  Cholelithiasis without acute cholecystitis.   4.  Incidental simple cyst in the right kidney.    ASSESSMENT        63 y.o. Black or  female with:  1. WELL COMPENSATED CIRRHOSIS   -- based on FibroScan and FibroSURE  -- MELD 7  -- HCC screening - up to date    2. HISTORY HEPATITIS C - treated / cured  -- s/p 12 weeks epclusa w/  SVR12 (cure) - 4/2020      PLAN      1. CBC, CMP, INR, AFP, U/S every 6 months: due 10/2024  2. Yearly visit, earlier PRN: due 10/2025   __________________________________________________________________    Duration of encounter: 23 min  This includes face-to-face time and non face-to-face time preparing to see the patient (eg, review of tests), obtaining and/or reviewing separately obtained history, documenting clinical information in the electronic or other health record, independently interpreting resultsand communicating results to the patient/family/caregiver, or care coordination.

## 2024-04-29 DIAGNOSIS — M15.9 OSTEOARTHRITIS OF MULTIPLE JOINTS, UNSPECIFIED OSTEOARTHRITIS TYPE: ICD-10-CM

## 2024-04-29 DIAGNOSIS — S93.325D LISFRANC DISLOCATION, LEFT, SUBSEQUENT ENCOUNTER: ICD-10-CM

## 2024-04-29 RX ORDER — TRAMADOL HYDROCHLORIDE 50 MG/1
50 TABLET ORAL EVERY 8 HOURS PRN
Qty: 90 TABLET | Refills: 0 | Status: SHIPPED | OUTPATIENT
Start: 2024-04-29 | End: 2024-05-09 | Stop reason: SDUPTHER

## 2024-04-29 NOTE — TELEPHONE ENCOUNTER
----- Message from Katharine Hunt sent at 4/29/2024  8:23 AM CDT -----  Refill for tramadol. [facility] [address] Pt #[phone]

## 2024-05-09 ENCOUNTER — OFFICE VISIT (OUTPATIENT)
Dept: FAMILY MEDICINE | Facility: CLINIC | Age: 64
End: 2024-05-09
Payer: MEDICAID

## 2024-05-09 VITALS
HEART RATE: 71 BPM | HEIGHT: 64 IN | SYSTOLIC BLOOD PRESSURE: 130 MMHG | WEIGHT: 249.81 LBS | DIASTOLIC BLOOD PRESSURE: 82 MMHG | OXYGEN SATURATION: 98 % | BODY MASS INDEX: 42.65 KG/M2 | TEMPERATURE: 97 F

## 2024-05-09 DIAGNOSIS — R73.01 IMPAIRED FASTING GLUCOSE: ICD-10-CM

## 2024-05-09 DIAGNOSIS — M65.322 TRIGGER INDEX FINGER OF LEFT HAND: ICD-10-CM

## 2024-05-09 DIAGNOSIS — I10 ESSENTIAL HYPERTENSION: Primary | ICD-10-CM

## 2024-05-09 DIAGNOSIS — Z12.31 VISIT FOR SCREENING MAMMOGRAM: ICD-10-CM

## 2024-05-09 DIAGNOSIS — S93.325D LISFRANC DISLOCATION, LEFT, SUBSEQUENT ENCOUNTER: ICD-10-CM

## 2024-05-09 DIAGNOSIS — M15.9 OSTEOARTHRITIS OF MULTIPLE JOINTS, UNSPECIFIED OSTEOARTHRITIS TYPE: ICD-10-CM

## 2024-05-09 DIAGNOSIS — K21.9 GASTROESOPHAGEAL REFLUX DISEASE, UNSPECIFIED WHETHER ESOPHAGITIS PRESENT: ICD-10-CM

## 2024-05-09 DIAGNOSIS — M10.9 GOUT INVOLVING TOE, UNSPECIFIED CAUSE, UNSPECIFIED CHRONICITY, UNSPECIFIED LATERALITY: ICD-10-CM

## 2024-05-09 PROCEDURE — 99213 OFFICE O/P EST LOW 20 MIN: CPT | Mod: PBBFAC,PN | Performed by: INTERNAL MEDICINE

## 2024-05-09 PROCEDURE — 99214 OFFICE O/P EST MOD 30 MIN: CPT | Mod: S$PBB,,, | Performed by: INTERNAL MEDICINE

## 2024-05-09 PROCEDURE — 3079F DIAST BP 80-89 MM HG: CPT | Mod: CPTII,,, | Performed by: INTERNAL MEDICINE

## 2024-05-09 PROCEDURE — 4010F ACE/ARB THERAPY RXD/TAKEN: CPT | Mod: CPTII,,, | Performed by: INTERNAL MEDICINE

## 2024-05-09 PROCEDURE — 3075F SYST BP GE 130 - 139MM HG: CPT | Mod: CPTII,,, | Performed by: INTERNAL MEDICINE

## 2024-05-09 PROCEDURE — 3008F BODY MASS INDEX DOCD: CPT | Mod: CPTII,,, | Performed by: INTERNAL MEDICINE

## 2024-05-09 PROCEDURE — 99999 PR PBB SHADOW E&M-EST. PATIENT-LVL III: CPT | Mod: PBBFAC,,, | Performed by: INTERNAL MEDICINE

## 2024-05-09 RX ORDER — HYDROCHLOROTHIAZIDE 25 MG/1
25 TABLET ORAL DAILY
Qty: 90 TABLET | Refills: 1 | Status: CANCELLED | OUTPATIENT
Start: 2024-05-09

## 2024-05-09 RX ORDER — OLMESARTAN MEDOXOMIL 40 MG/1
40 TABLET ORAL DAILY
Qty: 90 TABLET | Refills: 1 | Status: CANCELLED | OUTPATIENT
Start: 2024-05-09

## 2024-05-09 RX ORDER — IBUPROFEN 800 MG/1
800 TABLET ORAL EVERY 6 HOURS PRN
Qty: 90 TABLET | Refills: 1 | Status: CANCELLED | OUTPATIENT
Start: 2024-05-09

## 2024-05-09 RX ORDER — COLCHICINE 0.6 MG/1
TABLET ORAL
Qty: 10 TABLET | Refills: 3 | Status: SHIPPED | OUTPATIENT
Start: 2024-05-09

## 2024-05-09 RX ORDER — HYDROCHLOROTHIAZIDE 25 MG/1
25 TABLET ORAL DAILY
Qty: 90 TABLET | Refills: 1 | Status: SHIPPED | OUTPATIENT
Start: 2024-05-09

## 2024-05-09 RX ORDER — FAMOTIDINE 20 MG/1
20 TABLET, FILM COATED ORAL 2 TIMES DAILY
Qty: 180 TABLET | Refills: 1 | Status: SHIPPED | OUTPATIENT
Start: 2024-05-09 | End: 2025-05-09

## 2024-05-09 RX ORDER — AMLODIPINE BESYLATE 10 MG/1
10 TABLET ORAL DAILY
Qty: 90 TABLET | Refills: 1 | Status: SHIPPED | OUTPATIENT
Start: 2024-05-09

## 2024-05-09 RX ORDER — CELECOXIB 200 MG/1
200 CAPSULE ORAL DAILY
Qty: 90 CAPSULE | Refills: 1 | Status: SHIPPED | OUTPATIENT
Start: 2024-05-09

## 2024-05-09 RX ORDER — FAMOTIDINE 20 MG/1
20 TABLET, FILM COATED ORAL 2 TIMES DAILY
Qty: 180 TABLET | Refills: 1 | Status: CANCELLED | OUTPATIENT
Start: 2024-05-09 | End: 2025-05-09

## 2024-05-09 RX ORDER — FLUTICASONE PROPIONATE 50 MCG
1 SPRAY, SUSPENSION (ML) NASAL DAILY
Qty: 48 G | Refills: 1 | Status: SHIPPED | OUTPATIENT
Start: 2024-05-09

## 2024-05-09 RX ORDER — OLMESARTAN MEDOXOMIL 40 MG/1
40 TABLET ORAL DAILY
Qty: 90 TABLET | Refills: 1 | Status: SHIPPED | OUTPATIENT
Start: 2024-05-09

## 2024-05-09 RX ORDER — CARVEDILOL 12.5 MG/1
12.5 TABLET ORAL 2 TIMES DAILY WITH MEALS
Qty: 180 TABLET | Refills: 1 | Status: SHIPPED | OUTPATIENT
Start: 2024-05-09

## 2024-05-09 RX ORDER — CARVEDILOL 12.5 MG/1
12.5 TABLET ORAL 2 TIMES DAILY WITH MEALS
Qty: 180 TABLET | Refills: 1 | Status: CANCELLED | OUTPATIENT
Start: 2024-05-09

## 2024-05-09 RX ORDER — TRAMADOL HYDROCHLORIDE 50 MG/1
50 TABLET ORAL EVERY 8 HOURS PRN
Qty: 90 TABLET | Refills: 3 | Status: SHIPPED | OUTPATIENT
Start: 2024-05-27

## 2024-05-09 RX ORDER — AMLODIPINE BESYLATE 10 MG/1
10 TABLET ORAL DAILY
Qty: 90 TABLET | Refills: 1 | Status: CANCELLED | OUTPATIENT
Start: 2024-05-09

## 2024-05-09 NOTE — PROGRESS NOTES
Subjective:       Patient ID: Chelsea Grossman is a 63 y.o. female.    Chief Complaint: Hypertension (3 months follow up. ), Foot Pain (Left foot pain ), and Hand Pain (Trigger finger with left hand)    Here for routine follow up; last visit note, most recent available labs, and health maintenance topics reviewed.   She has chronic pain following LisFranc fracture of foot. Tramadol does help and allows her to complete her ADLs.  Takes as much as TID, sometimes only once or twice.  Celebrex does help some without GI side effects she had with ibuprofen.  GERD symptoms have been better. Podiatry ordered new orthotics but she was unable to get them due to cost.  Having issues with left index trigger finger.  She had similar on right last year and had it injected.  That Ortho no longer in network.    Hypertension  This is a chronic problem. The problem is controlled (usually fine on home monitoring but hasn't checked recently). Pertinent negatives include no anxiety, chest pain, headaches, malaise/fatigue, neck pain, palpitations, peripheral edema or shortness of breath. Past treatments include beta blockers, calcium channel blockers, diuretics and ACE inhibitors. There are no compliance problems.      Review of Systems   Constitutional:  Negative for activity change, appetite change, chills, diaphoresis, fatigue, fever, malaise/fatigue and unexpected weight change.   HENT:  Negative for congestion, ear discharge, ear pain, hearing loss, mouth sores, nosebleeds, postnasal drip, rhinorrhea, sinus pressure, sinus pain, sneezing, sore throat, tinnitus, trouble swallowing and voice change.    Eyes:  Negative for photophobia, pain, discharge, redness, itching and visual disturbance.   Respiratory:  Negative for apnea, cough, choking, chest tightness, shortness of breath and wheezing.    Cardiovascular:  Negative for chest pain, palpitations and leg swelling.   Gastrointestinal:  Negative for abdominal distention,  abdominal pain, blood in stool, constipation, diarrhea, nausea and vomiting.   Endocrine: Negative for cold intolerance, heat intolerance, polydipsia and polyuria.   Genitourinary:  Negative for decreased urine volume, difficulty urinating, dyspareunia, dysuria, enuresis, frequency, genital sores, hematuria, menstrual problem, pelvic pain, urgency, vaginal bleeding, vaginal discharge and vaginal pain.   Musculoskeletal:  Positive for arthralgias and joint swelling. Negative for back pain, gait problem, myalgias, neck pain and neck stiffness.   Skin:  Negative for rash and wound.   Allergic/Immunologic: Negative for environmental allergies, food allergies and immunocompromised state.   Neurological:  Negative for dizziness, tremors, seizures, syncope, facial asymmetry, speech difficulty, weakness, light-headedness, numbness and headaches.   Hematological:  Negative for adenopathy. Does not bruise/bleed easily.   Psychiatric/Behavioral:  Negative for confusion, decreased concentration, hallucinations, self-injury, sleep disturbance and suicidal ideas. The patient is not nervous/anxious.        Past Medical History:   Diagnosis Date    Cirrhosis 2020    History of hepatitis C, s/p successful Rx w/ cure (SVR12 - 4/2020)     History of kidney stones     Hypertension     Hypertensive retinopathy of both eyes     Osteoarthritis     Osteopenia     Trigger finger of left hand 2023      Past Surgical History:   Procedure Laterality Date    CARPAL TUNNEL RELEASE Bilateral     FOOT SURGERY Left 12/18/2018    KIDNEY STONE SURGERY      Left ear surgery      TOTAL ABDOMINAL HYSTERECTOMY W/ BILATERAL SALPINGOOPHORECTOMY      TRIGGER FINGER RELEASE Right     2nd and 3rd fingers       Family History   Problem Relation Name Age of Onset    Heart disease Mother      Cancer Father          stomach    Breast cancer Sister      Breast cancer Maternal Grandmother  60    Stomach cancer Brother         Social History     Socioeconomic  History    Marital status: Single   Occupational History    Occupation: disabled   Tobacco Use    Smoking status: Former     Current packs/day: 0.00     Types: Cigarettes     Quit date: 10/2018     Years since quittin.6    Smokeless tobacco: Never   Substance and Sexual Activity    Alcohol use: Yes    Drug use: No    Sexual activity: Never     Partners: Female   Social History Narrative    Live with sister     Social Determinants of Health     Stress: No Stress Concern Present (2020)    [facility] of Occupational Health - Occupational Stress Questionnaire     Feeling of Stress : Not at all       Current Outpatient Medications   Medication Sig Dispense Refill    (Magic mouthwash) 1:1:1 diphenhydrAMINE(Benadryl) 12.5mg/5ml liq, aluminum & magnesium hydroxide-simethicone (Maalox), LIDOcaine viscous 2% Swish and spit 5 mLs every 4 (four) hours as needed (stomach pain). for mouth sores 360 mL 3    GABAPENTIN 4% CREAM APPLY 1 GRAM 3-4 TIMES DAILY FOR TREATMENT OF PAIN      multivitamin (THERAGRAN) per tablet Take 1 tablet by mouth once daily.      amLODIPine (NORVASC) 10 MG tablet Take 1 tablet (10 mg total) by mouth once daily. 90 tablet 1    carvediloL (COREG) 12.5 MG tablet Take 1 tablet (12.5 mg total) by mouth 2 (two) times daily with meals. 180 tablet 1    celecoxib (CELEBREX) 200 MG capsule Take 1 capsule (200 mg total) by mouth once daily. 90 capsule 1    colchicine (COLCRYS) 0.6 mg tablet Take 2 tablets at symptom onset, 1 tablet 2-3 hours later, then 1 tablet daily for 7 days as needed for gout attack 10 tablet 3    famotidine (PEPCID) 20 MG tablet Take 1 tablet (20 mg total) by mouth 2 (two) times daily. 180 tablet 1    fluticasone propionate (FLONASE) 50 mcg/actuation nasal spray 1 spray (50 mcg total) by Each Nostril route once daily. 48 g 1    hydroCHLOROthiazide (HYDRODIURIL) 25 MG tablet Take 1 tablet (25 mg total) by mouth once daily. 90 tablet 1    olmesartan (BENICAR) 40 MG tablet Take 1  "tablet (40 mg total) by mouth once daily. 90 tablet 1    [START ON 5/27/2024] traMADoL (ULTRAM) 50 mg tablet Take 1 tablet (50 mg total) by mouth every 8 (eight) hours as needed for Pain. 90 tablet 3     No current facility-administered medications for this visit.       Review of patient's allergies indicates:   Allergen Reactions    Sulfa (sulfonamide antibiotics) Itching    Sulfamethoxazole-trimethoprim Itching and Rash     Itchy Rash     Objective:    HPI     Hypertension     Additional comments: 3 months follow up.            Foot Pain     Additional comments: Left foot pain            Hand Pain     Additional comments: Trigger finger with left hand          Last edited by Jennifer Benitez LPN on 5/9/2024  9:13 AM.      Blood pressure 130/82, pulse 71, temperature 96.5 °F (35.8 °C), temperature source Temporal, height 5' 4" (1.626 m), weight 113.3 kg (249 lb 12.5 oz), SpO2 98%. Body mass index is 42.87 kg/m².   Physical Exam        Assessment:       1. Essential hypertension    2. Osteoarthritis of multiple joints, unspecified osteoarthritis type    3. Gout involving toe, unspecified cause, unspecified chronicity, unspecified laterality    4. Gastroesophageal reflux disease, unspecified whether esophagitis present    5. Lisfranc dislocation, left, subsequent encounter    6. Trigger index finger of left hand    7. Visit for screening mammogram    8. Impaired fasting glucose        Plan:       Chelsea was seen today for hypertension, foot pain and hand pain.    Diagnoses and all orders for this visit:    Essential hypertension  -     amLODIPine (NORVASC) 10 MG tablet; Take 1 tablet (10 mg total) by mouth once daily.  -     carvediloL (COREG) 12.5 MG tablet; Take 1 tablet (12.5 mg total) by mouth 2 (two) times daily with meals.  -     hydroCHLOROthiazide (HYDRODIURIL) 25 MG tablet; Take 1 tablet (25 mg total) by mouth once daily.  -     olmesartan (BENICAR) 40 MG tablet; Take 1 tablet (40 mg total) by mouth once " daily.  -     Lipid Panel; Future  -     Lipid Panel    Osteoarthritis of multiple joints, unspecified osteoarthritis type  -     celecoxib (CELEBREX) 200 MG capsule; Take 1 capsule (200 mg total) by mouth once daily.  -     traMADoL (ULTRAM) 50 mg tablet; Take 1 tablet (50 mg total) by mouth every 8 (eight) hours as needed for Pain.    Gout involving toe, unspecified cause, unspecified chronicity, unspecified laterality  -     colchicine (COLCRYS) 0.6 mg tablet; Take 2 tablets at symptom onset, 1 tablet 2-3 hours later, then 1 tablet daily for 7 days as needed for gout attack    Gastroesophageal reflux disease, unspecified whether esophagitis present  -     famotidine (PEPCID) 20 MG tablet; Take 1 tablet (20 mg total) by mouth 2 (two) times daily.    Lisfranc dislocation, left, subsequent encounter  -     traMADoL (ULTRAM) 50 mg tablet; Take 1 tablet (50 mg total) by mouth every 8 (eight) hours as needed for Pain.    Trigger index finger of left hand  -     Ambulatory referral/consult to Orthopedics; Future    Visit for screening mammogram  -     Mammo Digital Screening Bilat w/ Ralph; Future    Impaired fasting glucose  -     Hemoglobin A1C; Future  -     Hemoglobin A1C    Other orders  -     fluticasone propionate (FLONASE) 50 mcg/actuation nasal spray; 1 spray (50 mcg total) by Each Nostril route once daily.  The following orders have not been finalized:  -     Cancel: amLODIPine (NORVASC) 10 MG tablet  -     Cancel: carvediloL (COREG) 12.5 MG tablet  -     Cancel: famotidine (PEPCID) 20 MG tablet  -     Cancel: hydroCHLOROthiazide (HYDRODIURIL) 25 MG tablet  -     Cancel: ibuprofen (ADVIL,MOTRIN) 800 MG tablet  -     Cancel: olmesartan (BENICAR) 40 MG tablet

## 2024-05-16 ENCOUNTER — OFFICE VISIT (OUTPATIENT)
Dept: ORTHOPEDICS | Facility: CLINIC | Age: 64
End: 2024-05-16
Payer: MEDICAID

## 2024-05-16 VITALS
HEIGHT: 64 IN | BODY MASS INDEX: 42.65 KG/M2 | DIASTOLIC BLOOD PRESSURE: 76 MMHG | RESPIRATION RATE: 18 BRPM | WEIGHT: 249.81 LBS | SYSTOLIC BLOOD PRESSURE: 128 MMHG

## 2024-05-16 DIAGNOSIS — Z01.818 PREOP TESTING: ICD-10-CM

## 2024-05-16 DIAGNOSIS — M65.322 TRIGGER INDEX FINGER OF LEFT HAND: Primary | ICD-10-CM

## 2024-05-16 PROCEDURE — 1159F MED LIST DOCD IN RCRD: CPT | Mod: CPTII,S$GLB,, | Performed by: ORTHOPAEDIC SURGERY

## 2024-05-16 PROCEDURE — 3078F DIAST BP <80 MM HG: CPT | Mod: CPTII,S$GLB,, | Performed by: ORTHOPAEDIC SURGERY

## 2024-05-16 PROCEDURE — 99203 OFFICE O/P NEW LOW 30 MIN: CPT | Mod: S$GLB,,, | Performed by: ORTHOPAEDIC SURGERY

## 2024-05-16 PROCEDURE — 4010F ACE/ARB THERAPY RXD/TAKEN: CPT | Mod: CPTII,S$GLB,, | Performed by: ORTHOPAEDIC SURGERY

## 2024-05-16 PROCEDURE — 1160F RVW MEDS BY RX/DR IN RCRD: CPT | Mod: CPTII,S$GLB,, | Performed by: ORTHOPAEDIC SURGERY

## 2024-05-16 PROCEDURE — 3074F SYST BP LT 130 MM HG: CPT | Mod: CPTII,S$GLB,, | Performed by: ORTHOPAEDIC SURGERY

## 2024-05-16 PROCEDURE — 3008F BODY MASS INDEX DOCD: CPT | Mod: CPTII,S$GLB,, | Performed by: ORTHOPAEDIC SURGERY

## 2024-05-16 RX ORDER — CEFAZOLIN SODIUM 2 G/50ML
2 SOLUTION INTRAVENOUS
Status: CANCELLED | OUTPATIENT
Start: 2024-05-16

## 2024-05-16 NOTE — PROGRESS NOTES
[facility]    Subjective:     Chief Complaint:   Chief Complaint   Patient presents with    Left Hand - Pain     New patient complaint left hand index trigger finger locking history of bilateral CTR repair 2018/2019. Sharp pain radiating into wrist notes tingling and numbness. Weakness and issue with  strength         Past Medical History:   Diagnosis Date    Cirrhosis 2020    History of hepatitis C, s/p successful Rx w/ cure (SVR12 - 4/2020)     History of kidney stones     Hypertension     Hypertensive retinopathy of both eyes     Osteoarthritis     Osteopenia     Trigger finger of left hand 2023       Past Surgical History:   Procedure Laterality Date    CARPAL TUNNEL RELEASE Bilateral     FOOT SURGERY Left 12/18/2018    KIDNEY STONE SURGERY      Left ear surgery      TOTAL ABDOMINAL HYSTERECTOMY W/ BILATERAL SALPINGOOPHORECTOMY      TRIGGER FINGER RELEASE Right     2nd and 3rd fingers       Current Outpatient Medications   Medication Sig    (Magic mouthwash) 1:1:1 diphenhydrAMINE(Benadryl) 12.5mg/5ml liq, aluminum & magnesium hydroxide-simethicone (Maalox), LIDOcaine viscous 2% Swish and spit 5 mLs every 4 (four) hours as needed (stomach pain). for mouth sores    amLODIPine (NORVASC) 10 MG tablet Take 1 tablet (10 mg total) by mouth once daily.    carvediloL (COREG) 12.5 MG tablet Take 1 tablet (12.5 mg total) by mouth 2 (two) times daily with meals.    celecoxib (CELEBREX) 200 MG capsule Take 1 capsule (200 mg total) by mouth once daily.    colchicine (COLCRYS) 0.6 mg tablet Take 2 tablets at symptom onset, 1 tablet 2-3 hours later, then 1 tablet daily for 7 days as needed for gout attack    famotidine (PEPCID) 20 MG tablet Take 1 tablet (20 mg total) by mouth 2 (two) times daily.    fluticasone propionate (FLONASE) 50 mcg/actuation nasal spray 1 spray (50 mcg total) by Each Nostril route once daily.    GABAPENTIN 4% CREAM APPLY 1 GRAM 3-4 TIMES DAILY FOR TREATMENT OF PAIN    hydroCHLOROthiazide  (HYDRODIURIL) 25 MG tablet Take 1 tablet (25 mg total) by mouth once daily.    multivitamin (THERAGRAN) per tablet Take 1 tablet by mouth once daily.    olmesartan (BENICAR) 40 MG tablet Take 1 tablet (40 mg total) by mouth once daily.    [START ON 2024] traMADoL (ULTRAM) 50 mg tablet Take 1 tablet (50 mg total) by mouth every 8 (eight) hours as needed for Pain.     No current facility-administered medications for this visit.       Review of patient's allergies indicates:   Allergen Reactions    Sulfa (sulfonamide antibiotics) Itching    Sulfamethoxazole-trimethoprim Itching and Rash     Itchy Rash       Family History   Problem Relation Name Age of Onset    Heart disease Mother      Cancer Father          stomach    Breast cancer Sister      Breast cancer Maternal Grandmother  60    Stomach cancer Brother         Social History     Socioeconomic History    Marital status: Single   Occupational History    Occupation: disabled   Tobacco Use    Smoking status: Former     Current packs/day: 0.00     Types: Cigarettes     Quit date: 10/2018     Years since quittin.6    Smokeless tobacco: Never   Substance and Sexual Activity    Alcohol use: Yes    Drug use: No    Sexual activity: Never     Partners: Female   Social History Narrative    Live with sister     Social Determinants of Health     Stress: No Stress Concern Present (2020)    [employer] [place] of Occupational Health - Occupational Stress Questionnaire     Feeling of Stress : Not at all       History of present illness:  64-year-old female, presents to clinic today as a new patient for evaluation of complaints of left hand pain.  She is got pain and triggering of the left index finger.  This has been going on for a while.  She is seen 2 prior orthopedist, Dr. Tamayo as well as Dr. Burns she had injections in March and in  of last year.  The pain has returned.      Review of Systems:    Constitution: Negative for chills, fever, and sweats.   Negative for unexplained weight loss.    HENT:  Negative for headaches and blurry vision.    Cardiovascular:Negative for chest pain or irregular heart beat. Negative for hypertension.    Respiratory:  Negative for cough and shortness of breath.    Gastrointestinal: Negative for abdominal pain, heartburn, melena, nausea, and vomitting.    Genitourinary:  Negative bladder incontinence and dysuria.    Musculoskeletal:  See HPI for details.     Neurological: Negative for numbness.    Psychiatric/Behavioral: Negative for depression.  The patient is not nervous/anxious.      Endocrine: Negative for polyuria    Hematologic/Lymphatic: Negative for bleeding problem.  Does not bruise/bleed easily.    Skin: Negative for poor would healing and rash    Objective:      Physical Examination:    Vital Signs:    Vitals:    05/16/24 1128   BP: 128/76   Resp: 18       Body mass index is 42.87 kg/m².    This a well-developed, well nourished patient in no acute distress.  They are alert and oriented and cooperative to examination.        Examination of the left hand, the patient has visual triggering of the left index finger.  She is tender over the A1 pulley.  She is able to range the finger through full flexion and extension.  Skin is dry and intact, no erythema ecchymosis no signs symptoms of infection no rashes no evidence of advanced osteoarthritis or joint swelling  Pertinent New Results:    XRAY Report / Interpretation:   AP lateral oblique views of the left hand taken today in the office do not demonstrate any significant osseous abnormalities.  Visualized soft tissues are normal.    Assessment/Plan:      Left index finger, trigger finger.  She has been injected twice in the last year.  We have recommended trigger finger release.  We went over the risks and benefits of the surgery with her in great detail.  We discussed the potential side effects or complications associated with further injections.    Patient was consented for  "surgery. Risks and benefits of the procedure were discussed in great detail to include the expected preoperative, intraoperative and postoperative courses. Complications may include but are not limited to bleeding, infection, damage to nerves, arteries, blood vessels, bones, tendons, ligaments, stiffness, instability, deep vein thrombus (DVT), pulmonary embolus (PE), altered sensation, continued pain, RSD, loss of motion or a need for additional surgery. As well as general anesthetic complications including seizure, stroke, heart attack and even death.    Bo Mullins, Physician Assistant, served in the capacity as a "scribe" for this patient encounter.  A "face-to-face" encounter occurred with Dr. River Gonzales on this date.  The treatment plan and medical decision-making is outlined above. Patient was seen and examined with a chaperone.       This note was created using Dragon voice recognition software that occasionally misinterpreted phrases or words.          "

## 2024-05-20 ENCOUNTER — HOSPITAL ENCOUNTER (OUTPATIENT)
Dept: PREADMISSION TESTING | Facility: HOSPITAL | Age: 64
Discharge: HOME | End: 2024-05-20
Attending: ORTHOPAEDIC SURGERY
Payer: MEDICAID

## 2024-05-20 VITALS
WEIGHT: 249 LBS | HEIGHT: 64 IN | OXYGEN SATURATION: 96 % | BODY MASS INDEX: 42.51 KG/M2 | DIASTOLIC BLOOD PRESSURE: 81 MMHG | SYSTOLIC BLOOD PRESSURE: 121 MMHG | TEMPERATURE: 99 F | HEART RATE: 82 BPM | RESPIRATION RATE: 20 BRPM

## 2024-05-20 DIAGNOSIS — Z01.818 PREOP TESTING: Primary | ICD-10-CM

## 2024-05-20 DIAGNOSIS — M65.322 TRIGGER INDEX FINGER OF LEFT HAND: ICD-10-CM

## 2024-05-20 LAB
OHS QRS DURATION: 88 MS
OHS QTC CALCULATION: 428 MS

## 2024-05-20 PROCEDURE — 93010 ELECTROCARDIOGRAM REPORT: CPT | Mod: ,,, | Performed by: GENERAL PRACTICE

## 2024-05-20 PROCEDURE — 93005 ELECTROCARDIOGRAM TRACING: CPT | Performed by: GENERAL PRACTICE

## 2024-05-20 NOTE — DISCHARGE INSTRUCTIONS
To confirm, Your doctor has instructed you that surgery is scheduled for: Wednesday 5/22/24    Pre-Op will call the afternoon prior to surgery between 4:00 and 6:00 PM with the final arrival time.      Please report to Registration at front of the hospital --it is best to park in the garage on [address]    Do not eat or drink anything after midnight the night before your surgery - THIS INCLUDES  WATER, GUM, MINTS AND CANDY.    YOU MAY BRUSH YOUR TEETH     TAKE APPROVED  MEDICATIONS WITH A SMALL SIP OF WATER THE MORNING OF YOUR PROCEDURE: See Medication list    PLEASE NOTE:  The surgery schedule has many variables which may affect the time of your surgery case.  Family members should be available if your surgery time changes.  Plan to be here the day of your procedure between 4-6 hours.    DO NOT TAKE THESE MEDICATIONS 5-7 DAYS PRIOR to your procedure or per your surgeon's request: ASPIRIN, ALEVE, ADVIL, IBUPROFEN,  NICHOLE SELTZER, BC , FISH OIL , VITAMIN E, HERBALS  (May take Tylenol)    ONLY if you are prescribed any types of blood thinners such as:  Aspirin, Coumadin, Plavix, Pradaxa, Xarelto, Aggrenox, Effient, Eliquis, Savasya, Brilinta, or any other, ask your surgeon whether you should stop taking them and how long before surgery you should stop.  You may also need to verify with the prescribing physician if it is ok to stop your medication.                                                     IMPORTANT INSTRUCTIONS    Do not smoke, vape or drink alcoholic beverages 24 hours prior to your procedure.  Shower the night before AND the morning of your procedure with a Chlorhexidine wash such as Hibiclens or Dial antibacterial soap from the neck down.    Do not get it on your face or in your eyes.  You may use your own shampoo and face wash. This helps your skin to be as bacteria free as possible.   Do not apply any deodorant, lotion or powder after you shower.   DO NOT remove hair from the surgery site.  Do not shave  the incision site unless you are given specific instructions to do so.    Sleep in a bed with clean sheets.  Do not sleep with a pet in the bed.   If you wear contact lenses, dentures, hearing aids or glasses, bring a container to put them in during surgery and give to a family member for safe keeping.    Please leave all jewelry, piercing's and valuables at home.   Wear comfortable clothing that is easy to remove and place back on after surgery.     Make arrangements in advance for transportation home by a responsible adult.    You must make arrangements for transportation, TAXI'S, UBER'S OR LYFTS ARE NOT ALLOWED.      If you have any questions about these instructions, call Pre-Op Admit  Nursing at [phone] , Pre-Op Day Surgery Unit at [phone]

## 2024-05-21 RX ORDER — OXYCODONE AND ACETAMINOPHEN 7.5; 325 MG/1; MG/1
1 TABLET ORAL EVERY 6 HOURS PRN
Qty: 14 TABLET | Refills: 0 | Status: SHIPPED | OUTPATIENT
Start: 2024-05-21 | End: 2024-05-28

## 2024-05-22 ENCOUNTER — ANESTHESIA (OUTPATIENT)
Dept: SURGERY | Facility: HOSPITAL | Age: 64
End: 2024-05-22
Payer: MEDICAID

## 2024-05-22 ENCOUNTER — HOSPITAL ENCOUNTER (OUTPATIENT)
Facility: HOSPITAL | Age: 64
Discharge: HOME | End: 2024-05-22
Attending: ORTHOPAEDIC SURGERY | Admitting: ORTHOPAEDIC SURGERY
Payer: MEDICAID

## 2024-05-22 ENCOUNTER — ANESTHESIA EVENT (OUTPATIENT)
Dept: SURGERY | Facility: HOSPITAL | Age: 64
End: 2024-05-22
Payer: MEDICAID

## 2024-05-22 VITALS
DIASTOLIC BLOOD PRESSURE: 72 MMHG | BODY MASS INDEX: 42.49 KG/M2 | OXYGEN SATURATION: 99 % | HEART RATE: 77 BPM | TEMPERATURE: 98 F | WEIGHT: 248.88 LBS | HEIGHT: 64 IN | SYSTOLIC BLOOD PRESSURE: 129 MMHG | RESPIRATION RATE: 18 BRPM

## 2024-05-22 DIAGNOSIS — M65.322 TRIGGER INDEX FINGER OF LEFT HAND: Primary | ICD-10-CM

## 2024-05-22 DIAGNOSIS — Z01.818 PREOP TESTING: ICD-10-CM

## 2024-05-22 PROCEDURE — 37000008 HC ANESTHESIA 1ST 15 MINUTES: Performed by: ORTHOPAEDIC SURGERY

## 2024-05-22 PROCEDURE — 37000009 HC ANESTHESIA EA ADD 15 MINS: Performed by: ORTHOPAEDIC SURGERY

## 2024-05-22 PROCEDURE — D9220A PRA ANESTHESIA: Mod: ANES,,, | Performed by: ANESTHESIOLOGY

## 2024-05-22 PROCEDURE — 26055 INCISE FINGER TENDON SHEATH: CPT | Mod: F1,,, | Performed by: ORTHOPAEDIC SURGERY

## 2024-05-22 PROCEDURE — 25000003 PHARM REV CODE 250: Performed by: ANESTHESIOLOGY

## 2024-05-22 PROCEDURE — 25000003 PHARM REV CODE 250: Performed by: NURSE ANESTHETIST, CERTIFIED REGISTERED

## 2024-05-22 PROCEDURE — 71000016 HC POSTOP RECOV ADDL HR: Performed by: ORTHOPAEDIC SURGERY

## 2024-05-22 PROCEDURE — 71000015 HC POSTOP RECOV 1ST HR: Performed by: ORTHOPAEDIC SURGERY

## 2024-05-22 PROCEDURE — 63600175 PHARM REV CODE 636 W HCPCS: Performed by: NURSE ANESTHETIST, CERTIFIED REGISTERED

## 2024-05-22 PROCEDURE — D9220A PRA ANESTHESIA: Mod: CRNA,,, | Performed by: NURSE ANESTHETIST, CERTIFIED REGISTERED

## 2024-05-22 PROCEDURE — 36000706: Performed by: ORTHOPAEDIC SURGERY

## 2024-05-22 PROCEDURE — 63600175 PHARM REV CODE 636 W HCPCS: Performed by: ORTHOPAEDIC SURGERY

## 2024-05-22 PROCEDURE — 36000707: Performed by: ORTHOPAEDIC SURGERY

## 2024-05-22 RX ORDER — MIDAZOLAM HYDROCHLORIDE 1 MG/ML
INJECTION INTRAMUSCULAR; INTRAVENOUS
Status: DISCONTINUED | OUTPATIENT
Start: 2024-05-22 | End: 2024-05-22

## 2024-05-22 RX ORDER — OXYCODONE HYDROCHLORIDE 5 MG/1
5 TABLET ORAL ONCE
Status: COMPLETED | OUTPATIENT
Start: 2024-05-22 | End: 2024-05-22

## 2024-05-22 RX ORDER — ACETAMINOPHEN 10 MG/ML
INJECTION, SOLUTION INTRAVENOUS
Status: DISCONTINUED | OUTPATIENT
Start: 2024-05-22 | End: 2024-05-22

## 2024-05-22 RX ORDER — FENTANYL CITRATE 50 UG/ML
INJECTION, SOLUTION INTRAMUSCULAR; INTRAVENOUS
Status: DISCONTINUED | OUTPATIENT
Start: 2024-05-22 | End: 2024-05-22

## 2024-05-22 RX ORDER — PROPOFOL 10 MG/ML
VIAL (ML) INTRAVENOUS
Status: DISCONTINUED | OUTPATIENT
Start: 2024-05-22 | End: 2024-05-22

## 2024-05-22 RX ORDER — CEFAZOLIN SODIUM 2 G/50ML
2 SOLUTION INTRAVENOUS
Status: COMPLETED | OUTPATIENT
Start: 2024-05-22 | End: 2024-05-22

## 2024-05-22 RX ADMIN — OXYCODONE HYDROCHLORIDE 5 MG: 5 TABLET ORAL at 08:05

## 2024-05-22 RX ADMIN — PROPOFOL 60 MG: 10 INJECTION, EMULSION INTRAVENOUS at 07:05

## 2024-05-22 RX ADMIN — ACETAMINOPHEN 1000 MG: 10 INJECTION, SOLUTION INTRAVENOUS at 07:05

## 2024-05-22 RX ADMIN — SODIUM CHLORIDE: 0.9 INJECTION, SOLUTION INTRAVENOUS at 07:05

## 2024-05-22 RX ADMIN — FENTANYL CITRATE 100 MCG: 50 INJECTION, SOLUTION INTRAMUSCULAR; INTRAVENOUS at 07:05

## 2024-05-22 RX ADMIN — MIDAZOLAM HYDROCHLORIDE 2 MG: 1 INJECTION, SOLUTION INTRAMUSCULAR; INTRAVENOUS at 07:05

## 2024-05-22 RX ADMIN — CEFAZOLIN SODIUM 2 G: 2 SOLUTION INTRAVENOUS at 07:05

## 2024-05-22 NOTE — ANESTHESIA POSTPROCEDURE EVALUATION
Anesthesia Post Evaluation    Patient: Chelsea Grossman    Procedure(s) Performed: Procedure(s) (LRB):  RELEASE, TRIGGER FINGER, LEFT INDEX FINGER (Left)    Final Anesthesia Type: MAC      Patient location: Surgery holding area.  Patient participation: Yes- Able to Participate  Level of consciousness: awake and alert and oriented  Post-procedure vital signs: reviewed and stable  Pain management: adequate  Airway patency: patent    PONV status at discharge: No PONV  Anesthetic complications: no      Cardiovascular status: blood pressure returned to baseline and stable  Respiratory status: unassisted and spontaneous ventilation  Hydration status: euvolemic  Follow-up not needed.              Vitals Value Taken Time   /89 05/22/24 0550   Temp 36.8 °C (98.2 °F) 05/22/24 0550   Pulse 86 05/22/24 0550   Resp 18 05/22/24 0550   SpO2 99 % 05/22/24 0550         No case tracking events are documented in the log.      Pain/Della Score: No data recorded

## 2024-05-22 NOTE — ANESTHESIA PROCEDURE NOTES
Peripheral Block    Patient location during procedure: OR    Reason for block: primary anesthetic    Diagnosis: Trigger finger   Start time: 5/22/2024 7:24 AM  Timeout: 5/22/2024 7:24 AM   End time: 5/22/2024 7:28 AM    Staffing  Authorizing Provider: Brandyn Brewer MD  Performing Provider: Brandyn Brewer MD    Staffing  Performed by: Brandyn Brewer MD  Authorized by: Brandyn Brewer MD    Preanesthetic Checklist  Completed: patient identified, IV checked, site marked, risks and benefits discussed, surgical consent, monitors and equipment checked, pre-op evaluation and timeout performed  Peripheral Block  Patient position: supine  Patient monitoring: heart rate, cardiac monitor, continuous pulse ox, continuous capnometry and frequent blood pressure checks  Block type: [place] block  Laterality: left  Injection technique: single shot  Needle  Needle localization: anatomical landmarks     Assessment  Injection assessment: negative aspiration and negative parasthesia  Paresthesia pain: none  Heart rate change: no  Slow fractionated injection: yes  Pain Tolerance: comfortable throughout block      Additional Notes  [place] block performed with easy injection of 40 cc lidocaine 0.5% (plain and preservative-free) per 22 gauge IV in dorsum of hand, with tourniquet inflated to 250 mmHg.  Patient tolerated procedure well with sedation.

## 2024-05-22 NOTE — ANESTHESIA PREPROCEDURE EVALUATION
05/22/2024  Chelsea Grossman is a 64 y.o., female.      Pre-op Assessment    I have reviewed the Patient Summary Reports.     I have reviewed the Nursing Notes. I have reviewed the NPO Status.   I have reviewed the Medications.     Review of Systems  Anesthesia Hx:             Denies Family Hx of Anesthesia complications.    Denies Personal Hx of Anesthesia complications.                    Social:  Former Smoker, No Alcohol Use       Hematology/Oncology:  Hematology Normal   Oncology Normal                                   EENT/Dental:  chronic allergic rhinitis Top and bottom partial dentures, both out          Cardiovascular:     Hypertension                                        Pulmonary:  Pulmonary Normal                       Renal/:  Chronic Renal Disease renal calculi               Hepatic/GI:     GERD, well controlled Liver Disease, (Cirrhosis) Hepatitis (history of hepatitis-C, successfully treated)           Musculoskeletal:  Arthritis (OA)               Neurological:        Peripheral Neuropathy (History of successful bilateral carpal tunnel releases)                          Endocrine:  Endocrine Normal          Morbid Obesity / BMI > 40  Psych:  Psychiatric Normal                    Physical Exam  General: Well nourished, Cooperative, Alert and Oriented    Airway:  Mallampati: III   Mouth Opening: Normal  TM Distance: > 6 cm  Tongue: Normal  Neck ROM: Normal ROM    Dental:  Intact    Chest/Lungs:  Clear to auscultation, Normal Respiratory Rate    Heart:  Rate: Normal  Rhythm: Regular Rhythm  Sounds: Normal        Anesthesia Plan  Type of Anesthesia, risks & benefits discussed:    Anesthesia Type: MAC  Intra-op Monitoring Plan: Standard ASA Monitors  Post Op Pain Control Plan: multimodal analgesia  Airway Plan: , Post-Induction  Informed Consent: Informed consent signed with the Patient  and all parties understand the risks and agree with anesthesia plan.  All questions answered.   ASA Score: 3  Anesthesia Plan Notes: [place] Block  Multimodal analgesia:  IV acetaminophen   Antiemetics:  Zofran, Decadron 8 mg,   No Pepcid (patient took her own this morning)    Ready For Surgery From Anesthesia Perspective.     .

## 2024-05-22 NOTE — H&P
CC/Indication for Procedure: 64 y.o. female with Trigger index finger of left hand [M65.322]  Preop testing [Z01.818].    Patient scheduled for [place] INCISE FINGER TENDON SHEATH [16408] (RELEASE, TRIGGER FINGER, LEFT INDEX FINGER)  TX INCISE FINGER TENDON SHEATH [62769].    Past Medical History:   Diagnosis Date    2020    History of hepatitis C, s/p successful Rx w/ cure (SVR - 2020)     History of kidney stones     Hypertension     Hypertensive retinopathy of both eyes     Osteoarthritis     Osteopenia     Trigger finger of left hand      Past Surgical History:   Procedure Laterality Date    CARPAL TUNNEL RELEASE Bilateral     FOOT SURGERY Left 2018    KIDNEY STONE SURGERY      Left ear surgery      TOTAL ABDOMINAL HYSTERECTOMY W/ BILATERAL SALPINGOOPHORECTOMY      TRIGGER FINGER RELEASE Right     2nd and 3rd fingers     Family History   Problem Relation Name Age of Onset    Heart disease Mother      Cancer Father          stomach    Breast cancer Sister      Breast cancer Maternal Grandmother  60    Stomach cancer Brother       Social History     Socioeconomic History    Marital status: Single   Occupational History    Occupation: disabled   Tobacco Use    Smoking status: Former     Current packs/day: 0.00     Types: Cigarettes     Quit date: 10/2018     Years since quittin.6    Smokeless tobacco: Never   Substance and Sexual Activity    Alcohol use: Not Currently    Drug use: No    Sexual activity: Never     Partners: Female   Social History Narrative    Live with sister     Social Determinants of Health     Stress: No Stress Concern Present (2020)    [employer] [place] [employer] - Occupational Stress Questionnaire     Feeling of Stress : Not at all       Review of patient's allergies indicates:   Allergen Reactions    Sulfa (sulfonamide antibiotics) Itching    Sulfamethoxazole-trimethoprim Itching and Rash     Itchy Rash         Current Facility-Administered  Medications:     cefazolin (ANCEF) 2 gram in dextrose 5% 50 mL IVPB (premix), 2 g, Intravenous, On Call Procedure, River Gonzales MD    ROS:    Denies chest pain or palpitations  Denies shortness of breath  Denies fevers or chills  Denies chest pain  Denies abdominal pain    PE:    General Appearance: Well nourished  Orientation: Oriented to time, place, person  Mental Status: Alert  Heart: RRR  Lungs: CTA  Abdomen: Soft and non-tender    Anesthesia/Surgery risks, benefits and alternative options discussed and understood by patient/family.    This note was created using Dragon voice recognition software that occasionally misinterpreted phrases or words.

## 2024-05-22 NOTE — TRANSFER OF CARE
"Anesthesia Transfer of Care Note    Patient: Chelsea Grossman    Procedure(s) Performed: Procedure(s) (LRB):  RELEASE, TRIGGER FINGER, LEFT INDEX FINGER (Left)    Patient location: OPS    Anesthesia Type: regional    Transport from OR: Transported from OR on room air with adequate spontaneous ventilation    Post pain: adequate analgesia    Post assessment: no apparent anesthetic complications    Post vital signs: stable    Level of consciousness: awake and alert    Nausea/Vomiting: no nausea/vomiting    Complications: none    Transfer of care protocol was followed    Last vitals: Visit Vitals  BP (!) 147/89 (BP Location: Left arm, Patient Position: Lying)   Pulse 86   Temp 36.8 °C (98.2 °F) (Oral)   Resp 18   Ht 5' 4" (1.626 m)   Wt 112.9 kg (248 lb 14.4 oz)   SpO2 99%   Breastfeeding No   BMI 42.72 kg/m²     "

## 2024-05-23 ENCOUNTER — OFFICE VISIT (OUTPATIENT)
Dept: ORTHOPEDICS | Facility: CLINIC | Age: 64
End: 2024-05-23
Payer: MEDICAID

## 2024-05-23 VITALS
BODY MASS INDEX: 42.49 KG/M2 | WEIGHT: 248.88 LBS | HEIGHT: 64 IN | DIASTOLIC BLOOD PRESSURE: 98 MMHG | SYSTOLIC BLOOD PRESSURE: 160 MMHG

## 2024-05-23 DIAGNOSIS — Z98.890 S/P TRIGGER FINGER RELEASE: Primary | ICD-10-CM

## 2024-05-23 PROCEDURE — 4010F ACE/ARB THERAPY RXD/TAKEN: CPT | Mod: CPTII,S$GLB,, | Performed by: ORTHOPAEDIC SURGERY

## 2024-05-23 PROCEDURE — 1101F PT FALLS ASSESS-DOCD LE1/YR: CPT | Mod: CPTII,S$GLB,, | Performed by: ORTHOPAEDIC SURGERY

## 2024-05-23 PROCEDURE — 3288F FALL RISK ASSESSMENT DOCD: CPT | Mod: CPTII,S$GLB,, | Performed by: ORTHOPAEDIC SURGERY

## 2024-05-23 PROCEDURE — 99024 POSTOP FOLLOW-UP VISIT: CPT | Mod: S$GLB,,, | Performed by: ORTHOPAEDIC SURGERY

## 2024-05-23 PROCEDURE — 1159F MED LIST DOCD IN RCRD: CPT | Mod: CPTII,S$GLB,, | Performed by: ORTHOPAEDIC SURGERY

## 2024-05-23 PROCEDURE — 3077F SYST BP >= 140 MM HG: CPT | Mod: CPTII,S$GLB,, | Performed by: ORTHOPAEDIC SURGERY

## 2024-05-23 PROCEDURE — 1160F RVW MEDS BY RX/DR IN RCRD: CPT | Mod: CPTII,S$GLB,, | Performed by: ORTHOPAEDIC SURGERY

## 2024-05-23 PROCEDURE — 3080F DIAST BP >= 90 MM HG: CPT | Mod: CPTII,S$GLB,, | Performed by: ORTHOPAEDIC SURGERY

## 2024-05-23 NOTE — PROGRESS NOTES
[facility] POST-OP NOTE    Subjective:           Chief Complaint:   Chief Complaint   Patient presents with    Left Hand - Post-op Evaluation     Patient is here for PO day one left index trigger finger release 5.22.24. States pain is controlled with medication. Denied numbness and tingling       Past Medical History:   Diagnosis Date    Cirrhosis 2020    History of hepatitis C, s/p successful Rx w/ cure (SVR12 - 4/2020)     History of kidney stones     Hypertension     Hypertensive retinopathy of both eyes     Osteoarthritis     Osteopenia     Trigger finger of left hand 2023       Past Surgical History:   Procedure Laterality Date    CARPAL TUNNEL RELEASE Bilateral     FOOT SURGERY Left 12/18/2018    KIDNEY STONE SURGERY      Left ear surgery      TOTAL ABDOMINAL HYSTERECTOMY W/ BILATERAL SALPINGOOPHORECTOMY      TRIGGER FINGER RELEASE Right     2nd and 3rd fingers       Current Outpatient Medications   Medication Sig    (Magic mouthwash) 1:1:1 diphenhydrAMINE(Benadryl) 12.5mg/5ml liq, aluminum & magnesium hydroxide-simethicone (Maalox), LIDOcaine viscous 2% Swish and spit 5 mLs every 4 (four) hours as needed (stomach pain). for mouth sores    amLODIPine (NORVASC) 10 MG tablet Take 1 tablet (10 mg total) by mouth once daily.    carvediloL (COREG) 12.5 MG tablet Take 1 tablet (12.5 mg total) by mouth 2 (two) times daily with meals.    celecoxib (CELEBREX) 200 MG capsule Take 1 capsule (200 mg total) by mouth once daily.    colchicine (COLCRYS) 0.6 mg tablet Take 2 tablets at symptom onset, 1 tablet 2-3 hours later, then 1 tablet daily for 7 days as needed for gout attack    famotidine (PEPCID) 20 MG tablet Take 1 tablet (20 mg total) by mouth 2 (two) times daily.    fluticasone propionate (FLONASE) 50 mcg/actuation nasal spray 1 spray (50 mcg total) by Each Nostril route once daily. (Patient taking differently: 1 spray by Each Nostril route daily as needed.)    GABAPENTIN 4% CREAM APPLY 1 GRAM 3-4 TIMES  DAILY FOR TREATMENT OF PAIN    hydroCHLOROthiazide (HYDRODIURIL) 25 MG tablet Take 1 tablet (25 mg total) by mouth once daily.    multivitamin (THERAGRAN) per tablet Take 1 tablet by mouth once daily.    olmesartan (BENICAR) 40 MG tablet Take 1 tablet (40 mg total) by mouth once daily.    oxyCODONE-acetaminophen (PERCOCET) 7.5-325 mg per tablet Take 1 tablet by mouth every 6 (six) hours as needed for Pain. Take as directed for postoperative pain.    [START ON 2024] traMADoL (ULTRAM) 50 mg tablet Take 1 tablet (50 mg total) by mouth every 8 (eight) hours as needed for Pain.     No current facility-administered medications for this visit.       Review of patient's allergies indicates:   Allergen Reactions    Sulfa (sulfonamide antibiotics) Itching    Sulfamethoxazole-trimethoprim Itching and Rash     Itchy Rash       Family History   Problem Relation Name Age of Onset    Heart disease Mother      Cancer Father          stomach    Breast cancer Sister      Breast cancer Maternal Grandmother  60    Stomach cancer Brother         Social History     Socioeconomic History    Marital status: Single   Occupational History    Occupation: disabled   Tobacco Use    Smoking status: Former     Current packs/day: 0.00     Types: Cigarettes     Quit date: 10/2018     Years since quittin.6    Smokeless tobacco: Never   Substance and Sexual Activity    Alcohol use: Not Currently    Drug use: No    Sexual activity: Never     Partners: Female   Social History Narrative    Live with sister     Social Determinants of Health     Stress: No Stress Concern Present (2020)    [employer] [place] [employer] - Occupational Stress Questionnaire     Feeling of Stress : Not at all       History of present illness:  Patient returns status post trigger finger release doing well      Review of Systems:    Musculoskeletal:  See HPI      Objective:        Physical Examination:    Vital Signs:    Vitals:    24 0911   BP:  (!) 160/98       Body mass index is 42.72 kg/m².    This a well-developed, well nourished patient in no acute distress.  They are alert and oriented and cooperative to examination.        Wounds are clean dry and intact sensation is preserved  Pertinent New Results:    XRAY Report / Interpretation:       Assessment/Plan:      Stable following trigger finger release encouraged range of motion follow-up in approximately a week for suture removal    This note was created using Dragon voice recognition software that occasionally misinterpreted phrases or words.

## 2024-06-13 ENCOUNTER — TELEPHONE (OUTPATIENT)
Dept: FAMILY MEDICINE | Facility: CLINIC | Age: 64
End: 2024-06-13
Payer: MEDICARE

## 2024-06-13 NOTE — TELEPHONE ENCOUNTER
----- Message from Katharine Hunt sent at 6/13/2024  2:55 PM CDT -----    ----- Message -----  From: Vaishnavi Houston  Sent: 6/13/2024   2:51 PM CDT  To: Katharine Hunt    Pt needs a np appt   [facility]   [phone]

## 2024-06-14 NOTE — TELEPHONE ENCOUNTER
She is on tramadol and I do not treat chronic pain and do not write for tramadol. She will need to establish with another provider

## 2024-08-10 NOTE — PROGRESS NOTES
SUBJECTIVE:    Patient ID: Chelsea Grossman is a 64 y.o. female.    Chief Complaint: Establish Care (Establishing care/ previous provider retired/ mammo and lab work in Oct 2024/ discuss medications/ DEXA referral/ discuss colo referral/ check left ear had surgery and is itchy / refill diclofenac gel//mp)    Pt here to get established. Used to see Dr. Bhatti.    PMHx HTN, Arthritis, GERD, Allergies, gout    Has had HTN for a long time. BP is doing well today. Denies CP/SOB/HA.    Has had surgery for burst eardrum from HTN.     Has hx of staples in her left foot from an old injury where she had to have surgery after dropping a can on her foot. (Bruce/Marinael). Has pain in her foot. Takes celebrex and tramadol sometimes for the pain, and also uses topical cream with diclofenac/lidocaine as well (0.925/0.3%).      Has issues with heartburn sometimes. Takes rx famotidine for it.     Has a hx gout, last flare up was last week. Normally gets the flare up in her left foot.  Gets flareups 1-2 times a month    Has allergies, taking flonase as needed. Does a lot of sneezing and coughing up phlegm.    Tries to stay active and do some exercise in the yard. Walks to the mailbox.     Sleeps well.     No trouble with bowel or bladder.     Has a hx of elevated liver enzymes due to Hep C. Was seeing Dr. Scheuermann. Enzymes are back to normal. Has been treated to cure.     -------------------------------------------------------  Mammogram due  Cscope due        Hospital Outpatient Visit on 05/20/2024   Component Date Value Ref Range Status    QRS Duration 05/20/2024 88  ms Final    [facility] QTC Calculation 05/20/2024 428  ms Final   Lab Visit on 04/09/2024   Component Date Value Ref Range Status    WBC 04/09/2024 6.70  3.90 - 12.70 K/uL Final    RBC 04/09/2024 4.20  4.00 - 5.40 M/uL Final    Hemoglobin 04/09/2024 13.1  12.0 - 16.0 g/dL Final    Hematocrit 04/09/2024 40.3  37.0 - 48.5 % Final    MCV 04/09/2024 96  82 - 98 fL Final     MCH 2024 31.2 (H)  27.0 - 31.0 pg Final    MCHC 2024 32.5  32.0 - 36.0 g/dL Final    RDW 2024 13.0  11.5 - 14.5 % Final    Platelets 2024 247  150 - 450 K/uL Final    MPV 2024 9.5  9.2 - 12.9 fL Final    Sodium 2024 140  136 - 145 mmol/L Final    Potassium 2024 4.2  3.5 - 5.1 mmol/L Final    Chloride 2024 105  95 - 110 mmol/L Final    CO2 2024 25  23 - 29 mmol/L Final    Glucose 2024 111 (H)  70 - 110 mg/dL Final    BUN 2024 21  8 - 23 mg/dL Final    Creatinine 2024 1.0  0.5 - 1.4 mg/dL Final    Calcium 2024 10.2  8.7 - 10.5 mg/dL Final    Total Protein 2024 7.9  6.0 - 8.4 g/dL Final    Albumin 2024 4.4  3.5 - 5.2 g/dL Final    Total Bilirubin 2024 0.4  0.1 - 1.0 mg/dL Final    Alkaline Phosphatase 2024 68  55 - 135 U/L Final    AST 2024 20  10 - 40 U/L Final    ALT 2024 30  10 - 44 U/L Final    eGFR 2024 >60.0  >60 mL/min/1.73 m^2 Final    Anion Gap 2024 10  8 - 16 mmol/L Final    Prothrombin Time 2024 10.9  9.0 - 12.5 sec Final    INR 2024 1.0  0.8 - 1.2 Final    AFP 2024 8.2  0.0 - 9.2 ng/mL Final       Past Medical History:   Diagnosis Date    Cirrhosis     History of hepatitis C, s/p successful Rx w/ cure ([facility] - 2020)     History of kidney stones     Hypertension     Hypertensive retinopathy of both eyes     Osteoarthritis     Osteopenia     Trigger finger of left hand      Social History     Socioeconomic History    Marital status: Single   Occupational History    Occupation: disabled   Tobacco Use    Smoking status: Former     Current packs/day: 0.00     Types: Cigarettes     Quit date: 10/2018     Years since quittin.9    Smokeless tobacco: Never   Substance and Sexual Activity    Alcohol use: Not Currently    Drug use: No    Sexual activity: Never     Partners: Female   Social History Narrative    Live with sister     Social Determinants of Health      Stress: No Stress Concern Present (9/23/2020)    [employer] - Occupational Stress Questionnaire     Feeling of Stress : Not at all     Past Surgical History:   Procedure Laterality Date    CARPAL TUNNEL RELEASE Bilateral     FOOT SURGERY Left 12/18/2018    KIDNEY STONE SURGERY      Left ear surgery      TOTAL ABDOMINAL HYSTERECTOMY W/ BILATERAL SALPINGOOPHORECTOMY      TRIGGER FINGER RELEASE Right     2nd and 3rd fingers    TRIGGER FINGER RELEASE Left 5/22/2024    Procedure: RELEASE, TRIGGER FINGER, LEFT INDEX FINGER;  Surgeon: River Gonzales MD;  Location: [facility];  Service: Orthopedics;  Laterality: Left;     Family History   Problem Relation Name Age of Onset    Heart disease Mother      Cancer Father          stomach    Breast cancer Sister      Breast cancer Maternal Grandmother  60    Stomach cancer Brother         Review of patient's allergies indicates:   Allergen Reactions    Sulfa (sulfonamide antibiotics) Itching    Sulfamethoxazole-trimethoprim Itching and Rash     Itchy Rash       Current Outpatient Medications:     (Magic mouthwash) 1:1:1 diphenhydrAMINE(Benadryl) 12.5mg/5ml liq, aluminum & magnesium hydroxide-simethicone (Maalox), LIDOcaine viscous 2%, Swish and spit 5 mLs every 4 (four) hours as needed (stomach pain). for mouth sores, Disp: 360 mL, Rfl: 3    celecoxib (CELEBREX) 200 MG capsule, Take 1 capsule (200 mg total) by mouth once daily., Disp: 90 capsule, Rfl: 1    fluticasone propionate (FLONASE) 50 mcg/actuation nasal spray, 1 spray (50 mcg total) by Each Nostril route once daily. (Patient taking differently: 1 spray by Each Nostril route daily as needed.), Disp: 48 g, Rfl: 1    UNABLE TO FIND, Apply 1 g topically 4 (four) times daily. medication name: Diclofenac NA/Lidocaine HCL 0.925/0.3% Gel, Disp: , Rfl:     amLODIPine (NORVASC) 10 MG tablet, Take 1 tablet (10 mg total) by mouth once daily., Disp: 90 tablet, Rfl: 3    carvediloL (COREG) 12.5 MG tablet,  "Take 1 tablet (12.5 mg total) by mouth 2 (two) times daily with meals., Disp: 180 tablet, Rfl: 3    colchicine (COLCRYS) 0.6 mg tablet, Take 2 tablets at symptom onset, 1 tablet 2-3 hours later, then 1 tablet daily for 7 days as needed for gout attack, Disp: 10 tablet, Rfl: 3    famotidine (PEPCID) 20 MG tablet, Take 1 tablet (20 mg total) by mouth 2 (two) times daily., Disp: 180 tablet, Rfl: 3    hydroCHLOROthiazide (HYDRODIURIL) 25 MG tablet, Take 1 tablet (25 mg total) by mouth once daily., Disp: 90 tablet, Rfl: 3    multivitamin (THERAGRAN) per tablet, Take 1 tablet by mouth once daily. (Patient not taking: Reported on 8/15/2024), Disp: , Rfl:     olmesartan (BENICAR) 40 MG tablet, Take 1 tablet (40 mg total) by mouth once daily., Disp: 90 tablet, Rfl: 1    traMADoL (ULTRAM) 50 mg tablet, Take 1 tablet (50 mg total) by mouth every 8 (eight) hours as needed for Pain., Disp: 90 tablet, Rfl: 3    Review of Systems   Constitutional:  Negative for appetite change, fatigue, fever and unexpected weight change.   Respiratory:  Negative for cough, chest tightness, shortness of breath and wheezing.    Cardiovascular:  Negative for chest pain and leg swelling.   Gastrointestinal:  Negative for abdominal pain, constipation, nausea and vomiting.        -heartburn   Genitourinary:  Negative for difficulty urinating, dysuria, frequency and urgency.   Musculoskeletal:  Negative for arthralgias, back pain, myalgias and neck pain.   Skin:  Negative for rash.   Neurological:  Negative for dizziness, weakness, numbness and headaches.   Hematological:  Does not bruise/bleed easily.   Psychiatric/Behavioral:  Negative for dysphoric mood, sleep disturbance and suicidal ideas. The patient is not nervous/anxious.    All other systems reviewed and are negative.         Objective:      Vitals:    08/15/24 1407   BP: 112/70   Pulse: 78   SpO2: 96%   Weight: 114.3 kg (252 lb)   Height: 5' 4" (1.626 m)     Physical Exam  Vitals reviewed. "   Constitutional:       General: She is not in acute distress.     Appearance: Normal appearance. She is well-developed.   HENT:      Head: Normocephalic and atraumatic.      Right Ear: Tympanic membrane and ear canal normal.      Left Ear: Tympanic membrane and ear canal normal.   Neck:      Thyroid: No thyromegaly.      Vascular: No carotid bruit.   Cardiovascular:      Rate and Rhythm: Normal rate and regular rhythm.      Heart sounds: Normal heart sounds. No murmur heard.     No friction rub.   Pulmonary:      Effort: Pulmonary effort is normal.      Breath sounds: Normal breath sounds. No wheezing or rales.   Abdominal:      General: Bowel sounds are normal. There is no distension.      Palpations: Abdomen is soft.      Tenderness: There is no abdominal tenderness.   Musculoskeletal:      Cervical back: Neck supple.   Lymphadenopathy:      Cervical: No cervical adenopathy.   Skin:     General: Skin is warm and dry.      Findings: No rash.   Neurological:      Mental Status: She is alert and oriented to person, place, and time.   Psychiatric:         Attention and Perception: She is attentive.         Speech: Speech normal.         Behavior: Behavior normal.         Thought Content: Thought content normal.         Judgment: Judgment normal.           Assessment:       1. Essential hypertension    2. Gout involving toe, unspecified cause, unspecified chronicity, unspecified laterality    3. Gastroesophageal reflux disease, unspecified whether esophagitis present    4. Osteoarthritis of multiple joints, unspecified osteoarthritis type    5. Lisfranc dislocation, left, sequela    6. Class 3 obesity    7. Menopause    8. Long-term use of high-risk medication    9. Elevated blood sugar    10. Screening for colon cancer         Plan:       Essential hypertension  Comments:  Controlled. Will continue to monitor on current medication  Orders:  -     amLODIPine (NORVASC) 10 MG tablet; Take 1 tablet (10 mg total) by mouth  once daily.  Dispense: 90 tablet; Refill: 3  -     carvediloL (COREG) 12.5 MG tablet; Take 1 tablet (12.5 mg total) by mouth 2 (two) times daily with meals.  Dispense: 180 tablet; Refill: 3  -     hydroCHLOROthiazide (HYDRODIURIL) 25 MG tablet; Take 1 tablet (25 mg total) by mouth once daily.  Dispense: 90 tablet; Refill: 3  -     olmesartan (BENICAR) 40 MG tablet; Take 1 tablet (40 mg total) by mouth once daily.  Dispense: 90 tablet; Refill: 1    Gout involving toe, unspecified cause, unspecified chronicity, unspecified laterality  Comments:  Asymptomatic. Will continue to monitor symptoms  Orders:  -     colchicine (COLCRYS) 0.6 mg tablet; Take 2 tablets at symptom onset, 1 tablet 2-3 hours later, then 1 tablet daily for 7 days as needed for gout attack  Dispense: 10 tablet; Refill: 3    Gastroesophageal reflux disease, unspecified whether esophagitis present  Comments:  Controlled. Will continue to monitor symptoms  Orders:  -     famotidine (PEPCID) 20 MG tablet; Take 1 tablet (20 mg total) by mouth 2 (two) times daily.  Dispense: 180 tablet; Refill: 3    Osteoarthritis of multiple joints, unspecified osteoarthritis type  Comments:  Stable. Will continue to monitor symptoms  Orders:  -     traMADoL (ULTRAM) 50 mg tablet; Take 1 tablet (50 mg total) by mouth every 8 (eight) hours as needed for Pain.  Dispense: 90 tablet; Refill: 3    Lisfranc dislocation, left, sequela  Comments:  Stable. To continue pain cream, tramadol prn, and celebrex. Will continue to monitor pain control and function  Orders:  -     traMADoL (ULTRAM) 50 mg tablet; Take 1 tablet (50 mg total) by mouth every 8 (eight) hours as needed for Pain.  Dispense: 90 tablet; Refill: 3    Class 3 obesity  Comments:  Chronic. BMI 43.26. Encouraged dietary discretion and regular exercise.    Menopause  -     DXA Bone Density Axial Skeleton 1 or more sites; Future; Expected date: 08/15/2024    Long-term use of high-risk medication  -     TSH w/reflex to  FT4; Future; Expected date: 08/15/2024  -     Urinalysis, Reflex to Urine Culture Urine, Clean Catch; Future; Expected date: 08/15/2024  -     CBC Auto Differential; Future; Expected date: 08/15/2024  -     Lipid Panel; Future; Expected date: 08/15/2024  -     Comprehensive Metabolic Panel; Future; Expected date: 08/15/2024  -     Microalbumin/Creatinine Ratio, Urine; Future; Expected date: 08/15/2024  -     HEMOGLOBIN A1C; Future; Expected date: 08/15/2024    Elevated blood sugar  -     HEMOGLOBIN A1C; Future; Expected date: 08/15/2024    Screening for colon cancer  -     Case Request Endoscopy: COLONOSCOPY    Labs and/or tests have been ordered for the evaluation/monitoring of acute/chronic conditions, to be done just before next visit.    Follow up in about 4 months (around 12/15/2024) for HTN.        8/25/2024 Dodie Hall

## 2024-08-15 ENCOUNTER — PATIENT MESSAGE (OUTPATIENT)
Dept: GASTROENTEROLOGY | Facility: CLINIC | Age: 64
End: 2024-08-15
Payer: MEDICARE

## 2024-08-15 ENCOUNTER — OFFICE VISIT (OUTPATIENT)
Dept: FAMILY MEDICINE | Facility: CLINIC | Age: 64
End: 2024-08-15
Payer: MEDICARE

## 2024-08-15 VITALS
BODY MASS INDEX: 43.02 KG/M2 | OXYGEN SATURATION: 96 % | HEIGHT: 64 IN | SYSTOLIC BLOOD PRESSURE: 112 MMHG | DIASTOLIC BLOOD PRESSURE: 70 MMHG | HEART RATE: 78 BPM | WEIGHT: 252 LBS

## 2024-08-15 DIAGNOSIS — Z78.0 MENOPAUSE: ICD-10-CM

## 2024-08-15 DIAGNOSIS — Z79.899 LONG-TERM USE OF HIGH-RISK MEDICATION: ICD-10-CM

## 2024-08-15 DIAGNOSIS — I10 ESSENTIAL HYPERTENSION: Primary | ICD-10-CM

## 2024-08-15 DIAGNOSIS — M15.9 OSTEOARTHRITIS OF MULTIPLE JOINTS, UNSPECIFIED OSTEOARTHRITIS TYPE: ICD-10-CM

## 2024-08-15 DIAGNOSIS — M10.9 GOUT INVOLVING TOE, UNSPECIFIED CAUSE, UNSPECIFIED CHRONICITY, UNSPECIFIED LATERALITY: ICD-10-CM

## 2024-08-15 DIAGNOSIS — Z12.11 SCREENING FOR COLON CANCER: ICD-10-CM

## 2024-08-15 DIAGNOSIS — E66.01 CLASS 3 OBESITY: ICD-10-CM

## 2024-08-15 DIAGNOSIS — K21.9 GASTROESOPHAGEAL REFLUX DISEASE, UNSPECIFIED WHETHER ESOPHAGITIS PRESENT: ICD-10-CM

## 2024-08-15 DIAGNOSIS — R73.9 ELEVATED BLOOD SUGAR: ICD-10-CM

## 2024-08-15 DIAGNOSIS — S93.325S LISFRANC DISLOCATION, LEFT, SEQUELA: ICD-10-CM

## 2024-08-15 PROCEDURE — 99214 OFFICE O/P EST MOD 30 MIN: CPT | Mod: S$GLB,,, | Performed by: FAMILY MEDICINE

## 2024-08-15 RX ORDER — FAMOTIDINE 20 MG/1
20 TABLET, FILM COATED ORAL 2 TIMES DAILY
Qty: 180 TABLET | Refills: 3 | Status: SHIPPED | OUTPATIENT
Start: 2024-08-15

## 2024-08-15 RX ORDER — CARVEDILOL 12.5 MG/1
12.5 TABLET ORAL 2 TIMES DAILY WITH MEALS
Qty: 180 TABLET | Refills: 3 | Status: SHIPPED | OUTPATIENT
Start: 2024-08-15

## 2024-08-15 RX ORDER — AMLODIPINE BESYLATE 10 MG/1
10 TABLET ORAL DAILY
Qty: 90 TABLET | Refills: 3 | Status: SHIPPED | OUTPATIENT
Start: 2024-08-15

## 2024-08-15 RX ORDER — TRAMADOL HYDROCHLORIDE 50 MG/1
50 TABLET ORAL EVERY 8 HOURS PRN
Qty: 90 TABLET | Refills: 3 | Status: SHIPPED | OUTPATIENT
Start: 2024-08-15

## 2024-08-15 RX ORDER — HYDROCHLOROTHIAZIDE 25 MG/1
25 TABLET ORAL DAILY
Qty: 90 TABLET | Refills: 3 | Status: SHIPPED | OUTPATIENT
Start: 2024-08-15

## 2024-08-15 RX ORDER — COLCHICINE 0.6 MG/1
TABLET ORAL
Qty: 10 TABLET | Refills: 3 | Status: SHIPPED | OUTPATIENT
Start: 2024-08-15

## 2024-08-15 RX ORDER — OLMESARTAN MEDOXOMIL 40 MG/1
40 TABLET ORAL DAILY
Qty: 90 TABLET | Refills: 1 | Status: SHIPPED | OUTPATIENT
Start: 2024-08-15

## 2024-08-26 ENCOUNTER — TELEPHONE (OUTPATIENT)
Dept: GASTROENTEROLOGY | Facility: CLINIC | Age: 64
End: 2024-08-26
Payer: MEDICARE

## 2024-08-26 NOTE — TELEPHONE ENCOUNTER
Colonoscopy 10/29 instructions reviewed and patient states understanding. Copy mailed per patient request address verified.

## 2024-09-16 ENCOUNTER — HOSPITAL ENCOUNTER (OUTPATIENT)
Dept: RADIOLOGY | Facility: HOSPITAL | Age: 64
Discharge: HOME | End: 2024-09-16
Attending: FAMILY MEDICINE
Payer: MEDICARE

## 2024-09-16 DIAGNOSIS — Z78.0 MENOPAUSE: ICD-10-CM

## 2024-09-16 PROCEDURE — 77080 DXA BONE DENSITY AXIAL: CPT | Mod: TC,PO

## 2024-09-16 PROCEDURE — 77080 DXA BONE DENSITY AXIAL: CPT | Mod: 26,,, | Performed by: RADIOLOGY

## 2024-09-19 ENCOUNTER — TELEPHONE (OUTPATIENT)
Dept: FAMILY MEDICINE | Facility: CLINIC | Age: 64
End: 2024-09-19
Payer: MEDICARE

## 2024-09-19 RX ORDER — CALCIUM CITRATE/VITAMIN D3 500MG-12.5
1 TABLET,CHEWABLE ORAL 2 TIMES DAILY
COMMUNITY

## 2024-09-19 NOTE — TELEPHONE ENCOUNTER
Spoke with pt in regards to recent Dexa scan results. Verbalized verbatim per Dr. Hall. Pt acknowledged understanding.    Medication list updated.

## 2024-09-19 NOTE — TELEPHONE ENCOUNTER
----- Message from Dodie Hall MD sent at 9/19/2024  7:11 AM CDT -----  Let pt know that her DEXA bone scan is     1. normal in the left hip and spine.  2. If not already taking she should take citrical 600mg plus 400 IU D bid with meals and Weight bearing exercises as tolerated to maintain her bone strength and to help prevent further decreases in bone density in the future.

## 2024-10-08 ENCOUNTER — HOSPITAL ENCOUNTER (OUTPATIENT)
Dept: RADIOLOGY | Facility: HOSPITAL | Age: 64
Discharge: HOME | End: 2024-10-08
Attending: INTERNAL MEDICINE
Payer: MEDICARE

## 2024-10-08 ENCOUNTER — HOSPITAL ENCOUNTER (OUTPATIENT)
Dept: RADIOLOGY | Facility: HOSPITAL | Age: 64
Discharge: HOME | End: 2024-10-08
Attending: PHYSICIAN ASSISTANT
Payer: MEDICARE

## 2024-10-08 DIAGNOSIS — K74.60 HEPATIC CIRRHOSIS, UNSPECIFIED HEPATIC CIRRHOSIS TYPE, UNSPECIFIED WHETHER ASCITES PRESENT: ICD-10-CM

## 2024-10-08 DIAGNOSIS — Z12.31 VISIT FOR SCREENING MAMMOGRAM: ICD-10-CM

## 2024-10-08 PROCEDURE — 77063 BREAST TOMOSYNTHESIS BI: CPT | Mod: 26,,, | Performed by: RADIOLOGY

## 2024-10-08 PROCEDURE — 77067 SCR MAMMO BI INCL CAD: CPT | Mod: 26,,, | Performed by: RADIOLOGY

## 2024-10-08 PROCEDURE — 77067 SCR MAMMO BI INCL CAD: CPT | Mod: TC

## 2024-10-08 PROCEDURE — 76705 ECHO EXAM OF ABDOMEN: CPT | Mod: 26,,, | Performed by: RADIOLOGY

## 2024-10-08 PROCEDURE — 76705 ECHO EXAM OF ABDOMEN: CPT | Mod: TC

## 2024-10-10 ENCOUNTER — TELEPHONE (OUTPATIENT)
Dept: HEPATOLOGY | Facility: CLINIC | Age: 64
End: 2024-10-10
Payer: MEDICARE

## 2024-10-10 DIAGNOSIS — K74.60 HEPATIC CIRRHOSIS, UNSPECIFIED HEPATIC CIRRHOSIS TYPE, UNSPECIFIED WHETHER ASCITES PRESENT: Primary | ICD-10-CM

## 2024-10-10 NOTE — TELEPHONE ENCOUNTER
Attempt made to reach patient.  Msg from PA Scheuermann left on her VM.  F/u with testing scheduled 4/15/25; appt reminder notice mailed.

## 2024-10-10 NOTE — TELEPHONE ENCOUNTER
10/8 labs and ultrasound reviewed - stable    Please notify patient:  I have reviewed the recent labs and ultrasound.  Liver is working well.  Liver cancer screening looked fine. There are no tumors in the liver. Liver cancer blood test was normal.  Next liver monitoring is due in 6 months.    Please schedule: CBC, CMP, INR, AFP, U/S, VISIT in 4/2025      Thanks

## 2024-10-29 ENCOUNTER — HOSPITAL ENCOUNTER (OUTPATIENT)
Facility: HOSPITAL | Age: 64
Discharge: HOME | End: 2024-10-29
Attending: INTERNAL MEDICINE | Admitting: INTERNAL MEDICINE
Payer: MEDICARE

## 2024-10-29 ENCOUNTER — ANESTHESIA (OUTPATIENT)
Dept: ENDOSCOPY | Facility: HOSPITAL | Age: 64
End: 2024-10-29
Payer: MEDICARE

## 2024-10-29 ENCOUNTER — ANESTHESIA EVENT (OUTPATIENT)
Dept: ENDOSCOPY | Facility: HOSPITAL | Age: 64
End: 2024-10-29
Payer: MEDICARE

## 2024-10-29 DIAGNOSIS — Z12.11 SCREENING FOR COLON CANCER: ICD-10-CM

## 2024-10-29 DIAGNOSIS — K64.8 INTERNAL HEMORRHOIDS: Primary | ICD-10-CM

## 2024-10-29 PROCEDURE — 37000008 HC ANESTHESIA 1ST 15 MINUTES: Performed by: INTERNAL MEDICINE

## 2024-10-29 PROCEDURE — G0121 COLON CA SCRN NOT HI RSK IND: HCPCS | Performed by: INTERNAL MEDICINE

## 2024-10-29 PROCEDURE — G0121 COLON CA SCRN NOT HI RSK IND: HCPCS | Mod: ,,, | Performed by: INTERNAL MEDICINE

## 2024-10-29 PROCEDURE — 63600175 PHARM REV CODE 636 W HCPCS: Performed by: NURSE ANESTHETIST, CERTIFIED REGISTERED

## 2024-10-29 PROCEDURE — 37000009 HC ANESTHESIA EA ADD 15 MINS: Performed by: INTERNAL MEDICINE

## 2024-10-29 RX ORDER — LIDOCAINE HYDROCHLORIDE 20 MG/ML
INJECTION, SOLUTION EPIDURAL; INFILTRATION; INTRACAUDAL; PERINEURAL
Status: DISCONTINUED
Start: 2024-10-29 | End: 2024-10-29 | Stop reason: HOSPADM

## 2024-10-29 RX ORDER — SODIUM CHLORIDE 9 MG/ML
INJECTION, SOLUTION INTRAVENOUS CONTINUOUS
Status: DISCONTINUED | OUTPATIENT
Start: 2024-10-29 | End: 2024-10-29 | Stop reason: HOSPADM

## 2024-10-29 RX ORDER — LIDOCAINE HYDROCHLORIDE 20 MG/ML
INJECTION INTRAVENOUS
Status: DISCONTINUED | OUTPATIENT
Start: 2024-10-29 | End: 2024-10-29

## 2024-10-29 RX ORDER — PROPOFOL 10 MG/ML
INJECTION, EMULSION INTRAVENOUS
Status: DISCONTINUED
Start: 2024-10-29 | End: 2024-10-29 | Stop reason: HOSPADM

## 2024-10-29 RX ORDER — PROPOFOL 10 MG/ML
VIAL (ML) INTRAVENOUS
Status: DISCONTINUED | OUTPATIENT
Start: 2024-10-29 | End: 2024-10-29

## 2024-10-29 RX ADMIN — LIDOCAINE HYDROCHLORIDE 50 MG: 20 INJECTION, SOLUTION INTRAVENOUS at 10:10

## 2024-10-29 RX ADMIN — PROPOFOL 50 MG: 10 INJECTION, EMULSION INTRAVENOUS at 10:10

## 2024-10-29 RX ADMIN — PROPOFOL 100 MG: 10 INJECTION, EMULSION INTRAVENOUS at 10:10

## 2024-10-30 VITALS
WEIGHT: 252 LBS | DIASTOLIC BLOOD PRESSURE: 75 MMHG | HEART RATE: 72 BPM | BODY MASS INDEX: 43.02 KG/M2 | TEMPERATURE: 98 F | OXYGEN SATURATION: 96 % | SYSTOLIC BLOOD PRESSURE: 135 MMHG | HEIGHT: 64 IN | RESPIRATION RATE: 22 BRPM

## 2024-11-19 DIAGNOSIS — K21.9 GASTROESOPHAGEAL REFLUX DISEASE, UNSPECIFIED WHETHER ESOPHAGITIS PRESENT: ICD-10-CM

## 2024-11-19 RX ORDER — FAMOTIDINE 20 MG/1
20 TABLET, FILM COATED ORAL 2 TIMES DAILY
Qty: 180 TABLET | Refills: 3 | Status: SHIPPED | OUTPATIENT
Start: 2024-11-19

## 2024-11-19 NOTE — TELEPHONE ENCOUNTER
The patient's prescription has been approved and sent to   [facility] - [place] - [address]  [address]  [place] [address]  Phone: [phone] Fax: [phone]

## 2024-11-19 NOTE — TELEPHONE ENCOUNTER
----- Message from Vaishnavi sent at 11/19/2024 11:37 AM CST -----  Refill famotidine  [facility] [address]   [phone]

## 2024-12-10 ENCOUNTER — TELEPHONE (OUTPATIENT)
Dept: FAMILY MEDICINE | Facility: CLINIC | Age: 64
End: 2024-12-10
Payer: MEDICARE

## 2024-12-10 NOTE — TELEPHONE ENCOUNTER
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 [phone]

## 2024-12-10 NOTE — LETTER
[facility]  [address]  [address]  [place] [address]  Phone: [phone]  Fax: [phone] December 10, 2024    Chelsea Grossman  [address] [address]      To Whom It May Concern:    Chelsea Grossman is unable to participate in jury duty due to ongoing medical concerns.    If you have any questions or concerns, please feel free to call my office.    Sincerely,    Electronically Signed By: MD Rafael Chanel Miyoshi, MD

## 2024-12-12 ENCOUNTER — TELEPHONE (OUTPATIENT)
Dept: FAMILY MEDICINE | Facility: CLINIC | Age: 64
End: 2024-12-12
Payer: MEDICARE

## 2024-12-12 DIAGNOSIS — Z79.899 LONG-TERM USE OF HIGH-RISK MEDICATION: ICD-10-CM

## 2024-12-12 DIAGNOSIS — I10 BENIGN ESSENTIAL HYPERTENSION: Primary | ICD-10-CM

## 2024-12-12 DIAGNOSIS — N28.9 KIDNEY INSUFFICIENCY: ICD-10-CM

## 2024-12-12 DIAGNOSIS — R73.01 ELEVATED FASTING BLOOD SUGAR: ICD-10-CM

## 2024-12-12 NOTE — TELEPHONE ENCOUNTER
----- Message from Vaishnavi sent at 12/12/2024 12:36 PM CST -----  Patient needs orders for her labs to be sent to [facility]. Her appointment is on 01/18. Facility is waiting on the orders.   Fax#[phone]  Patient # [phone]

## 2024-12-18 ENCOUNTER — LAB VISIT (OUTPATIENT)
Dept: LAB | Facility: HOSPITAL | Age: 64
End: 2024-12-18
Attending: FAMILY MEDICINE
Payer: MEDICARE

## 2024-12-18 DIAGNOSIS — R73.01 ELEVATED FASTING BLOOD SUGAR: ICD-10-CM

## 2024-12-18 DIAGNOSIS — I10 BENIGN ESSENTIAL HYPERTENSION: ICD-10-CM

## 2024-12-18 DIAGNOSIS — N28.9 KIDNEY INSUFFICIENCY: ICD-10-CM

## 2024-12-18 DIAGNOSIS — Z79.899 LONG-TERM USE OF HIGH-RISK MEDICATION: ICD-10-CM

## 2024-12-18 LAB
ALBUMIN/CREAT UR: 12.3 UG/MG (ref 0–30)
BILIRUB UR QL STRIP: NEGATIVE
CLARITY UR: CLEAR
COLOR UR: YELLOW
CREAT UR-MCNC: 112.6 MG/DL (ref 15–325)
GLUCOSE UR QL STRIP: NEGATIVE
HGB UR QL STRIP: NEGATIVE
KETONES UR QL STRIP: NEGATIVE
LEUKOCYTE ESTERASE UR QL STRIP: NEGATIVE
MICROALBUMIN UR DL<=1MG/L-MCNC: 13.8 UG/ML (ref 0–4999.9)
NITRITE UR QL STRIP: NEGATIVE
PH UR STRIP: 7 [PH] (ref 5–8)
PROT UR QL STRIP: NEGATIVE
SP GR UR STRIP: 1.02 (ref 1–1.03)
URN SPEC COLLECT METH UR: NORMAL
UROBILINOGEN UR STRIP-ACNC: NEGATIVE EU/DL

## 2024-12-18 PROCEDURE — 81003 URINALYSIS AUTO W/O SCOPE: CPT | Performed by: FAMILY MEDICINE

## 2024-12-18 PROCEDURE — 82570 ASSAY OF URINE CREATININE: CPT | Performed by: FAMILY MEDICINE

## 2024-12-22 NOTE — PROGRESS NOTES
SUBJECTIVE:    Patient ID: Chelsea Grossman is a 64 y.o. female.    Chief Complaint: Follow-up (4 mo f/u//no med bottles//complains of short stabbing pains in left foor for 2 weeks//fluad ordered//tc)    Pt here to checkup on acute and chronic conditions. (PMHx HTN, Arthritis, GERD, Allergies, gout)    BP is doing well today. Denies CP/SOB/HA.  (S/p surgery for L burst eardrum from HTN over 10 years ago.)    Has hx of staples in her left foot from an old injury where she had to have surgery after dropping a can on her foot. (Mancil/Keppel). Has pain in her foot. Stopped taking celebrex because she takes tramadol sometimes for the pain real bad, like after cleaning up and mopping. She also has stairs and when she goes to the store she might twitch it or something and has to take the tramadol. Also uses topical cream with diclofenac/lidocaine as well (0.925/0.3%).      Heartburn has been doing better on famotidine.     Has a hx gout, last flare up was about a week ago. Takes colchicine for it.  Normally gets the flare up in her left foot.  Gets flareups 1-2 times a month    Has allergies, taking flonase as needed, not daily.     Tries to stay active and do some exercise, walks 2-3 blocks and then goes back on. Walks to the mailbox.     Had labs done: Ma/Cr 13.8    -------------------------------------------------------  Mammogram 10/2024  Cscope due  Hx Hep C treated to cure (Scheuermann)        Lab Visit on 12/18/2024   Component Date Value Ref Range Status    Microalbumin, Urine 12/18/2024 13.8  0.0 - 4999.9 ug/mL Final    Creatinine, Urine 12/18/2024 112.6  15.0 - 325.0 mg/dL Final    Microalb/Creat Ratio 12/18/2024 12.3  0.0 - 30.0 ug/mg Final    Specimen UA 12/18/2024 Urine, Clean Catch   Final    Color, UA 12/18/2024 Yellow  Yellow, Straw, Tyesha Final    Appearance, UA 12/18/2024 Clear  Clear Final    pH, UA 12/18/2024 7.0  5.0 - 8.0 Final    Specific Gravity, UA 12/18/2024 1.020  1.005 - 1.030 Final     Protein, UA 12/18/2024 Negative  Negative Final    Glucose, UA 12/18/2024 Negative  Negative Final    Ketones, UA 12/18/2024 Negative  Negative Final    Bilirubin (UA) 12/18/2024 Negative  Negative Final    Occult Blood UA 12/18/2024 Negative  Negative Final    Nitrite, UA 12/18/2024 Negative  Negative Final    Urobilinogen, UA 12/18/2024 Negative  <2.0 EU/dL Final    Leukocytes, UA 12/18/2024 Negative  Negative Final   Lab Visit on 10/08/2024   Component Date Value Ref Range Status    WBC 10/08/2024 6.45  3.90 - 12.70 K/uL Final    RBC 10/08/2024 3.75 (L)  4.00 - 5.40 M/uL Final    Hemoglobin 10/08/2024 12.0  12.0 - 16.0 g/dL Final    Hematocrit 10/08/2024 37.1  37.0 - 48.5 % Final    MCV 10/08/2024 99 (H)  82 - 98 fL Final    MCH 10/08/2024 32.0 (H)  27.0 - 31.0 pg Final    MCHC 10/08/2024 32.3  32.0 - 36.0 g/dL Final    RDW 10/08/2024 12.9  11.5 - 14.5 % Final    Platelets 10/08/2024 263  150 - 450 K/uL Final    MPV 10/08/2024 9.7  9.2 - 12.9 fL Final    Sodium 10/08/2024 140  136 - 145 mmol/L Final    Potassium 10/08/2024 3.6  3.5 - 5.1 mmol/L Final    Chloride 10/08/2024 108  95 - 110 mmol/L Final    CO2 10/08/2024 20 (L)  23 - 29 mmol/L Final    Glucose 10/08/2024 114 (H)  70 - 110 mg/dL Final    BUN 10/08/2024 16  8 - 23 mg/dL Final    Creatinine 10/08/2024 1.0  0.5 - 1.4 mg/dL Final    Calcium 10/08/2024 9.8  8.7 - 10.5 mg/dL Final    Total Protein 10/08/2024 7.6  6.0 - 8.4 g/dL Final    Albumin 10/08/2024 3.7  3.5 - 5.2 g/dL Final    Total Bilirubin 10/08/2024 0.2  0.1 - 1.0 mg/dL Final    Alkaline Phosphatase 10/08/2024 71  55 - 135 U/L Final    AST 10/08/2024 27  10 - 40 U/L Final    ALT 10/08/2024 37  10 - 44 U/L Final    eGFR 10/08/2024 >60  >60 mL/min/1.73 m^2 Final    Anion Gap 10/08/2024 12  8 - 16 mmol/L Final    Prothrombin Time 10/08/2024 10.9  9.0 - 12.5 sec Final    INR 10/08/2024 1.0  0.8 - 1.2 Final    AFP 10/08/2024 7.6  ng/mL Final       Past Medical History:   Diagnosis Date    Cirrhosis      History of hepatitis C, s/p successful Rx w/ cure (SVR12 - 2020)     History of kidney stones     Hypertension     Hypertensive retinopathy of both eyes     Osteoarthritis     Osteopenia     Trigger finger of left hand      Social History     Socioeconomic History    Marital status: Single   Occupational History    Occupation: disabled   Tobacco Use    Smoking status: Former     Current packs/day: 0.00     Types: Cigarettes     Quit date: 10/2018     Years since quittin.2    Smokeless tobacco: Never   Substance and Sexual Activity    Alcohol use: Not Currently    Drug use: No    Sexual activity: Never     Partners: Female   Social History Narrative    Live with sister     Social Drivers of Health     Stress: No Stress Concern Present (2020)    [facility] Occupational Health - Occupational Stress Questionnaire     Feeling of Stress : Not at all     Past Surgical History:   Procedure Laterality Date    CARPAL TUNNEL RELEASE Bilateral     COLONOSCOPY N/A 10/29/2024    Procedure: COLONOSCOPY;  Surgeon: Harrison Royal MD;  Location: [facility];  Service: Endoscopy;  Laterality: N/A;  ppm    FOOT SURGERY Left 2018    KIDNEY STONE SURGERY      Left ear surgery      TOTAL ABDOMINAL HYSTERECTOMY W/ BILATERAL SALPINGOOPHORECTOMY      TRIGGER FINGER RELEASE Right     2nd and 3rd fingers    TRIGGER FINGER RELEASE Left 2024    Procedure: RELEASE, TRIGGER FINGER, LEFT INDEX FINGER;  Surgeon: River Gonzales MD;  Location: [facility];  Service: Orthopedics;  Laterality: Left;     Family History   Problem Relation Name Age of Onset    Heart disease Mother      Cancer Father          stomach    Breast cancer Sister      Breast cancer Maternal Grandmother  60    Stomach cancer Brother         Review of patient's allergies indicates:   Allergen Reactions    Sulfa (sulfonamide antibiotics) Itching    Sulfamethoxazole-trimethoprim Itching and Rash     Itchy Rash       Current Outpatient  Medications:     (Magic mouthwash) 1:1:1 diphenhydrAMINE(Benadryl) 12.5mg/5ml liq, aluminum & magnesium hydroxide-simethicone (Maalox), LIDOcaine viscous 2%, Swish and spit 5 mLs every 4 (four) hours as needed (stomach pain). for mouth sores, Disp: 360 mL, Rfl: 3    amLODIPine (NORVASC) 10 MG tablet, Take 1 tablet (10 mg total) by mouth once daily., Disp: 90 tablet, Rfl: 3    calcium citrate-vitamin D3 500 mg-12.5 mcg (500 unit) Chew, Take 1 tablet by mouth 2 (two) times daily., Disp: , Rfl:     carvediloL (COREG) 12.5 MG tablet, Take 1 tablet (12.5 mg total) by mouth 2 (two) times daily with meals., Disp: 180 tablet, Rfl: 3    famotidine (PEPCID) 20 MG tablet, Take 1 tablet (20 mg total) by mouth 2 (two) times daily., Disp: 180 tablet, Rfl: 3    fluticasone propionate (FLONASE) 50 mcg/actuation nasal spray, 1 spray (50 mcg total) by Each Nostril route once daily., Disp: 48 g, Rfl: 1    multivitamin (THERAGRAN) per tablet, Take 1 tablet by mouth once daily., Disp: , Rfl:     olmesartan (BENICAR) 40 MG tablet, Take 1 tablet (40 mg total) by mouth once daily., Disp: 90 tablet, Rfl: 1    traMADoL (ULTRAM) 50 mg tablet, Take 1 tablet (50 mg total) by mouth every 8 (eight) hours as needed for Pain., Disp: 90 tablet, Rfl: 3    UNABLE TO FIND, Apply 1 g topically 4 (four) times daily. medication name: Diclofenac NA/Lidocaine HCL 0.925/0.3% Gel, Disp: , Rfl:     colchicine (COLCRYS) 0.6 mg tablet, Take 2 tablets at symptom onset, 1 tablet 2-3 hours later, then 1 tablet daily for 7 days as needed for gout attack, Disp: 30 tablet, Rfl: 3    hydroCHLOROthiazide (HYDRODIURIL) 25 MG tablet, Take 1 tablet (25 mg total) by mouth once daily., Disp: 90 tablet, Rfl: 3  No current facility-administered medications for this visit.    Review of Systems   Constitutional:  Negative for appetite change, fatigue, fever and unexpected weight change.   Respiratory:  Negative for cough, chest tightness, shortness of breath and wheezing.   "  Cardiovascular:  Negative for chest pain and leg swelling.   Gastrointestinal:  Negative for abdominal pain, constipation, nausea and vomiting.        -heartburn   Genitourinary:  Negative for difficulty urinating, dysuria, frequency and urgency.   Musculoskeletal:  Negative for arthralgias, back pain, myalgias and neck pain.   Skin:  Negative for rash.   Neurological:  Negative for dizziness, weakness, numbness and headaches.   Hematological:  Does not bruise/bleed easily.   Psychiatric/Behavioral:  Negative for dysphoric mood, sleep disturbance and suicidal ideas. The patient is not nervous/anxious.    All other systems reviewed and are negative.         Objective:      Vitals:    12/23/24 0741   BP: 134/68   Pulse: 78   SpO2: 98%   Weight: 117.5 kg (259 lb)   Height: 5' 4" (1.626 m)       Physical Exam  Vitals reviewed.   Constitutional:       General: She is not in acute distress.     Appearance: Normal appearance. She is well-developed.   HENT:      Head: Normocephalic and atraumatic.      Right Ear: Tympanic membrane and ear canal normal.      Left Ear: Tympanic membrane and ear canal normal.   Neck:      Thyroid: No thyromegaly.      Vascular: No carotid bruit.   Cardiovascular:      Rate and Rhythm: Normal rate and regular rhythm.      Heart sounds: Normal heart sounds. No murmur heard.     No friction rub.   Pulmonary:      Effort: Pulmonary effort is normal.      Breath sounds: Normal breath sounds. No wheezing or rales.   Abdominal:      General: Bowel sounds are normal. There is no distension.      Palpations: Abdomen is soft.      Tenderness: There is no abdominal tenderness.   Musculoskeletal:      Cervical back: Neck supple.   Lymphadenopathy:      Cervical: No cervical adenopathy.   Skin:     General: Skin is warm and dry.      Findings: No rash.   Neurological:      Mental Status: She is alert and oriented to person, place, and time.   Psychiatric:         Attention and Perception: She is " attentive.         Speech: Speech normal.         Behavior: Behavior normal.         Thought Content: Thought content normal.         Judgment: Judgment normal.           Assessment:       1. Essential hypertension    2. Gastroesophageal reflux disease, unspecified whether esophagitis present    3. Osteoarthritis of multiple joints, unspecified osteoarthritis type    4. Gout involving toe, unspecified cause, unspecified chronicity, unspecified laterality    5. Other chronic postprocedural pain    6. Influenza vaccine administered    7. Elevated blood sugar    8. Long-term use of high-risk medication           Plan:       Essential hypertension  Comments:  Controlled. Will continue to monitor on current medication  Orders:  -     hydroCHLOROthiazide (HYDRODIURIL) 25 MG tablet; Take 1 tablet (25 mg total) by mouth once daily.  Dispense: 90 tablet; Refill: 3  -     TSH w/reflex to FT4; Future; Expected date: 12/23/2024  -     Lipid Panel; Future; Expected date: 12/23/2024  -     HEMOGLOBIN A1C; Future; Expected date: 12/23/2024    Gastroesophageal reflux disease, unspecified whether esophagitis present  Comments:  Controlled. Will continue to monitor symptoms on pepcid    Osteoarthritis of multiple joints, unspecified osteoarthritis type    Gout involving toe, unspecified cause, unspecified chronicity, unspecified laterality  Comments:  Intermittent. Will continue to monitor flareups. To continue colchicine as needed.  Orders:  -     colchicine (COLCRYS) 0.6 mg tablet; Take 2 tablets at symptom onset, 1 tablet 2-3 hours later, then 1 tablet daily for 7 days as needed for gout attack  Dispense: 30 tablet; Refill: 3    Other chronic postprocedural pain  Comments:  Pain controlled. Will continue to monitor pain control and function on tramadol.    Influenza vaccine administered  -     influenza (adjuvanted) (Fluad) 45 mcg/0.5 mL IM vaccine (> or = 64 yo) 0.5 mL    Elevated blood sugar  Comments:  Will check  A1c.  Orders:  -     TSH w/reflex to FT4; Future; Expected date: 12/23/2024  -     HEMOGLOBIN A1C; Future; Expected date: 12/23/2024    Long-term use of high-risk medication  -     TSH w/reflex to FT4; Future; Expected date: 12/23/2024  -     Lipid Panel; Future; Expected date: 12/23/2024  -     HEMOGLOBIN A1C; Future; Expected date: 12/23/2024      Labs and/or tests have been ordered for the evaluation/monitoring of acute/chronic conditions, to be done today.    Follow up in about 4 months (around 4/23/2025) for HTN, GERD, Arthritis.        12/23/2024 Dodie Hall

## 2024-12-23 ENCOUNTER — OFFICE VISIT (OUTPATIENT)
Dept: FAMILY MEDICINE | Facility: CLINIC | Age: 64
End: 2024-12-23
Payer: MEDICARE

## 2024-12-23 ENCOUNTER — TELEPHONE (OUTPATIENT)
Dept: FAMILY MEDICINE | Facility: CLINIC | Age: 64
End: 2024-12-23

## 2024-12-23 VITALS
BODY MASS INDEX: 44.22 KG/M2 | OXYGEN SATURATION: 98 % | DIASTOLIC BLOOD PRESSURE: 68 MMHG | WEIGHT: 259 LBS | HEART RATE: 78 BPM | SYSTOLIC BLOOD PRESSURE: 134 MMHG | HEIGHT: 64 IN

## 2024-12-23 DIAGNOSIS — Z79.899 LONG-TERM USE OF HIGH-RISK MEDICATION: ICD-10-CM

## 2024-12-23 DIAGNOSIS — M10.9 GOUT INVOLVING TOE, UNSPECIFIED CAUSE, UNSPECIFIED CHRONICITY, UNSPECIFIED LATERALITY: ICD-10-CM

## 2024-12-23 DIAGNOSIS — R73.9 ELEVATED BLOOD SUGAR: ICD-10-CM

## 2024-12-23 DIAGNOSIS — G89.28 OTHER CHRONIC POSTPROCEDURAL PAIN: ICD-10-CM

## 2024-12-23 DIAGNOSIS — M15.9 OSTEOARTHRITIS OF MULTIPLE JOINTS, UNSPECIFIED OSTEOARTHRITIS TYPE: ICD-10-CM

## 2024-12-23 DIAGNOSIS — I10 ESSENTIAL HYPERTENSION: Primary | ICD-10-CM

## 2024-12-23 DIAGNOSIS — K21.9 GASTROESOPHAGEAL REFLUX DISEASE, UNSPECIFIED WHETHER ESOPHAGITIS PRESENT: ICD-10-CM

## 2024-12-23 DIAGNOSIS — Z23 INFLUENZA VACCINE ADMINISTERED: ICD-10-CM

## 2024-12-23 PROCEDURE — 90653 IIV ADJUVANT VACCINE IM: CPT | Mod: S$GLB,,, | Performed by: FAMILY MEDICINE

## 2024-12-23 PROCEDURE — 99214 OFFICE O/P EST MOD 30 MIN: CPT | Mod: S$GLB,,, | Performed by: FAMILY MEDICINE

## 2024-12-23 PROCEDURE — G0008 ADMIN INFLUENZA VIRUS VAC: HCPCS | Mod: S$GLB,,, | Performed by: FAMILY MEDICINE

## 2024-12-23 RX ORDER — HYDROCHLOROTHIAZIDE 25 MG/1
25 TABLET ORAL DAILY
Qty: 90 TABLET | Refills: 3 | Status: SHIPPED | OUTPATIENT
Start: 2024-12-23

## 2024-12-23 RX ORDER — COLCHICINE 0.6 MG/1
TABLET ORAL
Qty: 30 TABLET | Refills: 3 | Status: SHIPPED | OUTPATIENT
Start: 2024-12-23

## 2024-12-23 NOTE — TELEPHONE ENCOUNTER
Noted  
Patient said she is going to hold off on getting lab until January when she gets on PHN. She may have had a balance getting labs with Medicare today. FYI to Dr Hall   
Statement Selected

## 2025-01-24 DIAGNOSIS — S93.325S LISFRANC DISLOCATION, LEFT, SEQUELA: ICD-10-CM

## 2025-01-24 DIAGNOSIS — M15.9 OSTEOARTHRITIS OF MULTIPLE JOINTS, UNSPECIFIED OSTEOARTHRITIS TYPE: ICD-10-CM

## 2025-01-24 RX ORDER — TRAMADOL HYDROCHLORIDE 50 MG/1
50 TABLET ORAL EVERY 8 HOURS PRN
Qty: 90 TABLET | Refills: 3 | Status: SHIPPED | OUTPATIENT
Start: 2025-01-24

## 2025-01-24 NOTE — TELEPHONE ENCOUNTER
The patient's prescription has been approved and sent to   [facility]/pharmacy #[id number] - [place], [place] - [address]  [address] [address]  Phone: [phone] Fax: [phone]

## 2025-01-24 NOTE — TELEPHONE ENCOUNTER
----- Message from Yvrose sent at 1/24/2025  8:05 AM CST -----  Refill Tramadol [facility] on [address]. The patient is coming Monday to have her labs.  pt's # [phone]

## 2025-02-24 DIAGNOSIS — Z00.00 ENCOUNTER FOR MEDICARE ANNUAL WELLNESS EXAM: ICD-10-CM

## 2025-04-15 ENCOUNTER — HOSPITAL ENCOUNTER (OUTPATIENT)
Dept: RADIOLOGY | Facility: HOSPITAL | Age: 65
Discharge: HOME | End: 2025-04-15
Attending: PHYSICIAN ASSISTANT
Payer: MEDICARE

## 2025-04-15 ENCOUNTER — OFFICE VISIT (OUTPATIENT)
Dept: HEPATOLOGY | Facility: CLINIC | Age: 65
End: 2025-04-15
Payer: MEDICARE

## 2025-04-15 VITALS — WEIGHT: 259.81 LBS | BODY MASS INDEX: 44.36 KG/M2 | HEIGHT: 64 IN

## 2025-04-15 DIAGNOSIS — E66.01 MORBID (SEVERE) OBESITY DUE TO EXCESS CALORIES: ICD-10-CM

## 2025-04-15 DIAGNOSIS — K74.60 HEPATIC CIRRHOSIS, UNSPECIFIED HEPATIC CIRRHOSIS TYPE, UNSPECIFIED WHETHER ASCITES PRESENT: Primary | ICD-10-CM

## 2025-04-15 DIAGNOSIS — K74.60 HEPATIC CIRRHOSIS, UNSPECIFIED HEPATIC CIRRHOSIS TYPE, UNSPECIFIED WHETHER ASCITES PRESENT: ICD-10-CM

## 2025-04-15 DIAGNOSIS — E66.01 CLASS 3 SEVERE OBESITY WITH BODY MASS INDEX (BMI) OF 40.0 TO 44.9 IN ADULT, UNSPECIFIED OBESITY TYPE, UNSPECIFIED WHETHER SERIOUS COMORBIDITY PRESENT: ICD-10-CM

## 2025-04-15 DIAGNOSIS — E66.813 CLASS 3 SEVERE OBESITY WITH BODY MASS INDEX (BMI) OF 40.0 TO 44.9 IN ADULT, UNSPECIFIED OBESITY TYPE, UNSPECIFIED WHETHER SERIOUS COMORBIDITY PRESENT: ICD-10-CM

## 2025-04-15 PROCEDURE — 3008F BODY MASS INDEX DOCD: CPT | Mod: CPTII,S$GLB,, | Performed by: PHYSICIAN ASSISTANT

## 2025-04-15 PROCEDURE — 3044F HG A1C LEVEL LT 7.0%: CPT | Mod: CPTII,S$GLB,, | Performed by: PHYSICIAN ASSISTANT

## 2025-04-15 PROCEDURE — 1160F RVW MEDS BY RX/DR IN RCRD: CPT | Mod: CPTII,S$GLB,, | Performed by: PHYSICIAN ASSISTANT

## 2025-04-15 PROCEDURE — 99999 PR PBB SHADOW E&M-EST. PATIENT-LVL III: CPT | Mod: PBBFAC,,, | Performed by: PHYSICIAN ASSISTANT

## 2025-04-15 PROCEDURE — 99214 OFFICE O/P EST MOD 30 MIN: CPT | Mod: S$GLB,,, | Performed by: PHYSICIAN ASSISTANT

## 2025-04-15 PROCEDURE — 1101F PT FALLS ASSESS-DOCD LE1/YR: CPT | Mod: CPTII,S$GLB,, | Performed by: PHYSICIAN ASSISTANT

## 2025-04-15 PROCEDURE — 4010F ACE/ARB THERAPY RXD/TAKEN: CPT | Mod: CPTII,S$GLB,, | Performed by: PHYSICIAN ASSISTANT

## 2025-04-15 PROCEDURE — 3288F FALL RISK ASSESSMENT DOCD: CPT | Mod: CPTII,S$GLB,, | Performed by: PHYSICIAN ASSISTANT

## 2025-04-15 PROCEDURE — 76705 ECHO EXAM OF ABDOMEN: CPT | Mod: TC

## 2025-04-15 PROCEDURE — 76705 ECHO EXAM OF ABDOMEN: CPT | Mod: 26,,, | Performed by: RADIOLOGY

## 2025-04-15 PROCEDURE — 1159F MED LIST DOCD IN RCRD: CPT | Mod: CPTII,S$GLB,, | Performed by: PHYSICIAN ASSISTANT

## 2025-04-15 NOTE — PROGRESS NOTES
HEPATOLOGY CLINIC VISIT NOTE   CHIEF COMPLAINT: HCV Cirrhosis    HISTORY       This is a 64 y.o. Black or  female with well compensated cirrhosis due to HCV (now treated and cured) here for f/u.     Cirrhosis history:  FibroScan 8/23/19 - kPa 13.4, F4 (, no steatosis) // HCV FibroSURE 10/2019 - A2-3, F4  Well compensated  No portal HTN     Cirrhosis maintenance:  - Varices screening - not indicated (on carvedilol 12.5mg BID from PCP)  - HAV status: (+) immunity to HAV  - HBV status: s/p vaccine [id number]                    HCV history:  S/p 12 weeks epclusa w/ SVR12 (cured) - 4/2020  - Genotype 1a //  Treatment naive prior to epclusa    Interval history (4/16/24):  U/S 4/2024 - no liver lesions or ascites. Spleen 9cm  Labs 4/2024 - stable. MELD 7. Normal AFP  No s/s liver decompensation (on HCTZ 25 from PCP)    Interval history (04/15/2025):  Routine cirrhosis labs / imaging  U/S today: no liver lesions or ascites  Labs today: stable. AFP pending  MELD 3.0: 7 at 4/15/2025  8:36 AM  MELD-Na: 6 at 4/15/2025  8:36 AM  Calculated from:  Serum Creatinine: 1 mg/dL at 4/15/2025  8:36 AM  Serum Sodium: 139 mmol/L (Using max of 137 mmol/L) at 4/15/2025  8:36 AM  Total Bilirubin: 0.3 mg/dL (Using min of 1 mg/dL) at 4/15/2025  8:36 AM  Serum Albumin: 4.5 g/dL (Using max of 3.5 g/dL) at 4/15/2025  8:36 AM  INR(ratio): 1 at 4/15/2025  8:36 AM  Age at listing (hypothetical): 64 years  Sex: Female at 4/15/2025  8:36 AM      Denies jaundice, dark urine, hematemesis, melena, slowed mentation, abdominal distention.     Risks for steatotic liver  DM: appears new as of 1/2025 (HbA1c 6.6, per pt not yet d/w PCP)  Obesity: BMI 44  No HLD or alcohol    PMH, PSH, SOCIAL HX, FAMILY HX      Reviewed in Epic  FAMILY HX: neg for liver diease  Alcohol - (+) alcohol use in past, none regular now.  Drugs - remote history 1970s      ROS: as per HPI      PHYSICAL EXAM:  Friendly Black or  female, in no acute  distress; alert and oriented to person, place and time  HEENT: Sclerae anicteric.   LUNGS: Normal respiratory effort.   ABDOMEN: Flat, soft, nontender.   SKIN: Warm and dry. No jaundice, No obvious rashes.   EXTREMITIES: No lower extremity edema  NEURO/PSYCH: Normal gate. Memory intact. Thought and speech pattern appropriate. Behavior normal. No depression or anxiety noted.    PERTINENT DIAGNOSTIC RESULTS      Lab Results   Component Value Date    WBC 6.10 04/15/2025    HGB 13.9 04/15/2025     04/15/2025     Lab Results   Component Value Date    INR 1.0 04/15/2025     Lab Results   Component Value Date    AST 32 04/15/2025    ALT 35 04/15/2025    BILITOT 0.3 04/15/2025    ALBUMIN 4.5 04/15/2025    ALKPHOS 56 04/15/2025    CREATININE 1.0 04/15/2025    BUN 19 04/15/2025     04/15/2025    K 3.8 04/15/2025    AFP 7.6 10/08/2024     Results for orders placed during the hospital encounter of 04/15/25  US Abdomen Limited  CLINICAL HISTORY:  Unspecified cirrhosis of liver    TECHNIQUE:  Limited ultrasound of the right upper quadrant of the abdomen (including pancreas, liver, gallbladder, common bile duct, and spleen) was performed.    COMPARISON:  None.    FINDINGS:  Liver: Normal in size, measuring 17 cm. There is diffuse increased echogenicity consistent with fatty infiltration.  No focal hepatic lesions.    Gallbladder: Multiple gallstones are identified.  Gallbladder wall thickening or edema is not seen    Biliary system: The common duct is not dilated, measuring 5.3 mm.  No intrahepatic ductal dilatation.    Spleen: Normal in size and echotexture, measuring 8.7 cm.    Miscellaneous: No upper abdominal ascites.    Impression  Diffuse increased echogenicity suggesting fatty infiltration.  No ascites seen.  Cholelithiasis.    ASSESSMENT        64 y.o. Black or  female with:  1. Cirrhosis: well-compensated w/o portal HTN  -- based on FibroScan and FibroSURE  -- MELD 7  -- HCC screening: u/s ok,  AFP pending  -- EV screening: EGD not indicated. Already on coreg from PCP.    2. History of HCV: treated / cured  -- s/p 12 weeks epclusa w/ SVR12 - 4/2020    3. Obesity w/ BMI 44    4. DM (?recently diagnosed, pt unaware)    PLAN      1. CBC, CMP, INR, AFP, U/S every 6 months: due 10/2025 if AFP ok  2. Yearly visit, earlier PRN: due 4/2026    Briefly discussed significance of last HbA1c indicating DM. Discussed importance of modifying diet (low carb, avoid simple sugar, high fiber, high protein) and weight loss. Discussed relation b/w obesity and DM and fatty liver. Advised to discuss more w/ PCP at next visit.   __________________________________________________________________    Duration of encounter: 30 min  This includes face-to-face time and non face-to-face time preparing to see the patient (eg, review of tests), obtaining and/or reviewing separately obtained history, documenting clinical information in the electronic or other health record, independently interpreting resultsand communicating results to the patient/family/caregiver, or care coordination.

## 2025-04-17 ENCOUNTER — RESULTS FOLLOW-UP (OUTPATIENT)
Dept: HEPATOLOGY | Facility: CLINIC | Age: 65
End: 2025-04-17

## 2025-04-17 ENCOUNTER — TELEPHONE (OUTPATIENT)
Dept: HEPATOLOGY | Facility: CLINIC | Age: 65
End: 2025-04-17
Payer: MEDICARE

## 2025-04-17 NOTE — TELEPHONE ENCOUNTER
Pls tell pt labs and u/s from this week look ok  No liver tumors  Liver working well  Next monitoring due in 6 months (10/7/25) as scheduled    thanks

## 2025-04-23 ENCOUNTER — TELEPHONE (OUTPATIENT)
Dept: FAMILY MEDICINE | Facility: CLINIC | Age: 65
End: 2025-04-23

## 2025-04-23 ENCOUNTER — OFFICE VISIT (OUTPATIENT)
Dept: FAMILY MEDICINE | Facility: CLINIC | Age: 65
End: 2025-04-23
Payer: MEDICARE

## 2025-04-23 VITALS
HEART RATE: 76 BPM | SYSTOLIC BLOOD PRESSURE: 124 MMHG | HEIGHT: 64 IN | RESPIRATION RATE: 18 BRPM | BODY MASS INDEX: 44.39 KG/M2 | OXYGEN SATURATION: 96 % | WEIGHT: 260 LBS | DIASTOLIC BLOOD PRESSURE: 68 MMHG

## 2025-04-23 DIAGNOSIS — G47.9 TROUBLE IN SLEEPING: ICD-10-CM

## 2025-04-23 DIAGNOSIS — S99.922A FOOT INJURY, LEFT, INITIAL ENCOUNTER: ICD-10-CM

## 2025-04-23 DIAGNOSIS — I10 ESSENTIAL HYPERTENSION: Primary | ICD-10-CM

## 2025-04-23 DIAGNOSIS — Z79.899 LONG-TERM USE OF HIGH-RISK MEDICATION: ICD-10-CM

## 2025-04-23 DIAGNOSIS — R73.09 ELEVATED HEMOGLOBIN A1C: ICD-10-CM

## 2025-04-23 DIAGNOSIS — K21.9 GASTROESOPHAGEAL REFLUX DISEASE, UNSPECIFIED WHETHER ESOPHAGITIS PRESENT: ICD-10-CM

## 2025-04-23 DIAGNOSIS — S93.325S LISFRANC DISLOCATION, LEFT, SEQUELA: ICD-10-CM

## 2025-04-23 PROCEDURE — 1125F AMNT PAIN NOTED PAIN PRSNT: CPT | Mod: CPTII,S$GLB,, | Performed by: FAMILY MEDICINE

## 2025-04-23 PROCEDURE — 3288F FALL RISK ASSESSMENT DOCD: CPT | Mod: CPTII,S$GLB,, | Performed by: FAMILY MEDICINE

## 2025-04-23 PROCEDURE — 1159F MED LIST DOCD IN RCRD: CPT | Mod: CPTII,S$GLB,, | Performed by: FAMILY MEDICINE

## 2025-04-23 PROCEDURE — 3078F DIAST BP <80 MM HG: CPT | Mod: CPTII,S$GLB,, | Performed by: FAMILY MEDICINE

## 2025-04-23 PROCEDURE — 4010F ACE/ARB THERAPY RXD/TAKEN: CPT | Mod: CPTII,S$GLB,, | Performed by: FAMILY MEDICINE

## 2025-04-23 PROCEDURE — 3074F SYST BP LT 130 MM HG: CPT | Mod: CPTII,S$GLB,, | Performed by: FAMILY MEDICINE

## 2025-04-23 PROCEDURE — 3044F HG A1C LEVEL LT 7.0%: CPT | Mod: CPTII,S$GLB,, | Performed by: FAMILY MEDICINE

## 2025-04-23 PROCEDURE — 1101F PT FALLS ASSESS-DOCD LE1/YR: CPT | Mod: CPTII,S$GLB,, | Performed by: FAMILY MEDICINE

## 2025-04-23 PROCEDURE — 3008F BODY MASS INDEX DOCD: CPT | Mod: CPTII,S$GLB,, | Performed by: FAMILY MEDICINE

## 2025-04-23 PROCEDURE — 1160F RVW MEDS BY RX/DR IN RCRD: CPT | Mod: CPTII,S$GLB,, | Performed by: FAMILY MEDICINE

## 2025-04-23 PROCEDURE — 99214 OFFICE O/P EST MOD 30 MIN: CPT | Mod: S$GLB,,, | Performed by: FAMILY MEDICINE

## 2025-04-23 RX ORDER — CARVEDILOL 12.5 MG/1
12.5 TABLET ORAL 2 TIMES DAILY WITH MEALS
Qty: 180 TABLET | Refills: 3 | Status: SHIPPED | OUTPATIENT
Start: 2025-04-23

## 2025-04-23 RX ORDER — FAMOTIDINE 20 MG/1
20 TABLET, FILM COATED ORAL 2 TIMES DAILY
Qty: 180 TABLET | Refills: 3 | Status: SHIPPED | OUTPATIENT
Start: 2025-04-23

## 2025-04-23 RX ORDER — OLMESARTAN MEDOXOMIL 40 MG/1
40 TABLET ORAL DAILY
Qty: 90 TABLET | Refills: 3 | Status: SHIPPED | OUTPATIENT
Start: 2025-04-23

## 2025-04-23 RX ORDER — HYDROCHLOROTHIAZIDE 25 MG/1
25 TABLET ORAL DAILY
Qty: 90 TABLET | Refills: 3 | Status: SHIPPED | OUTPATIENT
Start: 2025-04-23

## 2025-04-23 RX ORDER — AMLODIPINE BESYLATE 10 MG/1
10 TABLET ORAL DAILY
Qty: 90 TABLET | Refills: 3 | Status: SHIPPED | OUTPATIENT
Start: 2025-04-23

## 2025-04-23 NOTE — PROGRESS NOTES
SUBJECTIVE:    Patient ID: Chelsea Grossman is a 64 y.o. female.    Chief Complaint: Follow-up (No bottles// refills needed// pt states she's been having trouble staying asleep says she wakes up with dry mouth,snoring also seems very tired during the day going on for 2 weeks //pt having left foot pain level 7 after container falling on foot 1 week ago//last taken tamodol was 4 days ago)    HPI  History of Present Illness    CHIEF COMPLAINT:  Chelsea presents today for medication refills and sleep issues    SLEEP:  She wakes up 2-3 times during the night, staying awake for 1-3 hours between awakenings. She reports snoring and dry mouth upon waking. She gets up to urinate 4-5 times per night. She takes naps during the day and feels sluggish with continued fatigue the following morning.    MUSCULOSKELETAL:  She reports recent left foot injury from a frozen container impact. She describes pain with certain movements, particularly when taking steps or turning her foot. Initially, there was swelling which improved with elevation, hot salt water soaks, and alcohol application. She denies current difficulty walking. She has a history of left foot surgery in 2018.    MEDICATIONS:  She takes amlodipine, carvedilol, hydrochlorothiazide, and Benicar for blood pressure management, and tramadol for ankle pain.    LABS:  Blood sugar and A1C were elevated. Thyroid function tests and cholesterol levels were normal.      ROS:  General: denies fever, denies chills, denies fatigue, denies weight gain, denies weight loss, +dry mouth, +excessive drowsiness  Eyes: denies vision changes, denies redness, denies discharge  ENT: denies ear pain, denies nasal congestion, denies sore throat  Cardiovascular: denies chest pain, denies palpitations, +lower extremity edema  Respiratory: denies cough, denies shortness of breath, +snoring  Gastrointestinal: denies abdominal pain, denies nausea, denies vomiting, denies diarrhea, denies  constipation, denies blood in stool  Genitourinary: denies dysuria, denies hematuria, denies frequency, +excessive urination  Musculoskeletal: denies joint pain, denies muscle pain, +pain with movement, +limb pain, +limb swelling  Skin: denies rash, denies lesion  Neurological: denies headache, denies dizziness, denies numbness, denies tingling, +sleep disturbances, +difficulty staying asleep  Psychiatric: denies anxiety, denies depression, +sleep difficulty         Lab Visit on 04/15/2025   Component Date Value Ref Range Status    WBC 04/15/2025 6.10  3.90 - 12.70 K/uL Final    RBC 04/15/2025 4.34  4.00 - 5.40 M/uL Final    Hgb 04/15/2025 13.9  12.0 - 16.0 gm/dL Final    Hct 04/15/2025 41.0  37.0 - 48.5 % Final    MCV 04/15/2025 95  82 - 98 fL Final    MCH 04/15/2025 32.0 (H)  27.0 - 31.0 pg Final    MCHC 04/15/2025 33.9  32.0 - 36.0 g/dL Final    RDW 04/15/2025 13.2  11.5 - 14.5 % Final    Platelet Count 04/15/2025 163  150 - 450 K/uL Final    MPV 04/15/2025 10.4  9.2 - 12.9 fL Final    Sodium 04/15/2025 139  136 - 145 mmol/L Final    Potassium 04/15/2025 3.8  3.5 - 5.1 mmol/L Final    Chloride 04/15/2025 107  95 - 110 mmol/L Final    CO2 04/15/2025 22 (L)  23 - 29 mmol/L Final    Glucose 04/15/2025 126 (H)  70 - 110 mg/dL Final    BUN 04/15/2025 19  8 - 23 mg/dL Final    Creatinine 04/15/2025 1.0  0.5 - 1.4 mg/dL Final    Calcium 04/15/2025 10.5  8.7 - 10.5 mg/dL Final    Protein Total 04/15/2025 8.2  6.0 - 8.4 gm/dL Final    Albumin 04/15/2025 4.5  3.5 - 5.2 g/dL Final    Bilirubin Total 04/15/2025 0.3  0.1 - 1.0 mg/dL Final    ALP 04/15/2025 56  40 - 150 unit/L Final    AST 04/15/2025 32  11 - 45 unit/L Final    ALT 04/15/2025 35  10 - 44 unit/L Final    Anion Gap 04/15/2025 10  8 - 16 mmol/L Final    eGFR 04/15/2025 >60  >60 mL/min/1.73/m2 Final    PT 04/15/2025 11.3  9.0 - 12.5 seconds Final    INR 04/15/2025 1.0  0.8 - 1.2 Final    AFP-Tumor Marker 04/15/2025 11 (H)  ng/mL Final    Platelet Estimate  04/15/2025 Clumped (A)  Appears Normal Final   Office Visit on 12/23/2024   Component Date Value Ref Range Status    TSH w/reflex to FT4 01/27/2025 1.43  0.40 - 4.50 mIU/L Final    Cholesterol 01/27/2025 161  <200 mg/dL Final    HDL 01/27/2025 42 (L)  > OR = 50 mg/dL Final    Triglycerides 01/27/2025 113  <150 mg/dL Final    LDL Cholesterol 01/27/2025 98  mg/dL (calc) Final    HDL/Cholesterol Ratio 01/27/2025 3.8  <5.0 (calc) Final    Non HDL Chol. (LDL+VLDL) 01/27/2025 119  <130 mg/dL (calc) Final    Hemoglobin A1C 01/27/2025 6.6 (H)  <5.7 % of total Hgb Final   Lab Visit on 12/18/2024   Component Date Value Ref Range Status    Microalbumin, Urine 12/18/2024 13.8  0.0 - 4999.9 ug/mL Final    Creatinine, Urine 12/18/2024 112.6  15.0 - 325.0 mg/dL Final    Microalb/Creat Ratio 12/18/2024 12.3  0.0 - 30.0 ug/mg Final    Specimen UA 12/18/2024 Urine, Clean Catch   Final    Color, UA 12/18/2024 Yellow  Yellow, Straw, Tyesha Final    Appearance, UA 12/18/2024 Clear  Clear Final    pH, UA 12/18/2024 7.0  5.0 - 8.0 Final    Specific Gravity, UA 12/18/2024 1.020  1.005 - 1.030 Final    Protein, UA 12/18/2024 Negative  Negative Final    Glucose, UA 12/18/2024 Negative  Negative Final    Ketones, UA 12/18/2024 Negative  Negative Final    Bilirubin (UA) 12/18/2024 Negative  Negative Final    Occult Blood UA 12/18/2024 Negative  Negative Final    Nitrite, UA 12/18/2024 Negative  Negative Final    Urobilinogen, UA 12/18/2024 Negative  <2.0 EU/dL Final    Leukocytes, UA 12/18/2024 Negative  Negative Final       Past Medical History:   Diagnosis Date    Cirrhosis 2020    History of hepatitis C, s/p successful Rx w/ cure (SVR12 - 4/2020)     History of kidney stones     Hypertension     Hypertensive retinopathy of both eyes     Osteoarthritis     Osteopenia     Trigger finger of left hand 2023     Social History[1]  Past Surgical History:   Procedure Laterality Date    CARPAL TUNNEL RELEASE Bilateral     COLONOSCOPY N/A 10/29/2024     Procedure: COLONOSCOPY;  Surgeon: Harrison Royal MD;  Location: [facility];  Service: Endoscopy;  Laterality: N/A;  ppm    FOOT SURGERY Left 12/18/2018    KIDNEY STONE SURGERY      Left ear surgery      TOTAL ABDOMINAL HYSTERECTOMY W/ BILATERAL SALPINGOOPHORECTOMY      TRIGGER FINGER RELEASE Right     2nd and 3rd fingers    TRIGGER FINGER RELEASE Left 5/22/2024    Procedure: RELEASE, TRIGGER FINGER, LEFT INDEX FINGER;  Surgeon: River Gonzales MD;  Location: [facility];  Service: Orthopedics;  Laterality: Left;     Family History   Problem Relation Name Age of Onset    Heart disease Mother      Cancer Father          stomach    Breast cancer Sister      Breast cancer Maternal Grandmother  60    Stomach cancer Brother         Review of patient's allergies indicates:   Allergen Reactions    Sulfa (sulfonamide antibiotics) Itching    Sulfamethoxazole-trimethoprim Itching and Rash     Itchy Rash       Current Outpatient Medications:     (Magic mouthwash) 1:1:1 diphenhydrAMINE(Benadryl) 12.5mg/5ml liq, aluminum & magnesium hydroxide-simethicone (Maalox), LIDOcaine viscous 2%, Swish and spit 5 mLs every 4 (four) hours as needed (stomach pain). for mouth sores, Disp: 360 mL, Rfl: 3    calcium citrate-vitamin D3 500 mg-12.5 mcg (500 unit) Chew, Take 1 tablet by mouth 2 (two) times daily., Disp: , Rfl:     fluticasone propionate (FLONASE) 50 mcg/actuation nasal spray, 1 spray (50 mcg total) by Each Nostril route once daily., Disp: 48 g, Rfl: 1    multivitamin (THERAGRAN) per tablet, Take 1 tablet by mouth once daily., Disp: , Rfl:     traMADoL (ULTRAM) 50 mg tablet, Take 1 tablet (50 mg total) by mouth every 8 (eight) hours as needed for Pain., Disp: 90 tablet, Rfl: 3    UNABLE TO FIND, Apply 1 g topically 4 (four) times daily. medication name: Diclofenac NA/Lidocaine HCL 0.925/0.3% Gel, Disp: , Rfl:     amLODIPine (NORVASC) 10 MG tablet, Take 1 tablet (10 mg total) by mouth once daily., Disp: 90 tablet, Rfl: 3     "carvediloL (COREG) 12.5 MG tablet, Take 1 tablet (12.5 mg total) by mouth 2 (two) times daily with meals., Disp: 180 tablet, Rfl: 3    famotidine (PEPCID) 20 MG tablet, Take 1 tablet (20 mg total) by mouth 2 (two) times daily., Disp: 180 tablet, Rfl: 3    hydroCHLOROthiazide (HYDRODIURIL) 25 MG tablet, Take 1 tablet (25 mg total) by mouth once daily., Disp: 90 tablet, Rfl: 3    olmesartan (BENICAR) 40 MG tablet, Take 1 tablet (40 mg total) by mouth once daily., Disp: 90 tablet, Rfl: 3    Review of Systems       Objective:      Vitals:    04/23/25 0928   BP: 124/68   Pulse: 76   Resp: 18   SpO2: 96%   Weight: 117.9 kg (260 lb)   Height: 5' 4" (1.626 m)     Physical Exam  Musculoskeletal:        Feet:    Feet:      Comments: Mild swelling to the left foot. TTP to the left 2nd metatarsal.      Physical Exam    General: No acute distress. Well-developed. Well-nourished.  Eyes: EOMI. Sclerae anicteric.  HENT: Normocephalic. Atraumatic. Nares patent. Moist oral mucosa.  Ears: Bilateral EACs clear.  Cardiovascular: Regular rate. Regular rhythm. No murmurs. No rubs. No gallops. Normal S1, S2.  Respiratory: Normal respiratory effort. Clear to auscultation bilaterally. No rales. No rhonchi. No wheezing.  Abdomen: Soft. Non-tender. Non-distended. Normoactive bowel sounds.  Musculoskeletal: No  obvious deformity.  Extremities: No lower extremity edema.  Neurological: Alert & oriented x3. No slurred speech. Normal gait.  Psychiatric: Normal mood. Normal affect. Good insight. Good judgment.  Skin: Warm. Dry. No rash.  MSK: Foot/Ankle - Left: Ankle edema (Left). Ankle ecchymosis (bruise) (Left).             Assessment:       1. Essential hypertension    2. Gastroesophageal reflux disease, unspecified whether esophagitis present    3. Lisfranc dislocation, left, sequela    4. Trouble in sleeping    5. Foot injury, left, initial encounter    6. Elevated hemoglobin A1c    7. Long-term use of high-risk medication         Plan:     "   Essential hypertension  Comments:  Controlled. Will continue to monitor on current medication  Orders:  -     amLODIPine (NORVASC) 10 MG tablet; Take 1 tablet (10 mg total) by mouth once daily.  Dispense: 90 tablet; Refill: 3  -     carvediloL (COREG) 12.5 MG tablet; Take 1 tablet (12.5 mg total) by mouth 2 (two) times daily with meals.  Dispense: 180 tablet; Refill: 3  -     hydroCHLOROthiazide (HYDRODIURIL) 25 MG tablet; Take 1 tablet (25 mg total) by mouth once daily.  Dispense: 90 tablet; Refill: 3  -     olmesartan (BENICAR) 40 MG tablet; Take 1 tablet (40 mg total) by mouth once daily.  Dispense: 90 tablet; Refill: 3    Gastroesophageal reflux disease, unspecified whether esophagitis present  Comments:  Controlled. Will continue to monitor symptoms  Orders:  -     famotidine (PEPCID) 20 MG tablet; Take 1 tablet (20 mg total) by mouth 2 (two) times daily.  Dispense: 180 tablet; Refill: 3    Lisfranc dislocation, left, sequela  Comments:  Stable. To continue pain cream, tramadol prn, and celebrex. Will continue to monitor pain control and function.Will refer to Podiatry for improved shoe support.  Orders:  -     Ambulatory referral/consult to Podiatry; Future; Expected date: 04/30/2025    Trouble in sleeping  Comments:  Suspect MERI. Will refer to PSG  Orders:  -     Ambulatory referral/consult to Sleep Disorders; Future; Expected date: 04/30/2025    Foot injury, left, initial encounter  Comments:  Acute. Will get sleep study.  Orders:  -     X-Ray Foot Complete Left; Future; Expected date: 04/23/2025    Elevated hemoglobin A1c  Comments:  Unsure if last BS was fasting. Will repeat labs. Counseled on exercise and diet.  Orders:  -     HEMOGLOBIN A1C; Future; Expected date: 04/23/2025  -     BASIC METABOLIC PANEL; Future; Expected date: 04/23/2025    Long-term use of high-risk medication  -     HEMOGLOBIN A1C; Future; Expected date: 04/23/2025  -     BASIC METABOLIC PANEL; Future; Expected date:  04/23/2025      AdditionalAssessment & Plan    - Considered sleep apnea as potential cause of sleep disturbances, dry mouth, and daytime fatigue.  - Evaluated left foot pain and swelling following minor injury at home.  - Assessed elevated glucose levels from recent labs; recommended lifestyle modifications and follow-up testing to monitor.    ESSENTIAL HYPERTENSION:  - Noted that the patient's blood pressure is reported to be good during the visit.  - Confirmed that the patient is not experiencing chest pain or shortness of breath.  - Continued current antihypertensive regimen including amlodipine, carvedilol, hydrochlorothiazide, and Benicar.    LEFT FOOT INJURY AND ANKLE EFFUSION:  - Noted that the patient reports left foot pain and swelling after a container from the freezer fell on it.  - Visually examined the foot and confirmed swelling.  - Ordered X-ray of left foot at [facility] to rule out fracture, especially considering patient's history of previous foot injury.  - Referred the patient to a podiatrist for evaluation of left foot pain and to discuss potential for firmer arch support.  - Continued tramadol for ankle pain management.    ABNORMAL GLUCOSE:  - Noted that the patient's recent lab results show elevated glucose and A1c levels.  - Advised the patient to be mindful of diet, particularly sugars, starches, and alcohol consumption.  - Ordered repeat labs including glucose and A1c to be done 1-2 weeks before next appointment.  - Recommend increasing physical activity, such as walking or using a treadmill, as tolerated given foot pain.      Follow up in about 4 months (around 8/23/2025) for IFG, HTN, LABS.        This note was generated with the assistance of ambient listening technology. Verbal consent was obtained by the patient and accompanying visitor(s) for the recording of patient appointment to facilitate this note. I attest to having reviewed and edited the generated note for  accuracy, though some syntax or spelling errors may persist. Please contact the author of this note for any clarification.      2025 Dodie Hall           [1]   Social History  Socioeconomic History    Marital status: Single   Occupational History    Occupation: disabled   Tobacco Use    Smoking status: Former     Current packs/day: 0.00     Types: Cigarettes     Quit date: 10/2018     Years since quittin.5    Smokeless tobacco: Never   Substance and Sexual Activity    Alcohol use: Not Currently    Drug use: No    Sexual activity: Never     Partners: Female   Social History Narrative    Live with sister     Social Drivers of Health     Stress: No Stress Concern Present (2020)    [employer] - Occupational Stress Questionnaire     Feeling of Stress : Not at all

## 2025-04-23 NOTE — TELEPHONE ENCOUNTER
----- Message from Katharine sent at 4/23/2025  8:07 AM CDT -----  Pt wants to come in at 9:00. Pt #[phone]

## 2025-05-01 DIAGNOSIS — M10.9 GOUT INVOLVING TOE, UNSPECIFIED CAUSE, UNSPECIFIED CHRONICITY, UNSPECIFIED LATERALITY: ICD-10-CM

## 2025-05-01 RX ORDER — COLCHICINE 0.6 MG/1
TABLET ORAL
Qty: 30 TABLET | Refills: 3 | Status: SHIPPED | OUTPATIENT
Start: 2025-05-01

## 2025-05-01 NOTE — TELEPHONE ENCOUNTER
Spoke with patient requesting refill for colchicine -DN   Quality 110: Preventive Care And Screening: Influenza Immunization: Influenza immunization was not ordered or administered, reason not given Detail Level: Detailed

## 2025-05-01 NOTE — TELEPHONE ENCOUNTER
prescription sent to   [facility] [address] [place] - [address] [facility] [address] [facility]  [address]  [place] [address]  Phone: [phone] Fax: [phone]

## 2025-05-01 NOTE — TELEPHONE ENCOUNTER
----- Message from Gely sent at 5/1/2025  2:17 PM CDT -----  Vm- 1:55- pt would like to talk to nurse [phone]

## 2025-05-01 NOTE — TELEPHONE ENCOUNTER
----- Message from Yvrose sent at 5/1/2025  2:40 PM CDT -----  The patient wants a prescription called in for Colchicine. She does not have an appointment with Dr. Barrera until 6/18/2025. [facility] [address]. She is going out of town tomorrow afternoon. The patient wants a call back . pt's # [phone]

## 2025-05-17 NOTE — H&P
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  Over the past year he has been living in his car and developed edema to both legs.  He damaged left heel while trying to put on his boot over his swollen foot 1 year ago.  He has had repeated break down of the left heel with infections on and off since, with this being the 4th episode.  Patient came in on this occasion because of increased drainage and smell from the wound.  On admission started on antibiotics including vancomycin and Zosyn.  With that and wound care he has improved and ID is asked to recommend antibiotics in preparation for discharge home.  Patient has not had fever or other constitutional symptoms.    ROS:      All 14 systems reviewed and negative except as noted above.    PMHx:      Past Medical History:   Diagnosis Date    Essential (primary) hypertension     Neuropathy of lower extremity         PMSx:      Past Surgical History:   Procedure Laterality Date    COLOSTOMY  1981    DEBRIDEMENT OF FOOT Left 3/11/2025    Procedure: DEBRIDEMENT, FOOT;  Surgeon: Get Enriquez DPM;  Location: [facility];  Service: Podiatry;  Laterality: Left;    Detachment at Left 1st Toe, Complete, Open Approach Left 03/13/2017    herniated disc repaired   2015    left femur break   2008    left hip replacement  02/02/2023    sciatic nerve repair  Left 1980        Social Hx:      Social History     Socioeconomic History    Marital status:     Number of children: 0   Occupational History    Occupation: oil    Tobacco Use    Smoking status: Former    Smokeless tobacco: Never    Tobacco comments:     Age started: 19; Age stopped: 55   Substance and Sexual Activity    Alcohol  "Count includes the [facility] - Emergency Dept  Hospital Medicine  History & Physical    Patient Name: Chelsea Grossman  MRN: [id number]  Patient Class: IP- Inpatient  Admission Date: 6/26/2023  Attending Physician: Goldy Massey, *   Primary Care Provider: Spenser Curry Jr, MD         Patient information was obtained from patient and ER records.     Subjective:     Principal Problem:JOSE (acute kidney injury)    Chief Complaint:   Chief Complaint   Patient presents with    Diarrhea     "Started this morning", possible syncopal episode.        HPI: 64 yo presents to ER due to not feeling good with near syncope. Patient reports hse went to have a routine mammogram this morning and while waiting to be called back started feeling bad. Reports she was diaphoretic and hot and felt fainty (light headed and weak). Reports going to the restroom and had a episode of soft/diarrhea stool. The mammography personal suggested not proceeding with mammogram and coming to ED to be checked out at which EMS was called. EMS was notified patient found with decreased blood pressure of 70/50. Patient was given IV hydration transferred to the emergency department for further evaluation. Patient denies chest pain or discomfort. No nausea or vomiting. No abdominal pain or cramping. Patient does reports she has had some urinary pain, urinary frequency, and urinary urgency X 2 weeks or more. Denies hematuria or odor associated with urine. Hx Hypertension. Patient reports taking BP medicines this am. Other history osteoarthritis and Hep C.        Past Medical History:   Diagnosis Date    Cirrhosis 2020    History of hepatitis C, s/p successful Rx w/ cure (SVR12 - 4/2020)     History of kidney stones     Hypertension     Hypertensive retinopathy of both eyes     Osteoarthritis     Osteopenia     Trigger finger of left hand 2023       Past Surgical History:   Procedure Laterality Date    CARPAL TUNNEL RELEASE Bilateral     " FOOT SURGERY Left 12/18/2018    KIDNEY STONE SURGERY      Left ear surgery      TOTAL ABDOMINAL HYSTERECTOMY W/ BILATERAL SALPINGOOPHORECTOMY      TRIGGER FINGER RELEASE Right     2nd and 3rd fingers       Review of patient's allergies indicates:   Allergen Reactions    Sulfa (sulfonamide antibiotics) Itching    Sulfamethoxazole-trimethoprim Itching and Rash     Itchy Rash       No current facility-administered medications on file prior to encounter.     Current Outpatient Medications on File Prior to Encounter   Medication Sig    amLODIPine (NORVASC) 10 MG tablet TAKE 1 TABLET BY MOUTH ONCE DAILY IN THE EVENING (Patient taking differently: Take 10 mg by mouth once daily.)    carvediloL (COREG) 12.5 MG tablet Take 1 tablet (12.5 mg total) by mouth 2 (two) times daily with meals.    celecoxib (CELEBREX) 200 MG capsule Take 1 capsule (200 mg total) by mouth once daily.    DULoxetine (CYMBALTA) 60 MG capsule Take 1 capsule (60 mg total) by mouth once daily.    hydroCHLOROthiazide (HYDRODIURIL) 25 MG tablet Take 1 tablet (25 mg total) by mouth once daily.    multivitamin (THERAGRAN) per tablet Take 1 tablet by mouth once daily.    olmesartan (BENICAR) 40 MG tablet Take 1 tablet (40 mg total) by mouth once daily.    pantoprazole (PROTONIX) 40 MG tablet Take 1 tablet (40 mg total) by mouth once daily.    colchicine (COLCRYS) 0.6 mg tablet Take 2 tablets at symptom onset, 1 tablet 2-3 hours later, then 1 tablet daily for 7 days as needed for gout attack    estradioL (ESTRACE) 0.01 % (0.1 mg/gram) vaginal cream INSERT 1 2 GRAM INTRAVAGINALLY EVERY NIGHT AT BEDTIME 2 TO 3 TIMES PER WEEK (Patient taking differently: Place 2 g vaginally twice a week. INSERT 1 2 GRAM INTRAVAGINALLY EVERY NIGHT AT BEDTIME 2 TO 3 TIMES PER WEEK)    fluticasone propionate (FLONASE) 50 mcg/actuation nasal spray 1 spray (50 mcg total) by Each Nostril route once daily.    ibuprofen (ADVIL,MOTRIN) 800 MG tablet Take 1 tablet (800 mg  use: Yes     Comment: every 2-3 days    Drug use: Never     Social Drivers of Health     Financial Resource Strain: High Risk (5/12/2025)    Overall Financial Resource Strain (CARDIA)     Difficulty of Paying Living Expenses: Very hard   Food Insecurity: Food Insecurity Present (5/12/2025)    Hunger Vital Sign     Worried About Running Out of Food in the Last Year: Often true     Ran Out of Food in the Last Year: Often true   Transportation Needs: No Transportation Needs (5/12/2025)    PRAPARE - Transportation     Lack of Transportation (Medical): No     Lack of Transportation (Non-Medical): No   Stress: Stress Concern Present (5/12/2025)    [employer] - Occupational Stress Questionnaire     Feeling of Stress : Very much   Housing Stability: High Risk (5/12/2025)    Housing Stability Vital Sign     Unable to Pay for Housing in the Last Year: Yes     Homeless in the Last Year: Yes        Family Hx:      Family History   Problem Relation Name Age of Onset    No Known Problems Mother      Stroke Father      Fibromyalgia Sister      No Known Problems Sister      `  Review of patient's allergies indicates:  No Known Allergies    Medications:      Current Facility-Administered Medications   Medication    acetaminophen tablet 650 mg    amLODIPine tablet 10 mg    docusate sodium capsule 100 mg    enoxaparin injection 40 mg    HYDROcodone-acetaminophen 7.5-325 mg per tablet 1 tablet    melatonin tablet 6 mg    mupirocin 2 % ointment    ondansetron injection 4 mg    piperacillin-tazobactam (ZOSYN) 4.5 g in D5W 100 mL IVPB (MB+)    polyethylene glycol packet 17 g    sodium chloride 0.9% flush 10 mL    vancomycin - pharmacy to dose    vancomycin 1,250 mg in D5W 250 mL IVPB (admixture device)       VITALS:      Vital Signs Range (Last 24H):  Temp:  [97.5 °F (36.4 °C)-98.4 °F (36.9 °C)]   Pulse:  [85-89]   Resp:  [18]   BP: (121-138)/(77-84)   SpO2:  [93 %-99 %]     I & O (Last 24H):    Intake/Output  "Summary (Last 24 hours) at 5/17/2025 1310  Last data filed at 5/17/2025 0524  Gross per 24 hour   Intake 840 ml   Output 2500 ml   Net -1660 ml       Physical Exam   Constitutional: Pt is alert and oriented to person, place, and time. Appears well-developed, ovoid.   Head: Normocephalic and atraumatic.   Eyes, nose and throat:  Pale moist mucosa, anicteric and acyanotic, CHANO, no oral or pharyngeal exudates  Cardiovascular: Normal rate and regular rhythm.  S1 and S2 appreciated by ascultation, no murmurs  Pulmonary/Chest: Effort normal, no crepitations or wheezes auscultated   Abdomen:  not distended, normal bowel sounds. Soft. Non-tender. No organomegaly or mass appreciated.  Extremities:  Bilateral leg edema, left significantly more than right.  Left heel wound bandaged but I reviewed the wound care photographs and noted necrotic appearing wound to left heel.  Skin: No rashes.  Psychiatric:  Normal mood and affect, judgment normal    Laboratory Data:    No results for input(s): "CPK", "CPKMB", "TROPONINI", "MB" in the last 24 hours. No results for input(s): "POCTGLUCOSE" in the last 24 hours.     Lab Results   Component Value Date    INR 1.1 05/13/2025    INR 1.1 09/19/2023    INR 1.0 12/19/2022    PROTIME 14.0 05/13/2025       Lab Results   Component Value Date    WBC 7.83 05/17/2025    HGB 11.8 (L) 05/17/2025    HCT 36.2 (L) 05/17/2025    MCV 97.3 (H) 05/17/2025     05/17/2025       BMP  Recent Labs   Lab 05/17/25  0431   *      K 3.9      CO2 24   BUN 22.4   CREATININE 1.25   CALCIUM 8.9       Above lab results reviewed and interpreted by me.  Old wound culture from April last year was positive for MRSA and what was reported initially as Bordetella higelaciozii.  It was sent to the reference lab and Alcaligenes faecalis was identified, susceptible to Bactrim.    Radiology:  Left foot x-ray without abnormality.      ASSESSMENT/PLAN:       Problem List[1]    ASSESSMENT:  Soft tissue infection " total) by mouth every 6 (six) hours as needed for Pain.    traMADoL (ULTRAM) 50 mg tablet TAKE 1 TABLET BY MOUTH EVERY 8 HOURS AS NEEDED FOR PAIN (Patient taking differently: Take 50 mg by mouth every 8 (eight) hours as needed. TAKE 1 TABLET BY MOUTH EVERY 8 HOURS AS NEEDED FOR PAIN)     Family History       Problem Relation (Age of Onset)    Breast cancer Sister, Maternal Grandmother (60)    Cancer Father    Heart disease Mother    Stomach cancer Brother          Tobacco Use    Smoking status: Former     Types: Cigarettes     Quit date: 10/2018     Years since quittin.7    Smokeless tobacco: Never   Substance and Sexual Activity    Alcohol use: Yes    Drug use: No    Sexual activity: Never     Partners: Female     Review of Systems   Constitutional:  Positive for diaphoresis.   HENT:  Negative for congestion and sore throat.    Eyes:  Negative for pain, discharge, redness and itching.   Respiratory:  Negative for cough, chest tightness, shortness of breath and wheezing.    Cardiovascular:  Negative for chest pain, palpitations and leg swelling.   Gastrointestinal:  Positive for diarrhea. Negative for abdominal distention and abdominal pain.   Genitourinary:  Positive for dysuria, frequency and urgency. Negative for decreased urine volume, difficulty urinating, flank pain and hematuria.   Musculoskeletal:  Negative for back pain, gait problem, joint swelling, myalgias and neck pain.   Skin:  Negative for rash.   Neurological:  Positive for syncope and weakness. Negative for dizziness, speech difficulty, light-headedness, numbness and headaches.   Hematological:  Does not bruise/bleed easily.   Psychiatric/Behavioral:  Negative for agitation, confusion, hallucinations, self-injury, sleep disturbance and suicidal ideas. The patient is not nervous/anxious and is not hyperactive.    Objective:     Vital Signs (Most Recent):  Temp: 97.7 °F (36.5 °C) (23 1132)  Pulse: 81 (23 1455)  Resp: 16 (23  1455)  BP: (!) 109/59 (06/26/23 1455)  SpO2: 96 % (06/26/23 1455) Vital Signs (24h Range):  Temp:  [97.7 °F (36.5 °C)-98 °F (36.7 °C)] 97.7 °F (36.5 °C)  Pulse:  [75-87] 81  Resp:  [16-23] 16  SpO2:  [93 %-98 %] 96 %  BP: ()/(39-74) 109/59     Weight: 104.3 kg (230 lb)  Body mass index is 40.74 kg/m².     Physical Exam  Vitals and nursing note reviewed.   Constitutional:       General: She is not in acute distress.     Appearance: Normal appearance. She is not toxic-appearing.   HENT:      Head: Normocephalic.      Right Ear: Tympanic membrane, ear canal and external ear normal. There is no impacted cerumen.      Left Ear: Tympanic membrane, ear canal and external ear normal. There is no impacted cerumen.      Nose: Nose normal. No congestion or rhinorrhea.      Mouth/Throat:      Mouth: Mucous membranes are moist.      Pharynx: Oropharynx is clear. No oropharyngeal exudate or posterior oropharyngeal erythema.   Eyes:      General: No scleral icterus.        Right eye: No discharge.         Left eye: No discharge.      Extraocular Movements: Extraocular movements intact.      Conjunctiva/sclera: Conjunctivae normal.      Pupils: Pupils are equal, round, and reactive to light.   Neck:      Vascular: No carotid bruit.   Cardiovascular:      Rate and Rhythm: Normal rate and regular rhythm.      Pulses: Normal pulses.      Heart sounds: No murmur heard.    No friction rub. No gallop.   Pulmonary:      Effort: Pulmonary effort is normal. No respiratory distress.      Breath sounds: Normal breath sounds. No stridor. No wheezing, rhonchi or rales.   Chest:      Chest wall: No tenderness.   Abdominal:      General: Abdomen is flat. Bowel sounds are normal. There is no distension.      Palpations: Abdomen is soft. There is no mass.      Tenderness: There is no abdominal tenderness. There is no right CVA tenderness, left CVA tenderness, guarding or rebound.      Hernia: No hernia is present.   Genitourinary:     Rectum:  to left heel with MRSA and Bordetella species isolated.  Similar organisms isolated by our lab 1 year ago and the reference lab re-identified the 2nd organism as Alcaligenes faecalis, susceptible to Bactrim.  Suspect that will be the same organism once again this time.  No associated bacteremia.  Neuropathy involving left lower extremity, chronic leg edema, predisposing the patient to nonhealing left heel ulcer    PLAN:  Replace Zosyn and vancomycin with Bactrim double strength 1 tablet twice a day x 2 weeks  Continue aggressive local wound care  Manage edema as best as possible to help with wound healing  ID clinic follow-up in 1 week with BMP to follow finalized culture report (pending from [facility]).    Thank you very much for the consult.  ID will sign off.  Please call again p.r.n..  Discussed with Dr. Diaz               [1]   Patient Active Problem List  Diagnosis    Essential (primary) hypertension    Chronic midline low back pain without sciatica    Neuropathy of lower extremity    Immunization due    Pre-op examination    Primary osteoarthritis of both hips    Hyperglycemia    Hospital discharge follow-up    History of total hip replacement, left    Edema of left lower extremity    Hypercalcemia    Primary osteoarthritis of right hip    Ulcer of left foot    Cellulitis of left lower extremity    Blister of left heel with infection    Left foot infection    BROOKS (acute kidney injury)    Wound of left foot      Normal.   Musculoskeletal:         General: No swelling, tenderness, deformity or signs of injury. Normal range of motion.      Cervical back: Normal range of motion and neck supple. No rigidity or tenderness.      Right lower leg: No edema.      Left lower leg: No edema.   Lymphadenopathy:      Cervical: No cervical adenopathy.   Skin:     General: Skin is warm and dry.      Capillary Refill: Capillary refill takes 2 to 3 seconds.      Coloration: Skin is not jaundiced or pale.      Findings: No bruising, erythema, lesion or rash.   Neurological:      General: No focal deficit present.      Mental Status: She is alert and oriented to person, place, and time. Mental status is at baseline.      Cranial Nerves: No cranial nerve deficit.      Sensory: No sensory deficit.      Motor: No weakness.   Psychiatric:         Mood and Affect: Mood normal.         Behavior: Behavior normal.         Thought Content: Thought content normal.         Judgment: Judgment normal.            CRANIAL NERVES     CN III, IV, VI   Pupils are equal, round, and reactive to light.     Significant Labs: All pertinent labs within the past 24 hours have been reviewed.  Recent Lab Results         06/26/23  1258   06/26/23  1130   06/26/23  0934   06/26/23  0816        Procalcitonin       5.04  Comment: Procalcitonin Result Interpretation:    <=0.50 ng/mL  Systemic infection (sepsis) is not likely. Local bacterial infection   is   possible.    >0.50 and <= 2.00 ng/mL  Systemic infection (sepsis) is possible, but other conditions are   also known   to elevate procalcitonin.     >2.00 ng/mL  Systemic infection (sepsis) is likely unless other causes are known.    >=10.00 ng/mL  Important systemic inflammatory response, almost exclusively due to   severe   bacterial sepsis or septic shock.         Albumin       3.5       Alkaline Phosphatase       96       ALT       37       Anion Gap       12       Appearance, UA   Cloudy           AST       29        Bacteria, UA   Moderate           Baso #       0.04       Basophil %       0.2       Bilirubin (UA)   Negative           BILIRUBIN TOTAL       2.0  Comment: For infants and newborns, interpretation of results should be based  on gestational age, weight and in agreement with clinical  observations.    Premature Infant recommended reference ranges:  Up to 24 hours.............<8.0 mg/dL  Up to 48 hours............<12.0 mg/dL  3-5 days..................<15.0 mg/dL  6-29 days.................<15.0 mg/dL         BNP       27  Comment: Values of less than 100 pg/ml are consistent with non-CHF populations.       BUN       37       Calcium       8.6       Chloride       102       CO2       19       Color, UA   Yellow           Creatinine       2.8       Differential Method       Automated       eGFR       18.3       Eos #       0.0       Eosinophil %       0.0       Glucose       173       Glucose, UA   Negative           Gran # (ANC)       14.7       Gran %       77.5       Hematocrit       34.9       Hemoglobin       11.7       Hyaline Casts, UA   21           Immature Grans (Abs)       0.24  Comment: Mild elevation in immature granulocytes is non specific and   can be seen in a variety of conditions including stress response,   acute inflammation, trauma and pregnancy. Correlation with other   laboratory and clinical findings is essential.         Immature Granulocytes       1.3       Ketones, UA   Negative           Lactate, Rao     1.8  Comment: Falsely low lactic acid results can be found in samples   containing >=13.0 mg/dL total bilirubin and/or >=3.5 mg/dL   direct bilirubin.           Leukocytes, UA   3+           Lymph #       2.4       Lymph %       12.6       Magnesium       1.8       MCH       31.7       MCHC       33.5       MCV       95       Microscopic Comment   SEE COMMENT  Comment: Other formed elements not mentioned in the report are not   present in the microscopic examination.              Mono #        1.6       Mono %       8.4       MPV       9.6       NITRITE UA   Negative           nRBC       0       Occult Blood Negative             Occult Blood UA   3+           pH, UA   6.0           Platelets       181       Potassium       3.9       PROTEIN TOTAL       7.6       Protein, UA   2+  Comment: Recommend a 24 hour urine protein or a urine   protein/creatinine ratio if globulin induced proteinuria is  clinically suspected.             RBC       3.69       RBC, UA   24           RDW       13.3       Sodium       133       Specific [place], UA   1.010           Specimen UA   Urine, Clean Catch           Squam Epithel, UA   1           Troponin I High Sensitivity       9.3  Comment: Troponin results differ between methods. Do not use   results between Troponin methods interchangeably.    Alkaline Phospatase levels above 400 U/L may   cause false positive results.    Access hsTnI should not be used for patients taking   Asfotase erendira (Strensiq).         UROBILINOGEN UA   2.0-3.0           WBC, UA   >100           Stool WBC No neutrophils seen             WBC       19.01               Significant Imaging: I have reviewed all pertinent imaging results/findings within the past 24 hours.    Assessment/Plan:     * JOSE (acute kidney injury)  Patient with acute kidney injury likely due to dehydration. JOSE is currently improving.   Labs reviewed- Renal function/electrolytes: BUN/Cr 37/2.8, GFR 18.3,  Na 133. Lactic and troponin negative  NS @125ml/hr  Monitor urine output   serial BMP and adjust therapy as needed.   Avoid nephrotoxins and renally dose meds for GFR listed above.       UTI (urinary tract infection)  C/O urinary frequency, urinary urgency, and dysuria X 2 weeks  C/O feeling bad with near syncope episode  NS @ 125ml/hr  Zosyn IVPB 3.375 @ 8hr  Labs reviewed:  Specimen UA Urine, Clean Catch Ketones, UA Negative   Color, UA Yellow Bilirubin (UA) Negative   Appearance, UA Cloudy Abnormal  Occult Blood UA  3+ Abnormal    pH, UA 6.0 Nitrite, UA Negative   Specific Gravity, UA 1.010 Urobilinogen, UA 2.0-3.0 Abnormal  EU/dL   Protein, UA 2+ Abnormal   Leukocytes, UA 3+ Abnormal    Glucose, UA Negative     Procal 5.04, WBC 19.01  Repeat UA in the am  Repeat CBC in am        VTE Risk Mitigation (From admission, onward)         Ordered     IP VTE HIGH RISK PATIENT  Once         06/26/23 1403                           GILSON ROBERTSON-C  Department of Hospital Medicine  [facility] [place] - Emergency Dept

## 2025-05-28 DIAGNOSIS — M15.9 OSTEOARTHRITIS OF MULTIPLE JOINTS, UNSPECIFIED OSTEOARTHRITIS TYPE: ICD-10-CM

## 2025-05-28 DIAGNOSIS — S93.325S LISFRANC DISLOCATION, LEFT, SEQUELA: ICD-10-CM

## 2025-05-28 RX ORDER — TRAMADOL HYDROCHLORIDE 50 MG/1
50 TABLET, FILM COATED ORAL EVERY 8 HOURS PRN
Qty: 90 TABLET | Refills: 3 | Status: SHIPPED | OUTPATIENT
Start: 2025-06-01 | End: 2025-05-30 | Stop reason: SDUPTHER

## 2025-05-28 NOTE — TELEPHONE ENCOUNTER
The patient's prescription has been approved and sent to   [facility]/pharmacy #[id number] - [place], [place] - [address]  [address] [address]  Phone: [phone] Fax: [phone]

## 2025-05-28 NOTE — TELEPHONE ENCOUNTER
----- Message from Katharine sent at 5/28/2025  8:22 AM CDT -----  Refill for tramadol. [facility] on [address] Pt #[phone]

## 2025-05-28 NOTE — TELEPHONE ENCOUNTER
Last appointment : 4/23/25  Upcoming appointment: 8/22/25  Labs: tramadol urine not done  Last Sent in:  1/24/25   : 5/2/25

## 2025-05-30 DIAGNOSIS — M15.9 OSTEOARTHRITIS OF MULTIPLE JOINTS, UNSPECIFIED OSTEOARTHRITIS TYPE: ICD-10-CM

## 2025-05-30 DIAGNOSIS — S93.325S LISFRANC DISLOCATION, LEFT, SEQUELA: ICD-10-CM

## 2025-05-30 RX ORDER — TRAMADOL HYDROCHLORIDE 50 MG/1
50 TABLET, FILM COATED ORAL EVERY 8 HOURS PRN
Qty: 90 TABLET | Refills: 3 | Status: SHIPPED | OUTPATIENT
Start: 2025-06-01

## 2025-06-11 ENCOUNTER — HOSPITAL ENCOUNTER (OUTPATIENT)
Dept: RADIOLOGY | Facility: HOSPITAL | Age: 65
Discharge: HOME | End: 2025-06-11
Attending: FAMILY MEDICINE
Payer: MEDICARE

## 2025-06-11 DIAGNOSIS — S99.922A FOOT INJURY, LEFT, INITIAL ENCOUNTER: ICD-10-CM

## 2025-06-11 PROCEDURE — 73630 X-RAY EXAM OF FOOT: CPT | Mod: TC,PO,LT

## 2025-06-11 PROCEDURE — 73630 X-RAY EXAM OF FOOT: CPT | Mod: 26,LT,, | Performed by: RADIOLOGY

## 2025-06-18 ENCOUNTER — OFFICE VISIT (OUTPATIENT)
Dept: PODIATRY | Facility: CLINIC | Age: 65
End: 2025-06-18
Payer: MEDICARE

## 2025-06-18 VITALS — BODY MASS INDEX: 43.96 KG/M2 | HEIGHT: 64 IN | WEIGHT: 257.5 LBS

## 2025-06-18 DIAGNOSIS — S93.325D LISFRANC DISLOCATION, LEFT, SUBSEQUENT ENCOUNTER: Primary | ICD-10-CM

## 2025-06-18 DIAGNOSIS — S93.325S LISFRANC DISLOCATION, LEFT, SEQUELA: ICD-10-CM

## 2025-06-18 DIAGNOSIS — R20.2 PARESTHESIA OF LEFT FOOT: ICD-10-CM

## 2025-06-18 DIAGNOSIS — G89.29 CHRONIC FOOT PAIN, LEFT: ICD-10-CM

## 2025-06-18 DIAGNOSIS — M79.672 CHRONIC FOOT PAIN, LEFT: ICD-10-CM

## 2025-06-18 PROCEDURE — 3008F BODY MASS INDEX DOCD: CPT | Mod: CPTII,S$GLB,, | Performed by: PODIATRIST

## 2025-06-18 PROCEDURE — 1125F AMNT PAIN NOTED PAIN PRSNT: CPT | Mod: CPTII,S$GLB,, | Performed by: PODIATRIST

## 2025-06-18 PROCEDURE — 3288F FALL RISK ASSESSMENT DOCD: CPT | Mod: CPTII,S$GLB,, | Performed by: PODIATRIST

## 2025-06-18 PROCEDURE — 99999 PR PBB SHADOW E&M-EST. PATIENT-LVL III: CPT | Mod: PBBFAC,,, | Performed by: PODIATRIST

## 2025-06-18 PROCEDURE — 3044F HG A1C LEVEL LT 7.0%: CPT | Mod: CPTII,S$GLB,, | Performed by: PODIATRIST

## 2025-06-18 PROCEDURE — 1160F RVW MEDS BY RX/DR IN RCRD: CPT | Mod: CPTII,S$GLB,, | Performed by: PODIATRIST

## 2025-06-18 PROCEDURE — 1101F PT FALLS ASSESS-DOCD LE1/YR: CPT | Mod: CPTII,S$GLB,, | Performed by: PODIATRIST

## 2025-06-18 PROCEDURE — 1159F MED LIST DOCD IN RCRD: CPT | Mod: CPTII,S$GLB,, | Performed by: PODIATRIST

## 2025-06-18 PROCEDURE — 99214 OFFICE O/P EST MOD 30 MIN: CPT | Mod: S$GLB,,, | Performed by: PODIATRIST

## 2025-06-18 PROCEDURE — 4010F ACE/ARB THERAPY RXD/TAKEN: CPT | Mod: CPTII,S$GLB,, | Performed by: PODIATRIST

## 2025-06-18 RX ORDER — METHYLPREDNISOLONE 4 MG/1
TABLET ORAL
Qty: 21 EACH | Refills: 1 | Status: SHIPPED | OUTPATIENT
Start: 2025-06-18 | End: 2025-07-09

## 2025-06-18 RX ORDER — CYCLOBENZAPRINE HCL 5 MG
5 TABLET ORAL 3 TIMES DAILY PRN
Qty: 30 TABLET | Refills: 1 | Status: SHIPPED | OUTPATIENT
Start: 2025-06-18

## 2025-06-18 NOTE — PROGRESS NOTES
"  [address] [facility] [address] [place] [address]  Phone: [phone]   [phone]    Patient's PCP:Dodie Hall MD  Referring Provider: Dr. Dodie Hall    Subjective:      Chief Complaint:: Foot Pain (left)    Foot Pain  Associated symptoms include arthralgias and numbness. Pertinent negatives include no abdominal pain, chest pain, chills, coughing, fatigue, fever, headaches, joint swelling, myalgias, nausea, neck pain, rash or weakness.     Chelsea Grossman is a 65 y.o. female who presents today with a complaint of left foot pain in the arch. The current episode started 3 weeks.  The symptoms include swelling, throbbing, sharp pain that comes and goes. Probable cause of complaint unknown.  The symptoms are aggravated by rest, prolonged standing, walking on stairs. The problem has stayed the same. Treatment to date have included PCP, Xray's,  soaking, elevation, OTC pain medication, tramadol which provided some relief.       Vitals:    06/18/25 0822   Weight: 116.8 kg (257 lb 8 oz)   Height: 5' 4" (1.626 m)   PF: 99 L/min   PainSc:   8      Shoe Size: 9-10    Past Surgical History:   Procedure Laterality Date    CARPAL TUNNEL RELEASE Bilateral     COLONOSCOPY N/A 10/29/2024    Procedure: COLONOSCOPY;  Surgeon: Harrison Royal MD;  Location: [facility];  Service: Endoscopy;  Laterality: N/A;  ppm    FOOT SURGERY Left 12/18/2018    KIDNEY STONE SURGERY      Left ear surgery      TOTAL ABDOMINAL HYSTERECTOMY W/ BILATERAL SALPINGOOPHORECTOMY      TRIGGER FINGER RELEASE Right     2nd and 3rd fingers    TRIGGER FINGER RELEASE Left 5/22/2024    Procedure: RELEASE, TRIGGER FINGER, LEFT INDEX FINGER;  Surgeon: River Gonzales MD;  Location: [facility] OR;  Service: Orthopedics;  Laterality: Left;     Past Medical History:   Diagnosis Date    Cirrhosis 2020    History of hepatitis C, s/p successful Rx w/ cure (SVR12 - 4/2020)     History of kidney stones     Hypertension     Hypertensive retinopathy of both eyes "     Osteoarthritis     Osteopenia     Trigger finger of left hand 2023     Family History   Problem Relation Name Age of Onset    Heart disease Mother      Cancer Father          stomach    Breast cancer Sister      Breast cancer Maternal Grandmother  60    Stomach cancer Brother          Social History:   Marital Status: Single  Alcohol History:  reports that she does not currently use alcohol.  Tobacco History:  reports that she quit smoking about 6 years ago. Her smoking use included cigarettes. She has never used smokeless tobacco.  Drug History:  reports no history of drug use.    Review of patient's allergies indicates:   Allergen Reactions    Sulfa (sulfonamide antibiotics) Itching    Sulfamethoxazole-trimethoprim Itching and Rash     Itchy Rash       Current Medications[1]    Review of Systems   Constitutional:  Negative for chills, fatigue, fever and unexpected weight change.   HENT:  Negative for hearing loss and trouble swallowing.    Eyes:  Negative for photophobia and visual disturbance.   Respiratory:  Negative for cough, shortness of breath and wheezing.    Cardiovascular:  Negative for chest pain, palpitations and leg swelling.   Gastrointestinal:  Negative for abdominal pain and nausea.   Genitourinary:  Negative for dysuria and frequency.   Musculoskeletal:  Positive for arthralgias. Negative for back pain, gait problem, joint swelling, myalgias and neck pain.   Skin:  Negative for rash and wound.   Neurological:  Positive for numbness. Negative for tremors, seizures, weakness and headaches.   Hematological:  Does not bruise/bleed easily.         Objective:        Physical Exam:   Foot Exam    General  General Appearance: appears stated age and healthy   Orientation: alert and oriented to person, place, and time   Affect: appropriate   Gait: unimpaired       Left Foot/Ankle      Inspection and Palpation  Ecchymosis: none  Tenderness: lisfranc joint and midtarsal joint   Swelling: lisfranc joint    Arch: pes planus  Hammertoes: absent  Claw toes: absent  Hallux valgus: no  Hallux limitus: no  Skin Exam: no drainage, no ulcer and no erythema   Neurovascular  Dorsalis pedis: 2+  Posterior tibial: 2+  Capillary refill: 2+  Varicose veins: not present  Saphenous nerve sensation: diminished  Tibial nerve sensation: normal  Superficial peroneal nerve sensation: normal  Deep peroneal nerve sensation: normal  Sural nerve sensation: normal    Muscle Strength  Ankle dorsiflexion: 5  Ankle plantar flexion: 5  Ankle inversion: 5  Ankle eversion: 5  Great toe extension: 5  Great toe flexion: 5    Range of Motion    Normal left ankle ROM    Tests  Anterior drawer: negative   Talar tilt: negative   PT Tinel's sign: negative  Paresthesia: positive      Physical Exam  Cardiovascular:      Pulses:           Dorsalis pedis pulses are 2+ on the left side.        Posterior tibial pulses are 2+ on the left side.   Musculoskeletal:      Left foot: No bunion.   Feet:      Left foot:      Skin integrity: No ulcer or erythema.               Left Ankle/Foot Exam     Range of Motion   The patient has normal left ankle ROM.       Muscle Strength   Left Lower Extremity   Ankle Dorsiflexion:  5   Plantar flexion:  5/5     Reflexes     Left Side  Paresthesia: present    Vascular Exam       Left Pulses  Dorsalis Pedis:      2+  Posterior Tibial:      2+           Imaging: X-Ray Foot Complete Left  Narrative: EXAMINATION:  XR FOOT COMPLETE 3 VIEW LEFT    CLINICAL HISTORY:  Unspecified injury of left foot, initial encounter    FINDINGS:  Three views of the left foot compared to prior exams including 10/23/2023 show stable postoperative changes of intertarsal and tarsometatarsal fusion, with long cannulated screw in the region of the medial and middle cuneiforms, and dorsal metallic staples fusing the 1st, 2nd and 3rd TMT joints.  No findings of hardware fracture or loosening.    There is no acute fracture or dislocation, with advanced  degenerative narrowing of the 1st MTP joint space with osteophytes.  Bone mineralization is normal.  Impression: Negative for acute fracture or dislocation.    Electronically signed by: Kai Sanchez  Date:    06/11/2025  Time:    08:08               Assessment:       1. Lisfranc dislocation, left, subsequent encounter    2. Paresthesia of left foot    3. Chronic foot pain, left    4. Lisfranc dislocation, left, sequela      Plan:   Lisfranc dislocation, left, subsequent encounter    Paresthesia of left foot    Chronic foot pain, left  -     methylPREDNISolone (MEDROL DOSEPACK) 4 mg tablet; use as directed  Dispense: 21 each; Refill: 1  -     cyclobenzaprine (FLEXERIL) 5 MG tablet; Take 1 tablet (5 mg total) by mouth 3 (three) times daily as needed for Muscle spasms.  Dispense: 30 tablet; Refill: 1    Lisfranc dislocation, left, sequela  Comments:  Stable. To continue pain cream, tramadol prn, and celebrex. Will continue to monitor pain control and function.Will refer to Podiatry for improved shoe support.  Orders:  -     Ambulatory referral/consult to Podiatry  -     methylPREDNISolone (MEDROL DOSEPACK) 4 mg tablet; use as directed  Dispense: 21 each; Refill: 1  -     cyclobenzaprine (FLEXERIL) 5 MG tablet; Take 1 tablet (5 mg total) by mouth 3 (three) times daily as needed for Muscle spasms.  Dispense: 30 tablet; Refill: 1      Follow up if symptoms worsen or fail to improve.    Procedures        Patient's previous outside radiographs were reviewed and personally interpreted by myself today.    I discussed with the patient that it appears that the previous ORIF from her Lisfranc dislocation does appear stable.  The hardware is intact.  She does have some mild surrounding arthritic changes.  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  I also discussed with her that she does also have some paresthesias which are likely secondary to the surgery.  I  also discussed with her that her shoe gear is excessively worn and needs to be updated.  I made specific recommendations for her.  I reviewed her orthotics and they still appear to be in serviceable condition though these may need to be updated the next few years.  I am going to send in a Medrol Dosepak for her.  I am also going to send muscle relaxer.    Counseling:     I provided patient education verbally regarding:   Patient diagnosis, treatment options, as well as alternatives, risks, and benefits.     This note was created using Dragon voice recognition software that occasionally misinterpreted phrases or words.                    [1]   Current Outpatient Medications   Medication Sig Dispense Refill    (Magic mouthwash) 1:1:1 diphenhydrAMINE(Benadryl) 12.5mg/5ml liq, aluminum & magnesium hydroxide-simethicone (Maalox), LIDOcaine viscous 2% Swish and spit 5 mLs every 4 (four) hours as needed (stomach pain). for mouth sores 360 mL 3    amLODIPine (NORVASC) 10 MG tablet Take 1 tablet (10 mg total) by mouth once daily. 90 tablet 3    calcium citrate-vitamin D3 500 mg-12.5 mcg (500 unit) Chew Take 1 tablet by mouth 2 (two) times daily.      carvediloL (COREG) 12.5 MG tablet Take 1 tablet (12.5 mg total) by mouth 2 (two) times daily with meals. 180 tablet 3    colchicine (COLCRYS) 0.6 mg tablet Take 2 tablets at symptom onset, 1 tablet 2-3 hours later, then 1 tablet daily for 7 days as needed for gout attack 30 tablet 3    cyclobenzaprine (FLEXERIL) 5 MG tablet Take 1 tablet (5 mg total) by mouth 3 (three) times daily as needed for Muscle spasms. 30 tablet 1    famotidine (PEPCID) 20 MG tablet Take 1 tablet (20 mg total) by mouth 2 (two) times daily. 180 tablet 3    fluticasone propionate (FLONASE) 50 mcg/actuation nasal spray 1 spray (50 mcg total) by Each Nostril route once daily. 48 g 1    hydroCHLOROthiazide (HYDRODIURIL) 25 MG tablet Take 1 tablet (25 mg total) by mouth once daily. 90 tablet 3     methylPREDNISolone (MEDROL DOSEPACK) 4 mg tablet use as directed 21 each 1    multivitamin (THERAGRAN) per tablet Take 1 tablet by mouth once daily.      olmesartan (BENICAR) 40 MG tablet Take 1 tablet (40 mg total) by mouth once daily. 90 tablet 3    traMADoL (ULTRAM) 50 mg tablet Take 1 tablet (50 mg total) by mouth every 8 (eight) hours as needed for Pain. 90 tablet 3    UNABLE TO FIND Apply 1 g topically 4 (four) times daily. medication name: Diclofenac NA/Lidocaine HCL 0.925/0.3% Gel       No current facility-administered medications for this visit.

## 2025-08-07 ENCOUNTER — TELEPHONE (OUTPATIENT)
Dept: FAMILY MEDICINE | Facility: CLINIC | Age: 65
End: 2025-08-07
Payer: MEDICARE

## 2025-08-07 NOTE — TELEPHONE ENCOUNTER
Admitting provider at bedside.   Spoke to patient that fasting lab is due a week prior to visit, orders at [facility], encouraged water. Advised she can take morning meds that do not need to be taken with food.

## 2025-08-08 ENCOUNTER — TELEPHONE (OUTPATIENT)
Dept: FAMILY MEDICINE | Facility: CLINIC | Age: 65
End: 2025-08-08
Payer: MEDICARE

## 2025-08-08 DIAGNOSIS — Z79.899 ENCOUNTER FOR LONG-TERM (CURRENT) USE OF MEDICATIONS: Primary | ICD-10-CM

## 2025-08-08 DIAGNOSIS — R73.09 ELEVATED HEMOGLOBIN A1C: ICD-10-CM

## 2025-08-08 NOTE — TELEPHONE ENCOUNTER
----- Message from Vaishnavi sent at 8/8/2025  7:56 AM CDT -----  Patient calling in regarding to asking what day she is supposed to take her testing?   [phone]

## 2025-08-13 ENCOUNTER — TELEPHONE (OUTPATIENT)
Dept: FAMILY MEDICINE | Facility: CLINIC | Age: 65
End: 2025-08-13
Payer: MEDICARE

## 2025-09-03 ENCOUNTER — TELEPHONE (OUTPATIENT)
Dept: FAMILY MEDICINE | Facility: CLINIC | Age: 65
End: 2025-09-03
Payer: MEDICARE

## (undated) DEVICE — GLOVE BIOGEL SKINSENSE PI 8.5

## (undated) DEVICE — COVER MAYO STAND 23X54IN

## (undated) DEVICE — SUT ETHILON 4-0 PS2 18 BLK

## (undated) DEVICE — GLOVE BIOGEL PIMICRO INDIC 8.5

## (undated) DEVICE — TRAY UPPER EXTREMITY SMH

## (undated) DEVICE — TOURNIQUET SB QC DP 18X4IN

## (undated) DEVICE — APPLICATOR CHLORAPREP ORN 26ML

## (undated) DEVICE — SOL NACL IRR 1000ML BTL

## (undated) DEVICE — ELECTRODE REM PLYHSV RETURN 9